# Patient Record
Sex: FEMALE | Race: BLACK OR AFRICAN AMERICAN | Employment: OTHER | URBAN - METROPOLITAN AREA
[De-identification: names, ages, dates, MRNs, and addresses within clinical notes are randomized per-mention and may not be internally consistent; named-entity substitution may affect disease eponyms.]

---

## 2017-01-03 ENCOUNTER — HOSPITAL ENCOUNTER (OUTPATIENT)
Dept: LAB | Age: 77
Discharge: HOME | End: 2017-01-03
Payer: MEDICARE

## 2017-01-03 ENCOUNTER — TELEPHONE (OUTPATIENT)
Dept: FAMILY MEDICINE CLINIC | Age: 77
End: 2017-01-03

## 2017-01-03 ENCOUNTER — OFFICE VISIT (OUTPATIENT)
Dept: GYNECOLOGY | Age: 77
End: 2017-01-03

## 2017-01-03 VITALS
SYSTOLIC BLOOD PRESSURE: 175 MMHG | HEART RATE: 85 BPM | WEIGHT: 293 LBS | BODY MASS INDEX: 47.09 KG/M2 | DIASTOLIC BLOOD PRESSURE: 77 MMHG | HEIGHT: 66 IN

## 2017-01-03 DIAGNOSIS — M79.10 MYALGIA: Primary | ICD-10-CM

## 2017-01-03 DIAGNOSIS — C54.1 ENDOMETRIAL CANCER (HCC): Primary | ICD-10-CM

## 2017-01-03 DIAGNOSIS — E66.01 MORBID OBESITY, UNSPECIFIED OBESITY TYPE (HCC): ICD-10-CM

## 2017-01-03 PROCEDURE — 88175 CYTOPATH C/V AUTO FLUID REDO: CPT | Performed by: OBSTETRICS & GYNECOLOGY

## 2017-01-03 RX ORDER — CYCLOBENZAPRINE HCL 10 MG
10 TABLET ORAL 2 TIMES DAILY
Qty: 60 TAB | Refills: 0 | Status: SHIPPED | OUTPATIENT
Start: 2017-01-03 | End: 2017-01-24

## 2017-01-03 NOTE — PROGRESS NOTES
27 [facility] [address], [address] [facility]  [phone]  MD Scott Yost MD Werner Karvonen, JACQUELINE    Office Visit    Patient ID:  Name: Breanne Jay  MRN: [id number]  : 1940/76 y.o. Visit date: 1/3/2017    Primary Diagnosis:     ICD-10-CM ICD-9-CM    1. Endometrial cancer ([facility]) C54.1 182.0 PAP, IG, RFX HPV ASCUS ([id number])   2.  Morbid obesity, unspecified obesity type ([facility] E66.01 278.01           Problem List:    Patient Active Problem List   Diagnosis Code    DVT (deep venous thrombosis) ([facility] I82.409    Chronic pain disorder G89.4    HTN (hypertension) I10    Fibromyalgia M79.7    Hypercholesterolemia E78.00    Monoclonal gammopathies D47.2    Acute sinusitis J01.90    Rash R21    Pulmonary embolism ([facility]) I26.99    Edema leg R60.0    MCTD (mixed connective tissue disease) ([facility] M35.1    Leg pain, bilateral M79.604, M79.605    Morbid obesity ([facility]) E66.01    Bronchitis J40    Cellulitis of nostril J34.0    Atrial fibrillation ([facility]) I48.91    Otitis externa H60.90    Tinea corporis B35.4    Edema leg R60.0    Ankle pain, right M25.571    Elbow pain, right M25.521    Arm pain, right M79.601    SOB (shortness of breath) R06.02    DM (diabetes mellitus) ([facility]) E11.9    Pulmonary edema J81.1    Elevated blood sugar R73.9    Iritis, chronic H20.10    Heart failure, systolic and diastolic, chronic ([facility]) B86.34    Dental cavities K02.9    Cellulitis and abscess L03.90, L02.91    MRSA (methicillin resistant staph aureus) culture positive Z22.322    Onychomycosis B35.1    Dental abscess K04.7    Cataract H26.9    Knee pain, bilateral M25.561, M25.562    TSH elevation R94.6    MGUS (monoclonal gammopathy of unknown significance) D47.2    Subclinical hypothyroidism E03.9    Vitamin D deficiency E55.9    Left shoulder pain M25.512    Diabetes mellitus, type II (HCC) E11.9    Rash of hands R21    Fall against object [address] XXXA    Absolute anemia D64.9    Preoperative clearance Z01.818    Tinea pedis, recurrent B35.3    Claudication of both lower extremities (HCC) I73.9    Fatty liver K76.0    Acute kidney failure (HCC) N17.9    Cholelithiasis K80.20    Diabetes mellitus type 2, controlled (HCC) E11.9    Screening for glaucoma Z13.5    Screening for colon cancer Z12.11    Lower abdominal pain R10.30    Leiomyoma of body of uterus D25.9    Endometrial cancer (HCC) C54.1    Myalgia M79.1    Pain of left hand M79.642    Left wrist pain M25.532    Pain of left upper extremity M79.602    At risk for side effect of medication Z91.89       INTERVAL HISTORY: Lily Osborn is a morbidly obese  female with multiple medical problems and a history of stage IA, grade 3, uterine papillary serous carcinoma. She underwent a TLH, BSO, PLND in August 2016. She was recommended adjuvant chemotherapy with Taxol/Carboplatin but she declined. She presents today for routine surveillance. She denies any vaginal bleeding or discharge, any pelvic or abdominal pain, or any changes in her bowel or bladder habits. She has a lot of other complaints, primary musculoskeletal issues. ROS:  Negative  and GI review for dysfunction  Negative cardiopulmonary review. No SOB, palpitations, SRIVASTAVA, Angine. No syncope  Negative musculoskeletal review. Negative Psychological/Neurological review.       PMH:  Past Medical History   Diagnosis Date    Acute kidney failure ([address] 11/19/2015    Acute sinusitis 2/11/2011    Adverse effect of anesthesia      colon polyp    Arthritis      RA    At risk for side effect of medication 9/30/2016    Atrial fibrillation ([address] 10/19/2011    Autoimmune disease ([address]      [address]    Cholelithiasis 11/19/2015    Chronic pain     Claudication of both lower extremities ([address] 8/25/2015    Diabetes mellitus type 2, controlled ([address] 1/19/2016    Diabetes mellitus, type II ([facility] 10/10/2014    Fall against object 10/10/2014    Fatty liver 10/20/2015    Hypertension     Ill-defined condition      SPINAL STENOSIS    Left shoulder pain 10/10/2014    Leiomyoma of body of uterus 2016    Morbid obesity ([address]     Myalgia 2016    Pneumonia     Preop examination 6/15/2015    Preoperative clearance 6/15/2015    Rash 2011    Rash of hands 10/10/2014    Screening for colon cancer 2016    Screening for glaucoma 2016    SOB (shortness of breath)      hospitalized 2012. angina, SOB    Thromboembolus ([facility]      LEFT LOWER LEG AND PE    Tinea pedis, recurrent 2015     PSH:  Past Surgical History   Procedure Laterality Date    Pr colsc flx w/rmvl of tumor polyp lesion snare tq  2011          Hx heart catheterization       NEGATIVE PER PATIENT    Hx orthopaedic Right 'S     BUNIONECTOMY    Hx knee arthroscopy Left     Hx other surgical       BIOPSY OF VEIN IN FOREHEAD    Hx cataract removal Bilateral     Hx  section   [facility] Hx gyn  1960     etopic    Hx dilation and curettage  2016    Hx tubal ligation  1963    Hx hysterectomy  2016    Colonoscopy,remv aris,snare  2016          Colonoscopy N/A 2016     COLONOSCOPY performed by Keron Monique MD at [facility] ENDOSCOPY     SOC:  Social History     Social History    Marital status: SINGLE     Spouse name: N/A    Number of children: N/A    Years of education: N/A     Occupational History    Not on file.      Social History Main Topics    Smoking status: Former Smoker     Packs/day: 0.25     Years: 15.00     Quit date: 1996    Smokeless tobacco: Never Used    Alcohol use No    Drug use: No    Sexual activity: No     Other Topics Concern    Not on file     Social History Narrative     Family History  Family History   Problem Relation Age of Onset    Other Mother      ANEURYSM    Obesity Mother     Heart Disease Father     Other Father      VASCULAR DISEASE    Heart Disease Brother     Heart Attack Brother     Kidney Disease Sister     Seizures Brother     Heart Attack Brother     Anesth Problems Neg Hx      Medications: (reviewed)  Current Outpatient Prescriptions on File Prior to Visit   Medication Sig Dispense Refill    erythromycin (ILOTYCIN) ophthalmic ointment Administer 1 g to right eye three (3) times daily for 7 days. 21 Tube 0    morphine CR (MS CONTIN) 60 mg CR tablet Take 1 Tab by mouth every twelve (12) hours. Max Daily Amount: 120 mg. Indications: CHRONIC PAIN, SEVERE PAIN 60 Tab 0    oxyCODONE IR (ROXICODONE) 20 mg immediate release tablet Take 1 Tab by mouth every four (4) hours as needed for Pain. Max Daily Amount: 120 mg. Indications: PAIN 180 Tab 0    [START ON 1/22/2017] oxyCODONE IR (ROXICODONE) 20 mg immediate release tablet Take 1 Tab by mouth every four (4) hours as needed for Pain. Max Daily Amount: 120 mg. Indications: PAIN 180 Tab 0    [START ON 1/22/2017] morphine CR (MS CONTIN) 60 mg CR tablet Take 1 Tab by mouth every twelve (12) hours. Max Daily Amount: 120 mg. Indications: CHRONIC PAIN, SEVERE PAIN 60 Tab 0    metoprolol succinate (TOPROL-XL) 100 mg tablet       furosemide (LASIX) 40 mg tablet Take 1 Tab by mouth daily. 30 Tab 1    potassium chloride (K-DUR, KLOR-CON) 20 mEq tablet Take 1 Tab by mouth two (2) times a day. Indications: HYPOKALEMIA PREVENTION (Patient taking differently: Take 20 mEq by mouth daily. Indications: HYPOKALEMIA PREVENTION) 30 Tab 1    aspirin delayed-release 81 mg tablet Take 2 Tabs by mouth daily (after breakfast). After meal  Indications: PREVENTION OF TRANSIENT ISCHEMIC ATTACKS, THROMBOSIS PREVENTION AFTER PCI 60 Tab 11    ascorbic acid, vitamin C, (VITAMIN C) 500 mg tablet Take 500 mg by mouth daily.  magnesium 250 mg tab Take  by mouth daily.  cholecalciferol, vitamin D3, (VITAMIN D3) 2,000 unit tab Take  by mouth daily.       hydrALAZINE (APRESOLINE) 25 mg tablet Take 1 Tab by mouth daily. 90 Tab 6    metoprolol succinate (TOPROL-XL) 50 mg XL tablet Take 50 mg by mouth daily.  levalbuterol tartrate (XOPENEX HFA) 45 mcg/actuation inhaler Take 2 Puffs by inhalation every six (6) hours as needed for Wheezing or Shortness of Breath. 1 Inhaler 11     No current facility-administered medications on file prior to visit. Allergies: (reviewed)  Allergies   Allergen Reactions    Latex Rash and Itching    Advair Diskus [Fluticasone-Salmeterol] Other (comments)     \"breathing problems\"    Bactrim [Sulfamethoprim] Shortness of Breath, Itching and Swelling     Swelling of tongue and throat    Iodinated Contrast Media - Oral And Iv Dye Hives    Ivp [Fd And C Blue No.1] Hives and Shortness of Breath    Lovenox [Enoxaparin] Shortness of Breath    Nsaids (Non-Steroidal Anti-Inflammatory Drug) Other (comments)     Heartburn    Sulfa (Sulfonamide Antibiotics) Shortness of Breath       OBJECTIVE:    PHYSICAL EXAM  VITAL SIGNS: Vitals:    01/03/17 0929   BP: 175/77   Pulse: 85   Weight: 309 lb (140.2 kg)   Height: 5' 6\" (1.676 m)     Body mass index is 49.87 kg/(m^2). GENERAL VICKI: Conversant, alert, oriented. NAD   HEENT: Normocephalic. Oropharynx clear. Neck: Supple. Trachea midline, no JVD, no thyroid enlargement   RESPIRATORY: Lung fields clear to auscultation and percussion. No rales/rhonchi. Adequate respiratory effort   CARDIOVASC: Rate regular, S1 and S2 confirmed   GASTROINT: soft, non-tender, without masses or organomegaly. No hernia noted   MUSCULOSKEL: FROM. No focal tenderness. EXTREMITIES: extremities normal, atraumatic, no cyanosis or edema. Pulses appreciated.    PELVIC: External genitalia: normal general appearance  Urinary system: urethral meatus normal  Vaginal: normal without tenderness, induration or masses  Cervix: absent  Adnexa: removed surgically  Uterus: absent   RECTAL: Deferred   MEHUL SURVEY: Negative throughout---neck, axillae, inguina. NEURO: Grossly intact, no acute deficit. Oriented. Not depressed. Not agitated. IMPRESSION AND PLAN:  Guera Mckoy has a diagnosis of stage IA, grade 3, uterine papillary serous carcinoma. She declined adjuvant therapy following her hysterectomy. She has no evidence of disease on today's exam.  We will follow up on today's pap smear and will send her for a CA-125. I will see her back in 3 months.       Kristine Crocker MD  1/3/2017/9:26 AM

## 2017-01-03 NOTE — TELEPHONE ENCOUNTER
Pt wants a muscle relaxer for pain in her shoulder area   No fall or accident       Best number to reach her is [phone]

## 2017-01-03 NOTE — TELEPHONE ENCOUNTER
Returned call to pt. C/o muscle spasms to left shoulder,  States oxycodone IR  or morphine CR not relieving pain. Denies injury or fall. Requesting muscle relaxer.   Message sent to Dr Lorenzo Muñoz

## 2017-01-04 LAB — CANCER AG125 SERPL-ACNC: 60.9 U/ML (ref 0–38.1)

## 2017-01-06 NOTE — PROGRESS NOTES
Abnormal cells, suspicious for malignancy. CA-125 also elevated. Patient needs appointment to discuss the results and to schedule imaging.

## 2017-01-09 DIAGNOSIS — R31.9 BLOOD IN URINE: ICD-10-CM

## 2017-01-09 DIAGNOSIS — R10.30 LOWER ABDOMINAL PAIN: ICD-10-CM

## 2017-01-09 DIAGNOSIS — E11.9 DIABETES MELLITUS WITHOUT COMPLICATION (HCC): ICD-10-CM

## 2017-01-09 DIAGNOSIS — G89.4 CHRONIC PAIN DISORDER: ICD-10-CM

## 2017-01-09 DIAGNOSIS — I26.99 OTHER PULMONARY EMBOLISM WITHOUT ACUTE COR PULMONALE, UNSPECIFIED CHRONICITY (HCC): ICD-10-CM

## 2017-01-09 NOTE — PROGRESS NOTES
Patient notified of results, pt verbalized understanding of results and MD's recommendations, pt made an appt for consult visit for 1/10/17

## 2017-01-09 NOTE — TELEPHONE ENCOUNTER
Received call from pt with a call back request.  Pt would like Dr Collins Mendoza to call her in regards to her most recent results from the oncologist.  Informed her that Dr Rosalva Sandoval is out of the office .   Pt stated understanding    Message sent to dr Collins Mendoza

## 2017-01-10 ENCOUNTER — TELEPHONE (OUTPATIENT)
Dept: GYNECOLOGY | Age: 77
End: 2017-01-10

## 2017-01-10 ENCOUNTER — OFFICE VISIT (OUTPATIENT)
Dept: GYNECOLOGY | Age: 77
End: 2017-01-10

## 2017-01-10 DIAGNOSIS — E66.01 MORBID OBESITY, UNSPECIFIED OBESITY TYPE (HCC): ICD-10-CM

## 2017-01-10 DIAGNOSIS — T50.995A ALLERGY TO IVP DYE, INITIAL ENCOUNTER: ICD-10-CM

## 2017-01-10 DIAGNOSIS — I82.499 DEEP VEIN THROMBOSIS (DVT) OF OTHER VEIN OF LOWER EXTREMITY, UNSPECIFIED CHRONICITY, UNSPECIFIED LATERALITY (HCC): ICD-10-CM

## 2017-01-10 DIAGNOSIS — C54.1 ENDOMETRIAL CANCER (HCC): Primary | ICD-10-CM

## 2017-01-10 DIAGNOSIS — I26.09 OTHER PULMONARY EMBOLISM WITH ACUTE COR PULMONALE, UNSPECIFIED CHRONICITY (HCC): ICD-10-CM

## 2017-01-10 DIAGNOSIS — I82.499 DEEP VEIN THROMBOSIS (DVT) OF OTHER VEIN OF LOWER EXTREMITY, UNSPECIFIED CHRONICITY, UNSPECIFIED LATERALITY (HCC): Primary | ICD-10-CM

## 2017-01-10 RX ORDER — PREDNISONE 10 MG/1
TABLET ORAL
Qty: 15 TAB | Refills: 0 | Status: SHIPPED | OUTPATIENT
Start: 2017-01-10 | End: 2017-01-24

## 2017-01-10 RX ORDER — FUROSEMIDE 40 MG/1
40 TABLET ORAL DAILY
Qty: 30 TAB | Refills: 1 | Status: SHIPPED | OUTPATIENT
Start: 2017-01-10 | End: 2017-02-23 | Stop reason: SDUPTHER

## 2017-01-10 NOTE — PROGRESS NOTES
[address], [address], [address]  [phone]  MD Deanna Campbell MD Raelene Conroy, JACQUELINE    Office Visit    Patient ID:  Name: Tila Coles  MRN: [id number]  : 1940/76 y.o. Visit date: 1/10/2017    Primary Diagnosis:     ICD-10-CM ICD-9-CM    1. Endometrial cancer ([facility]) C54.1 182.0    2. Morbid obesity, unspecified obesity type ([facility] E66.01 278.01    3. Deep vein thrombosis (DVT) of other vein of lower extremity, unspecified chronicity, unspecified laterality ([facility]) I82.499 453.40    4.  Other pulmonary embolism with acute cor pulmonale, unspecified chronicity ([facility]) I26.09 415.19      415.0           Problem List:    Patient Active Problem List   Diagnosis Code    DVT (deep venous thrombosis) ([facility]) I82.409    Chronic pain disorder G89.4    HTN (hypertension) I10    Fibromyalgia M79.7    Hypercholesterolemia E78.00    Monoclonal gammopathies D47.2    Acute sinusitis J01.90    Rash R21    Pulmonary embolism ([facility]) I26.99    Edema leg R60.0    MCTD (mixed connective tissue disease) ([facility] M35.1    Leg pain, bilateral M79.604, M79.605    Morbid obesity ([facility]) E66.01    Bronchitis J40    Cellulitis of nostril J34.0    Atrial fibrillation ([facility]) I48.91    Otitis externa H60.90    Tinea corporis B35.4    Edema leg R60.0    Ankle pain, right M25.571    Elbow pain, right M25.521    Arm pain, right M79.601    SOB (shortness of breath) R06.02    DM (diabetes mellitus) ([facility]) E11.9    Pulmonary edema J81.1    Elevated blood sugar R73.9    Iritis, chronic H20.10    Heart failure, systolic and diastolic, chronic ([facility]) N60.59    Dental cavities K02.9    Cellulitis and abscess L03.90, L02.91    MRSA (methicillin resistant staph aureus) culture positive Z22.322    Onychomycosis B35.1    Dental abscess K04.7    Cataract H26.9    Knee pain, bilateral M25.561, M25.562    TSH elevation R94.6    MGUS (monoclonal gammopathy of unknown significance) D47.2    Subclinical hypothyroidism E03.9    Vitamin D deficiency E55.9    Left shoulder pain M25.512    Diabetes mellitus, type II (HCC) E11.9    Rash of hands R21    Fall against object [address]. XXXA    Absolute anemia D64.9    Preoperative clearance Z01.818    Tinea pedis, recurrent B35.3    Claudication of both lower extremities (HCC) I73.9    Fatty liver K76.0    Acute kidney failure (HCC) N17.9    Cholelithiasis K80.20    Diabetes mellitus type 2, controlled (HCC) E11.9    Screening for glaucoma Z13.5    Screening for colon cancer Z12.11    Lower abdominal pain R10.30    Leiomyoma of body of uterus D25.9    Endometrial cancer (HCC) C54.1    Myalgia M79.1    Pain of left hand M79.642    Left wrist pain M25.532    Pain of left upper extremity M79.602    At risk for side effect of medication Z91.89       INTERVAL HISTORY: Lalo Wadsworth is a morbidly obese  female with multiple medical problems and a history of stage IA, grade 3, uterine papillary serous carcinoma. She underwent a TLH, BSO, PLND in August 2016. She was recommended adjuvant chemotherapy with Taxol/Carboplatin but she declined. She presented last week for routine surveillance. She denied any vaginal bleeding or discharge, any pelvic or abdominal pain, or any changes in her bowel or bladder habits. She had a lot of other complaints, primary musculoskeletal issues. There was nothing noted on exam but her pap smear returned as highly suspicious for adenocarcinoma. Her CA-125 was also elevated at 60.9. This was normal prior to her surgery. She presents today to discuss these results. ROS:  Negative  and GI review for dysfunction  Negative cardiopulmonary review. No SOB, palpitations, SRIVASTAVA, Angine. No syncope  Negative musculoskeletal review. Negative Psychological/Neurological review.       PMH:  Past Medical History   Diagnosis Date    Acute kidney failure ([address] 2015    Acute sinusitis 2011    Adverse effect of anesthesia      colon polyp    Arthritis      RA    At risk for side effect of medication 2016    Atrial fibrillation ([address] 10/19/2011    Autoimmune disease ([address]      [address]    Cholelithiasis 2015    Chronic pain     Claudication of both lower extremities ([address] 2015    Diabetes mellitus type 2, controlled ([address] 2016    Diabetes mellitus, type II ([address] 10/10/2014    Fall against object 10/10/2014    Fatty liver 10/20/2015    Hypertension     Ill-defined condition      SPINAL STENOSIS    Left shoulder pain 10/10/2014    Leiomyoma of body of uterus 2016    Morbid obesity ([address]     Myalgia 2016    Pneumonia     Preop examination 6/15/2015    Preoperative clearance 6/15/2015    Rash 2011    Rash of hands 10/10/2014    Screening for colon cancer 2016    Screening for glaucoma 2016    SOB (shortness of breath)      hospitalized 2012. angina, SOB    Thromboembolus ([address]      LEFT LOWER LEG AND PE    Tinea pedis, recurrent 2015     PSH:  Past Surgical History   Procedure Laterality Date    Pr colsc flx w/rmvl of tumor polyp lesion snare tq  2011          Hx heart catheterization       NEGATIVE PER PATIENT    Hx orthopaedic Right 1960'S     BUNIONECTOMY    Hx knee arthroscopy Left     Hx other surgical       BIOPSY OF VEIN IN FOREHEAD    Hx cataract removal Bilateral     Hx  section   [facility] Hx gyn  1960     etopic    Hx dilation and curettage  2016    Hx tubal ligation  1963    Hx hysterectomy  2016    Colonoscopy,remv lesn,snare  2016          Colonoscopy N/A 2016     COLONOSCOPY performed by Velvet Pena MD at [facility] ENDOSCOPY     SOC:  Social History     Social History    Marital status: SINGLE     Spouse name: N/A    Number of children: N/A    Years of education: N/A     Occupational History    Not on file. Social History Main Topics    Smoking status: Former Smoker     Packs/day: 0.25     Years: 15.00     Quit date: 8/23/1996    Smokeless tobacco: Never Used    Alcohol use No    Drug use: No    Sexual activity: No     Other Topics Concern    Not on file     Social History Narrative     Family History  Family History   Problem Relation Age of Onset    Other Mother      ANEURYSM    Obesity Mother     Heart Disease Father     Other Father      VASCULAR DISEASE    Heart Disease Brother     Heart Attack Brother     Kidney Disease Sister     Seizures Brother     Heart Attack Brother     Anesth Problems Neg Hx      Medications: (reviewed)  Current Outpatient Prescriptions on File Prior to Visit   Medication Sig Dispense Refill    cyclobenzaprine (FLEXERIL) 10 mg tablet Take 1 Tab by mouth two (2) times a day. Indications: MUSCLE SPASM 60 Tab 0    morphine CR (MS CONTIN) 60 mg CR tablet Take 1 Tab by mouth every twelve (12) hours. Max Daily Amount: 120 mg. Indications: CHRONIC PAIN, SEVERE PAIN 60 Tab 0    oxyCODONE IR (ROXICODONE) 20 mg immediate release tablet Take 1 Tab by mouth every four (4) hours as needed for Pain. Max Daily Amount: 120 mg. Indications: PAIN 180 Tab 0    [START ON 1/22/2017] oxyCODONE IR (ROXICODONE) 20 mg immediate release tablet Take 1 Tab by mouth every four (4) hours as needed for Pain. Max Daily Amount: 120 mg. Indications: PAIN 180 Tab 0    [START ON 1/22/2017] morphine CR (MS CONTIN) 60 mg CR tablet Take 1 Tab by mouth every twelve (12) hours. Max Daily Amount: 120 mg. Indications: CHRONIC PAIN, SEVERE PAIN 60 Tab 0    metoprolol succinate (TOPROL-XL) 100 mg tablet       furosemide (LASIX) 40 mg tablet Take 1 Tab by mouth daily. 30 Tab 1    potassium chloride (K-DUR, KLOR-CON) 20 mEq tablet Take 1 Tab by mouth two (2) times a day. Indications: HYPOKALEMIA PREVENTION (Patient taking differently: Take 20 mEq by mouth daily.  Indications: HYPOKALEMIA PREVENTION) 30 Tab 1  aspirin delayed-release 81 mg tablet Take 2 Tabs by mouth daily (after breakfast). After meal  Indications: PREVENTION OF TRANSIENT ISCHEMIC ATTACKS, THROMBOSIS PREVENTION AFTER PCI 60 Tab 11    ascorbic acid, vitamin C, (VITAMIN C) 500 mg tablet Take 500 mg by mouth daily.  magnesium 250 mg tab Take  by mouth daily.  cholecalciferol, vitamin D3, (VITAMIN D3) 2,000 unit tab Take  by mouth daily.  hydrALAZINE (APRESOLINE) 25 mg tablet Take 1 Tab by mouth daily. 90 Tab 6    levalbuterol tartrate (XOPENEX HFA) 45 mcg/actuation inhaler Take 2 Puffs by inhalation every six (6) hours as needed for Wheezing or Shortness of Breath. 1 Inhaler 11     No current facility-administered medications on file prior to visit. Allergies: (reviewed)  Allergies   Allergen Reactions    Latex Rash and Itching    Advair Diskus [Fluticasone-Salmeterol] Other (comments)     \"breathing problems\"    Bactrim [Sulfamethoprim] Shortness of Breath, Itching and Swelling     Swelling of tongue and throat    Iodinated Contrast Media - Oral And Iv Dye Hives    Ivp [Fd And C Blue No.1] Hives and Shortness of Breath    Lovenox [Enoxaparin] Shortness of Breath    Nsaids (Non-Steroidal Anti-Inflammatory Drug) Other (comments)     Heartburn    Sulfa (Sulfonamide Antibiotics) Shortness of Breath       OBJECTIVE:    PHYSICAL EXAM  VITAL SIGNS: There were no vitals filed for this visit. There is no height or weight on file to calculate BMI. GENERAL VICKI: Conversant, alert, oriented. NAD   HEENT: Normocephalic. Oropharynx clear. Neck: Supple. Trachea midline, no JVD, no thyroid enlargement   RESPIRATORY: Lung fields clear to auscultation and percussion. No rales/rhonchi. Adequate respiratory effort   CARDIOVASC: Rate regular, S1 and S2 confirmed   GASTROINT: soft, non-tender, without masses or organomegaly. No hernia noted   MUSCULOSKEL: FROM. No focal tenderness.     EXTREMITIES: extremities normal, atraumatic, no cyanosis or edema. Pulses appreciated. PELVIC: Deferred   RECTAL: Deferred   MEHUL SURVEY: Negative throughout---neck, axillae, inguina. NEURO: Grossly intact, no acute deficit. Oriented. Not depressed. Not agitated. IMPRESSION AND PLAN:  Marley Torre has a diagnosis of stage IA, grade 3, uterine papillary serous carcinoma. She declined adjuvant therapy following her hysterectomy. She now has findings concerning for recurrence, specifically an abnormal pap smear and an elevated CA-125. I had a long discussion with the patient, her son, and her daughter about the possibility of recurrent disease. I am going to send her for a CT of the chest, abdomen, and pelvis. If that does not show evidence of disease, I will likely take her to the OR for an EUA with biopsies.         Aravind Kline MD  1/10/2017/9:26 AM

## 2017-01-10 NOTE — MR AVS SNAPSHOT
Visit Information Date & Time Provider Department Dept. Phone Encounter #  
 1/10/2017  2:00 PM Samira Medina MD [facility] Gynecologic Oncology [phone] [id number] Your Appointments 1/17/2017  8:45 AM  
CT SCAN FOLLOW UP with Derian Santiago MD  
[facility]IL[facility] LEIGH Gynecologic Oncology [facility]) Appt Note: ct results [address] [phone]  
[phone]  
  
    
 4/4/2017  9:30 AM  
3 MONTH with Xochilt San MD  
[address] Oncology [facility]) Appt Note: [address] [facility] [address] [phone]  
[phone]  
  
    
 10/31/2017 10:00 AM  
Follow Up with Cammy Reyes  [facility] Oncology at [facility] Appt Note: MGUS, 1 yr f/u, CP 0  
 [address] [phone]  
[phone]  
  
   
 [address] [facility] [address] Upcoming Health Maintenance Date Due  
 MEDICARE YEARLY EXAM 7/20/2016 MICROALBUMIN Q1 2/22/2017 LIPID PANEL Q1 3/17/2017 HEMOGLOBIN A1C Q6M 4/20/2017 EYE EXAM RETINAL OR DILATED Q1 4/22/2017 FOOT EXAM Q1 7/13/2017 GLAUCOMA SCREENING Q2Y 4/22/2018 COLONOSCOPY 12/21/2019 DTaP/Tdap/Td series (3 - Td) 10/20/2026 Allergies as of 1/10/2017  Review Complete On: 1/10/2017 By: Xochilt San MD  
  
 Severity Noted Reaction Type Reactions Latex  08/29/2013    Rash, Itching Advair Diskus [Fluticasone-salmeterol]  05/29/2012    Other (comments) \"breathing problems\" Bactrim [Sulfamethoprim]  08/29/2013   Side Effect Shortness of Breath, Itching, Swelling Swelling of tongue and throat Iodinated Contrast Media - Oral And Iv Dye  10/04/2016    Hives Ivp [Fd And C Blue No.1]  12/21/2010    Hives, Shortness of Breath Lovenox [Enoxaparin]  12/21/2010    Shortness of Breath Nsaids (Non-steroidal Anti-inflammatory Drug)  12/21/2010    Other (comments) Heartburn Sulfa (Sulfonamide Antibiotics)  12/23/2013    Shortness of Breath Current Immunizations  Reviewed on 10/31/2016 Name Date Influenza Vaccine (Quad) PF  Deferred (Medication not available) Pneumococcal Vaccine (Unspecified Type) 1/1/2010 Tdap 5/17/2006 Not reviewed this visit You Were Diagnosed With   
  
 Codes Comments Endometrial cancer ([facility]    -  Primary ICD-10-CM: C54.1 ICD-9-CM: 182.0 Morbid obesity, unspecified obesity type ([facility]     ICD-10-CM: E66.01 
ICD-9-CM: 278.01 Deep vein thrombosis (DVT) of other vein of lower extremity, unspecified chronicity, unspecified laterality (HCC)     ICD-10-CM: K91.781 ICD-9-CM: 453.40 Other pulmonary embolism with acute cor pulmonale, unspecified chronicity (HCC)     ICD-10-CM: I26.09 
ICD-9-CM: 415.19, 415.0 Vitals OB Status Smoking Status Postmenopausal Former Smoker Preferred Pharmacy Pharmacy Name Phone [id number]- [place], [place] - [address] [phone] Your Updated Medication List  
  
   
This list is accurate as of: 1/10/17  3:27 PM.  Always use your most recent med list.  
  
  
  
  
 aspirin delayed-release 81 mg tablet Take 2 Tabs by mouth daily (after breakfast). After meal  Indications: PREVENTION OF TRANSIENT ISCHEMIC ATTACKS, THROMBOSIS PREVENTION AFTER PCI  
  
 cyclobenzaprine 10 mg tablet Commonly known as:  FLEXERIL Take 1 Tab by mouth two (2) times a day. Indications: MUSCLE SPASM  
  
 furosemide 40 mg tablet Commonly known as:  LASIX Take 1 Tab by mouth daily. hydrALAZINE 25 mg tablet Commonly known as:  APRESOLINE Take 1 Tab by mouth daily. levalbuterol tartrate 45 mcg/actuation inhaler Commonly known as:  Keiko Brown  
 Take 2 Puffs by inhalation every six (6) hours as needed for Wheezing or Shortness of Breath.  
  
 magnesium 250 mg Tab Take  by mouth daily. metoprolol succinate 100 mg tablet Commonly known as:  TOPROL-XL  
  
 * morphine CR 60 mg CR tablet Commonly known as:  MS CONTIN Take 1 Tab by mouth every twelve (12) hours. Max Daily Amount: 120 mg. Indications: CHRONIC PAIN, SEVERE PAIN  
  
 * morphine CR 60 mg CR tablet Commonly known as:  MS CONTIN Take 1 Tab by mouth every twelve (12) hours. Max Daily Amount: 120 mg. Indications: CHRONIC PAIN, SEVERE PAIN Start taking on:  1/22/2017 * oxyCODONE IR 20 mg immediate release tablet Commonly known as:  Benjaman Hurl Take 1 Tab by mouth every four (4) hours as needed for Pain. Max Daily Amount: 120 mg. Indications: PAIN  
  
 * oxyCODONE IR 20 mg immediate release tablet Commonly known as:  Benjaman Hurl Take 1 Tab by mouth every four (4) hours as needed for Pain. Max Daily Amount: 120 mg. Indications: PAIN Start taking on:  1/22/2017 potassium chloride 20 mEq tablet Commonly known as:  K-DUR, KLOR-CON Take 1 Tab by mouth two (2) times a day. Indications: HYPOKALEMIA PREVENTION  
  
 VITAMIN C 500 mg tablet Generic drug:  ascorbic acid (vitamin C) Take 500 mg by mouth daily. VITAMIN D3 2,000 unit Tab Generic drug:  cholecalciferol (vitamin D3) Take  by mouth daily. * Notice: This list has 4 medication(s) that are the same as other medications prescribed for you. Read the directions carefully, and ask your doctor or other care provider to review them with you. To-Do List   
 01/10/2017 Imaging:  CT ABD PELV W WO CONT   
  
 01/10/2017 Imaging:  CT CHEST W WO CONT Please provide this summary of care documentation to your next provider. Your primary care clinician is listed as Therese Alert. If you have any questions after today's visit, please call [phone].

## 2017-01-10 NOTE — TELEPHONE ENCOUNTER
Pt  to state she is allergic IV contract/dye She is scheduled for a CT scan on 1/12/17. She is requesting the medication for benadryl and prednisone be sent to the [facility] [address] She was advised to take first dose of prednisone at 11:30 pm on 1/11/17, the second dose at 5:30 am on 1/12/17 and the last dose one hour before her scan at 11:3 am on 1/12/17, pt verbalized understanding.   She was also instructed to take the benadryl one hour prior to scan, pt verbalized understanding, per verbal order ok to send Benadryl tablet and prednisone to pharmacy, allergy warning came up advair and prednisone, per [place] allergy was noted on 5/2012 and she took medication in 8/16 for previous scan without complication, ok to override

## 2017-01-12 ENCOUNTER — HOSPITAL ENCOUNTER (OUTPATIENT)
Dept: CT IMAGING | Age: 77
Discharge: HOME | End: 2017-01-12
Attending: OBSTETRICS & GYNECOLOGY
Payer: MEDICARE

## 2017-01-12 DIAGNOSIS — I26.09 OTHER PULMONARY EMBOLISM WITH ACUTE COR PULMONALE, UNSPECIFIED CHRONICITY (HCC): ICD-10-CM

## 2017-01-12 DIAGNOSIS — I82.499 DEEP VEIN THROMBOSIS (DVT) OF OTHER VEIN OF LOWER EXTREMITY, UNSPECIFIED CHRONICITY, UNSPECIFIED LATERALITY (HCC): ICD-10-CM

## 2017-01-12 DIAGNOSIS — C54.1 ENDOMETRIAL CANCER (HCC): ICD-10-CM

## 2017-01-12 PROCEDURE — 74011250636 HC RX REV CODE- 250/636: Performed by: OBSTETRICS & GYNECOLOGY

## 2017-01-12 PROCEDURE — 74011000255 HC RX REV CODE- 255: Performed by: OBSTETRICS & GYNECOLOGY

## 2017-01-12 PROCEDURE — 74011636320 HC RX REV CODE- 636/320: Performed by: OBSTETRICS & GYNECOLOGY

## 2017-01-12 PROCEDURE — 74177 CT ABD & PELVIS W/CONTRAST: CPT

## 2017-01-12 PROCEDURE — 71260 CT THORAX DX C+: CPT

## 2017-01-12 RX ORDER — SODIUM CHLORIDE 0.9 % (FLUSH) 0.9 %
10 SYRINGE (ML) INJECTION
Status: COMPLETED | OUTPATIENT
Start: 2017-01-12 | End: 2017-01-12

## 2017-01-12 RX ORDER — BARIUM SULFATE 20 MG/ML
900 SUSPENSION ORAL
Status: COMPLETED | OUTPATIENT
Start: 2017-01-12 | End: 2017-01-12

## 2017-01-12 RX ORDER — SODIUM CHLORIDE 9 MG/ML
50 INJECTION, SOLUTION INTRAVENOUS
Status: COMPLETED | OUTPATIENT
Start: 2017-01-12 | End: 2017-01-12

## 2017-01-12 RX ADMIN — BARIUM SULFATE 900 ML: 21 SUSPENSION ORAL at 12:39

## 2017-01-12 RX ADMIN — IOPAMIDOL 100 ML: 755 INJECTION, SOLUTION INTRAVENOUS at 12:39

## 2017-01-12 RX ADMIN — SODIUM CHLORIDE 50 ML/HR: 900 INJECTION, SOLUTION INTRAVENOUS at 12:39

## 2017-01-12 RX ADMIN — Medication 10 ML: at 12:39

## 2017-01-17 ENCOUNTER — OFFICE VISIT (OUTPATIENT)
Dept: GYNECOLOGY | Age: 77
End: 2017-01-17

## 2017-01-17 VITALS
BODY MASS INDEX: 47.09 KG/M2 | WEIGHT: 293 LBS | HEIGHT: 66 IN | DIASTOLIC BLOOD PRESSURE: 92 MMHG | SYSTOLIC BLOOD PRESSURE: 162 MMHG | HEART RATE: 94 BPM

## 2017-01-17 DIAGNOSIS — I26.09 OTHER PULMONARY EMBOLISM WITH ACUTE COR PULMONALE, UNSPECIFIED CHRONICITY (HCC): ICD-10-CM

## 2017-01-17 DIAGNOSIS — E66.01 MORBID OBESITY, UNSPECIFIED OBESITY TYPE (HCC): ICD-10-CM

## 2017-01-17 DIAGNOSIS — I82.499 DEEP VEIN THROMBOSIS (DVT) OF OTHER VEIN OF LOWER EXTREMITY, UNSPECIFIED CHRONICITY, UNSPECIFIED LATERALITY (HCC): ICD-10-CM

## 2017-01-17 DIAGNOSIS — C54.1 ENDOMETRIAL CANCER (HCC): Primary | ICD-10-CM

## 2017-01-17 NOTE — PROGRESS NOTES
[address], [address], [address]  [phone]  MD Scott Headley MD Werner Karvonen, NP    Office Visit    Patient ID:  Name: Breanne Jay  MRN: [id number]  : 1940/76 y.o. Visit date: 2017    Primary Diagnosis:     ICD-10-CM ICD-9-CM    1. Endometrial cancer ([facility]) C54.1 182.0    2. Morbid obesity, unspecified obesity type ([facility] E66.01 278.01    3. Other pulmonary embolism with acute cor pulmonale, unspecified chronicity ([facility]) I26.09 415.19      415.0    4.  Deep vein thrombosis (DVT) of other vein of lower extremity, unspecified chronicity, unspecified laterality ([facility]) I82.499 453.40           Problem List:    Patient Active Problem List   Diagnosis Code    DVT (deep venous thrombosis) ([facility]) I82.409    Chronic pain disorder G89.4    HTN (hypertension) I10    Fibromyalgia M79.7    Hypercholesterolemia E78.00    Monoclonal gammopathies D47.2    Acute sinusitis J01.90    Rash R21    Pulmonary embolism ([facility]) I26.99    Edema leg R60.0    MCTD (mixed connective tissue disease) ([facility]) M35.1    Leg pain, bilateral M79.604, M79.605    Morbid obesity ([facility]) E66.01    Bronchitis J40    Cellulitis of nostril J34.0    Atrial fibrillation ([facility]) I48.91    Otitis externa H60.90    Tinea corporis B35.4    Edema leg R60.0    Ankle pain, right M25.571    Elbow pain, right M25.521    Arm pain, right M79.601    SOB (shortness of breath) R06.02    DM (diabetes mellitus) ([facility]) E11.9    Pulmonary edema J81.1    Elevated blood sugar R73.9    Iritis, chronic H20.10    Heart failure, systolic and diastolic, chronic ([facility]) C72.82    Dental cavities K02.9    Cellulitis and abscess L03.90, L02.91    MRSA (methicillin resistant staph aureus) culture positive Z22.322    Onychomycosis B35.1    Dental abscess K04.7    Cataract H26.9    Knee pain, bilateral M25.561, M25.562    TSH elevation R94.6    MGUS (monoclonal gammopathy of unknown significance) D47.2    Subclinical hypothyroidism E03.9    Vitamin D deficiency E55.9    Left shoulder pain M25.512    Diabetes mellitus, type II (HCC) E11.9    Rash of hands R21    Fall against object W19. XXXA    Absolute anemia D64.9    Preoperative clearance Z01.818    Tinea pedis, recurrent B35.3    Claudication of both lower extremities (HCC) I73.9    Fatty liver K76.0    Acute kidney failure (HCC) N17.9    Cholelithiasis K80.20    Diabetes mellitus type 2, controlled (HCC) E11.9    Screening for glaucoma Z13.5    Screening for colon cancer Z12.11    Lower abdominal pain R10.30    Leiomyoma of body of uterus D25.9    Endometrial cancer (HCC) C54.1    Myalgia M79.1    Pain of left hand M79.642    Left wrist pain M25.532    Pain of left upper extremity M79.602    At risk for side effect of medication Z91.89       INTERVAL HISTORY: Lily Osborn is a morbidly obese  female with multiple medical problems and a history of stage IA, grade 3, uterine papillary serous carcinoma. She underwent a TLH, BSO, PLND in August 2016. She was recommended adjuvant chemotherapy with Taxol/Carboplatin but she declined. She presented last week for routine surveillance. She denied any vaginal bleeding or discharge, any pelvic or abdominal pain, or any changes in her bowel or bladder habits. She had a lot of other complaints, primary musculoskeletal issues. There was nothing noted on exam but her pap smear returned as highly suspicious for adenocarcinoma. Her CA-125 was also elevated at 60.9. This was normal prior to her surgery. I subsequently sent her for a CT of the abdomen/pelvis. She presents today to discuss these results. ROS:  Negative  and GI review for dysfunction  Negative cardiopulmonary review. No SOB, palpitations, SRIVASTAVA, Angine. No syncope  Negative musculoskeletal review. Negative Psychological/Neurological review.       PMH:  Past Medical History   Diagnosis Date    Acute kidney failure ([address] 2015    Acute sinusitis 2011    Adverse effect of anesthesia      colon polyp    Arthritis      RA    At risk for side effect of medication 2016    Atrial fibrillation ([address] 10/19/2011    Autoimmune disease ([address]      [address]    Cholelithiasis 2015    Chronic pain     Claudication of both lower extremities ([address] 2015    Diabetes mellitus type 2, controlled ([address] 2016    Diabetes mellitus, type II ([address] 10/10/2014    Fall against object 10/10/2014    Fatty liver 10/20/2015    Hypertension     Ill-defined condition      SPINAL STENOSIS    Left shoulder pain 10/10/2014    Leiomyoma of body of uterus 2016    Morbid obesity ([address]     Myalgia 2016    Pneumonia     Preop examination 6/15/2015    Preoperative clearance 6/15/2015    Rash 2011    Rash of hands 10/10/2014    Screening for colon cancer 2016    Screening for glaucoma 2016    SOB (shortness of breath)      hospitalized 2012.  angina, SOB    Thromboembolus ([address]      LEFT LOWER LEG AND PE    Tinea pedis, recurrent 2015     PSH:  Past Surgical History   Procedure Laterality Date    Pr colsc flx w/rmvl of tumor polyp lesion snare tq  2011          Hx heart catheterization  2001     NEGATIVE PER PATIENT    Hx orthopaedic Right 'S     BUNIONECTOMY    Hx knee arthroscopy Left     Hx other surgical       BIOPSY OF VEIN IN FOREHEAD    Hx cataract removal Bilateral     Hx  section   [facility] Hx gyn  1960     etopic    Hx dilation and curettage  2016    Hx tubal ligation  1963    Hx hysterectomy  2016    Colonoscopy,remv lesn,snare  2016          Colonoscopy N/A 2016     COLONOSCOPY performed by Gini Paul MD at [facility] ENDOSCOPY     SOC:  Social History     Social History    Marital status: SINGLE     Spouse name: N/A    Number of children: N/A    Years of education: N/A     Occupational History    Not on file. Social History Main Topics    Smoking status: Former Smoker     Packs/day: 0.25     Years: 15.00     Quit date: 8/23/1996    Smokeless tobacco: Never Used    Alcohol use No    Drug use: No    Sexual activity: No     Other Topics Concern    Not on file     Social History Narrative     Family History  Family History   Problem Relation Age of Onset    Other Mother      ANEURYSM    Obesity Mother     Heart Disease Father     Other Father      VASCULAR DISEASE    Heart Disease Brother     Heart Attack Brother     Kidney Disease Sister     Seizures Brother     Heart Attack Brother     Anesth Problems Neg Hx      Medications: (reviewed)  Current Outpatient Prescriptions on File Prior to Visit   Medication Sig Dispense Refill    furosemide (LASIX) 40 mg tablet Take 1 Tab by mouth daily. 30 Tab 1    predniSONE (DELTASONE) 10 mg tablet Take 5 tabs 13 hours, 7 hours and 1 hour prior to your scan 15 Tab 0    cyclobenzaprine (FLEXERIL) 10 mg tablet Take 1 Tab by mouth two (2) times a day. Indications: MUSCLE SPASM 60 Tab 0    morphine CR (MS CONTIN) 60 mg CR tablet Take 1 Tab by mouth every twelve (12) hours. Max Daily Amount: 120 mg. Indications: CHRONIC PAIN, SEVERE PAIN 60 Tab 0    metoprolol succinate (TOPROL-XL) 100 mg tablet       potassium chloride (K-DUR, KLOR-CON) 20 mEq tablet Take 1 Tab by mouth two (2) times a day. Indications: HYPOKALEMIA PREVENTION (Patient taking differently: Take 20 mEq by mouth daily. Indications: HYPOKALEMIA PREVENTION) 30 Tab 1    aspirin delayed-release 81 mg tablet Take 2 Tabs by mouth daily (after breakfast). After meal  Indications: PREVENTION OF TRANSIENT ISCHEMIC ATTACKS, THROMBOSIS PREVENTION AFTER PCI 60 Tab 11    ascorbic acid, vitamin C, (VITAMIN C) 500 mg tablet Take 500 mg by mouth daily.  magnesium 250 mg tab Take  by mouth daily.       cholecalciferol, vitamin D3, (VITAMIN D3) 2,000 unit tab Take  by mouth daily.  hydrALAZINE (APRESOLINE) 25 mg tablet Take 1 Tab by mouth daily. 90 Tab 6    levalbuterol tartrate (XOPENEX HFA) 45 mcg/actuation inhaler Take 2 Puffs by inhalation every six (6) hours as needed for Wheezing or Shortness of Breath. 1 Inhaler 11    oxyCODONE IR (ROXICODONE) 20 mg immediate release tablet Take 1 Tab by mouth every four (4) hours as needed for Pain. Max Daily Amount: 120 mg. Indications: PAIN 180 Tab 0    [START ON 1/22/2017] oxyCODONE IR (ROXICODONE) 20 mg immediate release tablet Take 1 Tab by mouth every four (4) hours as needed for Pain. Max Daily Amount: 120 mg. Indications: PAIN 180 Tab 0    [START ON 1/22/2017] morphine CR (MS CONTIN) 60 mg CR tablet Take 1 Tab by mouth every twelve (12) hours. Max Daily Amount: 120 mg. Indications: CHRONIC PAIN, SEVERE PAIN 60 Tab 0     No current facility-administered medications on file prior to visit. Allergies: (reviewed)  Allergies   Allergen Reactions    Latex Rash and Itching    Advair Diskus [Fluticasone-Salmeterol] Other (comments)     \"breathing problems\"    Bactrim [Sulfamethoprim] Shortness of Breath, Itching and Swelling     Swelling of tongue and throat    Iodinated Contrast Media - Oral And Iv Dye Hives    Ivp [Fd And C Blue No.1] Hives and Shortness of Breath    Lovenox [Enoxaparin] Shortness of Breath    Nsaids (Non-Steroidal Anti-Inflammatory Drug) Other (comments)     Heartburn    Sulfa (Sulfonamide Antibiotics) Shortness of Breath       OBJECTIVE:    PHYSICAL EXAM  VITAL SIGNS: Vitals:    01/17/17 0852   BP: (!) 162/92   Pulse: 94   Weight: 303 lb (137.4 kg)   Height: 5' 5.98\" (1.676 m)     Body mass index is 48.93 kg/(m^2). GENERAL VICKI: Conversant, alert, oriented. NAD   HEENT: Normocephalic. Oropharynx clear. Neck: Supple. Trachea midline, no JVD, no thyroid enlargement   RESPIRATORY: Lung fields clear to auscultation and percussion. No rales/rhonchi.  Adequate respiratory effort CARDIOVASC: Rate regular, S1 and S2 confirmed   GASTROINT: soft, non-tender, without masses or organomegaly. No hernia noted   MUSCULOSKEL: FROM. No focal tenderness. EXTREMITIES: extremities normal, atraumatic, no cyanosis or edema. Pulses appreciated. PELVIC: Deferred   RECTAL: Deferred   MEHUL SURVEY: Negative throughout---neck, axillae, inguina. NEURO: Grossly intact, no acute deficit. Oriented. Not depressed. Not agitated. CT of abdomen/pelvis ( 1/12/17)  THORAX:  2.9 mm nodule is noted in the periphery of the right middle lobe (4-35). 2.2 mm  pleural-based nodule which is triangular is noted adjacent to this nodule. Lungs  are otherwise clear. There is no acute finding. No pleural effusion. The airways  are patent.      The aorta enhances normally with no evidence of aneurysm or dissection.      There is no mediastinal or hilar or axillary adenopathy.      Thyroid gland is homogeneous.     Osseous structures of the thorax, abdomen, and pelvis demonstrate degenerative  changes of the inferior lumbar spine. No lytic or sclerotic lesions are  identified. There are degenerative changes of the sacroiliac joints. There is  possible early AVN of the left femoral head. This has not changed.        ABDOMEN AND PELVIS:  There are no focal abnormalities within the liver, kidneys, spleen, pancreas,  and adrenal glands. .     The aorta tapers without aneurysm. There is no retroperitoneal adenopathy or  mass.     There is no bowel obstruction or inflammatory change. There is diverticulosis of  the distal colon. The appendix is normal. There is a trace amount of ascites in  the right and left upper quadrants as well as in the cul-de-sac. There is slight  stranding in the pericolonic fat along the peritoneum on the left. This is best  visualized on coronal imaging (602, 73). There is no nodularity, however. No  other manifestation of peritoneal disease is identified. .     The study of the pelvis demonstrates moderately full urinary bladder. There is  no pelvic mass or adenopathy.     IMPRESSION:   Thorax:  1. There is a 2.9 mm nodule in the periphery of the right middle lobe. Follow-up  of this may be considered as suggested below. The smaller pleural-based nodule  is consistent with probable benign etiology similar to a perifissural nodule. .  2. No other manifestation of metastatic disease in the thorax.     Abdomen and pelvis:  1. Trace amount of ascites is seen in the abdomen as described above. There is  slight stranding in pericolonic fat along the peritoneum on the left, however  there is no other manifestation of peritoneal disease. 2. There is no adenopathy or abnormal mass noted. IMPRESSION AND PLAN:  Hannah Taveras has a diagnosis of stage IA, grade 3, uterine papillary serous carcinoma. She declined adjuvant therapy following her hysterectomy. She now has findings concerning for recurrence, specifically an abnormal pap smear and an elevated CA-125. I had a long discussion with the patient, her son, and her daughter about the possibility of recurrent disease. Her CT showed no obvious disease, but it did demonstrate some trace ascites in the pelvis. That still doesn't explain the pap smear. I have recommended an exam under anesthesia with vaginal cuff biopsy ASAP. She was counseled on the risks, benefits, indications, and alternatives of the procedure. Her questions were answered and she wishes to proceed.       Corrinne Center., MD  1/17/2017/9:26 AM

## 2017-01-18 ENCOUNTER — TELEPHONE (OUTPATIENT)
Dept: GYNECOLOGY | Age: 77
End: 2017-01-18

## 2017-01-18 NOTE — TELEPHONE ENCOUNTER
Pt  to request a copy of her office visit from 1/17/16, copy of her pap smear, copy of her  results and copy of recent CT scan, she would like them mailed to home address on file which was verified

## 2017-01-24 ENCOUNTER — HOSPITAL ENCOUNTER (OUTPATIENT)
Dept: PREADMISSION TESTING | Age: 77
Discharge: HOME | End: 2017-01-24
Payer: MEDICARE

## 2017-01-24 VITALS
HEIGHT: 66 IN | WEIGHT: 293 LBS | TEMPERATURE: 97.5 F | SYSTOLIC BLOOD PRESSURE: 137 MMHG | BODY MASS INDEX: 47.09 KG/M2 | RESPIRATION RATE: 20 BRPM | DIASTOLIC BLOOD PRESSURE: 75 MMHG | HEART RATE: 69 BPM

## 2017-01-24 LAB
ALBUMIN SERPL BCP-MCNC: 3.2 G/DL (ref 3.5–5)
ALBUMIN/GLOB SERPL: 0.6 {RATIO} (ref 1.1–2.2)
ALP SERPL-CCNC: 75 U/L (ref 45–117)
ALT SERPL-CCNC: 16 U/L (ref 12–78)
ANION GAP BLD CALC-SCNC: 4 MMOL/L (ref 5–15)
AST SERPL W P-5'-P-CCNC: 15 U/L (ref 15–37)
BASOPHILS # BLD AUTO: 0 K/UL (ref 0–0.1)
BASOPHILS # BLD: 0 % (ref 0–1)
BILIRUB SERPL-MCNC: 0.4 MG/DL (ref 0.2–1)
BUN SERPL-MCNC: 25 MG/DL (ref 6–20)
BUN/CREAT SERPL: 28 (ref 12–20)
CALCIUM SERPL-MCNC: 10.2 MG/DL (ref 8.5–10.1)
CHLORIDE SERPL-SCNC: 102 MMOL/L (ref 97–108)
CO2 SERPL-SCNC: 33 MMOL/L (ref 21–32)
CREAT SERPL-MCNC: 0.89 MG/DL (ref 0.55–1.02)
EOSINOPHIL # BLD: 0.1 K/UL (ref 0–0.4)
EOSINOPHIL NFR BLD: 3 % (ref 0–7)
ERYTHROCYTE [DISTWIDTH] IN BLOOD BY AUTOMATED COUNT: 14.9 % (ref 11.5–14.5)
GLOBULIN SER CALC-MCNC: 5.7 G/DL (ref 2–4)
GLUCOSE SERPL-MCNC: 85 MG/DL (ref 65–100)
HCT VFR BLD AUTO: 31 % (ref 35–47)
HGB BLD-MCNC: 9.9 G/DL (ref 11.5–16)
LYMPHOCYTES # BLD AUTO: 24 % (ref 12–49)
LYMPHOCYTES # BLD: 1.1 K/UL (ref 0.8–3.5)
MCH RBC QN AUTO: 29.6 PG (ref 26–34)
MCHC RBC AUTO-ENTMCNC: 31.9 G/DL (ref 30–36.5)
MCV RBC AUTO: 92.5 FL (ref 80–99)
MONOCYTES # BLD: 0.4 K/UL (ref 0–1)
MONOCYTES NFR BLD AUTO: 9 % (ref 5–13)
NEUTS SEG # BLD: 2.9 K/UL (ref 1.8–8)
NEUTS SEG NFR BLD AUTO: 64 % (ref 32–75)
PLATELET # BLD AUTO: 190 K/UL (ref 150–400)
POTASSIUM SERPL-SCNC: 4 MMOL/L (ref 3.5–5.1)
PROT SERPL-MCNC: 8.9 G/DL (ref 6.4–8.2)
RBC # BLD AUTO: 3.35 M/UL (ref 3.8–5.2)
SODIUM SERPL-SCNC: 139 MMOL/L (ref 136–145)
WBC # BLD AUTO: 4.5 K/UL (ref 3.6–11)

## 2017-01-24 PROCEDURE — 36415 COLL VENOUS BLD VENIPUNCTURE: CPT | Performed by: OBSTETRICS & GYNECOLOGY

## 2017-01-24 PROCEDURE — 85025 COMPLETE CBC W/AUTO DIFF WBC: CPT | Performed by: OBSTETRICS & GYNECOLOGY

## 2017-01-24 PROCEDURE — 80053 COMPREHEN METABOLIC PANEL: CPT | Performed by: OBSTETRICS & GYNECOLOGY

## 2017-01-24 NOTE — PERIOP NOTES
Patient given surgical site infection FAQ handout . Preop instructions reviewed and patient verbalizes understanding of instructions. Patient has been given the opportunity to ask additional questions.

## 2017-01-25 ENCOUNTER — TELEPHONE (OUTPATIENT)
Dept: GYNECOLOGY | Age: 77
End: 2017-01-25

## 2017-01-25 RX ORDER — HYDRALAZINE HYDROCHLORIDE 25 MG/1
25 TABLET, FILM COATED ORAL DAILY
Qty: 90 TAB | Refills: 6 | Status: SHIPPED | OUTPATIENT
Start: 2017-01-25 | End: 2018-05-08 | Stop reason: SDUPTHER

## 2017-01-25 NOTE — TELEPHONE ENCOUNTER
Justina Baron called from [address] [place] [address] to report the patients HGB of 9.9 and HCT of 31. She wanted to be sure we have received those results. I advised her that we do have those results for  to review.

## 2017-01-25 NOTE — PERIOP NOTES
FAXED PRE-ADMISSION TESTING REPORTS (AND FAX CONFIRMATION RECEIVED TO 'S OFFICE CALLED ON 1/25/17  AT 1547  AND SPOKE WITH THE NURSE RE: ABNORMAL HGB 9.9(L) , HCT 31.0 (L)

## 2017-02-02 RX ORDER — CLONAZEPAM 1 MG/1
1 TABLET ORAL 2 TIMES DAILY
Qty: 30 TAB | Refills: 0 | Status: SHIPPED | OUTPATIENT
Start: 2017-02-02 | End: 2017-02-02 | Stop reason: SDUPTHER

## 2017-02-02 RX ORDER — SCOLOPAMINE TRANSDERMAL SYSTEM 1 MG/1
1.5 PATCH, EXTENDED RELEASE TRANSDERMAL
Qty: 3 PATCH | Refills: 0 | Status: SHIPPED | OUTPATIENT
Start: 2017-02-02 | End: 2017-02-02 | Stop reason: SDUPTHER

## 2017-02-02 RX ORDER — CLONAZEPAM 1 MG/1
1 TABLET ORAL 2 TIMES DAILY
Qty: 30 TAB | Refills: 0 | Status: SHIPPED | OUTPATIENT
Start: 2017-02-02 | End: 2017-03-14 | Stop reason: SDUPTHER

## 2017-02-02 RX ORDER — SCOLOPAMINE TRANSDERMAL SYSTEM 1 MG/1
1.5 PATCH, EXTENDED RELEASE TRANSDERMAL
Qty: 3 PATCH | Refills: 0 | Status: SHIPPED | OUTPATIENT
Start: 2017-02-02 | End: 2017-09-19 | Stop reason: CLARIF

## 2017-02-23 ENCOUNTER — OFFICE VISIT (OUTPATIENT)
Dept: FAMILY MEDICINE CLINIC | Age: 77
End: 2017-02-23

## 2017-02-23 ENCOUNTER — HOSPITAL ENCOUNTER (OUTPATIENT)
Dept: LAB | Age: 77
Discharge: HOME | End: 2017-02-23
Payer: MEDICARE

## 2017-02-23 VITALS
WEIGHT: 293 LBS | HEIGHT: 66 IN | BODY MASS INDEX: 47.09 KG/M2 | OXYGEN SATURATION: 99 % | RESPIRATION RATE: 14 BRPM | SYSTOLIC BLOOD PRESSURE: 116 MMHG | DIASTOLIC BLOOD PRESSURE: 59 MMHG | HEART RATE: 68 BPM | TEMPERATURE: 97.6 F

## 2017-02-23 DIAGNOSIS — R31.9 BLOOD IN URINE: ICD-10-CM

## 2017-02-23 DIAGNOSIS — G89.4 CHRONIC PAIN DISORDER: ICD-10-CM

## 2017-02-23 DIAGNOSIS — R10.30 LOWER ABDOMINAL PAIN: ICD-10-CM

## 2017-02-23 DIAGNOSIS — E11.9 DIABETES MELLITUS WITHOUT COMPLICATION (HCC): ICD-10-CM

## 2017-02-23 DIAGNOSIS — I26.99 OTHER PULMONARY EMBOLISM WITHOUT ACUTE COR PULMONALE, UNSPECIFIED CHRONICITY (HCC): ICD-10-CM

## 2017-02-23 LAB
BILIRUB UR QL STRIP: NEGATIVE
GLUCOSE UR-MCNC: NEGATIVE MG/DL
KETONES P FAST UR STRIP-MCNC: NEGATIVE MG/DL
PH UR STRIP: 5.5 [PH] (ref 4.6–8)
PROT UR QL STRIP: NORMAL MG/DL
SP GR UR STRIP: 1.02 (ref 1–1.03)
UA UROBILINOGEN AMB POC: NORMAL (ref 0.2–1)
URINALYSIS CLARITY POC: CLEAR
URINALYSIS COLOR POC: YELLOW
URINE BLOOD POC: NEGATIVE
URINE LEUKOCYTES POC: NEGATIVE
URINE NITRITES POC: NEGATIVE

## 2017-02-23 PROCEDURE — 36415 COLL VENOUS BLD VENIPUNCTURE: CPT

## 2017-02-23 PROCEDURE — 82043 UR ALBUMIN QUANTITATIVE: CPT

## 2017-02-23 RX ORDER — OXYCODONE HYDROCHLORIDE 20 MG/1
20 TABLET ORAL
Qty: 180 TAB | Refills: 0 | Status: SHIPPED | OUTPATIENT
Start: 2017-03-23 | End: 2017-04-20 | Stop reason: SDUPTHER

## 2017-02-23 RX ORDER — PREDNISONE 10 MG/1
TABLET ORAL
Refills: 0 | COMMUNITY
Start: 2017-01-10 | End: 2017-09-19 | Stop reason: CLARIF

## 2017-02-23 RX ORDER — POLYETHYLENE GLYCOL-3350 AND ELECTROLYTES WITH FLAVOR PACK 240; 5.84; 2.98; 6.72; 22.72 G/278.26G; G/278.26G; G/278.26G; G/278.26G; G/278.26G
POWDER, FOR SOLUTION ORAL
Refills: 0 | COMMUNITY
Start: 2016-11-30 | End: 2017-09-19 | Stop reason: CLARIF

## 2017-02-23 RX ORDER — FUROSEMIDE 40 MG/1
40 TABLET ORAL DAILY
Qty: 30 TAB | Refills: 1 | Status: SHIPPED | OUTPATIENT
Start: 2017-02-23 | End: 2017-04-20 | Stop reason: SDUPTHER

## 2017-02-23 RX ORDER — MORPHINE SULFATE 60 MG/1
60 TABLET, FILM COATED, EXTENDED RELEASE ORAL EVERY 12 HOURS
Qty: 60 TAB | Refills: 0 | Status: SHIPPED | OUTPATIENT
Start: 2017-03-23 | End: 2017-04-20 | Stop reason: SDUPTHER

## 2017-02-23 RX ORDER — DIPHENHYDRAMINE HCL 50 MG
CAPSULE ORAL
Refills: 0 | COMMUNITY
Start: 2017-01-11 | End: 2017-09-19 | Stop reason: CLARIF

## 2017-02-23 RX ORDER — POTASSIUM CHLORIDE 20 MEQ/1
20 TABLET, EXTENDED RELEASE ORAL 2 TIMES DAILY
Qty: 30 TAB | Refills: 1 | Status: SHIPPED | OUTPATIENT
Start: 2017-02-23 | End: 2017-04-20 | Stop reason: SDUPTHER

## 2017-02-23 RX ORDER — MORPHINE SULFATE 60 MG/1
60 TABLET, FILM COATED, EXTENDED RELEASE ORAL EVERY 12 HOURS
Qty: 60 TAB | Refills: 0 | Status: SHIPPED | OUTPATIENT
Start: 2017-02-23 | End: 2017-04-20 | Stop reason: SDUPTHER

## 2017-02-23 RX ORDER — CYCLOBENZAPRINE HCL 10 MG
TABLET ORAL
Refills: 0 | COMMUNITY
Start: 2017-01-03 | End: 2017-09-19 | Stop reason: CLARIF

## 2017-02-23 RX ORDER — METHYLPREDNISOLONE 4 MG/1
TABLET ORAL
Refills: 0 | COMMUNITY
Start: 2016-11-16 | End: 2017-08-22 | Stop reason: ALTCHOICE

## 2017-02-23 RX ORDER — LORAZEPAM 0.5 MG/1
TABLET ORAL
Refills: 1 | COMMUNITY
Start: 2017-02-02 | End: 2017-03-15 | Stop reason: ALTCHOICE

## 2017-02-23 RX ORDER — OXYCODONE HYDROCHLORIDE 20 MG/1
20 TABLET ORAL
Qty: 180 TAB | Refills: 0 | Status: SHIPPED | OUTPATIENT
Start: 2017-02-23 | End: 2017-04-20 | Stop reason: SDUPTHER

## 2017-02-23 NOTE — PROGRESS NOTES
Milo Selby      Name and  verified      Chief Complaint   Patient presents with   [facility] Welcome To Medicare

## 2017-02-23 NOTE — MR AVS SNAPSHOT
Visit Information Date & Time Provider Department Dept. Phone Encounter #  
 2/23/2017  9:45 AM Claudis Phalen, MD [facility] OFFICE-ANNEX [phone] [id number] Your Appointments 4/4/2017  9:30 AM  
3 MONTH with Benita Sexton MD  
[address] [facility] Gynecologic Oncology [address]) Appt Note: [address] [phone]  
[address] [phone]  
  
    
 10/31/2017 10:00 AM  
Follow Up with Hermelinda Valladares  [facility]) Appt Note: MGUS, 1 yr f/u, CP 0  
 [address] [address] [address]  
[phone]  
  
   
 [address] [address] Upcoming Health Maintenance Date Due  
 MEDICARE YEARLY EXAM 7/20/2016 MICROALBUMIN Q1 2/22/2017 LIPID PANEL Q1 3/17/2017 HEMOGLOBIN A1C Q6M 4/20/2017 EYE EXAM RETINAL OR DILATED Q1 4/22/2017 FOOT EXAM Q1 7/13/2017 GLAUCOMA SCREENING Q2Y 4/22/2018 COLONOSCOPY 12/21/2019 DTaP/Tdap/Td series (3 - Td) 10/20/2026 Allergies as of 2/23/2017  Review Complete On: 2/23/2017 By: Greg Epley, LPN Severity Noted Reaction Type Reactions Latex  08/29/2013    Rash, Itching Advair Diskus [Fluticasone-salmeterol]  05/29/2012    Other (comments) \"breathing problems\" Bactrim [Sulfamethoprim]  08/29/2013   Side Effect Shortness of Breath, Itching, Swelling Swelling of tongue and throat Iodinated Contrast Media - Oral And Iv Dye  10/04/2016    Hives Ivp [Fd And C Blue No.1]  12/21/2010    Hives, Shortness of Breath Lovenox [Enoxaparin]  12/21/2010    Shortness of Breath Nsaids (Non-steroidal Anti-inflammatory Drug)  12/21/2010    Other (comments) Heartburn Sulfa (Sulfonamide Antibiotics)  12/23/2013    Shortness of Breath Current Immunizations  Reviewed on 10/31/2016 Name Date Influenza Vaccine (Quad) PF  Deferred (Medication not available) Pneumococcal Vaccine (Unspecified Type) 1/1/2010 Tdap 5/17/2006 Not reviewed this visit You Were Diagnosed With   
  
 Codes Comments Diabetes mellitus without complication ([facility]     OLW-61-AO: E11.9 ICD-9-CM: 250.00 Blood in urine     ICD-10-CM: R31.9 ICD-9-CM: 599.70 Lower abdominal pain     ICD-10-CM: R10.30 ICD-9-CM: 789.09 Chronic pain disorder     ICD-10-CM: G89.4 ICD-9-CM: 338.4 Other pulmonary embolism without acute cor pulmonale, unspecified chronicity (HCC)     ICD-10-CM: I26.99 
ICD-9-CM: 415.19 Vitals BP  
  
  
  
  
  
 116/59 (BP 1 Location: Left arm, BP Patient Position: At rest) Vitals History BMI and BSA Data Body Mass Index Body Surface Area 49.74 kg/m 2 2.55 m 2 Preferred Pharmacy Pharmacy Name Phone [id number], [place] - [address] [phone] Your Updated Medication List  
  
   
This list is accurate as of: 2/23/17 11:27 AM.  Always use your most recent med list.  
  
  
  
  
 apixaban 5 mg tablet Commonly known as:  Chris Hatfield Take 1 Tab by mouth two (2) times a day. Indications: Cerebral Thromboembolism Prevention, DEEP VEIN THROMBOSIS PREVENTION, DEEP VENOUS THROMBOSIS, Pulmonary Thromboembolism  
  
 aspirin delayed-release 81 mg tablet Take 2 Tabs by mouth daily (after breakfast). After meal  Indications: PREVENTION OF TRANSIENT ISCHEMIC ATTACKS, THROMBOSIS PREVENTION AFTER PCI  
  
 clonazePAM 1 mg tablet Commonly known as:  Wilbur Pen Take 1 Tab by mouth two (2) times a day. Max Daily Amount: 2 mg.  
  
 cyclobenzaprine 10 mg tablet Commonly known as:  FLEXERIL  
TAKE 1 TAB BY MOUTH TWO (2) TIMES A DAY. INDICATIONS: MUSCLE SPASM  
  
 diphenhydrAMINE 50 mg capsule Commonly known as:  BENADRYL  
TAKE ONE CAPSULE BY MOUTH 1 HOUR PRIOR TO SCAN  
  
 furosemide 40 mg tablet Commonly known as:  LASIX Take 1 Tab by mouth daily. GAVILYTE-C 240-22.72-6.72 -5.84 gram solution Generic drug:  PEG 3350-Electrolytes USE AS DIRECTED  
  
 hydrALAZINE 25 mg tablet Commonly known as:  APRESOLINE Take 1 Tab by mouth daily. LORazepam 0.5 mg tablet Commonly known as:  ATIVAN  
TAKE 1 TABLET BY MOUTH 30 MIN PRIOR TO PROCEDURE  
  
 magnesium 250 mg Tab Take  by mouth daily. methylPREDNISolone 4 mg tablet Commonly known as:  MEDROL DOSEPACK  
TAKE 6 TABLETS ON DAY 1 AS DIRECTED ON PACKAGE AND DECREASE BY 1 TAB EACH DAY FOR A TOTAL OF 6 DAYS  
  
 metoprolol succinate 100 mg tablet Commonly known as:  TOPROL-XL  
100 mg daily. * morphine CR 60 mg CR tablet Commonly known as:  MS CONTIN Take 1 Tab by mouth every twelve (12) hours. Max Daily Amount: 120 mg. Indications: Chronic Pain, Severe Pain * morphine CR 60 mg CR tablet Commonly known as:  MS CONTIN Take 1 Tab by mouth every twelve (12) hours. Max Daily Amount: 120 mg. Indications: Chronic Pain, Severe Pain Start taking on:  3/23/2017 OTHER  
MORPHINE CREAM APPLIES TO KNEES 3-4X DAILY  
  
 * oxyCODONE IR 20 mg immediate release tablet Commonly known as:  Mabeline Dickson Take 1 Tab by mouth every four (4) hours as needed for Pain. Max Daily Amount: 120 mg. Indications: Pain * oxyCODONE IR 20 mg immediate release tablet Commonly known as:  Mabeline Dickson Take 1 Tab by mouth every four (4) hours as needed for Pain. Max Daily Amount: 120 mg. Indications: Pain Start taking on:  3/23/2017 pneumococcal 13 aleksandar conj dip 0.5 mL Syrg injection Commonly known as:  PREVNAR-13  
0.5 mL by IntraMUSCular route once for 1 dose. potassium chloride 20 mEq tablet Commonly known as:  K-DUR, KLOR-CON Take 1 Tab by mouth two (2) times a day. Indications: HYPOKALEMIA PREVENTION  
  
 predniSONE 10 mg tablet Commonly known as:  Ambreen Gelineau  
 TAKE 5 TABLETS 13 HOURS, 7 HOURS AND 1 HOUR PRIOR TO YOUR SCAN  
  
 scopolamine 1.5 mg (1 mg over 3 days) Pt3d Commonly known as:  TRANSDERM-SCOP  
1 Patch by TransDERmal route every seventy-two (72) hours. Indications: PREVENTION OF MOTION SICKNESS  
  
 VITAMIN C 500 mg tablet Generic drug:  ascorbic acid (vitamin C) Take 500 mg by mouth daily. VITAMIN D3 2,000 unit Tab Generic drug:  cholecalciferol (vitamin D3) Take  by mouth daily. * Notice: This list has 4 medication(s) that are the same as other medications prescribed for you. Read the directions carefully, and ask your doctor or other care provider to review them with you. Prescriptions Printed Refills  
 pneumococcal 13 aleksandar conj dip (PREVNAR-13) 0.5 mL syrg injection 0 Si.5 mL by IntraMUSCular route once for 1 dose. Class: Print Route: IntraMUSCular  
 oxyCODONE IR (ROXICODONE) 20 mg immediate release tablet 0 Sig: Take 1 Tab by mouth every four (4) hours as needed for Pain. Max Daily Amount: 120 mg. Indications: Pain Class: Print Route: Oral  
 morphine CR (MS CONTIN) 60 mg CR tablet 0 Sig: Take 1 Tab by mouth every twelve (12) hours. Max Daily Amount: 120 mg. Indications: Chronic Pain, Severe Pain Class: Print Route: Oral  
 oxyCODONE IR (ROXICODONE) 20 mg immediate release tablet 0 Starting on: 3/23/2017 Sig: Take 1 Tab by mouth every four (4) hours as needed for Pain. Max Daily Amount: 120 mg. Indications: Pain Class: Print Route: Oral  
 morphine CR (MS CONTIN) 60 mg CR tablet 0 Starting on: 3/23/2017 Sig: Take 1 Tab by mouth every twelve (12) hours. Max Daily Amount: 120 mg. Indications: Chronic Pain, Severe Pain Class: Print Route: Oral  
  
Prescriptions Sent to Pharmacy Refills  
 furosemide (LASIX) 40 mg tablet 1 Sig: Take 1 Tab by mouth daily. Class: Normal  
 Pharmacy[facility] [address] [facility] [address] Ph #: [phone] Route: Oral  
 potassium chloride (K-DUR, KLOR-CON) 20 mEq tablet 1 Sig: Take 1 Tab by mouth two (2) times a day. Indications: HYPOKALEMIA PREVENTION Class: Normal  
 Pharmacy: [facility]pharmacy [address] Ph #: [phone] Route: Oral  
  
We Performed the Following MICROALBUMIN, UR, RAND W/ MICROALBUMIN/CREA RATIO [id number] CPT(R)] Please provide this summary of care documentation to your next provider. Your primary care clinician is listed as Kirsten Godfrey. If you have any questions after today's visit, please call [phone].

## 2017-02-24 LAB
ALBUMIN/CREAT UR: 7 MG/G CREAT (ref 0–30)
CREAT UR-MCNC: 194.2 MG/DL
MICROALBUMIN UR-MCNC: 13.6 UG/ML

## 2017-02-24 NOTE — PROGRESS NOTES
HISTORY OF PRESENT ILLNESS  Tabitha Farrell is a 68 y.o. female. HPI     Chronic low-mid back and knees pain syndrome  The patient's pain has been an ongoing concerning problem, it all Started few yrs ago when the wt started to creep out of control little by little and is aware that the current BMI is a big contributor to the pt current joints pain,     In addition with the current medications dosage, the pt capable of doing things specially the activity of the daily living, and also they are improving the quality of the pt's life which the pt states are very cumbersome. The pt has tried otc pain meds, prescribed pain meds but they did not help and not only that,  they created ++ abdominal upset from NSAIDS, this pt has tried all the other Non- Narcotic meds and none have been able to help ease down the pain, the current opioid based pain meds are the only ones when taken ease the current pain level at this time, this pt offered surgical corrections,was also told they may not get rid of the pain, so that, pt denied correctional surgery for an unknown prognosis. pt states that the pt is not GEOVANY BEBETO IRIZARR[facility] shopping aware random UA drug screen, would be also performed, if foul finding pt would be terminated, for which the pt agreed and finally,     The intensity of the pain is at10/10 w/out med,  persist without medication, a chronic condition, dull radiating to the lower legs      Current Outpatient Prescriptions   Medication Sig Dispense Refill    cyclobenzaprine (FLEXERIL) 10 mg tablet TAKE 1 TAB BY MOUTH TWO (2) TIMES A DAY. INDICATIONS: MUSCLE SPASM  0    diphenhydrAMINE (BENADRYL) 50 mg capsule TAKE ONE CAPSULE BY MOUTH 1 HOUR PRIOR TO SCAN  0    LORazepam (ATIVAN) 0.5 mg tablet TAKE 1 TABLET BY MOUTH 30 MIN PRIOR TO PROCEDURE  1    furosemide (LASIX) 40 mg tablet Take 1 Tab by mouth daily. 30 Tab 1    potassium chloride (K-DUR, KLOR-CON) 20 mEq tablet Take 1 Tab by mouth two (2) times a day.  Indications: HYPOKALEMIA PREVENTION 30 Tab 1    oxyCODONE IR (ROXICODONE) 20 mg immediate release tablet Take 1 Tab by mouth every four (4) hours as needed for Pain. Max Daily Amount: 120 mg. Indications: Pain 180 Tab 0    morphine CR (MS CONTIN) 60 mg CR tablet Take 1 Tab by mouth every twelve (12) hours. Max Daily Amount: 120 mg. Indications: Chronic Pain, Severe Pain 60 Tab 0    [START ON 3/23/2017] oxyCODONE IR (ROXICODONE) 20 mg immediate release tablet Take 1 Tab by mouth every four (4) hours as needed for Pain. Max Daily Amount: 120 mg. Indications: Pain 180 Tab 0    [START ON 3/23/2017] morphine CR (MS CONTIN) 60 mg CR tablet Take 1 Tab by mouth every twelve (12) hours. Max Daily Amount: 120 mg. Indications: Chronic Pain, Severe Pain 60 Tab 0    apixaban (ELIQUIS) 5 mg tablet Take 1 Tab by mouth two (2) times a day. Indications: Cerebral Thromboembolism Prevention, DEEP VEIN THROMBOSIS PREVENTION, DEEP VENOUS THROMBOSIS, Pulmonary Thromboembolism 60 Tab 11    clonazePAM (KLONOPIN) 1 mg tablet Take 1 Tab by mouth two (2) times a day. Max Daily Amount: 2 mg. 30 Tab 0    hydrALAZINE (APRESOLINE) 25 mg tablet Take 1 Tab by mouth daily. 90 Tab 6    OTHER MORPHINE CREAM APPLIES TO KNEES 3-4X DAILY      metoprolol succinate (TOPROL-XL) 100 mg tablet 100 mg daily.  ascorbic acid, vitamin C, (VITAMIN C) 500 mg tablet Take 500 mg by mouth daily.  magnesium 250 mg tab Take  by mouth daily.  cholecalciferol, vitamin D3, (VITAMIN D3) 2,000 unit tab Take  by mouth daily.       methylPREDNISolone (MEDROL DOSEPACK) 4 mg tablet TAKE 6 TABLETS ON DAY 1 AS DIRECTED ON PACKAGE AND DECREASE BY 1 TAB EACH DAY FOR A TOTAL OF 6 DAYS  0    GAVILYTE-C 240-22.72-6.72 -5.84 gram solution USE AS DIRECTED  0    predniSONE (DELTASONE) 10 mg tablet TAKE 5 TABLETS 13 HOURS, 7 HOURS AND 1 HOUR PRIOR TO YOUR SCAN  0    scopolamine (TRANSDERM-SCOP) 1.5 mg (1 mg over 3 days) pt3d 1 Patch by TransDERmal route every seventy-two (72) hours. Indications: PREVENTION OF MOTION SICKNESS 3 Patch 0    aspirin delayed-release 81 mg tablet Take 2 Tabs by mouth daily (after breakfast). After meal  Indications: PREVENTION OF TRANSIENT ISCHEMIC ATTACKS, THROMBOSIS PREVENTION AFTER PCI 60 Tab 11     Allergies   Allergen Reactions    Latex Rash and Itching    Advair Diskus [Fluticasone-Salmeterol] Other (comments)     \"breathing problems\"    Bactrim [Sulfamethoprim] Shortness of Breath, Itching and Swelling     Swelling of tongue and throat    Iodinated Contrast Media - Oral And Iv Dye Hives    Ivp [Fd And C Blue No.1] Hives and Shortness of Breath    Lovenox [Enoxaparin] Shortness of Breath    Nsaids (Non-Steroidal Anti-Inflammatory Drug) Other (comments)     Heartburn    Sulfa (Sulfonamide Antibiotics) Shortness of Breath     Past Medical History:   Diagnosis Date    Acute kidney failure (HCC) 11/19/2015    Acute sinusitis 2/11/2011    Adverse effect of anesthesia     SLOW WAKING PAST ANESTHESIA    Arthritis     RA    At risk for side effect of medication 9/30/2016    Atrial fibrillation ([address] 10/19/2011    Autoimmune disease ([address]     FIBROMYLGIA    Cancer ([address] 2016    ENDOMETRIAL     Cholelithiasis 11/19/2015    Chronic pain     Claudication of both lower extremities ([address] 8/25/2015    Diabetes mellitus type 2, controlled ([address] 1/19/2016    Diabetes mellitus, type II ([address] 10/10/2014    Fall against object 10/10/2014    Fatty liver 10/20/2015    Hypertension     Ill-defined condition     SPINAL STENOSIS    Left shoulder pain 10/10/2014    Leiomyoma of body of uterus 4/7/2016    Morbid obesity ([address]     Myalgia 9/30/2016    Nausea & vomiting     Pneumonia     Preop examination 6/15/2015    Preoperative clearance 6/15/2015    Rash 2/11/2011    Rash of hands 10/10/2014    Screening for colon cancer 2/22/2016    Screening for glaucoma 2/22/2016    Sleep apnea     SOB (shortness of breath)     hospitalized 5/2012. angina, SOB    Thromboembolus ([facility] 2011    LEFT LOWER LEG AND PE    Tinea pedis, recurrent 7/20/2015     Past Surgical History:   Procedure Laterality Date    COLONOSCOPY N/A 12/21/2016    COLONOSCOPY performed by Delores Bacon MD at [address] [facility]  12/21/2016         HX CATARACT REMOVAL Bilateral 2015    [address]    HX DILATION AND CURETTAGE  07/29/2016    HX DILATION AND CURETTAGE  2016    HX GYN  1960    etopic    HX HEART CATHETERIZATION  2001    NEGATIVE PER PATIENT    HX HYSTERECTOMY  2016    HX KNEE ARTHROSCOPY Left 1998    HX ORTHOPAEDIC Right 1960'S    BUNIONECTOMY    HX OTHER SURGICAL      BIOPSY OF VEIN IN FOREHEAD    HX TUBAL LIGATION  1963    [place] COLSC FLX W/RMVL OF TUMOR POLYP LESION SNARE TQ  12/12/2011          Family History   Problem Relation Age of Onset    Other Mother      ANEURYSM    Obesity Mother     Heart Disease Father     Other Father      VASCULAR DISEASE    Hypertension Sister     Heart Disease Brother     Heart Attack Brother     Kidney Disease Sister     Seizures Brother     Heart Attack Brother     Anesth Problems Neg Hx     Alcohol abuse Neg Hx      Social History   Substance Use Topics    Smoking status: Former Smoker     Packs/day: 0.25     Years: 15.00     Quit date: 8/23/1996    Smokeless tobacco: Never Used    Alcohol use No      Lab Results  Component Value Date/Time   Hemoglobin A1c 6.7 08/25/2015 05:01 PM   Hemoglobin A1c 6.7 03/20/2015 12:32 PM   Hemoglobin A1c 6.9 04/17/2014 12:22 PM   Glucose 85 01/24/2017 10:10 AM   Glucose (POC) 113 09/02/2016 12:15 PM   Microalb/Creat ratio (ug/mg creat.) 3.4 04/17/2014 12:30 PM   LDL, calculated 46 03/17/2016 12:19 PM   Creatinine (POC) 0.9 08/16/2016 08:18 AM   Creatinine 0.89 01/24/2017 10:10 AM      Lab Results  Component Value Date/Time   ALT (SGPT) 16 01/24/2017 10:10 AM   AST (SGOT) 15 01/24/2017 10:10 AM   Alk.  phosphatase 75 01/24/2017 10:10 AM Bilirubin, direct 0.14 02/21/2014 12:27 PM   Bilirubin, total 0.4 01/24/2017 10:10 AM          Review of Systems   Constitutional: Negative for chills and fever. HENT: Negative for ear pain and nosebleeds. Eyes: Negative for blurred vision, pain and discharge. Respiratory: Negative for shortness of breath. Cardiovascular: Negative for chest pain and leg swelling. Gastrointestinal: Negative for constipation, diarrhea, nausea and vomiting. Genitourinary: Negative for frequency. Musculoskeletal: Positive for back pain, joint pain, myalgias and neck pain. Skin: Negative for itching and rash. Neurological: Negative for headaches. Psychiatric/Behavioral: Negative for depression. The patient is not nervous/anxious. Physical Exam   Constitutional: She is oriented to person, place, and time. She appears well-developed and well-nourished. HENT:   Head: Normocephalic and atraumatic. Eyes: Conjunctivae and EOM are normal.   Neck: Normal range of motion. Neck supple. Cardiovascular: Normal rate, regular rhythm and normal heart sounds. No murmur heard. Pulmonary/Chest: Effort normal and breath sounds normal.   Abdominal: Soft. Bowel sounds are normal. She exhibits no distension. Musculoskeletal: She exhibits tenderness. She exhibits no edema. Right knee: She exhibits decreased range of motion. Tenderness found. Left knee: She exhibits decreased range of motion. Tenderness found. Lumbar back: She exhibits decreased range of motion, pain and spasm. Lymphadenopathy:     She has no cervical adenopathy. Neurological: She is alert and oriented to person, place, and time. Skin: No erythema. Psychiatric: Her behavior is normal.   Nursing note and vitals reviewed. ASSESSMENT and PLAN  Carla Desouza was seen today for welcome to medicare.     Diagnoses and all orders for this visit:    Diabetes mellitus without complication (HCC)  -     furosemide (LASIX) 40 mg tablet; Take 1 Tab by mouth daily. -     potassium chloride (K-DUR, KLOR-CON) 20 mEq tablet; Take 1 Tab by mouth two (2) times a day. Indications: HYPOKALEMIA PREVENTION  -     MICROALBUMIN, UR, RAND W/ MICROALBUMIN/CREA RATIO  -     AMB POC URINALYSIS DIP STICK AUTO W/O MICRO    Blood in urine  -     furosemide (LASIX) 40 mg tablet; Take 1 Tab by mouth daily. -     potassium chloride (K-DUR, KLOR-CON) 20 mEq tablet; Take 1 Tab by mouth two (2) times a day. Indications: HYPOKALEMIA PREVENTION  -     oxyCODONE IR (ROXICODONE) 20 mg immediate release tablet; Take 1 Tab by mouth every four (4) hours as needed for Pain. Max Daily Amount: 120 mg. Indications: Pain  -     morphine CR (MS CONTIN) 60 mg CR tablet; Take 1 Tab by mouth every twelve (12) hours. Max Daily Amount: 120 mg. Indications: Chronic Pain, Severe Pain  -     oxyCODONE IR (ROXICODONE) 20 mg immediate release tablet; Take 1 Tab by mouth every four (4) hours as needed for Pain. Max Daily Amount: 120 mg. Indications: Pain  -     morphine CR (MS CONTIN) 60 mg CR tablet; Take 1 Tab by mouth every twelve (12) hours. Max Daily Amount: 120 mg. Indications: Chronic Pain, Severe Pain  -     AMB POC URINALYSIS DIP STICK AUTO W/O MICRO    Lower abdominal pain  -     furosemide (LASIX) 40 mg tablet; Take 1 Tab by mouth daily. -     potassium chloride (K-DUR, KLOR-CON) 20 mEq tablet; Take 1 Tab by mouth two (2) times a day. Indications: HYPOKALEMIA PREVENTION  -     oxyCODONE IR (ROXICODONE) 20 mg immediate release tablet; Take 1 Tab by mouth every four (4) hours as needed for Pain. Max Daily Amount: 120 mg. Indications: Pain  -     morphine CR (MS CONTIN) 60 mg CR tablet; Take 1 Tab by mouth every twelve (12) hours. Max Daily Amount: 120 mg. Indications: Chronic Pain, Severe Pain  -     oxyCODONE IR (ROXICODONE) 20 mg immediate release tablet; Take 1 Tab by mouth every four (4) hours as needed for Pain. Max Daily Amount: 120 mg.  Indications: Pain  -     morphine CR (MS CONTIN) 60 mg CR tablet; Take 1 Tab by mouth every twelve (12) hours. Max Daily Amount: 120 mg. Indications: Chronic Pain, Severe Pain    Chronic pain disorder  -     furosemide (LASIX) 40 mg tablet; Take 1 Tab by mouth daily. -     potassium chloride (K-DUR, KLOR-CON) 20 mEq tablet; Take 1 Tab by mouth two (2) times a day. Indications: HYPOKALEMIA PREVENTION  -     oxyCODONE IR (ROXICODONE) 20 mg immediate release tablet; Take 1 Tab by mouth every four (4) hours as needed for Pain. Max Daily Amount: 120 mg. Indications: Pain  -     morphine CR (MS CONTIN) 60 mg CR tablet; Take 1 Tab by mouth every twelve (12) hours. Max Daily Amount: 120 mg. Indications: Chronic Pain, Severe Pain  -     oxyCODONE IR (ROXICODONE) 20 mg immediate release tablet; Take 1 Tab by mouth every four (4) hours as needed for Pain. Max Daily Amount: 120 mg. Indications: Pain  -     morphine CR (MS CONTIN) 60 mg CR tablet; Take 1 Tab by mouth every twelve (12) hours. Max Daily Amount: 120 mg. Indications: Chronic Pain, Severe Pain    Other pulmonary embolism without acute cor pulmonale, unspecified chronicity (HCC)  -     furosemide (LASIX) 40 mg tablet; Take 1 Tab by mouth daily. -     potassium chloride (K-DUR, KLOR-CON) 20 mEq tablet; Take 1 Tab by mouth two (2) times a day. Indications: HYPOKALEMIA PREVENTION    Other orders  -     pneumococcal 13 aleksandar conj dip (PREVNAR-13) 0.5 mL syrg injection; 0.5 mL by IntraMUSCular route once for 1 dose. Patient was provided evidence based informations with the regard of their expected course,  In addition was told to help with weight reduction, spinal manipulations, massage therapy, exercise therapy:  Patient was also told to remain active but to avoid heavy lifting and pushing at this time  Advised for self cared options such as:  NSAID's and Tylenol for pain, take meds w/ food and water, if develop abdominal upsets, such as heart burn and sour stomach.  Please take some OTC antacids or Nexium 30 min before the first meal once daily and watch for discolored stool. Also do the exercise therapy: Ice therapy 2-30min tid daily, daily stretching x2 daily for 5-10 min, rom strengthening with resistance banding 3-4 times per week  Most of the how to do informations printed and handed out to the patient,   and finally the stool softner if opioid base meds given, in addition, pt was told to avoid machinary operation and driving while on any opioid based medications that will cause dizziness, drowsiness, and sleepiness. Dependency and tolerancy were also addressed,  meds side effects and compliancy advised,  Call or rtc if worsens,   Pt agreed with today's recommendations.

## 2017-03-15 ENCOUNTER — TELEPHONE (OUTPATIENT)
Dept: FAMILY MEDICINE CLINIC | Age: 77
End: 2017-03-15

## 2017-03-15 RX ORDER — CLONAZEPAM 1 MG/1
1 TABLET ORAL 2 TIMES DAILY
Qty: 30 TAB | Refills: 2 | Status: SHIPPED | OUTPATIENT
Start: 2017-03-15 | End: 2017-09-19 | Stop reason: CLARIF

## 2017-04-20 ENCOUNTER — OFFICE VISIT (OUTPATIENT)
Dept: FAMILY MEDICINE CLINIC | Age: 77
End: 2017-04-20

## 2017-04-20 ENCOUNTER — HOSPITAL ENCOUNTER (OUTPATIENT)
Dept: LAB | Age: 77
Discharge: HOME | End: 2017-04-20
Payer: MEDICARE

## 2017-04-20 VITALS
HEIGHT: 66 IN | SYSTOLIC BLOOD PRESSURE: 115 MMHG | WEIGHT: 293 LBS | RESPIRATION RATE: 14 BRPM | HEART RATE: 90 BPM | TEMPERATURE: 97.7 F | BODY MASS INDEX: 47.09 KG/M2 | DIASTOLIC BLOOD PRESSURE: 71 MMHG | OXYGEN SATURATION: 94 %

## 2017-04-20 DIAGNOSIS — G89.29 CHRONIC LEFT SHOULDER PAIN: ICD-10-CM

## 2017-04-20 DIAGNOSIS — R31.9 BLOOD IN URINE: ICD-10-CM

## 2017-04-20 DIAGNOSIS — C54.1 ENDOMETRIAL CANCER (HCC): ICD-10-CM

## 2017-04-20 DIAGNOSIS — G89.29 CHRONIC PAIN OF RIGHT ANKLE: ICD-10-CM

## 2017-04-20 DIAGNOSIS — M25.571 CHRONIC PAIN OF RIGHT ANKLE: ICD-10-CM

## 2017-04-20 DIAGNOSIS — M25.512 CHRONIC LEFT SHOULDER PAIN: ICD-10-CM

## 2017-04-20 DIAGNOSIS — D47.2 MONOCLONAL GAMMOPATHIES: ICD-10-CM

## 2017-04-20 DIAGNOSIS — M25.521 ELBOW PAIN, RIGHT: ICD-10-CM

## 2017-04-20 DIAGNOSIS — I73.9 CLAUDICATION OF BOTH LOWER EXTREMITIES (HCC): ICD-10-CM

## 2017-04-20 DIAGNOSIS — E11.9 DIABETES MELLITUS WITHOUT COMPLICATION (HCC): ICD-10-CM

## 2017-04-20 DIAGNOSIS — Z92.3 PERSONAL HISTORY OF RADIATION THERAPY: ICD-10-CM

## 2017-04-20 DIAGNOSIS — M79.10 MYALGIA: ICD-10-CM

## 2017-04-20 DIAGNOSIS — G89.4 CHRONIC PAIN DISORDER: Primary | ICD-10-CM

## 2017-04-20 DIAGNOSIS — R10.30 LOWER ABDOMINAL PAIN: ICD-10-CM

## 2017-04-20 DIAGNOSIS — D47.2 MGUS (MONOCLONAL GAMMOPATHY OF UNKNOWN SIGNIFICANCE): ICD-10-CM

## 2017-04-20 DIAGNOSIS — I26.99 OTHER PULMONARY EMBOLISM WITHOUT ACUTE COR PULMONALE, UNSPECIFIED CHRONICITY (HCC): ICD-10-CM

## 2017-04-20 DIAGNOSIS — M25.561 PAIN IN BOTH KNEES, UNSPECIFIED CHRONICITY: ICD-10-CM

## 2017-04-20 DIAGNOSIS — M35.1 MCTD (MIXED CONNECTIVE TISSUE DISEASE) (HCC): ICD-10-CM

## 2017-04-20 DIAGNOSIS — M79.7 FIBROMYALGIA: ICD-10-CM

## 2017-04-20 DIAGNOSIS — E11.22 CONTROLLED TYPE 2 DIABETES MELLITUS WITH CHRONIC KIDNEY DISEASE, UNSPECIFIED CKD STAGE, UNSPECIFIED LONG TERM INSULIN USE STATUS: ICD-10-CM

## 2017-04-20 DIAGNOSIS — N93.9 VAGINAL BLEEDING, ABNORMAL: ICD-10-CM

## 2017-04-20 DIAGNOSIS — M79.602 PAIN OF LEFT UPPER EXTREMITY: ICD-10-CM

## 2017-04-20 DIAGNOSIS — M25.562 PAIN IN BOTH KNEES, UNSPECIFIED CHRONICITY: ICD-10-CM

## 2017-04-20 DIAGNOSIS — M79.601 ARM PAIN, RIGHT: ICD-10-CM

## 2017-04-20 LAB — HBA1C MFR BLD HPLC: 5.8 %

## 2017-04-20 PROCEDURE — 85027 COMPLETE CBC AUTOMATED: CPT

## 2017-04-20 PROCEDURE — 84443 ASSAY THYROID STIM HORMONE: CPT

## 2017-04-20 PROCEDURE — 80053 COMPREHEN METABOLIC PANEL: CPT

## 2017-04-20 RX ORDER — POTASSIUM CHLORIDE 20 MEQ/1
20 TABLET, EXTENDED RELEASE ORAL 2 TIMES DAILY
Qty: 30 TAB | Refills: 1
Start: 2017-04-20 | End: 2017-07-13 | Stop reason: SDUPTHER

## 2017-04-20 RX ORDER — OXYCODONE HYDROCHLORIDE 20 MG/1
20 TABLET ORAL
Qty: 180 TAB | Refills: 0 | Status: SHIPPED | OUTPATIENT
Start: 2017-04-20 | End: 2017-06-22 | Stop reason: SDUPTHER

## 2017-04-20 RX ORDER — MORPHINE SULFATE 60 MG/1
60 TABLET, FILM COATED, EXTENDED RELEASE ORAL EVERY 12 HOURS
Qty: 60 TAB | Refills: 0 | Status: SHIPPED | OUTPATIENT
Start: 2017-04-20 | End: 2017-06-22 | Stop reason: SDUPTHER

## 2017-04-20 RX ORDER — FUROSEMIDE 40 MG/1
40 TABLET ORAL DAILY
Qty: 30 TAB | Refills: 1
Start: 2017-04-20 | End: 2017-08-22 | Stop reason: ALTCHOICE

## 2017-04-20 RX ORDER — OXYCODONE HYDROCHLORIDE 20 MG/1
20 TABLET ORAL
Qty: 180 TAB | Refills: 0 | Status: SHIPPED | OUTPATIENT
Start: 2017-05-20 | End: 2017-06-22 | Stop reason: SDUPTHER

## 2017-04-20 RX ORDER — MORPHINE SULFATE 60 MG/1
60 TABLET, FILM COATED, EXTENDED RELEASE ORAL EVERY 12 HOURS
Qty: 60 TAB | Refills: 0 | Status: SHIPPED | OUTPATIENT
Start: 2017-05-20 | End: 2017-06-22 | Stop reason: SDUPTHER

## 2017-04-20 NOTE — PROGRESS NOTES
Jesus Cox        Name and  verified        Chief Complaint   Patient presents with    Medication Refill

## 2017-04-20 NOTE — PROGRESS NOTES
HISTORY OF PRESENT ILLNESS  Evette Craft is a 68 y.o. female. HPI     Present with the son, with the help of walker and significant discomfort present for medication refill and her uterine cancer which was removed but unfortunately with metastases which needed for chemotherapy and radiation therapy stating that so far it has been very hard to go through all the radiation so far further or any chemotherapy is questionable and she has been refusing any chemotherapy at this time but up till yesterday she had  >30 episodes of radiation, as per the other oncologist she needed them all but so far stating that she likes to go to one therapy at the time, moslty transvaginal and extraabdominal radiation, was told that she may not chemotx, the area of question were all removed and were resected previously, last visit was yesterday and and her  Would be Q 4 wks f/u , regardless her chronic pain syndrome including her  Chronic low-mid back and knees pain syndrome has been an ongoing concerning problem, unfortunately her current wt , her significant past medical history for autoimmune mixed connective tissue disorder are big contributor to the pt current joints pain,      In addition, patient stating that with the current opioid-based pain medications ,  the pt capable of doing things specially the activity of her daily living, and they are improving the quality of the pt's life which the pt states are very cumbersome at her current weight of 300 pounds stating that sometimes the pain awakens her from sleep so that she will retake 1 of her pain medication and then she is able to go back to sleep for a couple more hours, stating that if it was not because of her current medication she most likely would not be alive.      The pt has tried all other available otc and prescribed pain meds,  but they did not help and not only that, they created ++ abdominal upset from NSAIDS, this pt has tried all the other Non- Narcotic meds and none have been able to help ease down the pain, the current opioid based pain meds are the only ones when taken ease the current pain level at this time, this pt offered surgical corrections,was also told they may not get rid of the pain, so that, pt denied correctional surgery for an unknown prognosis.     pt states that the pt is not [facility] shopping aware random [facility]A drug screen, patient was told if foul finding pt medical care would be terminated, for which the pt agreed and finally,      The intensity of the pain is at10/10 w/out med, persist without medication, a chronic condition, dull radiating to the lower legs    Also with history of steroid induced diabetic state for couple of years secondary to the pain patient was injected with a lot of steroids systemically for which made her develop diabetes since patient has been on opioid-based medication her diabetic state has almost disappeared and currently she is not taking any diabetic medication and today's hemoglobin A1c was at 5.8 percentile which could be secondary to body mass index of almost 48 kg/m²,     Unfortunately her legs are swelled up bilaterally complaining of leg pain both side lower extremity no discoloration denies any numbness no tingling stating that she has not been taking her fluid pill since she has been getting radiation therapy daily      Current Outpatient Prescriptions   Medication Sig Dispense Refill    clonazePAM (KLONOPIN) 1 mg tablet Take 1 Tab by mouth two (2) times a day. Max Daily Amount: 2 mg. 30 Tab 2    cyclobenzaprine (FLEXERIL) 10 mg tablet TAKE 1 TAB BY MOUTH TWO (2) TIMES A DAY. INDICATIONS: MUSCLE SPASM  0    GAVILYTE-C 240-22.72-6.72 -5.84 gram solution USE AS DIRECTED  0    furosemide (LASIX) 40 mg tablet Take 1 Tab by mouth daily. 30 Tab 1    potassium chloride (K-DUR, KLOR-CON) 20 mEq tablet Take 1 Tab by mouth two (2) times a day.  Indications: HYPOKALEMIA PREVENTION 30 Tab 1    oxyCODONE IR (ROXICODONE) 20 mg immediate release tablet Take 1 Tab by mouth every four (4) hours as needed for Pain. Max Daily Amount: 120 mg. Indications: Pain 180 Tab 0    morphine CR (MS CONTIN) 60 mg CR tablet Take 1 Tab by mouth every twelve (12) hours. Max Daily Amount: 120 mg. Indications: Chronic Pain, Severe Pain 60 Tab 0    oxyCODONE IR (ROXICODONE) 20 mg immediate release tablet Take 1 Tab by mouth every four (4) hours as needed for Pain. Max Daily Amount: 120 mg. Indications: Pain 180 Tab 0    morphine CR (MS CONTIN) 60 mg CR tablet Take 1 Tab by mouth every twelve (12) hours. Max Daily Amount: 120 mg. Indications: Chronic Pain, Severe Pain 60 Tab 0    apixaban (ELIQUIS) 5 mg tablet Take 1 Tab by mouth two (2) times a day. Indications: Cerebral Thromboembolism Prevention, DEEP VEIN THROMBOSIS PREVENTION, DEEP VENOUS THROMBOSIS, Pulmonary Thromboembolism 60 Tab 11    hydrALAZINE (APRESOLINE) 25 mg tablet Take 1 Tab by mouth daily. 90 Tab 6    OTHER MORPHINE CREAM APPLIES TO KNEES 3-4X DAILY      metoprolol succinate (TOPROL-XL) 100 mg tablet 100 mg daily.  aspirin delayed-release 81 mg tablet Take 2 Tabs by mouth daily (after breakfast). After meal  Indications: PREVENTION OF TRANSIENT ISCHEMIC ATTACKS, THROMBOSIS PREVENTION AFTER PCI 60 Tab 11    ascorbic acid, vitamin C, (VITAMIN C) 500 mg tablet Take 500 mg by mouth daily.  magnesium 250 mg tab Take  by mouth daily.  cholecalciferol, vitamin D3, (VITAMIN D3) 2,000 unit tab Take  by mouth daily.       diphenhydrAMINE (BENADRYL) 50 mg capsule TAKE ONE CAPSULE BY MOUTH 1 HOUR PRIOR TO SCAN  0    methylPREDNISolone (MEDROL DOSEPACK) 4 mg tablet TAKE 6 TABLETS ON DAY 1 AS DIRECTED ON PACKAGE AND DECREASE BY 1 TAB EACH DAY FOR A TOTAL OF 6 DAYS  0    predniSONE (DELTASONE) 10 mg tablet TAKE 5 TABLETS 13 HOURS, 7 HOURS AND 1 HOUR PRIOR TO YOUR SCAN  0    scopolamine (TRANSDERM-SCOP) 1.5 mg (1 mg over 3 days) pt3d 1 Patch by TransDERmal route every seventy-two (72) hours. Indications: PREVENTION OF MOTION SICKNESS 3 Patch 0     Allergies   Allergen Reactions    Latex Rash and Itching    Advair Diskus [Fluticasone-Salmeterol] Other (comments)     \"breathing problems\"    Bactrim [Sulfamethoprim] Shortness of Breath, Itching and Swelling     Swelling of tongue and throat    Iodinated Contrast Media - Oral And Iv Dye Hives    Ivp [Fd And C Blue No.1] Hives and Shortness of Breath    Lovenox [Enoxaparin] Shortness of Breath    Nsaids (Non-Steroidal Anti-Inflammatory Drug) Other (comments)     Heartburn    Sulfa (Sulfonamide Antibiotics) Shortness of Breath     Past Medical History:   Diagnosis Date    Acute kidney failure (HCC) 11/19/2015    Acute sinusitis 2/11/2011    Adverse effect of anesthesia     SLOW WAKING PAST ANESTHESIA    Arthritis     RA    At risk for side effect of medication 9/30/2016    Atrial fibrillation ([address] 10/19/2011    Autoimmune disease ([address]     FIBROMYLGIA    Cancer ([address] 2016    ENDOMETRIAL     Cholelithiasis 11/19/2015    Chronic pain     Claudication of both lower extremities ([address] 8/25/2015    Diabetes mellitus type 2, controlled ([address] 1/19/2016    Diabetes mellitus, type II ([address] 10/10/2014    Fall against object 10/10/2014    Fatty liver 10/20/2015    Hypertension     Ill-defined condition     SPINAL STENOSIS    Left shoulder pain 10/10/2014    Leiomyoma of body of uterus 4/7/2016    Morbid obesity ([address]     Myalgia 9/30/2016    Nausea & vomiting     Pneumonia     Preop examination 6/15/2015    Preoperative clearance 6/15/2015    Rash 2/11/2011    Rash of hands 10/10/2014    Screening for colon cancer 2/22/2016    Screening for glaucoma 2/22/2016    Sleep apnea     SOB (shortness of breath)     hospitalized 5/2012.  angina, SOB    Thromboembolus ([address] 2011    LEFT LOWER LEG AND PE    Tinea pedis, recurrent 7/20/2015    Vaginal bleeding, abnormal 4/20/2017     Past Surgical History:   Procedure Laterality Date  COLONOSCOPY N/A 12/21/2016    COLONOSCOPY performed by Christofer Burgos MD at [facility]  12/21/2016         HX CATARACT REMOVAL Bilateral 2015    [address]    HX DILATION AND CURETTAGE  07/29/2016    HX DILATION AND CURETTAGE  2016    HX GYN  1960    etopic    HX HEART CATHETERIZATION  2001    NEGATIVE PER PATIENT    HX HYSTERECTOMY  2016    HX KNEE ARTHROSCOPY Left 1998    HX ORTHOPAEDIC Right 1960'S    BUNIONECTOMY    HX OTHER SURGICAL      BIOPSY OF VEIN IN FOREHEAD    HX TUBAL LIGATION  1963    TX COLSC FLX W/RMVL OF TUMOR POLYP LESION SNARE TQ  12/12/2011          Family History   Problem Relation Age of Onset    Other Mother      ANEURYSM    Obesity Mother     Heart Disease Father     Other Father      VASCULAR DISEASE    Hypertension Sister     Heart Disease Brother     Heart Attack Brother     Kidney Disease Sister     Seizures Brother     Heart Attack Brother     Anesth Problems Neg Hx     Alcohol abuse Neg Hx      Social History   Substance Use Topics    Smoking status: Former Smoker     Packs/day: 0.25     Years: 15.00     Quit date: 8/23/1996    Smokeless tobacco: Never Used    Alcohol use No      Lab Results  Component Value Date/Time   WBC 4.5 01/24/2017 10:10 AM   WBC 12.2 05/02/2012 11:45 AM   HGB 9.9 01/24/2017 10:10 AM   HCT 31.0 01/24/2017 10:10 AM   PLATELET 838 31/69/9581 10:10 AM   MCV 92.5 01/24/2017 10:10 AM       Lab Results  Component Value Date/Time   Hemoglobin A1c 6.7 08/25/2015 05:01 PM   Hemoglobin A1c 6.7 03/20/2015 12:32 PM   Hemoglobin A1c 6.9 04/17/2014 12:22 PM   Glucose 85 01/24/2017 10:10 AM   Glucose (POC) 113 09/02/2016 12:15 PM   Microalb/Creat ratio (ug/mg creat.) 7.0 02/23/2017 11:44 AM   LDL, calculated 46 03/17/2016 12:19 PM   Creatinine (POC) 0.9 08/16/2016 08:18 AM   Creatinine 0.89 01/24/2017 10:10 AM      Lab Results  Component Value Date/Time   TSH 4.170 03/20/2015 12:32 PM Review of Systems   Constitutional: Positive for malaise/fatigue. Negative for chills and fever. HENT: Negative for ear pain and nosebleeds. Eyes: Negative for blurred vision, pain and discharge. Respiratory: Negative for shortness of breath. Cardiovascular: Negative for chest pain and leg swelling. Gastrointestinal: Negative for constipation, diarrhea, nausea and vomiting. Genitourinary: Negative for frequency. Musculoskeletal: Positive for back pain, joint pain, myalgias and neck pain. Skin: Negative for itching and rash. Neurological: Positive for weakness. Negative for headaches. Psychiatric/Behavioral: Negative for depression. The patient is nervous/anxious and has insomnia. Physical Exam   Constitutional: She is oriented to person, place, and time. She appears well-developed and well-nourished. HENT:   Head: Normocephalic and atraumatic. Eyes: Conjunctivae and EOM are normal.   Neck: Normal range of motion. Neck supple. Cardiovascular: Normal rate, regular rhythm and normal heart sounds. No murmur heard. Pulmonary/Chest: Effort normal and breath sounds normal.   Abdominal: Soft. Bowel sounds are normal. She exhibits no distension. Musculoskeletal: She exhibits tenderness. She exhibits no edema. Right knee: She exhibits decreased range of motion. Tenderness found. Left knee: She exhibits decreased range of motion. Tenderness found. Right ankle: She exhibits decreased range of motion and swelling. Left ankle: She exhibits decreased range of motion and swelling. Thoracic back: She exhibits decreased range of motion, tenderness, pain and spasm. Lumbar back: She exhibits decreased range of motion, tenderness, bony tenderness, pain and spasm. Lymphadenopathy:     She has no cervical adenopathy. Neurological: She is alert and oriented to person, place, and time. Skin: No erythema.    Psychiatric: Her behavior is normal. Nursing note and vitals reviewed. ASSESSMENT and PLAN  Shanika Casas was seen today for medication refill. Diagnoses and all orders for this visit:    Chronic pain disorder  -     AMB POC HEMOGLOBIN A1C  -     morphine CR (MS CONTIN) 60 mg CR tablet; Take 1 Tab by mouth every twelve (12) hours. Max Daily Amount: 120 mg. Indications: Chronic Pain, Severe Pain  -     oxyCODONE IR (ROXICODONE) 20 mg immediate release tablet; Take 1 Tab by mouth every four (4) hours as needed for Pain. Max Daily Amount: 120 mg. Indications: Pain  -     morphine CR (MS CONTIN) 60 mg CR tablet; Take 1 Tab by mouth every twelve (12) hours. Max Daily Amount: 120 mg. Indications: Chronic Pain, Severe Pain  -     oxyCODONE IR (ROXICODONE) 20 mg immediate release tablet; Take 1 Tab by mouth every four (4) hours as needed for Pain. Max Daily Amount: 120 mg. Indications: Pain  -     CBC W/O DIFF  -     METABOLIC PANEL, COMPREHENSIVE  -     TSH 3RD GENERATION  -     furosemide (LASIX) 40 mg tablet; Take 1 Tab by mouth daily. -     potassium chloride (K-DUR, KLOR-CON) 20 mEq tablet; Take 1 Tab by mouth two (2) times a day. Indications: HYPOKALEMIA PREVENTION  -     [place] COLLECTION VENOUS BLOOD,VENIPUNCTURE    Blood in urine  -     AMB POC HEMOGLOBIN A1C  -     morphine CR (MS CONTIN) 60 mg CR tablet; Take 1 Tab by mouth every twelve (12) hours. Max Daily Amount: 120 mg. Indications: Chronic Pain, Severe Pain  -     oxyCODONE IR (ROXICODONE) 20 mg immediate release tablet; Take 1 Tab by mouth every four (4) hours as needed for Pain. Max Daily Amount: 120 mg. Indications: Pain  -     morphine CR (MS CONTIN) 60 mg CR tablet; Take 1 Tab by mouth every twelve (12) hours. Max Daily Amount: 120 mg. Indications: Chronic Pain, Severe Pain  -     oxyCODONE IR (ROXICODONE) 20 mg immediate release tablet; Take 1 Tab by mouth every four (4) hours as needed for Pain. Max Daily Amount: 120 mg.  Indications: Pain  -     CBC W/O DIFF  -     METABOLIC PANEL, COMPREHENSIVE  -     TSH 3RD GENERATION  -     furosemide (LASIX) 40 mg tablet; Take 1 Tab by mouth daily. -     potassium chloride (K-DUR, KLOR-CON) 20 mEq tablet; Take 1 Tab by mouth two (2) times a day. Indications: HYPOKALEMIA PREVENTION  -     [place] COLLECTION VENOUS BLOOD,VENIPUNCTURE    Lower abdominal pain  -     AMB POC HEMOGLOBIN A1C  -     morphine CR (MS CONTIN) 60 mg CR tablet; Take 1 Tab by mouth every twelve (12) hours. Max Daily Amount: 120 mg. Indications: Chronic Pain, Severe Pain  -     oxyCODONE IR (ROXICODONE) 20 mg immediate release tablet; Take 1 Tab by mouth every four (4) hours as needed for Pain. Max Daily Amount: 120 mg. Indications: Pain  -     morphine CR (MS CONTIN) 60 mg CR tablet; Take 1 Tab by mouth every twelve (12) hours. Max Daily Amount: 120 mg. Indications: Chronic Pain, Severe Pain  -     oxyCODONE IR (ROXICODONE) 20 mg immediate release tablet; Take 1 Tab by mouth every four (4) hours as needed for Pain. Max Daily Amount: 120 mg. Indications: Pain  -     CBC W/O DIFF  -     METABOLIC PANEL, COMPREHENSIVE  -     TSH 3RD GENERATION  -     furosemide (LASIX) 40 mg tablet; Take 1 Tab by mouth daily. -     potassium chloride (K-DUR, KLOR-CON) 20 mEq tablet; Take 1 Tab by mouth two (2) times a day. Indications: HYPOKALEMIA PREVENTION  -     [place] COLLECTION VENOUS BLOOD,VENIPUNCTURE    MCTD (mixed connective tissue disease) (HCC)  -     AMB POC HEMOGLOBIN A1C  -     morphine CR (MS CONTIN) 60 mg CR tablet; Take 1 Tab by mouth every twelve (12) hours. Max Daily Amount: 120 mg. Indications: Chronic Pain, Severe Pain  -     oxyCODONE IR (ROXICODONE) 20 mg immediate release tablet; Take 1 Tab by mouth every four (4) hours as needed for Pain. Max Daily Amount: 120 mg. Indications: Pain  -     morphine CR (MS CONTIN) 60 mg CR tablet; Take 1 Tab by mouth every twelve (12) hours. Max Daily Amount: 120 mg.  Indications: Chronic Pain, Severe Pain  -     oxyCODONE IR (ROXICODONE) 20 mg immediate release tablet; Take 1 Tab by mouth every four (4) hours as needed for Pain. Max Daily Amount: 120 mg. Indications: Pain  -     CBC W/O DIFF  -     METABOLIC PANEL, COMPREHENSIVE  -     TSH 3RD GENERATION  -     furosemide (LASIX) 40 mg tablet; Take 1 Tab by mouth daily. -     potassium chloride (K-DUR, KLOR-CON) 20 mEq tablet; Take 1 Tab by mouth two (2) times a day. Indications: HYPOKALEMIA PREVENTION  -     [place] COLLECTION VENOUS BLOOD,VENIPUNCTURE    Claudication of both lower extremities (HCC)  -     AMB POC HEMOGLOBIN A1C  -     morphine CR (MS CONTIN) 60 mg CR tablet; Take 1 Tab by mouth every twelve (12) hours. Max Daily Amount: 120 mg. Indications: Chronic Pain, Severe Pain  -     oxyCODONE IR (ROXICODONE) 20 mg immediate release tablet; Take 1 Tab by mouth every four (4) hours as needed for Pain. Max Daily Amount: 120 mg. Indications: Pain  -     morphine CR (MS CONTIN) 60 mg CR tablet; Take 1 Tab by mouth every twelve (12) hours. Max Daily Amount: 120 mg. Indications: Chronic Pain, Severe Pain  -     oxyCODONE IR (ROXICODONE) 20 mg immediate release tablet; Take 1 Tab by mouth every four (4) hours as needed for Pain. Max Daily Amount: 120 mg. Indications: Pain  -     CBC W/O DIFF  -     METABOLIC PANEL, COMPREHENSIVE  -     TSH 3RD GENERATION  -     furosemide (LASIX) 40 mg tablet; Take 1 Tab by mouth daily. -     potassium chloride (K-DUR, KLOR-CON) 20 mEq tablet; Take 1 Tab by mouth two (2) times a day. Indications: HYPOKALEMIA PREVENTION  -     [place] COLLECTION VENOUS BLOOD,VENIPUNCTURE    Controlled type 2 diabetes mellitus with chronic kidney disease, unspecified CKD stage, unspecified long term insulin use status  -     REFERRAL TO PODIATRY  -     REFERRAL TO OPTOMETRY  -     AMB POC HEMOGLOBIN A1C  -     morphine CR (MS CONTIN) 60 mg CR tablet; Take 1 Tab by mouth every twelve (12) hours. Max Daily Amount: 120 mg.  Indications: Chronic Pain, Severe Pain  -     oxyCODONE IR (ROXICODONE) 20 mg immediate release tablet; Take 1 Tab by mouth every four (4) hours as needed for Pain. Max Daily Amount: 120 mg. Indications: Pain  -     morphine CR (MS CONTIN) 60 mg CR tablet; Take 1 Tab by mouth every twelve (12) hours. Max Daily Amount: 120 mg. Indications: Chronic Pain, Severe Pain  -     oxyCODONE IR (ROXICODONE) 20 mg immediate release tablet; Take 1 Tab by mouth every four (4) hours as needed for Pain. Max Daily Amount: 120 mg. Indications: Pain  -     CBC W/O DIFF  -     METABOLIC PANEL, COMPREHENSIVE  -     TSH 3RD GENERATION  -     furosemide (LASIX) 40 mg tablet; Take 1 Tab by mouth daily. -     potassium chloride (K-DUR, KLOR-CON) 20 mEq tablet; Take 1 Tab by mouth two (2) times a day. Indications: HYPOKALEMIA PREVENTION  -     [place] COLLECTION VENOUS BLOOD,VENIPUNCTURE    Endometrial cancer (HCC)  -     AMB POC HEMOGLOBIN A1C  -     morphine CR (MS CONTIN) 60 mg CR tablet; Take 1 Tab by mouth every twelve (12) hours. Max Daily Amount: 120 mg. Indications: Chronic Pain, Severe Pain  -     oxyCODONE IR (ROXICODONE) 20 mg immediate release tablet; Take 1 Tab by mouth every four (4) hours as needed for Pain. Max Daily Amount: 120 mg. Indications: Pain  -     morphine CR (MS CONTIN) 60 mg CR tablet; Take 1 Tab by mouth every twelve (12) hours. Max Daily Amount: 120 mg. Indications: Chronic Pain, Severe Pain  -     oxyCODONE IR (ROXICODONE) 20 mg immediate release tablet; Take 1 Tab by mouth every four (4) hours as needed for Pain. Max Daily Amount: 120 mg. Indications: Pain  -     CBC W/O DIFF  -     METABOLIC PANEL, COMPREHENSIVE  -     TSH 3RD GENERATION  -     furosemide (LASIX) 40 mg tablet; Take 1 Tab by mouth daily. -     potassium chloride (K-DUR, KLOR-CON) 20 mEq tablet; Take 1 Tab by mouth two (2) times a day. Indications: HYPOKALEMIA PREVENTION  -     [place] COLLECTION VENOUS BLOOD,VENIPUNCTURE    Fibromyalgia  -     AMB POC HEMOGLOBIN A1C  -     morphine CR (MS CONTIN) 60 mg CR tablet;  Take 1 Tab by mouth every twelve (12) hours. Max Daily Amount: 120 mg. Indications: Chronic Pain, Severe Pain  -     oxyCODONE IR (ROXICODONE) 20 mg immediate release tablet; Take 1 Tab by mouth every four (4) hours as needed for Pain. Max Daily Amount: 120 mg. Indications: Pain  -     morphine CR (MS CONTIN) 60 mg CR tablet; Take 1 Tab by mouth every twelve (12) hours. Max Daily Amount: 120 mg. Indications: Chronic Pain, Severe Pain  -     oxyCODONE IR (ROXICODONE) 20 mg immediate release tablet; Take 1 Tab by mouth every four (4) hours as needed for Pain. Max Daily Amount: 120 mg. Indications: Pain  -     CBC W/O DIFF  -     METABOLIC PANEL, COMPREHENSIVE  -     TSH 3RD GENERATION  -     furosemide (LASIX) 40 mg tablet; Take 1 Tab by mouth daily. -     potassium chloride (K-DUR, KLOR-CON) 20 mEq tablet; Take 1 Tab by mouth two (2) times a day. Indications: HYPOKALEMIA PREVENTION  -     [place] COLLECTION VENOUS BLOOD,VENIPUNCTURE    Monoclonal gammopathies  -     AMB POC HEMOGLOBIN A1C  -     morphine CR (MS CONTIN) 60 mg CR tablet; Take 1 Tab by mouth every twelve (12) hours. Max Daily Amount: 120 mg. Indications: Chronic Pain, Severe Pain  -     oxyCODONE IR (ROXICODONE) 20 mg immediate release tablet; Take 1 Tab by mouth every four (4) hours as needed for Pain. Max Daily Amount: 120 mg. Indications: Pain  -     morphine CR (MS CONTIN) 60 mg CR tablet; Take 1 Tab by mouth every twelve (12) hours. Max Daily Amount: 120 mg. Indications: Chronic Pain, Severe Pain  -     oxyCODONE IR (ROXICODONE) 20 mg immediate release tablet; Take 1 Tab by mouth every four (4) hours as needed for Pain. Max Daily Amount: 120 mg. Indications: Pain  -     CBC W/O DIFF  -     METABOLIC PANEL, COMPREHENSIVE  -     TSH 3RD GENERATION  -     furosemide (LASIX) 40 mg tablet; Take 1 Tab by mouth daily. -     potassium chloride (K-DUR, KLOR-CON) 20 mEq tablet; Take 1 Tab by mouth two (2) times a day.  Indications: HYPOKALEMIA PREVENTION  -     [place] COLLECTION VENOUS BLOOD,VENIPUNCTURE    Chronic pain of right ankle  -     AMB POC HEMOGLOBIN A1C  -     morphine CR (MS CONTIN) 60 mg CR tablet; Take 1 Tab by mouth every twelve (12) hours. Max Daily Amount: 120 mg. Indications: Chronic Pain, Severe Pain  -     oxyCODONE IR (ROXICODONE) 20 mg immediate release tablet; Take 1 Tab by mouth every four (4) hours as needed for Pain. Max Daily Amount: 120 mg. Indications: Pain  -     morphine CR (MS CONTIN) 60 mg CR tablet; Take 1 Tab by mouth every twelve (12) hours. Max Daily Amount: 120 mg. Indications: Chronic Pain, Severe Pain  -     oxyCODONE IR (ROXICODONE) 20 mg immediate release tablet; Take 1 Tab by mouth every four (4) hours as needed for Pain. Max Daily Amount: 120 mg. Indications: Pain  -     CBC W/O DIFF  -     METABOLIC PANEL, COMPREHENSIVE  -     TSH 3RD GENERATION  -     furosemide (LASIX) 40 mg tablet; Take 1 Tab by mouth daily. -     potassium chloride (K-DUR, KLOR-CON) 20 mEq tablet; Take 1 Tab by mouth two (2) times a day. Indications: HYPOKALEMIA PREVENTION  -     [place] COLLECTION VENOUS BLOOD,VENIPUNCTURE    Arm pain, right  -     AMB POC HEMOGLOBIN A1C  -     morphine CR (MS CONTIN) 60 mg CR tablet; Take 1 Tab by mouth every twelve (12) hours. Max Daily Amount: 120 mg. Indications: Chronic Pain, Severe Pain  -     oxyCODONE IR (ROXICODONE) 20 mg immediate release tablet; Take 1 Tab by mouth every four (4) hours as needed for Pain. Max Daily Amount: 120 mg. Indications: Pain  -     morphine CR (MS CONTIN) 60 mg CR tablet; Take 1 Tab by mouth every twelve (12) hours. Max Daily Amount: 120 mg. Indications: Chronic Pain, Severe Pain  -     oxyCODONE IR (ROXICODONE) 20 mg immediate release tablet; Take 1 Tab by mouth every four (4) hours as needed for Pain. Max Daily Amount: 120 mg. Indications: Pain  -     CBC W/O DIFF  -     METABOLIC PANEL, COMPREHENSIVE  -     TSH 3RD GENERATION  -     furosemide (LASIX) 40 mg tablet; Take 1 Tab by mouth daily.   -     potassium chloride (K-DUR, KLOR-CON) 20 mEq tablet; Take 1 Tab by mouth two (2) times a day. Indications: HYPOKALEMIA PREVENTION  -     [place] COLLECTION VENOUS BLOOD,VENIPUNCTURE    Elbow pain, right  -     AMB POC HEMOGLOBIN A1C  -     morphine CR (MS CONTIN) 60 mg CR tablet; Take 1 Tab by mouth every twelve (12) hours. Max Daily Amount: 120 mg. Indications: Chronic Pain, Severe Pain  -     oxyCODONE IR (ROXICODONE) 20 mg immediate release tablet; Take 1 Tab by mouth every four (4) hours as needed for Pain. Max Daily Amount: 120 mg. Indications: Pain  -     morphine CR (MS CONTIN) 60 mg CR tablet; Take 1 Tab by mouth every twelve (12) hours. Max Daily Amount: 120 mg. Indications: Chronic Pain, Severe Pain  -     oxyCODONE IR (ROXICODONE) 20 mg immediate release tablet; Take 1 Tab by mouth every four (4) hours as needed for Pain. Max Daily Amount: 120 mg. Indications: Pain  -     CBC W/O DIFF  -     METABOLIC PANEL, COMPREHENSIVE  -     TSH 3RD GENERATION  -     furosemide (LASIX) 40 mg tablet; Take 1 Tab by mouth daily. -     potassium chloride (K-DUR, KLOR-CON) 20 mEq tablet; Take 1 Tab by mouth two (2) times a day. Indications: HYPOKALEMIA PREVENTION  -     CA COLLECTION VENOUS BLOOD,VENIPUNCTURE    Pain in both knees, unspecified chronicity  -     AMB POC HEMOGLOBIN A1C  -     morphine CR (MS CONTIN) 60 mg CR tablet; Take 1 Tab by mouth every twelve (12) hours. Max Daily Amount: 120 mg. Indications: Chronic Pain, Severe Pain  -     oxyCODONE IR (ROXICODONE) 20 mg immediate release tablet; Take 1 Tab by mouth every four (4) hours as needed for Pain. Max Daily Amount: 120 mg. Indications: Pain  -     morphine CR (MS CONTIN) 60 mg CR tablet; Take 1 Tab by mouth every twelve (12) hours. Max Daily Amount: 120 mg. Indications: Chronic Pain, Severe Pain  -     oxyCODONE IR (ROXICODONE) 20 mg immediate release tablet; Take 1 Tab by mouth every four (4) hours as needed for Pain. Max Daily Amount: 120 mg.  Indications: Pain  -     CBC W/O DIFF  -     METABOLIC PANEL, COMPREHENSIVE  -     TSH 3RD GENERATION  -     furosemide (LASIX) 40 mg tablet; Take 1 Tab by mouth daily. -     potassium chloride (K-DUR, KLOR-CON) 20 mEq tablet; Take 1 Tab by mouth two (2) times a day. Indications: HYPOKALEMIA PREVENTION  -     HI COLLECTION VENOUS BLOOD,VENIPUNCTURE    MGUS (monoclonal gammopathy of unknown significance)  -     AMB POC HEMOGLOBIN A1C  -     morphine CR (MS CONTIN) 60 mg CR tablet; Take 1 Tab by mouth every twelve (12) hours. Max Daily Amount: 120 mg. Indications: Chronic Pain, Severe Pain  -     oxyCODONE IR (ROXICODONE) 20 mg immediate release tablet; Take 1 Tab by mouth every four (4) hours as needed for Pain. Max Daily Amount: 120 mg. Indications: Pain  -     morphine CR (MS CONTIN) 60 mg CR tablet; Take 1 Tab by mouth every twelve (12) hours. Max Daily Amount: 120 mg. Indications: Chronic Pain, Severe Pain  -     oxyCODONE IR (ROXICODONE) 20 mg immediate release tablet; Take 1 Tab by mouth every four (4) hours as needed for Pain. Max Daily Amount: 120 mg. Indications: Pain  -     CBC W/O DIFF  -     METABOLIC PANEL, COMPREHENSIVE  -     TSH 3RD GENERATION  -     furosemide (LASIX) 40 mg tablet; Take 1 Tab by mouth daily. -     potassium chloride (K-DUR, KLOR-CON) 20 mEq tablet; Take 1 Tab by mouth two (2) times a day. Indications: HYPOKALEMIA PREVENTION  -     HI COLLECTION VENOUS BLOOD,VENIPUNCTURE    Chronic left shoulder pain  -     AMB POC HEMOGLOBIN A1C  -     morphine CR (MS CONTIN) 60 mg CR tablet; Take 1 Tab by mouth every twelve (12) hours. Max Daily Amount: 120 mg. Indications: Chronic Pain, Severe Pain  -     oxyCODONE IR (ROXICODONE) 20 mg immediate release tablet; Take 1 Tab by mouth every four (4) hours as needed for Pain. Max Daily Amount: 120 mg. Indications: Pain  -     morphine CR (MS CONTIN) 60 mg CR tablet; Take 1 Tab by mouth every twelve (12) hours. Max Daily Amount: 120 mg.  Indications: Chronic Pain, Severe Pain  - oxyCODONE IR (ROXICODONE) 20 mg immediate release tablet; Take 1 Tab by mouth every four (4) hours as needed for Pain. Max Daily Amount: 120 mg. Indications: Pain  -     CBC W/O DIFF  -     METABOLIC PANEL, COMPREHENSIVE  -     TSH 3RD GENERATION  -     furosemide (LASIX) 40 mg tablet; Take 1 Tab by mouth daily. -     potassium chloride (K-DUR, KLOR-CON) 20 mEq tablet; Take 1 Tab by mouth two (2) times a day. Indications: HYPOKALEMIA PREVENTION  -     NC COLLECTION VENOUS BLOOD,VENIPUNCTURE    Myalgia  -     AMB POC HEMOGLOBIN A1C  -     morphine CR (MS CONTIN) 60 mg CR tablet; Take 1 Tab by mouth every twelve (12) hours. Max Daily Amount: 120 mg. Indications: Chronic Pain, Severe Pain  -     oxyCODONE IR (ROXICODONE) 20 mg immediate release tablet; Take 1 Tab by mouth every four (4) hours as needed for Pain. Max Daily Amount: 120 mg. Indications: Pain  -     morphine CR (MS CONTIN) 60 mg CR tablet; Take 1 Tab by mouth every twelve (12) hours. Max Daily Amount: 120 mg. Indications: Chronic Pain, Severe Pain  -     oxyCODONE IR (ROXICODONE) 20 mg immediate release tablet; Take 1 Tab by mouth every four (4) hours as needed for Pain. Max Daily Amount: 120 mg. Indications: Pain  -     CBC W/O DIFF  -     METABOLIC PANEL, COMPREHENSIVE  -     TSH 3RD GENERATION  -     furosemide (LASIX) 40 mg tablet; Take 1 Tab by mouth daily. -     potassium chloride (K-DUR, KLOR-CON) 20 mEq tablet; Take 1 Tab by mouth two (2) times a day. Indications: HYPOKALEMIA PREVENTION  -     NC COLLECTION VENOUS BLOOD,VENIPUNCTURE    Pain of left upper extremity  -     AMB POC HEMOGLOBIN A1C  -     morphine CR (MS CONTIN) 60 mg CR tablet; Take 1 Tab by mouth every twelve (12) hours. Max Daily Amount: 120 mg. Indications: Chronic Pain, Severe Pain  -     oxyCODONE IR (ROXICODONE) 20 mg immediate release tablet; Take 1 Tab by mouth every four (4) hours as needed for Pain. Max Daily Amount: 120 mg.  Indications: Pain  -     morphine CR (MS CONTIN) 60 mg CR tablet; Take 1 Tab by mouth every twelve (12) hours. Max Daily Amount: 120 mg. Indications: Chronic Pain, Severe Pain  -     oxyCODONE IR (ROXICODONE) 20 mg immediate release tablet; Take 1 Tab by mouth every four (4) hours as needed for Pain. Max Daily Amount: 120 mg. Indications: Pain  -     CBC W/O DIFF  -     METABOLIC PANEL, COMPREHENSIVE  -     TSH 3RD GENERATION  -     furosemide (LASIX) 40 mg tablet; Take 1 Tab by mouth daily. -     potassium chloride (K-DUR, KLOR-CON) 20 mEq tablet; Take 1 Tab by mouth two (2) times a day. Indications: HYPOKALEMIA PREVENTION  -     LA COLLECTION VENOUS BLOOD,VENIPUNCTURE    Vaginal bleeding, abnormal  -     AMB POC HEMOGLOBIN A1C  -     morphine CR (MS CONTIN) 60 mg CR tablet; Take 1 Tab by mouth every twelve (12) hours. Max Daily Amount: 120 mg. Indications: Chronic Pain, Severe Pain  -     oxyCODONE IR (ROXICODONE) 20 mg immediate release tablet; Take 1 Tab by mouth every four (4) hours as needed for Pain. Max Daily Amount: 120 mg. Indications: Pain  -     morphine CR (MS CONTIN) 60 mg CR tablet; Take 1 Tab by mouth every twelve (12) hours. Max Daily Amount: 120 mg. Indications: Chronic Pain, Severe Pain  -     oxyCODONE IR (ROXICODONE) 20 mg immediate release tablet; Take 1 Tab by mouth every four (4) hours as needed for Pain. Max Daily Amount: 120 mg. Indications: Pain  -     CBC W/O DIFF  -     METABOLIC PANEL, COMPREHENSIVE  -     TSH 3RD GENERATION  -     furosemide (LASIX) 40 mg tablet; Take 1 Tab by mouth daily. -     potassium chloride (K-DUR, KLOR-CON) 20 mEq tablet; Take 1 Tab by mouth two (2) times a day. Indications: HYPOKALEMIA PREVENTION  -     LA COLLECTION VENOUS BLOOD,VENIPUNCTURE    Diabetes mellitus without complication (HCC)  -     REFERRAL TO PODIATRY  -     REFERRAL TO OPTOMETRY  -     AMB POC HEMOGLOBIN A1C  -     morphine CR (MS CONTIN) 60 mg CR tablet; Take 1 Tab by mouth every twelve (12) hours. Max Daily Amount: 120 mg. Indications: Chronic Pain, Severe Pain  -     oxyCODONE IR (ROXICODONE) 20 mg immediate release tablet; Take 1 Tab by mouth every four (4) hours as needed for Pain. Max Daily Amount: 120 mg. Indications: Pain  -     morphine CR (MS CONTIN) 60 mg CR tablet; Take 1 Tab by mouth every twelve (12) hours. Max Daily Amount: 120 mg. Indications: Chronic Pain, Severe Pain  -     oxyCODONE IR (ROXICODONE) 20 mg immediate release tablet; Take 1 Tab by mouth every four (4) hours as needed for Pain. Max Daily Amount: 120 mg. Indications: Pain  -     CBC W/O DIFF  -     METABOLIC PANEL, COMPREHENSIVE  -     TSH 3RD GENERATION  -     furosemide (LASIX) 40 mg tablet; Take 1 Tab by mouth daily. -     potassium chloride (K-DUR, KLOR-CON) 20 mEq tablet; Take 1 Tab by mouth two (2) times a day. Indications: HYPOKALEMIA PREVENTION  -     CT COLLECTION VENOUS BLOOD,VENIPUNCTURE    Other pulmonary embolism without acute cor pulmonale, unspecified chronicity (HCC)  -     AMB POC HEMOGLOBIN A1C  -     morphine CR (MS CONTIN) 60 mg CR tablet; Take 1 Tab by mouth every twelve (12) hours. Max Daily Amount: 120 mg. Indications: Chronic Pain, Severe Pain  -     oxyCODONE IR (ROXICODONE) 20 mg immediate release tablet; Take 1 Tab by mouth every four (4) hours as needed for Pain. Max Daily Amount: 120 mg. Indications: Pain  -     morphine CR (MS CONTIN) 60 mg CR tablet; Take 1 Tab by mouth every twelve (12) hours. Max Daily Amount: 120 mg. Indications: Chronic Pain, Severe Pain  -     oxyCODONE IR (ROXICODONE) 20 mg immediate release tablet; Take 1 Tab by mouth every four (4) hours as needed for Pain. Max Daily Amount: 120 mg. Indications: Pain  -     CBC W/O DIFF  -     METABOLIC PANEL, COMPREHENSIVE  -     TSH 3RD GENERATION  -     furosemide (LASIX) 40 mg tablet; Take 1 Tab by mouth daily. -     potassium chloride (K-DUR, KLOR-CON) 20 mEq tablet; Take 1 Tab by mouth two (2) times a day.  Indications: HYPOKALEMIA PREVENTION  -     CT COLLECTION VENOUS BLOOD,VENIPUNCTURE    Personal history of radiation therapy  -     AMB POC HEMOGLOBIN A1C  -     morphine CR (MS CONTIN) 60 mg CR tablet; Take 1 Tab by mouth every twelve (12) hours. Max Daily Amount: 120 mg. Indications: Chronic Pain, Severe Pain  -     oxyCODONE IR (ROXICODONE) 20 mg immediate release tablet; Take 1 Tab by mouth every four (4) hours as needed for Pain. Max Daily Amount: 120 mg. Indications: Pain  -     morphine CR (MS CONTIN) 60 mg CR tablet; Take 1 Tab by mouth every twelve (12) hours. Max Daily Amount: 120 mg. Indications: Chronic Pain, Severe Pain  -     oxyCODONE IR (ROXICODONE) 20 mg immediate release tablet; Take 1 Tab by mouth every four (4) hours as needed for Pain. Max Daily Amount: 120 mg. Indications: Pain  -     CBC W/O DIFF  -     METABOLIC PANEL, COMPREHENSIVE  -     TSH 3RD GENERATION  -     furosemide (LASIX) 40 mg tablet; Take 1 Tab by mouth daily. -     potassium chloride (K-DUR, KLOR-CON) 20 mEq tablet; Take 1 Tab by mouth two (2) times a day. Indications: HYPOKALEMIA PREVENTION  -     CT COLLECTION VENOUS BLOOD,VENIPUNCTURE      Patient medications were refilled after signing the contract consent and no harm documentations and again was told to lessen the amount of opioid-based medication continue with weight reduction do some massage therapy chiropractor exercise therapy such as resistance banding avoid heavy lifting heavy pushing at this time include ibuprofen and Tylenol over-the-counter topical cream for  day views of concerns patient was told to take some Tums or over-the-counter PPI if abdominal upset ice therapy he therapy side effect of current opioid-based medication significantly explained in detail patient acknowledged understanding and agreed with recommendation  in addition, pt was told to avoid machinary operation and driving while on any opioid based medications that will cause dizziness, drowsiness, and sleepiness.   Dependency and tolerancy were also addressed,  meds side effects and compliancy advised,  Call or rtc if worsens, radiology results and schedule of future radiology studies reviewed with patient. Pt agreed with today's recommendations.

## 2017-04-20 NOTE — MR AVS SNAPSHOT
Visit Information Date & Time Provider Department Dept. Phone Encounter #  
 4/20/2017 11:30 AM Magy Abraham MD [address] OFFICE-[facility] [phone] [id number] Your Appointments 10/31/2017 10:00 AM  
Follow Up with Corrie Jorge  [facility] Appt Note: MGUS, 1 yr f/u, CP 0  
 [address] [place] [address] [address]  
[phone]  
  
   
 [address] [address] Upcoming Health Maintenance Date Due  
 MEDICARE YEARLY EXAM 7/20/2016 LIPID PANEL Q1 3/17/2017 HEMOGLOBIN A1C Q6M 4/20/2017 EYE EXAM RETINAL OR DILATED Q1 4/22/2017 FOOT EXAM Q1 7/13/2017 MICROALBUMIN Q1 2/23/2018 GLAUCOMA SCREENING Q2Y 4/22/2018 COLONOSCOPY 12/21/2019 DTaP/Tdap/Td series (3 - Td) 10/20/2026 Allergies as of 4/20/2017  Review Complete On: 4/20/2017 By: Phong Mccord LPN Severity Noted Reaction Type Reactions Latex  08/29/2013    Rash, Itching Advair Diskus [Fluticasone-salmeterol]  05/29/2012    Other (comments) \"breathing problems\" Bactrim [Sulfamethoprim]  08/29/2013   Side Effect Shortness of Breath, Itching, Swelling Swelling of tongue and throat Iodinated Contrast Media - Oral And Iv Dye  10/04/2016    Hives Ivp [Fd And C Blue No.1]  12/21/2010    Hives, Shortness of Breath Lovenox [Enoxaparin]  12/21/2010    Shortness of Breath Nsaids (Non-steroidal Anti-inflammatory Drug)  12/21/2010    Other (comments) Heartburn Sulfa (Sulfonamide Antibiotics)  12/23/2013    Shortness of Breath Current Immunizations  Reviewed on 10/31/2016 Name Date Influenza Vaccine (Quad) PF  Deferred (Medication not available) Pneumococcal Vaccine (Unspecified Type) 1/1/2010 Tdap 5/17/2006 Not reviewed this visit You Were Diagnosed With   
  
 Codes Comments MCTD (mixed connective tissue disease) ([place] [address].)    -  Primary ICD-10-CM: M35.1 ICD-9-CM: 710.8 Blood in urine     ICD-10-CM: R31.9 ICD-9-CM: 599.70 Lower abdominal pain     ICD-10-CM: R10.30 ICD-9-CM: 789.09 Chronic pain disorder     ICD-10-CM: G89.4 ICD-9-CM: 338.4 Claudication of both lower extremities ([place] [address].)     ICD-10-CM: I73.9 ICD-9-CM: 443.9 Controlled type 2 diabetes mellitus with chronic kidney disease, unspecified CKD stage, unspecified long term insulin use status     ICD-10-CM: E11.22 
ICD-9-CM: 250.40, 585.9 Endometrial cancer ([facility] [facility].)     ICD-10-CM: C54.1 ICD-9-CM: 182.0 Fibromyalgia     ICD-10-CM: M79.7 ICD-9-CM: 729.1 Monoclonal gammopathies     ICD-10-CM: D47.2 ICD-9-CM: 273.1 Chronic pain of right ankle     ICD-10-CM: M25.571, G89.29 ICD-9-CM: 719.47, 338.29 Arm pain, right     ICD-10-CM: M79.601 ICD-9-CM: 729.5 Elbow pain, right     ICD-10-CM: M25.521 ICD-9-CM: 719.42 Pain in both knees, unspecified chronicity     ICD-10-CM: M25.561, M25.562 ICD-9-CM: 719.46   
 MGUS (monoclonal gammopathy of unknown significance)     ICD-10-CM: F62.0 ICD-9-CM: 273.1 Chronic left shoulder pain     ICD-10-CM: M25.512, G89.29 ICD-9-CM: 719.41, 338.29 Myalgia     ICD-10-CM: M79.1 ICD-9-CM: 729.1 Pain of left upper extremity     ICD-10-CM: D66.537 ICD-9-CM: 729.5 Vaginal bleeding, abnormal     ICD-10-CM: N93.9 ICD-9-CM: 623.8 Diabetes mellitus without complication ([facility]     BVN-25-PL: E11.9 ICD-9-CM: 250.00 Other pulmonary embolism without acute cor pulmonale, unspecified chronicity (HCC)     ICD-10-CM: I26.99 
ICD-9-CM: 415.19 Vitals BP Pulse Temp Resp Height(growth percentile) Weight(growth percentile) 115/71 (BP 1 Location: Left arm, BP Patient Position: At rest) 90 97.7 °F (36.5 °C) (Oral) 14 5' 6\" (1.676 m) 301 lb 12.8 oz (136.9 kg) SpO2 BMI OB Status Smoking Status 94% 48.71 kg/m2 Hysterectomy Former Smoker Vitals History BMI and BSA Data Body Mass Index Body Surface Area 48.71 kg/m 2 2.52 m 2 Preferred Pharmacy Pharmacy Name Phone [facility]Y #[id number]- [facility] [address] [phone] Your Updated Medication List  
  
   
This list is accurate as of: 4/20/17 12:39 PM.  Always use your most recent med list.  
  
  
  
  
 apixaban 5 mg tablet Commonly known as:  Chelsea Verena Take 1 Tab by mouth two (2) times a day. Indications: Cerebral Thromboembolism Prevention, DEEP VEIN THROMBOSIS PREVENTION, DEEP VENOUS THROMBOSIS, Pulmonary Thromboembolism  
  
 aspirin delayed-release 81 mg tablet Take 2 Tabs by mouth daily (after breakfast). After meal  Indications: PREVENTION OF TRANSIENT ISCHEMIC ATTACKS, THROMBOSIS PREVENTION AFTER PCI  
  
 clonazePAM 1 mg tablet Commonly known as:  Jodeen Levels Take 1 Tab by mouth two (2) times a day. Max Daily Amount: 2 mg.  
  
 cyclobenzaprine 10 mg tablet Commonly known as:  FLEXERIL  
TAKE 1 TAB BY MOUTH TWO (2) TIMES A DAY. INDICATIONS: MUSCLE SPASM  
  
 diphenhydrAMINE 50 mg capsule Commonly known as:  BENADRYL  
TAKE ONE CAPSULE BY MOUTH 1 HOUR PRIOR TO SCAN  
  
 furosemide 40 mg tablet Commonly known as:  LASIX Take 1 Tab by mouth daily. GAVILYTE-C 240-22.72-6.72 -5.84 gram solution Generic drug:  PEG 3350-Electrolytes USE AS DIRECTED  
  
 hydrALAZINE 25 mg tablet Commonly known as:  APRESOLINE Take 1 Tab by mouth daily. magnesium 250 mg Tab Take  by mouth daily. methylPREDNISolone 4 mg tablet Commonly known as:  MEDROL DOSEPACK  
TAKE 6 TABLETS ON DAY 1 AS DIRECTED ON PACKAGE AND DECREASE BY 1 TAB EACH DAY FOR A TOTAL OF 6 DAYS  
  
 metoprolol succinate 100 mg tablet Commonly known as:  TOPROL-XL  
100 mg daily. * morphine CR 60 mg CR tablet Commonly known as:  MS CONTIN  
 Take 1 Tab by mouth every twelve (12) hours. Max Daily Amount: 120 mg. Indications: Chronic Pain, Severe Pain * morphine CR 60 mg CR tablet Commonly known as:  MS CONTIN Take 1 Tab by mouth every twelve (12) hours. Max Daily Amount: 120 mg. Indications: Chronic Pain, Severe Pain Start taking on:  5/20/2017 OTHER  
MORPHINE CREAM APPLIES TO KNEES 3-4X DAILY  
  
 * oxyCODONE IR 20 mg immediate release tablet Commonly known as:  Daren Pronto Take 1 Tab by mouth every four (4) hours as needed for Pain. Max Daily Amount: 120 mg. Indications: Pain * oxyCODONE IR 20 mg immediate release tablet Commonly known as:  Daren Pronto Take 1 Tab by mouth every four (4) hours as needed for Pain. Max Daily Amount: 120 mg. Indications: Pain Start taking on:  5/20/2017 potassium chloride 20 mEq tablet Commonly known as:  K-DUR, KLOR-CON Take 1 Tab by mouth two (2) times a day. Indications: HYPOKALEMIA PREVENTION  
  
 predniSONE 10 mg tablet Commonly known as:  DELTASONE  
TAKE 5 TABLETS 13 HOURS, 7 HOURS AND 1 HOUR PRIOR TO YOUR SCAN  
  
 scopolamine 1.5 mg (1 mg over 3 days) Pt3d Commonly known as:  TRANSDERM-SCOP  
1 Patch by TransDERmal route every seventy-two (72) hours. Indications: PREVENTION OF MOTION SICKNESS  
  
 VITAMIN C 500 mg tablet Generic drug:  ascorbic acid (vitamin C) Take 500 mg by mouth daily. VITAMIN D3 2,000 unit Tab Generic drug:  cholecalciferol (vitamin D3) Take  by mouth daily. * Notice: This list has 4 medication(s) that are the same as other medications prescribed for you. Read the directions carefully, and ask your doctor or other care provider to review them with you. Prescriptions Printed Refills  
 morphine CR (MS CONTIN) 60 mg CR tablet 0 Sig: Take 1 Tab by mouth every twelve (12) hours. Max Daily Amount: 120 mg. Indications: Chronic Pain, Severe Pain Class: Print  Route: Oral  
 oxyCODONE IR (ROXICODONE) 20 mg immediate release tablet 0 Sig: Take 1 Tab by mouth every four (4) hours as needed for Pain. Max Daily Amount: 120 mg. Indications: Pain Class: Print Route: Oral  
 morphine CR (MS CONTIN) 60 mg CR tablet 0 Starting on: 5/20/2017 Sig: Take 1 Tab by mouth every twelve (12) hours. Max Daily Amount: 120 mg. Indications: Chronic Pain, Severe Pain Class: Print Route: Oral  
 oxyCODONE IR (ROXICODONE) 20 mg immediate release tablet 0 Starting on: 5/20/2017 Sig: Take 1 Tab by mouth every four (4) hours as needed for Pain. Max Daily Amount: 120 mg. Indications: Pain Class: Print Route: Oral  
  
We Performed the Following AMB POC HEMOGLOBIN A1C [49489 CPT(R)] CBC W/O DIFF [16544 CPT(R)] METABOLIC PANEL, COMPREHENSIVE [30058 CPT(R)] REFERRAL TO OPTOMETRY [id number] Custom] Comments:  
 Please evaluate patient for DM. REFERRAL TO PODIATRY [REF90 Custom] Comments:  
 Please evaluate patient for *DM. TSH 3RD GENERATION [93137 CPT(R)] Referral Information Referral ID Referred By Referred To  
  
 [id number] Dee RUFF [facility], [address] [place] [address] Phone: [phone] Visits Status Start Date End Date 1 New Request 4/20/17 4/20/18 If your referral has a status of pending review or denied, additional information will be sent to support the outcome of this decision. Referral ID Referred By Referred To  
 [id number] JENNY RUFF MD  
   [facility], [facility] [address] [place] Phone: [phone] Fax: [phone] Visits Status Start Date End Date 1 New Request 4/20/17 4/20/18 If your referral has a status of pending review or denied, additional information will be sent to support the outcome of this decision. Please provide this summary of care documentation to your next provider. Your primary care clinician is listed as Marielos Bangura. If you have any questions after today's visit, please call [phone].

## 2017-04-21 LAB
ALBUMIN SERPL-MCNC: 3.7 G/DL (ref 3.5–4.8)
ALBUMIN/GLOB SERPL: 0.7 {RATIO} (ref 1.2–2.2)
ALP SERPL-CCNC: 83 IU/L (ref 39–117)
ALT SERPL-CCNC: 15 IU/L (ref 0–32)
AST SERPL-CCNC: 20 IU/L (ref 0–40)
BILIRUB SERPL-MCNC: 0.5 MG/DL (ref 0–1.2)
BUN SERPL-MCNC: 22 MG/DL (ref 8–27)
BUN/CREAT SERPL: 22 (ref 12–28)
CALCIUM SERPL-MCNC: 10.8 MG/DL (ref 8.7–10.3)
CHLORIDE SERPL-SCNC: 97 MMOL/L (ref 96–106)
CO2 SERPL-SCNC: 25 MMOL/L (ref 18–29)
CREAT SERPL-MCNC: 1.01 MG/DL (ref 0.57–1)
ERYTHROCYTE [DISTWIDTH] IN BLOOD BY AUTOMATED COUNT: 15.6 % (ref 12.3–15.4)
GLOBULIN SER CALC-MCNC: 5.4 G/DL (ref 1.5–4.5)
GLUCOSE SERPL-MCNC: 100 MG/DL (ref 65–99)
HCT VFR BLD AUTO: 28.9 % (ref 34–46.6)
HGB BLD-MCNC: 9.5 G/DL (ref 11.1–15.9)
INTERPRETATION: NORMAL
Lab: NORMAL
MCH RBC QN AUTO: 30.4 PG (ref 26.6–33)
MCHC RBC AUTO-ENTMCNC: 32.9 G/DL (ref 31.5–35.7)
MCV RBC AUTO: 93 FL (ref 79–97)
NRBC BLD AUTO-RTO: 0 %
PLATELET # BLD AUTO: 193 X10E3/UL (ref 150–379)
POTASSIUM SERPL-SCNC: 4.7 MMOL/L (ref 3.5–5.2)
PROT SERPL-MCNC: 9.1 G/DL (ref 6–8.5)
RBC # BLD AUTO: 3.12 X10E6/UL (ref 3.77–5.28)
SODIUM SERPL-SCNC: 139 MMOL/L (ref 134–144)
TSH SERPL DL<=0.005 MIU/L-ACNC: 4.32 UIU/ML (ref 0.45–4.5)
WBC # BLD AUTO: 2.5 X10E3/UL (ref 3.4–10.8)

## 2017-05-03 ENCOUNTER — TELEPHONE (OUTPATIENT)
Dept: FAMILY MEDICINE CLINIC | Age: 77
End: 2017-05-03

## 2017-05-03 NOTE — TELEPHONE ENCOUNTER
Patient phoned complaining of swelling in bilateral foot/ankle/legs and not voiding as much as normal.  Informed Dr. Yolande Meadows he stated to inform patient to increase fluid intake to 8 glasses of water per day  and sport drink patient informed she acknowledged understanding.

## 2017-05-30 ENCOUNTER — APPOINTMENT (OUTPATIENT)
Dept: GENERAL RADIOLOGY | Age: 77
End: 2017-05-30
Attending: EMERGENCY MEDICINE
Payer: MEDICARE

## 2017-05-30 ENCOUNTER — HOSPITAL ENCOUNTER (EMERGENCY)
Age: 77
Discharge: HOME | End: 2017-05-30
Attending: EMERGENCY MEDICINE
Payer: MEDICARE

## 2017-05-30 VITALS
HEART RATE: 79 BPM | HEIGHT: 66 IN | RESPIRATION RATE: 22 BRPM | DIASTOLIC BLOOD PRESSURE: 70 MMHG | SYSTOLIC BLOOD PRESSURE: 121 MMHG | TEMPERATURE: 98.3 F | BODY MASS INDEX: 47.09 KG/M2 | OXYGEN SATURATION: 96 % | WEIGHT: 293 LBS

## 2017-05-30 DIAGNOSIS — R00.0 TACHYCARDIA: ICD-10-CM

## 2017-05-30 DIAGNOSIS — T50.905A MEDICATION SIDE EFFECT, INITIAL ENCOUNTER: Primary | ICD-10-CM

## 2017-05-30 LAB
ALBUMIN SERPL BCP-MCNC: 3.1 G/DL (ref 3.5–5)
ALBUMIN/GLOB SERPL: 0.5 {RATIO} (ref 1.1–2.2)
ALP SERPL-CCNC: 75 U/L (ref 45–117)
ALT SERPL-CCNC: 15 U/L (ref 12–78)
ANION GAP BLD CALC-SCNC: 7 MMOL/L (ref 5–15)
AST SERPL W P-5'-P-CCNC: 13 U/L (ref 15–37)
BASOPHILS # BLD AUTO: 0 K/UL (ref 0–0.1)
BASOPHILS # BLD: 0 % (ref 0–1)
BILIRUB SERPL-MCNC: 1.3 MG/DL (ref 0.2–1)
BUN SERPL-MCNC: 18 MG/DL (ref 6–20)
BUN/CREAT SERPL: 15 (ref 12–20)
CALCIUM SERPL-MCNC: 10.3 MG/DL (ref 8.5–10.1)
CHLORIDE SERPL-SCNC: 104 MMOL/L (ref 97–108)
CK SERPL-CCNC: 68 U/L (ref 26–192)
CO2 SERPL-SCNC: 31 MMOL/L (ref 21–32)
CREAT SERPL-MCNC: 1.19 MG/DL (ref 0.55–1.02)
EOSINOPHIL # BLD: 0.1 K/UL (ref 0–0.4)
EOSINOPHIL NFR BLD: 2 % (ref 0–7)
ERYTHROCYTE [DISTWIDTH] IN BLOOD BY AUTOMATED COUNT: 15.6 % (ref 11.5–14.5)
GLOBULIN SER CALC-MCNC: 6.4 G/DL (ref 2–4)
GLUCOSE SERPL-MCNC: 89 MG/DL (ref 65–100)
HCT VFR BLD AUTO: 29.1 % (ref 35–47)
HGB BLD-MCNC: 9.3 G/DL (ref 11.5–16)
LYMPHOCYTES # BLD AUTO: 11 % (ref 12–49)
LYMPHOCYTES # BLD: 0.5 K/UL (ref 0.8–3.5)
MCH RBC QN AUTO: 29.7 PG (ref 26–34)
MCHC RBC AUTO-ENTMCNC: 32 G/DL (ref 30–36.5)
MCV RBC AUTO: 93 FL (ref 80–99)
MONOCYTES # BLD: 0.5 K/UL (ref 0–1)
MONOCYTES NFR BLD AUTO: 12 % (ref 5–13)
NEUTS SEG # BLD: 3.3 K/UL (ref 1.8–8)
NEUTS SEG NFR BLD AUTO: 75 % (ref 32–75)
PLATELET # BLD AUTO: 182 K/UL (ref 150–400)
POTASSIUM SERPL-SCNC: 4 MMOL/L (ref 3.5–5.1)
PROT SERPL-MCNC: 9.5 G/DL (ref 6.4–8.2)
RBC # BLD AUTO: 3.13 M/UL (ref 3.8–5.2)
RBC MORPH BLD: ABNORMAL
SODIUM SERPL-SCNC: 142 MMOL/L (ref 136–145)
TROPONIN I SERPL-MCNC: <0.04 NG/ML
WBC # BLD AUTO: 4.4 K/UL (ref 3.6–11)

## 2017-05-30 PROCEDURE — 71020 XR CHEST PA LAT: CPT

## 2017-05-30 PROCEDURE — 82550 ASSAY OF CK (CPK): CPT | Performed by: EMERGENCY MEDICINE

## 2017-05-30 PROCEDURE — 80053 COMPREHEN METABOLIC PANEL: CPT | Performed by: EMERGENCY MEDICINE

## 2017-05-30 PROCEDURE — 93005 ELECTROCARDIOGRAM TRACING: CPT

## 2017-05-30 PROCEDURE — 99284 EMERGENCY DEPT VISIT MOD MDM: CPT

## 2017-05-30 PROCEDURE — 85025 COMPLETE CBC W/AUTO DIFF WBC: CPT | Performed by: EMERGENCY MEDICINE

## 2017-05-30 PROCEDURE — 36415 COLL VENOUS BLD VENIPUNCTURE: CPT | Performed by: EMERGENCY MEDICINE

## 2017-05-30 PROCEDURE — 84484 ASSAY OF TROPONIN QUANT: CPT | Performed by: EMERGENCY MEDICINE

## 2017-05-30 NOTE — DISCHARGE INSTRUCTIONS
Side Effects of Medicine: Care Instructions  Your Care Instructions  Medicines are a big part of treatment for many health problems. But all medicines have side effects. Often these are mild problems. They might include a dry mouth or upset stomach. But sometimes medicines can cause dangerous side effects. One example is a bad allergic reaction. The best treatment will depend on what side effects you have. If you have a serious side effect, you may need to stop taking the medicine. You may also need to take another medicine to treat the side effect. If you have a mild side effect, it may go away after you take the medicine for a while. The doctor has checked you carefully, but problems can develop later. If you notice any problems or new symptoms, get medical treatment right away. Follow-up care is a key part of your treatment and safety. Be sure to make and go to all appointments, and call your doctor if you are having problems. It's also a good idea to know your test results and keep a list of the medicines you take. How can you care for yourself at home? · Be safe with medicines. Take your medicines exactly as prescribed. Call your doctor if you think you are having a problem with your medicine. · Call your doctor if side effects bother you and you wonder if you should keep taking a medicine. Your doctor may be able to lower your dose or change your medicine. Do not suddenly quit taking your medicine unless a doctor tells you to. · Make sure your doctor has a list of all the medicines, vitamins, supplements, and herbal remedies you take. Ask about side effects. When should you call for help? Call 911 anytime you think you may need emergency care. For example, call if:  · You have symptoms of a severe allergic reaction. These may include:  ¨ Sudden raised, red areas (hives) all over your body. ¨ Swelling of the throat, mouth, lips, or tongue. ¨ Trouble breathing. ¨ Passing out (losing consciousness). Or you may feel very lightheaded or suddenly feel weak, confused, or restless. Call your doctor now or seek immediate medical care if:  · You have symptoms of an allergic reaction, such as:  ¨ A rash or hives (raised, red areas on the skin). ¨ Itching. ¨ Swelling. ¨ Belly pain, nausea, or vomiting. Watch closely for changes in your health, and be sure to contact your doctor if:  · You think you are having a new problem with your medicine. · You do not get better as expected. Where can you learn more? Go to [employer]  Enter D152 in the search box to learn more about \"Side Effects of Medicine: Care Instructions. \"   © 4246-7215 [employer]. Care instructions adapted under license by [employer] (which disclaims liability or warranty for this information). This care instruction is for use with your licensed healthcare professional. If you have questions about a medical condition or this instruction, always ask your healthcare professional. [place] [address] any warranty or liability for your use of this information.   Content Version: 56.1.640307; Current as of: November 20, 2015

## 2017-05-30 NOTE — ED PROVIDER NOTES
HPI Comments:   Brice Brown is a 68 y.o. female with a hx of HTN, DM, ARF, A-fib, RA, and fibromyalgia presenting to the ED C/O intermittent palpitations which started after taking her Xopenex inhaler 3 days ago. Pt states she began wheezing this weekend, took the inhaler, and the palpitations developed soon afterwards. She called her PCP regarding the symptoms and was told to come to the ED for further evaluation. Pt is also c/o wound expressing green discharge to the left medial ankle which began draining a few days ago. She changes the dressing to the wound 3x per day but denies any redness or pain to the surrounding area. Pt denies hx of similar symptoms. Patient denies fever or any other symptoms or complaints. She specifically denies any fevers, chills, nausea, vomiting, chest pain, shortness of breath, headache, rash, diarrhea, sweating or weight loss. PCP: Trell Santos MD    There are no other complaints, changes or physical findings at this time. Written by Merlin Eaves, ED [employer], as dictated by [employer] Mark Felipe MD      The history is provided by the patient. No  was used.         Past Medical History:   Diagnosis Date    Acute kidney failure ([address] 11/19/2015    Acute sinusitis 2/11/2011    Adverse effect of anesthesia     SLOW WAKING PAST ANESTHESIA    Arthritis     RA    At risk for side effect of medication 9/30/2016    Atrial fibrillation ([address] 10/19/2011    Autoimmune disease ([address]     FIBROMYLGIA    Cancer ([address] 2016    ENDOMETRIAL     Cholelithiasis 11/19/2015    Chronic pain     Claudication of both lower extremities ([address] 8/25/2015    Diabetes mellitus type 2, controlled ([address] 1/19/2016    Diabetes mellitus, type II ([address] 10/10/2014    Fall against object 10/10/2014    Fatty liver 10/20/2015    Hypertension     Ill-defined condition     SPINAL STENOSIS    Left shoulder pain 10/10/2014    Leiomyoma of body of uterus 4/7/2016    Morbid [facility]     Myalgia 9/30/2016    Nausea & vomiting     Personal history of radiation therapy 4/20/2017    Pneumonia     Preop examination 6/15/2015    Preoperative clearance 6/15/2015    Rash 2/11/2011    Rash of hands 10/10/2014    Screening for colon cancer 2/22/2016    Screening for glaucoma 2/22/2016    Sleep apnea     SOB (shortness of breath)     hospitalized 5/2012. angina, SOB    Thromboembolus ([facility] 2011    LEFT LOWER LEG AND PE    Tinea pedis, recurrent 7/20/2015    Vaginal bleeding, abnormal 4/20/2017       Past Surgical History:   Procedure Laterality Date    COLONOSCOPY N/A 12/21/2016    COLONOSCOPY performed [facility]varez MD at [facility]  12/21/2016         HX CATARACT REMOVAL Bilateral 2015    [address]    HX DILATION AND CURETTAGE  07/29/2016    HX DILATION AND CURETTAGE  2016    HX GYN  1960    etopic    HX HEART CATHETERIZATION  2001    NEGATIVE PER PATIENT    HX HYSTERECTOMY  2016    HX KNEE ARTHROSCOPY Left 1998    HX ORTHOPAEDIC Right 1960'S    BUNIONECTOMY    HX OTHER SURGICAL      BIOPSY OF VEIN IN FOREHEAD    HX TUBAL LIGATION  1963    VT COLSC FLX W/RMVL OF TUMOR POLYP LESION SNARE TQ  12/12/2011              Family History:   Problem Relation Age of Onset    Other Mother      ANEURYSM    Obesity Mother     Heart Disease Father     Other Father      VASCULAR DISEASE    Hypertension Sister     Heart Disease Brother     Heart Attack Brother     Kidney Disease Sister     Seizures Brother     Heart Attack Brother     Anesth Problems Neg Hx     Alcohol abuse Neg Hx        Social History     Social History    Marital status: SINGLE     Spouse name: N/A    Number of children: N/A    Years of education: N/A     Occupational History    Not on file.      Social History Main Topics    Smoking status: Former Smoker     Packs/day: 0.25     Years: 15.00     Quit date: 8/23/1996    Smokeless tobacco: Never Used    Alcohol use No    Drug use: No    Sexual activity: No     Other Topics Concern    Not on file     Social History Narrative         ALLERGIES: Latex; Advair diskus [fluticasone-salmeterol]; Bactrim [sulfamethoprim]; Iodinated contrast media - oral and iv dye; Ivp [fd and c blue no.1]; Lovenox [enoxaparin]; Nsaids (non-steroidal anti-inflammatory drug); and Sulfa (sulfonamide antibiotics)    Review of Systems   Constitutional: Negative. Negative for activity change, appetite change, chills, fatigue, fever and unexpected weight change. HENT: Negative. Negative for congestion, hearing loss, rhinorrhea, sneezing and voice change. Eyes: Negative. Negative for pain and visual disturbance. Respiratory: Negative. Negative for apnea, cough, choking, chest tightness and shortness of breath. Cardiovascular: Positive for palpitations. Negative for chest pain. Gastrointestinal: Negative. Negative for abdominal distention, abdominal pain, blood in stool, diarrhea, nausea and vomiting. Genitourinary: Negative. Negative for difficulty urinating, flank pain, frequency and urgency. No discharge   Musculoskeletal: Negative. Negative for arthralgias, back pain, myalgias and neck stiffness. Skin: Positive for wound (to left medial ankle). Negative for color change and rash. Neurological: Negative. Negative for dizziness, seizures, syncope, speech difficulty, weakness, numbness and headaches. Hematological: Negative for adenopathy. Psychiatric/Behavioral: Negative. Negative for agitation, behavioral problems, dysphoric mood and suicidal ideas. The patient is not nervous/anxious. Vitals:    05/30/17 1147 05/30/17 1300 05/30/17 1315 05/30/17 1330   BP: 149/82 133/56 102/67 121/70   Pulse:       Resp:       Temp:       SpO2:  98% 97% 96%   Weight:       Height:                Physical Exam   Constitutional: She is oriented to person, place, and time.  She appears well-developed and well-nourished. No distress. HENT:   Head: Normocephalic and atraumatic. Mouth/Throat: Oropharynx is clear and moist. No oropharyngeal exudate. Eyes: Conjunctivae and EOM are normal. Pupils are equal, round, and reactive to light. Right eye exhibits no discharge. Left eye exhibits no discharge. Neck: Normal range of motion. Neck supple. Cardiovascular: Normal rate, regular rhythm and intact distal pulses. Exam reveals no gallop and no friction rub. No murmur heard. Pulmonary/Chest: Effort normal and breath sounds normal. No respiratory distress. She has no wheezes. She has no rales. She exhibits no tenderness. Abdominal: Soft. Bowel sounds are normal. She exhibits no distension and no mass. There is no tenderness. There is no rebound and no guarding. Musculoskeletal: Normal range of motion. She exhibits no edema. Lymphadenopathy:     She has no cervical adenopathy. Neurological: She is alert and oriented to person, place, and time. No cranial nerve deficit. Coordination normal.   Skin: Skin is warm and dry. No rash noted. No erythema. Psychiatric: She has a normal mood and affect. Nursing note and vitals reviewed. MDM  Number of Diagnoses or Management Options  Diagnosis management comments: DDx: ACS, arrhythmia, medication side effect       Amount and/or Complexity of Data Reviewed  Clinical lab tests: ordered and reviewed  Tests in the radiology section of CPT®: ordered and reviewed  Tests in the medicine section of CPT®: ordered and reviewed  Independent visualization of images, tracings, or specimens: yes    Patient Progress  Patient progress: stable    Procedures    Chief Complaint   Patient presents with    Rapid Heart Rate    Wound Check       12:06 PM  The patients presenting problems have been discussed, and they are in agreement with the care plan formulated and outlined with them. I have encouraged them to ask questions as they arise throughout their visit.     LABS REVIEWED:  Recent Results (from the past 24 hour(s))   EKG, 12 LEAD, INITIAL    Collection Time: 05/30/17 11:47 AM   Result Value Ref Range    Ventricular Rate 75 BPM    Atrial Rate 75 BPM    P-R Interval 174 ms    QRS Duration 86 ms    Q-T Interval 392 ms    QTC Calculation (Bezet) 437 ms    Calculated P Axis 39 degrees    Calculated R Axis 10 degrees    Calculated T Axis 26 degrees    Diagnosis       Sinus rhythm with premature atrial complexes  When compared with ECG of 29-SEP-2016 09:07,  premature atrial complexes are now present  VA interval has decreased     CBC WITH AUTOMATED DIFF    Collection Time: 05/30/17 12:35 PM   Result Value Ref Range    WBC 4.4 3.6 - 11.0 K/uL    RBC 3.13 (L) 3.80 - 5.20 M/uL    HGB 9.3 (L) 11.5 - 16.0 g/dL    HCT 29.1 (L) 35.0 - 47.0 %    MCV 93.0 80.0 - 99.0 FL    MCH 29.7 26.0 - 34.0 PG    MCHC 32.0 30.0 - 36.5 g/dL    RDW 15.6 (H) 11.5 - 14.5 %    PLATELET 425 527 - 562 K/uL    NEUTROPHILS 75 32 - 75 %    LYMPHOCYTES 11 (L) 12 - 49 %    MONOCYTES 12 5 - 13 %    EOSINOPHILS 2 0 - 7 %    BASOPHILS 0 0 - 1 %    ABS. NEUTROPHILS 3.3 1.8 - 8.0 K/UL    ABS. LYMPHOCYTES 0.5 (L) 0.8 - 3.5 K/UL    ABS. MONOCYTES 0.5 0.0 - 1.0 K/UL    ABS. EOSINOPHILS 0.1 0.0 - 0.4 K/UL    ABS. BASOPHILS 0.0 0.0 - 0.1 K/UL    RBC COMMENTS NORMOCYTIC, NORMOCHROMIC     METABOLIC PANEL, COMPREHENSIVE    Collection Time: 05/30/17 12:35 PM   Result Value Ref Range    Sodium 142 136 - 145 mmol/L    Potassium 4.0 3.5 - 5.1 mmol/L    Chloride 104 97 - 108 mmol/L    CO2 31 21 - 32 mmol/L    Anion gap 7 5 - 15 mmol/L    Glucose 89 65 - 100 mg/dL    BUN 18 6 - 20 MG/DL    Creatinine 1.19 (H) 0.55 - 1.02 MG/DL    BUN/Creatinine ratio 15 12 - 20      GFR est AA 53 (L) >60 ml/min/1.73m2    GFR est non-AA 44 (L) >60 ml/min/1.73m2    Calcium 10.3 (H) 8.5 - 10.1 MG/DL    Bilirubin, total 1.3 (H) 0.2 - 1.0 MG/DL    ALT (SGPT) 15 12 - 78 U/L    AST (SGOT) 13 (L) 15 - 37 U/L    Alk.  phosphatase 75 45 - 117 U/L    Protein, total 9.5 (H) 6.4 - 8.2 g/dL    Albumin 3.1 (L) 3.5 - 5.0 g/dL    Globulin 6.4 (H) 2.0 - 4.0 g/dL    A-G Ratio 0.5 (L) 1.1 - 2.2     CK W/ REFLX CKMB    Collection Time: 05/30/17 12:35 PM   Result Value Ref Range    CK 68 26 - 192 U/L   TROPONIN I    Collection Time: 05/30/17 12:35 PM   Result Value Ref Range    Troponin-I, Qt. <0.04 <0.05 ng/mL       VITAL SIGNS:  Patient Vitals for the past 12 hrs:   Temp Pulse Resp BP SpO2   05/30/17 1330 - - - 121/70 96 %   05/30/17 1315 - - - 102/67 97 %   05/30/17 1300 - - - 133/56 98 %   05/30/17 1147 - - - 149/82 -   05/30/17 1133 98.3 °F (36.8 °C) 79 22 149/71 98 %       RADIOLOGY RESULTS:  The following have been ordered and reviewed:  CXR Results  (Last 48 hours)               05/30/17 1256  XR CHEST PA LAT Final result    Impression:  IMPRESSION: No acute cardiopulmonary disease. Narrative: Indication:  rapid heart rate, intermittent. Wound check. Exam: AP upright and lateral views of the chest.       Direct comparison is made to prior CXR dated September 2016. Findings: Cardiomediastinal silhouette is stable. Lungs are clear bilaterally. Pleural spaces are normal. Osseous structures are intact. EKG interpretation: (Preliminary)  11:47 AM  Rhythm: sinus rhythm with PAC's; and regular . Rate (approx.): 75 bpm; Axis: normal; P wave: normal; QRS interval: normal ; ST/T wave: normal;     PROGRESS NOTES:    1:16 PM  I have just reevaluated the patient. I have reviewed Her vital signs and determined there is currently no worsening in their condition or physical exam.  Results have been reviewed with them and their questions have been answered. We will continue to review further results as they come available. 1:32 PM  The patient states that their symptoms have resolved and they feel much better. There are no other new complaints at this time. Her questions have been answered.   We are awaiting final results and those will be reviewed with them when they become available. Pt states she has had no more episodes of rapid heart rate in the ED.    1:39 PM  Meghan Tipton's  results have been reviewed with her. She has been counseled regarding her diagnosis. She verbally conveys understanding and agreement of the signs, symptoms, diagnosis, treatment and prognosis and additionally agrees to follow up as recommended with Dr. Liu Wagner MD in 24 - 48 hours. She also agrees with the care-plan and conveys that all of her questions have been answered. I have also put together some discharge instructions for her that include: 1) educational information regarding their diagnosis, 2) how to care for their diagnosis at home, as well a 3) list of reasons why they would want to return to the ED prior to their follow-up appointment, should their condition change. DIAGNOSIS:    1. Medication side effect, initial encounter    2. Tachycardia        PLAN:  Follow-up Information     Follow up With Details Comments Contact Info    Liu Wagner MD Call in 2 days As needed, If symptoms worsen [address] [place] [address]  [phone]          Current Discharge Medication List            ED COURSE: The patients hospital course has been uncomplicated. Attestation: This note is prepared by Humberto Fields, acting as Scribe for Shania Arambula. Tiana Riggs, [facility] Tiana Riggs MD: The scribe's documentation has been prepared under my direction and personally reviewed by me in its entirety. I confirm that the note above accurately reflects all work, treatment, procedures, and medical decision making performed by me.

## 2017-05-31 ENCOUNTER — PATIENT OUTREACH (OUTPATIENT)
Dept: FAMILY MEDICINE CLINIC | Age: 77
End: 2017-05-31

## 2017-05-31 LAB
ATRIAL RATE: 75 BPM
CALCULATED P AXIS, ECG09: 39 DEGREES
CALCULATED R AXIS, ECG10: 10 DEGREES
CALCULATED T AXIS, ECG11: 26 DEGREES
DIAGNOSIS, 93000: NORMAL
P-R INTERVAL, ECG05: 174 MS
Q-T INTERVAL, ECG07: 392 MS
QRS DURATION, ECG06: 86 MS
QTC CALCULATION (BEZET), ECG08: 437 MS
VENTRICULAR RATE, ECG03: 75 BPM

## 2017-05-31 NOTE — PROGRESS NOTES
Patient listed on discharge [facility]) report on 17. Patient discharged from ED [facility] ED for tachycardia after inhaler use. Contacted patient to perform post ED discharge assessment. Verified  and name with patient as identifiers. Provided introduction to self, and explanation of the NN role. Performed medication reconciliation with patient, and patient verbalizes understanding of administration of home medications. Reviewed discharge instructions with patient. Patient verbalizes understand of discharge instructions and follow-up care. Patient reports not taking Xopenex anymore, and it is not listed on her med list anymore. She reports \"no more pain than usual\" and no difficulties with intake or output. She would still like to keep her appointment with PCP on  as previously scheduled. She was given my name and number to call in case of any issues or concerns.

## 2017-06-22 ENCOUNTER — OFFICE VISIT (OUTPATIENT)
Dept: FAMILY MEDICINE CLINIC | Age: 77
End: 2017-06-22

## 2017-06-22 ENCOUNTER — HOSPITAL ENCOUNTER (OUTPATIENT)
Dept: LAB | Age: 77
Discharge: HOME | End: 2017-06-22
Payer: MEDICARE

## 2017-06-22 VITALS
HEIGHT: 66 IN | BODY MASS INDEX: 47.09 KG/M2 | WEIGHT: 293 LBS | DIASTOLIC BLOOD PRESSURE: 48 MMHG | RESPIRATION RATE: 14 BRPM | SYSTOLIC BLOOD PRESSURE: 116 MMHG | HEART RATE: 66 BPM | OXYGEN SATURATION: 94 % | TEMPERATURE: 97.2 F

## 2017-06-22 DIAGNOSIS — D47.2 MGUS (MONOCLONAL GAMMOPATHY OF UNKNOWN SIGNIFICANCE): ICD-10-CM

## 2017-06-22 DIAGNOSIS — M25.521 ELBOW PAIN, RIGHT: ICD-10-CM

## 2017-06-22 DIAGNOSIS — Z92.3 PERSONAL HISTORY OF RADIATION THERAPY: ICD-10-CM

## 2017-06-22 DIAGNOSIS — M25.571 CHRONIC PAIN OF RIGHT ANKLE: ICD-10-CM

## 2017-06-22 DIAGNOSIS — I26.99 OTHER PULMONARY EMBOLISM WITHOUT ACUTE COR PULMONALE, UNSPECIFIED CHRONICITY (HCC): ICD-10-CM

## 2017-06-22 DIAGNOSIS — E66.01 MORBID OBESITY, UNSPECIFIED OBESITY TYPE (HCC): Primary | ICD-10-CM

## 2017-06-22 DIAGNOSIS — G89.29 CHRONIC LEFT SHOULDER PAIN: ICD-10-CM

## 2017-06-22 DIAGNOSIS — I73.9 CLAUDICATION OF BOTH LOWER EXTREMITIES (HCC): ICD-10-CM

## 2017-06-22 DIAGNOSIS — M79.601 ARM PAIN, RIGHT: ICD-10-CM

## 2017-06-22 DIAGNOSIS — M25.561 PAIN IN BOTH KNEES, UNSPECIFIED CHRONICITY: ICD-10-CM

## 2017-06-22 DIAGNOSIS — M25.512 CHRONIC LEFT SHOULDER PAIN: ICD-10-CM

## 2017-06-22 DIAGNOSIS — C54.1 ENDOMETRIAL CANCER (HCC): ICD-10-CM

## 2017-06-22 DIAGNOSIS — M79.602 PAIN OF LEFT UPPER EXTREMITY: ICD-10-CM

## 2017-06-22 DIAGNOSIS — G89.29 CHRONIC PAIN OF RIGHT ANKLE: ICD-10-CM

## 2017-06-22 DIAGNOSIS — N93.9 VAGINAL BLEEDING, ABNORMAL: ICD-10-CM

## 2017-06-22 DIAGNOSIS — D47.2 MONOCLONAL GAMMOPATHIES: ICD-10-CM

## 2017-06-22 DIAGNOSIS — E11.9 DIABETES MELLITUS WITHOUT COMPLICATION (HCC): ICD-10-CM

## 2017-06-22 DIAGNOSIS — R10.30 LOWER ABDOMINAL PAIN: ICD-10-CM

## 2017-06-22 DIAGNOSIS — M25.532 LEFT WRIST PAIN: ICD-10-CM

## 2017-06-22 DIAGNOSIS — R31.9 BLOOD IN URINE: ICD-10-CM

## 2017-06-22 DIAGNOSIS — M79.7 FIBROMYALGIA: ICD-10-CM

## 2017-06-22 DIAGNOSIS — M79.642 PAIN OF LEFT HAND: ICD-10-CM

## 2017-06-22 DIAGNOSIS — E11.22 CONTROLLED TYPE 2 DIABETES MELLITUS WITH CHRONIC KIDNEY DISEASE, UNSPECIFIED CKD STAGE, UNSPECIFIED LONG TERM INSULIN USE STATUS: ICD-10-CM

## 2017-06-22 DIAGNOSIS — G89.4 CHRONIC PAIN DISORDER: ICD-10-CM

## 2017-06-22 DIAGNOSIS — M35.1 MCTD (MIXED CONNECTIVE TISSUE DISEASE) (HCC): ICD-10-CM

## 2017-06-22 DIAGNOSIS — M25.562 PAIN IN BOTH KNEES, UNSPECIFIED CHRONICITY: ICD-10-CM

## 2017-06-22 DIAGNOSIS — M79.10 MYALGIA: ICD-10-CM

## 2017-06-22 DIAGNOSIS — D25.9 UTERINE LEIOMYOMA, UNSPECIFIED LOCATION: ICD-10-CM

## 2017-06-22 PROCEDURE — 84443 ASSAY THYROID STIM HORMONE: CPT

## 2017-06-22 PROCEDURE — 85027 COMPLETE CBC AUTOMATED: CPT

## 2017-06-22 PROCEDURE — 83036 HEMOGLOBIN GLYCOSYLATED A1C: CPT

## 2017-06-22 PROCEDURE — 36415 COLL VENOUS BLD VENIPUNCTURE: CPT

## 2017-06-22 PROCEDURE — 80053 COMPREHEN METABOLIC PANEL: CPT

## 2017-06-22 RX ORDER — SAME BUTANEDISULFONATE/BETAINE 400-600 MG
250 POWDER IN PACKET (EA) ORAL 2 TIMES DAILY
Qty: 20 CAP | Refills: 0 | Status: SHIPPED | OUTPATIENT
Start: 2017-06-22 | End: 2017-07-02

## 2017-06-22 RX ORDER — MORPHINE SULFATE 60 MG/1
60 TABLET, FILM COATED, EXTENDED RELEASE ORAL EVERY 12 HOURS
Qty: 60 TAB | Refills: 0 | Status: SHIPPED | OUTPATIENT
Start: 2017-06-22 | End: 2017-08-22 | Stop reason: SDUPTHER

## 2017-06-22 RX ORDER — OXYCODONE HYDROCHLORIDE 20 MG/1
20 TABLET ORAL
Qty: 180 TAB | Refills: 0 | Status: SHIPPED | OUTPATIENT
Start: 2017-07-22 | End: 2017-08-22 | Stop reason: SDUPTHER

## 2017-06-22 RX ORDER — MORPHINE SULFATE 60 MG/1
60 TABLET, FILM COATED, EXTENDED RELEASE ORAL EVERY 12 HOURS
Qty: 60 TAB | Refills: 0 | Status: SHIPPED | OUTPATIENT
Start: 2017-07-22 | End: 2017-08-22 | Stop reason: SDUPTHER

## 2017-06-22 RX ORDER — FUROSEMIDE 80 MG/1
TABLET ORAL
Refills: 11 | COMMUNITY
Start: 2017-05-22 | End: 2018-01-09

## 2017-06-22 RX ORDER — DOXYCYCLINE 100 MG/1
100 CAPSULE ORAL 2 TIMES DAILY
Qty: 20 CAP | Refills: 0 | Status: SHIPPED | OUTPATIENT
Start: 2017-06-22 | End: 2017-07-02

## 2017-06-22 RX ORDER — OXYCODONE HYDROCHLORIDE 20 MG/1
20 TABLET ORAL
Qty: 180 TAB | Refills: 0 | Status: SHIPPED | OUTPATIENT
Start: 2017-06-22 | End: 2017-08-22 | Stop reason: SDUPTHER

## 2017-06-22 NOTE — PROGRESS NOTES
Vincent York        Name and  verified        Chief Complaint   Patient presents with    Pain (Chronic)         Health Maintenance reviewed-discussed with patient.

## 2017-06-22 NOTE — MR AVS SNAPSHOT
Visit Information Date & Time Provider Department Dept. Phone Encounter #  
 6/22/2017 11:45 AM Albina Peabody, MD [address] OFF[facility] [phone] [id number] Your Appointments 10/31/2017 10:00 AM  
Follow Up with Lanre Whitmore DO [facility] at [facility]) Appt Note: MGUS, 1 yr f/u, CP 0  
 [address] [place] [address] [id number]  
[phone]  
  
   
 [address] [address] Upcoming Health Maintenance Date Due  
 MEDICARE YEARLY EXAM 7/20/2016 EYE EXAM RETINAL OR DILATED Q1 4/22/2017 FOOT EXAM Q1 7/13/2017 INFLUENZA AGE 9 TO ADULT 8/1/2017 HEMOGLOBIN A1C Q6M 10/20/2017 MICROALBUMIN Q1 2/23/2018 LIPID PANEL Q1 4/20/2018 GLAUCOMA SCREENING Q2Y 4/22/2018 COLONOSCOPY 12/21/2019 DTaP/Tdap/Td series (3 - Td) 10/20/2026 Allergies as of 6/22/2017  Review Complete On: 5/30/2017 By: Shara Galarza RN Severity Noted Reaction Type Reactions Latex  08/29/2013    Rash, Itching Advair Diskus [Fluticasone-salmeterol]  05/29/2012    Other (comments) \"breathing problems\" Bactrim [Sulfamethoprim]  08/29/2013   Side Effect Shortness of Breath, Itching, Swelling Swelling of tongue and throat Iodinated Contrast- Oral And Iv Dye  10/04/2016    Hives Ivp [Fd And C Blue No.1]  12/21/2010    Hives, Shortness of Breath Lovenox [Enoxaparin]  12/21/2010    Shortness of Breath Nsaids (Non-steroidal Anti-inflammatory Drug)  12/21/2010    Other (comments) Heartburn Sulfa (Sulfonamide Antibiotics)  12/23/2013    Shortness of Breath Current Immunizations  Reviewed on 10/31/2016 Name Date Influenza Vaccine (Quad) PF  Deferred (Medication not available) Pneumococcal Vaccine (Unspecified Type) 1/1/2010 Tdap 5/17/2006 Not reviewed this visit You Were Diagnosed With   
  
 Codes Comments Morbid obesity, unspecified obesity type    -  Primary ICD-10-CM: E66.01 
ICD-9-CM: 278.01 Myalgia     ICD-10-CM: M79.1 ICD-9-CM: 729.1 Left wrist pain     ICD-10-CM: M25.532 ICD-9-CM: 719.43 Pain of left hand     ICD-10-CM: N79.966 ICD-9-CM: 729.5 Pain of left upper extremity     ICD-10-CM: B03.749 ICD-9-CM: 729.5 Uterine leiomyoma, unspecified location     ICD-10-CM: D25.9 ICD-9-CM: 218.9 Chronic left shoulder pain     ICD-10-CM: M25.512, G89.29 ICD-9-CM: 719.41, 338.29 Blood in urine     ICD-10-CM: R31.9 ICD-9-CM: 599.70 Lower abdominal pain     ICD-10-CM: R10.30 ICD-9-CM: 789.09 Chronic pain disorder     ICD-10-CM: G89.4 ICD-9-CM: 338.4 MCTD (mixed connective tissue disease) (RUST 75.)     ICD-10-CM: M35.1 ICD-9-CM: 710.8 Claudication of both lower extremities ([facility] 75.)     ICD-10-CM: I73.9 ICD-9-CM: 443.9 Controlled type 2 diabetes mellitus with chronic kidney disease, unspecified CKD stage, unspecified long term insulin use status     ICD-10-CM: E11.22 
ICD-9-CM: 250.40, 585.9 Endometrial cancer ([facility] 75.)     ICD-10-CM: C54.1 ICD-9-CM: 182.0 Fibromyalgia     ICD-10-CM: M79.7 ICD-9-CM: 729.1 Monoclonal gammopathies     ICD-10-CM: D47.2 ICD-9-CM: 273.1 Chronic pain of right ankle     ICD-10-CM: M25.571, G89.29 ICD-9-CM: 719.47, 338.29 Arm pain, right     ICD-10-CM: M79.601 ICD-9-CM: 729.5 Elbow pain, right     ICD-10-CM: M25.521 ICD-9-CM: 719.42 Pain in both knees, unspecified chronicity     ICD-10-CM: M25.561, M25.562 ICD-9-CM: 719.46   
 MGUS (monoclonal gammopathy of unknown significance)     ICD-10-CM: K49.3 ICD-9-CM: 273.1 Vaginal bleeding, abnormal     ICD-10-CM: N93.9 ICD-9-CM: 623.8 Diabetes mellitus without complication ([facility]     PJU-61-GY: E11.9 ICD-9-CM: 250.00 Other pulmonary embolism without acute cor pulmonale, unspecified chronicity (HCC)     ICD-10-CM: I26.99 
ICD-9-CM: 415.19 Personal history of radiation therapy     ICD-10-CM: Z92.3 ICD-9-CM: V15.3 Vitals BP Pulse Temp Resp Height(growth percentile) Weight(growth percentile) 116/48 (BP 1 Location: Left arm, BP Patient Position: At rest) 66 97.2 °F (36.2 °C) (Oral) 14 5' 6\" (1.676 m) 297 lb (134.7 kg) SpO2 BMI OB Status Smoking Status 94% 47.94 kg/m2 Hysterectomy Former Smoker BMI and BSA Data Body Mass Index Body Surface Area  
 47.94 kg/m 2 2.5 m 2 Preferred Pharmacy Pharmacy Name Phone [id number]- [place], [place] - [address] [phone] Your Updated Medication List  
  
   
This list is accurate as of: 6/22/17 12:05 PM.  Always use your most recent med list.  
  
  
  
  
 apixaban 5 mg tablet Commonly known as:  Pasty Ramal Take 1 Tab by mouth two (2) times a day. Indications: Cerebral Thromboembolism Prevention, DEEP VEIN THROMBOSIS PREVENTION, DEEP VENOUS THROMBOSIS, Pulmonary Thromboembolism  
  
 aspirin delayed-release 81 mg tablet Take 2 Tabs by mouth daily (after breakfast). After meal  Indications: PREVENTION OF TRANSIENT ISCHEMIC ATTACKS, THROMBOSIS PREVENTION AFTER PCI  
  
 clonazePAM 1 mg tablet Commonly known as:  Joaquina Maurice Take 1 Tab by mouth two (2) times a day. Max Daily Amount: 2 mg.  
  
 cyclobenzaprine 10 mg tablet Commonly known as:  FLEXERIL  
TAKE 1 TAB BY MOUTH TWO (2) TIMES A DAY. INDICATIONS: MUSCLE SPASM  
  
 diphenhydrAMINE 50 mg capsule Commonly known as:  BENADRYL  
TAKE ONE CAPSULE BY MOUTH 1 HOUR PRIOR TO SCAN  
  
 doxycycline 100 mg capsule Commonly known as:  VIBRAMYCIN Take 1 Cap by mouth two (2) times a day for 10 days. * furosemide 40 mg tablet Commonly known as:  LASIX Take 1 Tab by mouth daily. * furosemide 80 mg tablet Commonly known as:  LASIX TAKE 1 TABLET BY MOUTH EVERY DAY  
  
 GAVILYTE-C 240-22.72-6.72 -5.84 gram solution Generic drug:  PEG 3350-Electrolytes USE AS DIRECTED  
  
 hydrALAZINE 25 mg tablet Commonly known as:  APRESOLINE Take 1 Tab by mouth daily. magnesium 250 mg Tab Take  by mouth daily. methylPREDNISolone 4 mg tablet Commonly known as:  MEDROL DOSEPACK  
TAKE 6 TABLETS ON DAY 1 AS DIRECTED ON PACKAGE AND DECREASE BY 1 TAB EACH DAY FOR A TOTAL OF 6 DAYS  
  
 metoprolol succinate 100 mg tablet Commonly known as:  TOPROL-XL  
100 mg daily. * morphine CR 60 mg CR tablet Commonly known as:  MS CONTIN Take 1 Tab by mouth every twelve (12) hours. Max Daily Amount: 120 mg. Indications: Chronic Pain, Severe Pain * morphine CR 60 mg CR tablet Commonly known as:  MS CONTIN Take 1 Tab by mouth every twelve (12) hours. Max Daily Amount: 120 mg. Indications: Chronic Pain, Severe Pain Start taking on:  7/22/2017 OTHER  
MORPHINE CREAM APPLIES TO KNEES 3-4X DAILY  
  
 * oxyCODONE IR 20 mg immediate release tablet Commonly known as:  Verlena [place] Take 1 Tab by mouth every four (4) hours as needed for Pain. Max Daily Amount: 120 mg. Indications: Pain * oxyCODONE IR 20 mg immediate release tablet Commonly known as:  Verlena [place] Take 1 Tab by mouth every four (4) hours as needed for Pain. Max Daily Amount: 120 mg. Indications: Pain Start taking on:  7/22/2017 potassium chloride 20 mEq tablet Commonly known as:  K-DUR, KLOR-CON Take 1 Tab by mouth two (2) times a day. Indications: HYPOKALEMIA PREVENTION  
  
 predniSONE 10 mg tablet Commonly known as:  DELTASONE  
TAKE 5 TABLETS 13 HOURS, 7 HOURS AND 1 HOUR PRIOR TO YOUR SCAN Saccharomyces boulardii 250 mg capsule Commonly known as:  PROBIOTIC (S.BOULARDII) Take 1 Cap by mouth two (2) times a day for 10 days. scopolamine 1.5 mg (1 mg over 3 days) Pt3d Commonly known as:  TRANSDERM-SCOP  
1 Patch by TransDERmal route every seventy-two (72) hours. Indications: PREVENTION OF MOTION SICKNESS VITAMIN C 500 mg tablet Generic drug:  ascorbic acid (vitamin C) Take 500 mg by mouth daily. VITAMIN D3 2,000 unit Tab Generic drug:  cholecalciferol (vitamin D3) Take  by mouth daily. * Notice: This list has 6 medication(s) that are the same as other medications prescribed for you. Read the directions carefully, and ask your doctor or other care provider to review them with you. Prescriptions Printed Refills  
 morphine CR (MS CONTIN) 60 mg CR tablet 0 Sig: Take 1 Tab by mouth every twelve (12) hours. Max Daily Amount: 120 mg. Indications: Chronic Pain, Severe Pain Class: Print Route: Oral  
 oxyCODONE IR (ROXICODONE) 20 mg immediate release tablet 0 Sig: Take 1 Tab by mouth every four (4) hours as needed for Pain. Max Daily Amount: 120 mg. Indications: Pain Class: Print Route: Oral  
 morphine CR (MS CONTIN) 60 mg CR tablet 0 Starting on: 7/22/2017 Sig: Take 1 Tab by mouth every twelve (12) hours. Max Daily Amount: 120 mg. Indications: Chronic Pain, Severe Pain Class: Print Route: Oral  
 oxyCODONE IR (ROXICODONE) 20 mg immediate release tablet 0 Starting on: 7/22/2017 Sig: Take 1 Tab by mouth every four (4) hours as needed for Pain. Max Daily Amount: 120 mg. Indications: Pain Class: Print Route: Oral  
  
Prescriptions Sent to Pharmacy Refills  
 doxycycline (VIBRAMYCIN) 100 mg capsule 0 Sig: Take 1 Cap by mouth two (2) times a day for 10 days. Class: Normal  
 Pharmacy: [facility]/pharmacy [address] [place] [address] [address] Ph #: [phone] Route: Oral  
 Saccharomyces boulardii (PROBIOTIC, S.BOULARDII,) 250 mg capsule 0 Sig: Take 1 Cap by mouth two (2) times a day for 10 days. Class: Normal  
 Pharmacy: [facility]/pharmacy [address] [place] [address] [address] Ph #: [phone] Route: Oral  
  
We Performed the Following CBC W/O DIFF [38100 CPT(R)] HEMOGLOBIN A1C WITH EAG [83371 CPT(R)] METABOLIC PANEL, COMPREHENSIVE [29062 CPT(R)] TSH 3RD GENERATION [62035 CPT(R)] To-Do List   
 06/22/2017 Imaging:  [facility] Please provide this summary of care documentation to your next provider. Your primary care clinician is listed as Felicitas Gasca. If you have any questions after today's visit, please call [phone].

## 2017-06-22 NOTE — PROGRESS NOTES
HISTORY OF PRESENT ILLNESS  Ethan Neal is a 68 y.o. female. HPI     Chronic low-mid back and knees pain syndrome  The patient's pain has been an ongoing concerning problem, it all Started few yrs ago when the wt started to creep out of control little by little and is aware that the current BMI of 48 which is a big contributor to the pt current joints pain, in addition patient not able to do much of activity uses a walker for movement having his son and son-in-law and care provider at the house unable to do much of activity secondary to, extreme joint pain that she is in at this time patient was told that she is not a good candidate for surgical correction, but was told that her joints or bone to bone in addition with history of mixed connective tissue disorder so far happy with current progress  Patient states that the current dosage of the meds given, not strong enough, but it is helping, regardless of all the concerns of the opioid based medications side effects,    In addition with the current medications dosage, the pt capable of doing things specially the activity of the daily living, and also they are improving the quality of the pt's life which the pt states are very cumbersome. The pt has tried otc pain meds, prescribed pain meds but they did not help and not only that,  they created ++ abdominal upset from NSAIDS, this pt has tried all the other Non- Narcotic meds and none have been able to help ease down the pain, the current opioid based pain meds are the only ones when taken ease the current pain level at this time, this pt offered surgical corrections,was also told they may not get rid of the pain, so that, pt denied correctional surgery for an unknown prognosis.     pt states that the pt is not [facility] shopping aware that is indicated in the pt contract that a record is obtained from other MD's writing opioid based med for such pt and if so, would be against the contract, it would terminate the care for the opioid based med refill, UA drug screen, would be also performed, if foul finding pt would be terminated, for which the pt agreed and finally,     The intensity of the pain is at10/10 w/out med,  persist without medication, a chronic condition,  compliant with medication takes it as prescribed, keeps meds at a safe place, on stool softener, and laxatives,  not operating  machinery while on this med. In addition she has Cellulitis of the left lower foot. Stating that few days ago pus was coming out of it but now stopped painful current pain medication not working having left ankle pain with history of skin infection and now with bone pain patient currently is on no antibiotic and is very concerned about her left ankle          Current Outpatient Prescriptions   Medication Sig Dispense Refill    morphine CR (MS CONTIN) 60 mg CR tablet Take 1 Tab by mouth every twelve (12) hours. Max Daily Amount: 120 mg. Indications: Chronic Pain, Severe Pain 60 Tab 0    oxyCODONE IR (ROXICODONE) 20 mg immediate release tablet Take 1 Tab by mouth every four (4) hours as needed for Pain. Max Daily Amount: 120 mg. Indications: Pain 180 Tab 0    morphine CR (MS CONTIN) 60 mg CR tablet Take 1 Tab by mouth every twelve (12) hours. Max Daily Amount: 120 mg. Indications: Chronic Pain, Severe Pain 60 Tab 0    oxyCODONE IR (ROXICODONE) 20 mg immediate release tablet Take 1 Tab by mouth every four (4) hours as needed for Pain. Max Daily Amount: 120 mg. Indications: Pain 180 Tab 0    furosemide (LASIX) 40 mg tablet Take 1 Tab by mouth daily. (Patient taking differently: Take 80 mg by mouth daily.) 30 Tab 1    potassium chloride (K-DUR, KLOR-CON) 20 mEq tablet Take 1 Tab by mouth two (2) times a day. Indications: HYPOKALEMIA PREVENTION 30 Tab 1    clonazePAM (KLONOPIN) 1 mg tablet Take 1 Tab by mouth two (2) times a day.  Max Daily Amount: 2 mg. 30 Tab 2    cyclobenzaprine (FLEXERIL) 10 mg tablet TAKE 1 TAB BY MOUTH TWO (2) TIMES A DAY. INDICATIONS: MUSCLE SPASM  0    diphenhydrAMINE (BENADRYL) 50 mg capsule TAKE ONE CAPSULE BY MOUTH 1 HOUR PRIOR TO SCAN  0    methylPREDNISolone (MEDROL DOSEPACK) 4 mg tablet TAKE 6 TABLETS ON DAY 1 AS DIRECTED ON PACKAGE AND DECREASE BY 1 TAB EACH DAY FOR A TOTAL OF 6 DAYS  0    GAVILYTE-C 240-22.72-6.72 -5.84 gram solution USE AS DIRECTED  0    predniSONE (DELTASONE) 10 mg tablet TAKE 5 TABLETS 13 HOURS, 7 HOURS AND 1 HOUR PRIOR TO YOUR SCAN  0    apixaban (ELIQUIS) 5 mg tablet Take 1 Tab by mouth two (2) times a day. Indications: Cerebral Thromboembolism Prevention, DEEP VEIN THROMBOSIS PREVENTION, DEEP VENOUS THROMBOSIS, Pulmonary Thromboembolism 60 Tab 11    scopolamine (TRANSDERM-SCOP) 1.5 mg (1 mg over 3 days) pt3d 1 Patch by TransDERmal route every seventy-two (72) hours. Indications: PREVENTION OF MOTION SICKNESS 3 Patch 0    hydrALAZINE (APRESOLINE) 25 mg tablet Take 1 Tab by mouth daily. 90 Tab 6    OTHER MORPHINE CREAM APPLIES TO KNEES 3-4X DAILY      metoprolol succinate (TOPROL-XL) 100 mg tablet 100 mg daily.  aspirin delayed-release 81 mg tablet Take 2 Tabs by mouth daily (after breakfast). After meal  Indications: PREVENTION OF TRANSIENT ISCHEMIC ATTACKS, THROMBOSIS PREVENTION AFTER PCI 60 Tab 11    ascorbic acid, vitamin C, (VITAMIN C) 500 mg tablet Take 500 mg by mouth daily.  magnesium 250 mg tab Take  by mouth daily.  cholecalciferol, vitamin D3, (VITAMIN D3) 2,000 unit tab Take  by mouth daily.        Allergies   Allergen Reactions    Latex Rash and Itching    Advair Diskus [Fluticasone-Salmeterol] Other (comments)     \"breathing problems\"    Bactrim [Sulfamethoprim] Shortness of Breath, Itching and Swelling     Swelling of tongue and throat    Iodinated Contrast- Oral And Iv Dye Hives    Ivp [Fd And C Blue No.1] Hives and Shortness of Breath    Lovenox [Enoxaparin] Shortness of Breath    Nsaids (Non-Steroidal Anti-Inflammatory Drug) Other (comments) Heartburn    Sulfa (Sulfonamide Antibiotics) Shortness of Breath     Past Medical History:   Diagnosis Date    Acute kidney failure ([address] 2015    Acute sinusitis 2011    Adverse effect of anesthesia     SLOW WAKING PAST ANESTHESIA    Arthritis     RA    At risk for side effect of medication 2016    Atrial fibrillation ([address] 10/19/2011    Autoimmune disease ([address]     FIBROMYLGIA    Cancer ([address] 2016    ENDOMETRIAL     Cholelithiasis 2015    Chronic pain     Claudication of both lower extremities ([address] 2015    Diabetes mellitus type 2, controlled ([address] 2016    Diabetes mellitus, type II ([address] 10/10/2014    Fall against object 10/10/2014    Fatty liver 10/20/2015    Hypertension     Ill-defined condition     SPINAL STENOSIS    Left shoulder pain 10/10/2014    Leiomyoma of body of uterus 2016    Morbid obesity ([address]     Myalgia 2016    Nausea & vomiting     Personal history of radiation therapy 2017    Pneumonia     Preop examination 6/15/2015    Preoperative clearance 6/15/2015    Rash 2011    Rash of hands 10/10/2014    Screening for colon cancer 2016    Screening for glaucoma 2016    Sleep apnea     SOB (shortness of breath)     hospitalized 2012.  angina, SOB    Thromboembolus ([address]     LEFT LOWER LEG AND PE    Tinea pedis, recurrent 2015    Vaginal bleeding, abnormal 2017     Past Surgical History:   Procedure Laterality Date    COLONOSCOPY N/A 2016    COLONOSCOPY performed by Roger Granda MD at [address] [place] [address]  2016         HX CATARACT REMOVAL Bilateral 2015    HX  SECTION   1963    HX DILATION AND CURETTAGE  2016    HX DILATION AND CURETTAGE  2016    HX GYN  1960    etopic    HX HEART CATHETERIZATION  2001    NEGATIVE PER PATIENT    HX HYSTERECTOMY  2016    HX KNEE ARTHROSCOPY Left     HX ORTHOPAEDIC Right  BUNIONECTOMY    HX OTHER SURGICAL      BIOPSY OF VEIN IN FOREHEAD    HX TUBAL LIGATION  1963    FL COLSC FLX W/RMVL OF TUMOR POLYP LESION SNARE TQ  12/12/2011          Family History   Problem Relation Age of Onset    Other Mother      ANEURYSM    Obesity Mother     Heart Disease Father     Other Father      VASCULAR DISEASE    Hypertension Sister     Heart Disease Brother     Heart Attack Brother     Kidney Disease Sister     Seizures Brother     Heart Attack Brother     Anesth Problems Neg Hx     Alcohol abuse Neg Hx      Social History   Substance Use Topics    Smoking status: Former Smoker     Packs/day: 0.25     Years: 15.00     Quit date: 8/23/1996    Smokeless tobacco: Never Used    Alcohol use No      Lab Results  Component Value Date/Time   WBC 4.4 05/30/2017 12:35 PM   HGB 9.3 05/30/2017 12:35 PM   HCT 29.1 05/30/2017 12:35 PM   PLATELET 778 19/62/8291 12:35 PM   MCV 93.0 05/30/2017 12:35 PM     Lab Results  Component Value Date/Time   Hemoglobin A1c 6.7 08/25/2015 05:01 PM   Hemoglobin A1c 6.7 03/20/2015 12:32 PM   Hemoglobin A1c 6.9 04/17/2014 12:22 PM   Glucose 89 05/30/2017 12:35 PM   Glucose (POC) 113 09/02/2016 12:15 PM   Microalb/Creat ratio (ug/mg creat.) 7.0 02/23/2017 11:44 AM   LDL, calculated 46 03/17/2016 12:19 PM   Creatinine (POC) 0.9 08/16/2016 08:18 AM   Creatinine 1.19 05/30/2017 12:35 PM      Lab Results  Component Value Date/Time   Cholesterol, total 104 03/17/2016 12:19 PM   HDL Cholesterol 39 03/17/2016 12:19 PM   LDL, calculated 46 03/17/2016 12:19 PM   Triglyceride 94 03/17/2016 12:19 PM   Lab Results  Component Value Date/Time   ALT (SGPT) 15 05/30/2017 12:35 PM   AST (SGOT) 13 05/30/2017 12:35 PM   Alk.  phosphatase 75 05/30/2017 12:35 PM   Bilirubin, direct 0.14 02/21/2014 12:27 PM   Bilirubin, total 1.3 05/30/2017 12:35 PM   Albumin 3.1 05/30/2017 12:35 PM   Protein, total 9.5 05/30/2017 12:35 PM   INR 1.3 12/22/2016 11:40 AM   INR 1.5 09/29/2016 11:21 AM Prothrombin time 15.7 09/29/2016 11:21 AM   PLATELET 350 44/69/5248 12:35 PM   AFP, Serum, Tumor Marker 1.6 02/21/2014 12:27 PM       Lab Results  Component Value Date/Time   GFR est non-AA 44 05/30/2017 12:35 PM   GFR, non-AA (POC) >60 08/16/2016 08:18 AM   GFR est AA 53 05/30/2017 12:35 PM   GFR-AA (POC) >60 08/16/2016 08:18 AM   Creatinine 1.19 05/30/2017 12:35 PM   Creatinine (POC) 0.9 08/16/2016 08:18 AM   BUN 18 05/30/2017 12:35 PM   Sodium 142 05/30/2017 12:35 PM   Potassium 4.0 05/30/2017 12:35 PM   Chloride 104 05/30/2017 12:35 PM   CO2 31 05/30/2017 12:35 PM   Magnesium 2.1 09/01/2016 05:04 PM   Lab Results  Component Value Date/Time   TSH 4.320 04/20/2017 12:41 PM                     Review of Systems   Constitutional: Negative for chills and fever. HENT: Negative for ear pain and nosebleeds. Eyes: Negative for blurred vision, pain and discharge. Respiratory: Negative for shortness of breath. Cardiovascular: Positive for leg swelling. Negative for chest pain. Gastrointestinal: Negative for constipation, diarrhea, nausea and vomiting. Genitourinary: Negative for frequency. Musculoskeletal: Positive for joint pain and myalgias. Skin: Positive for rash. Negative for itching. Neurological: Negative for headaches. Psychiatric/Behavioral: Negative for depression. The patient is not nervous/anxious. Physical Exam   Constitutional: She is oriented to person, place, and time. She appears well-developed and well-nourished. HENT:   Head: Normocephalic and atraumatic. Eyes: Conjunctivae and EOM are normal.   Neck: Normal range of motion. Neck supple. Cardiovascular: Normal rate, regular rhythm and normal heart sounds. No murmur heard. Pulmonary/Chest: Effort normal and breath sounds normal.   Abdominal: Soft. Bowel sounds are normal. She exhibits no distension. Musculoskeletal: She exhibits tenderness and deformity. She exhibits no edema.         Right knee: She exhibits decreased range of motion and swelling. Tenderness found. Left knee: She exhibits decreased range of motion and swelling. Tenderness found. Left ankle: She exhibits decreased range of motion, swelling, ecchymosis and deformity. Tenderness. Medial malleolus tenderness found. Thoracic back: She exhibits decreased range of motion, tenderness, pain and spasm. Lumbar back: She exhibits decreased range of motion, tenderness, bony tenderness, pain and spasm. Neurological: She is alert and oriented to person, place, and time. Skin: No erythema. Psychiatric: Her behavior is normal.   Nursing note and vitals reviewed. ASSESSMENT and PLAN  Poly Miller was seen today for pain (chronic). Diagnoses and all orders for this visit:    Morbid obesity, unspecified obesity type  -     CBC W/O DIFF  -     METABOLIC PANEL, COMPREHENSIVE  -     TSH 3RD GENERATION  -     HEMOGLOBIN A1C WITH EAG  -     NM BONE SCAN LTD; Future    Myalgia  -     morphine CR (MS CONTIN) 60 mg CR tablet; Take 1 Tab by mouth every twelve (12) hours. Max Daily Amount: 120 mg. Indications: Chronic Pain, Severe Pain  -     oxyCODONE IR (ROXICODONE) 20 mg immediate release tablet; Take 1 Tab by mouth every four (4) hours as needed for Pain. Max Daily Amount: 120 mg. Indications: Pain  -     morphine CR (MS CONTIN) 60 mg CR tablet; Take 1 Tab by mouth every twelve (12) hours. Max Daily Amount: 120 mg. Indications: Chronic Pain, Severe Pain  -     oxyCODONE IR (ROXICODONE) 20 mg immediate release tablet; Take 1 Tab by mouth every four (4) hours as needed for Pain. Max Daily Amount: 120 mg.  Indications: Pain  -     CBC W/O DIFF  -     METABOLIC PANEL, COMPREHENSIVE  -     TSH 3RD GENERATION  -     HEMOGLOBIN A1C WITH EAG  -     NM BONE SCAN LTD; Future    Left wrist pain  -     CBC W/O DIFF  -     METABOLIC PANEL, COMPREHENSIVE  -     TSH 3RD GENERATION  -     HEMOGLOBIN A1C WITH EAG  -     NM BONE SCAN LTD; Future    Pain of left hand  -     CBC W/O DIFF  -     METABOLIC PANEL, COMPREHENSIVE  -     TSH 3RD GENERATION  -     HEMOGLOBIN A1C WITH EAG  -     NM BONE SCAN LTD; Future    Pain of left upper extremity  -     morphine CR (MS CONTIN) 60 mg CR tablet; Take 1 Tab by mouth every twelve (12) hours. Max Daily Amount: 120 mg. Indications: Chronic Pain, Severe Pain  -     oxyCODONE IR (ROXICODONE) 20 mg immediate release tablet; Take 1 Tab by mouth every four (4) hours as needed for Pain. Max Daily Amount: 120 mg. Indications: Pain  -     morphine CR (MS CONTIN) 60 mg CR tablet; Take 1 Tab by mouth every twelve (12) hours. Max Daily Amount: 120 mg. Indications: Chronic Pain, Severe Pain  -     oxyCODONE IR (ROXICODONE) 20 mg immediate release tablet; Take 1 Tab by mouth every four (4) hours as needed for Pain. Max Daily Amount: 120 mg. Indications: Pain  -     CBC W/O DIFF  -     METABOLIC PANEL, COMPREHENSIVE  -     TSH 3RD GENERATION  -     HEMOGLOBIN A1C WITH EAG  -     NM BONE SCAN LTD; Future    Uterine leiomyoma, unspecified location  -     CBC W/O DIFF  -     METABOLIC PANEL, COMPREHENSIVE  -     TSH 3RD GENERATION  -     HEMOGLOBIN A1C WITH EAG  -     NM BONE SCAN LTD; Future    Chronic left shoulder pain  -     morphine CR (MS CONTIN) 60 mg CR tablet; Take 1 Tab by mouth every twelve (12) hours. Max Daily Amount: 120 mg. Indications: Chronic Pain, Severe Pain  -     oxyCODONE IR (ROXICODONE) 20 mg immediate release tablet; Take 1 Tab by mouth every four (4) hours as needed for Pain. Max Daily Amount: 120 mg. Indications: Pain  -     morphine CR (MS CONTIN) 60 mg CR tablet; Take 1 Tab by mouth every twelve (12) hours. Max Daily Amount: 120 mg. Indications: Chronic Pain, Severe Pain  -     oxyCODONE IR (ROXICODONE) 20 mg immediate release tablet; Take 1 Tab by mouth every four (4) hours as needed for Pain. Max Daily Amount: 120 mg.  Indications: Pain  -     CBC W/O DIFF  -     METABOLIC PANEL, COMPREHENSIVE  -     TSH 3RD GENERATION  - HEMOGLOBIN A1C WITH EAG  -     NM BONE SCAN LTD; Future    Blood in urine  -     morphine CR (MS CONTIN) 60 mg CR tablet; Take 1 Tab by mouth every twelve (12) hours. Max Daily Amount: 120 mg. Indications: Chronic Pain, Severe Pain  -     oxyCODONE IR (ROXICODONE) 20 mg immediate release tablet; Take 1 Tab by mouth every four (4) hours as needed for Pain. Max Daily Amount: 120 mg. Indications: Pain  -     morphine CR (MS CONTIN) 60 mg CR tablet; Take 1 Tab by mouth every twelve (12) hours. Max Daily Amount: 120 mg. Indications: Chronic Pain, Severe Pain  -     oxyCODONE IR (ROXICODONE) 20 mg immediate release tablet; Take 1 Tab by mouth every four (4) hours as needed for Pain. Max Daily Amount: 120 mg. Indications: Pain  -     CBC W/O DIFF  -     METABOLIC PANEL, COMPREHENSIVE  -     TSH 3RD GENERATION  -     HEMOGLOBIN A1C WITH EAG  -     NM BONE SCAN LTD; Future    Lower abdominal pain  -     morphine CR (MS CONTIN) 60 mg CR tablet; Take 1 Tab by mouth every twelve (12) hours. Max Daily Amount: 120 mg. Indications: Chronic Pain, Severe Pain  -     oxyCODONE IR (ROXICODONE) 20 mg immediate release tablet; Take 1 Tab by mouth every four (4) hours as needed for Pain. Max Daily Amount: 120 mg. Indications: Pain  -     morphine CR (MS CONTIN) 60 mg CR tablet; Take 1 Tab by mouth every twelve (12) hours. Max Daily Amount: 120 mg. Indications: Chronic Pain, Severe Pain  -     oxyCODONE IR (ROXICODONE) 20 mg immediate release tablet; Take 1 Tab by mouth every four (4) hours as needed for Pain. Max Daily Amount: 120 mg. Indications: Pain  -     CBC W/O DIFF  -     METABOLIC PANEL, COMPREHENSIVE  -     TSH 3RD GENERATION  -     HEMOGLOBIN A1C WITH EAG  -     NM BONE SCAN LTD; Future    Chronic pain disorder  -     morphine CR (MS CONTIN) 60 mg CR tablet; Take 1 Tab by mouth every twelve (12) hours. Max Daily Amount: 120 mg.  Indications: Chronic Pain, Severe Pain  -     oxyCODONE IR (ROXICODONE) 20 mg immediate release tablet; Take 1 Tab by mouth every four (4) hours as needed for Pain. Max Daily Amount: 120 mg. Indications: Pain  -     morphine CR (MS CONTIN) 60 mg CR tablet; Take 1 Tab by mouth every twelve (12) hours. Max Daily Amount: 120 mg. Indications: Chronic Pain, Severe Pain  -     oxyCODONE IR (ROXICODONE) 20 mg immediate release tablet; Take 1 Tab by mouth every four (4) hours as needed for Pain. Max Daily Amount: 120 mg. Indications: Pain  -     CBC W/O DIFF  -     METABOLIC PANEL, COMPREHENSIVE  -     TSH 3RD GENERATION  -     HEMOGLOBIN A1C WITH EAG  -     NM BONE SCAN LTD; Future    MCTD (mixed connective tissue disease) ([facility])  -     morphine CR (MS CONTIN) 60 mg CR tablet; Take 1 Tab by mouth every twelve (12) hours. Max Daily Amount: 120 mg. Indications: Chronic Pain, Severe Pain  -     oxyCODONE IR (ROXICODONE) 20 mg immediate release tablet; Take 1 Tab by mouth every four (4) hours as needed for Pain. Max Daily Amount: 120 mg. Indications: Pain  -     morphine CR (MS CONTIN) 60 mg CR tablet; Take 1 Tab by mouth every twelve (12) hours. Max Daily Amount: 120 mg. Indications: Chronic Pain, Severe Pain  -     oxyCODONE IR (ROXICODONE) 20 mg immediate release tablet; Take 1 Tab by mouth every four (4) hours as needed for Pain. Max Daily Amount: 120 mg. Indications: Pain  -     CBC W/O DIFF  -     METABOLIC PANEL, COMPREHENSIVE  -     TSH 3RD GENERATION  -     HEMOGLOBIN A1C WITH EAG  -     NM BONE SCAN LTD; Future    Claudication of both lower extremities (HCC)  -     morphine CR (MS CONTIN) 60 mg CR tablet; Take 1 Tab by mouth every twelve (12) hours. Max Daily Amount: 120 mg. Indications: Chronic Pain, Severe Pain  -     oxyCODONE IR (ROXICODONE) 20 mg immediate release tablet; Take 1 Tab by mouth every four (4) hours as needed for Pain. Max Daily Amount: 120 mg. Indications: Pain  -     morphine CR (MS CONTIN) 60 mg CR tablet; Take 1 Tab by mouth every twelve (12) hours. Max Daily Amount: 120 mg.  Indications: Chronic Pain, Severe Pain  -     oxyCODONE IR (ROXICODONE) 20 mg immediate release tablet; Take 1 Tab by mouth every four (4) hours as needed for Pain. Max Daily Amount: 120 mg. Indications: Pain  -     CBC W/O DIFF  -     METABOLIC PANEL, COMPREHENSIVE  -     TSH 3RD GENERATION  -     HEMOGLOBIN A1C WITH EAG  -     NM BONE SCAN LTD; Future    Controlled type 2 diabetes mellitus with chronic kidney disease, unspecified CKD stage, unspecified long term insulin use status  -     morphine CR (MS CONTIN) 60 mg CR tablet; Take 1 Tab by mouth every twelve (12) hours. Max Daily Amount: 120 mg. Indications: Chronic Pain, Severe Pain  -     oxyCODONE IR (ROXICODONE) 20 mg immediate release tablet; Take 1 Tab by mouth every four (4) hours as needed for Pain. Max Daily Amount: 120 mg. Indications: Pain  -     morphine CR (MS CONTIN) 60 mg CR tablet; Take 1 Tab by mouth every twelve (12) hours. Max Daily Amount: 120 mg. Indications: Chronic Pain, Severe Pain  -     oxyCODONE IR (ROXICODONE) 20 mg immediate release tablet; Take 1 Tab by mouth every four (4) hours as needed for Pain. Max Daily Amount: 120 mg. Indications: Pain  -     CBC W/O DIFF  -     METABOLIC PANEL, COMPREHENSIVE  -     TSH 3RD GENERATION  -     HEMOGLOBIN A1C WITH EAG  -     NM BONE SCAN LTD; Future    Endometrial cancer (HCC)  -     morphine CR (MS CONTIN) 60 mg CR tablet; Take 1 Tab by mouth every twelve (12) hours. Max Daily Amount: 120 mg. Indications: Chronic Pain, Severe Pain  -     oxyCODONE IR (ROXICODONE) 20 mg immediate release tablet; Take 1 Tab by mouth every four (4) hours as needed for Pain. Max Daily Amount: 120 mg. Indications: Pain  -     morphine CR (MS CONTIN) 60 mg CR tablet; Take 1 Tab by mouth every twelve (12) hours. Max Daily Amount: 120 mg. Indications: Chronic Pain, Severe Pain  -     oxyCODONE IR (ROXICODONE) 20 mg immediate release tablet; Take 1 Tab by mouth every four (4) hours as needed for Pain. Max Daily Amount: 120 mg. Indications: Pain  -     CBC W/O DIFF  -     METABOLIC PANEL, COMPREHENSIVE  -     TSH 3RD GENERATION  -     HEMOGLOBIN A1C WITH EAG  -     NM BONE SCAN LTD; Future    Fibromyalgia  -     morphine CR (MS CONTIN) 60 mg CR tablet; Take 1 Tab by mouth every twelve (12) hours. Max Daily Amount: 120 mg. Indications: Chronic Pain, Severe Pain  -     oxyCODONE IR (ROXICODONE) 20 mg immediate release tablet; Take 1 Tab by mouth every four (4) hours as needed for Pain. Max Daily Amount: 120 mg. Indications: Pain  -     morphine CR (MS CONTIN) 60 mg CR tablet; Take 1 Tab by mouth every twelve (12) hours. Max Daily Amount: 120 mg. Indications: Chronic Pain, Severe Pain  -     oxyCODONE IR (ROXICODONE) 20 mg immediate release tablet; Take 1 Tab by mouth every four (4) hours as needed for Pain. Max Daily Amount: 120 mg. Indications: Pain  -     CBC W/O DIFF  -     METABOLIC PANEL, COMPREHENSIVE  -     TSH 3RD GENERATION  -     HEMOGLOBIN A1C WITH EAG  -     NM BONE SCAN LTD; Future    Monoclonal gammopathies  -     morphine CR (MS CONTIN) 60 mg CR tablet; Take 1 Tab by mouth every twelve (12) hours. Max Daily Amount: 120 mg. Indications: Chronic Pain, Severe Pain  -     oxyCODONE IR (ROXICODONE) 20 mg immediate release tablet; Take 1 Tab by mouth every four (4) hours as needed for Pain. Max Daily Amount: 120 mg. Indications: Pain  -     morphine CR (MS CONTIN) 60 mg CR tablet; Take 1 Tab by mouth every twelve (12) hours. Max Daily Amount: 120 mg. Indications: Chronic Pain, Severe Pain  -     oxyCODONE IR (ROXICODONE) 20 mg immediate release tablet; Take 1 Tab by mouth every four (4) hours as needed for Pain. Max Daily Amount: 120 mg. Indications: Pain  -     CBC W/O DIFF  -     METABOLIC PANEL, COMPREHENSIVE  -     TSH 3RD GENERATION  -     HEMOGLOBIN A1C WITH EAG  -     NM BONE SCAN LTD; Future    Chronic pain of right ankle  -     morphine CR (MS CONTIN) 60 mg CR tablet; Take 1 Tab by mouth every twelve (12) hours.  Max Daily Amount: 120 mg. Indications: Chronic Pain, Severe Pain  -     oxyCODONE IR (ROXICODONE) 20 mg immediate release tablet; Take 1 Tab by mouth every four (4) hours as needed for Pain. Max Daily Amount: 120 mg. Indications: Pain  -     morphine CR (MS CONTIN) 60 mg CR tablet; Take 1 Tab by mouth every twelve (12) hours. Max Daily Amount: 120 mg. Indications: Chronic Pain, Severe Pain  -     oxyCODONE IR (ROXICODONE) 20 mg immediate release tablet; Take 1 Tab by mouth every four (4) hours as needed for Pain. Max Daily Amount: 120 mg. Indications: Pain  -     CBC W/O DIFF  -     METABOLIC PANEL, COMPREHENSIVE  -     TSH 3RD GENERATION  -     HEMOGLOBIN A1C WITH EAG  -     NM BONE SCAN LTD; Future    Arm pain, right  -     morphine CR (MS CONTIN) 60 mg CR tablet; Take 1 Tab by mouth every twelve (12) hours. Max Daily Amount: 120 mg. Indications: Chronic Pain, Severe Pain  -     oxyCODONE IR (ROXICODONE) 20 mg immediate release tablet; Take 1 Tab by mouth every four (4) hours as needed for Pain. Max Daily Amount: 120 mg. Indications: Pain  -     morphine CR (MS CONTIN) 60 mg CR tablet; Take 1 Tab by mouth every twelve (12) hours. Max Daily Amount: 120 mg. Indications: Chronic Pain, Severe Pain  -     oxyCODONE IR (ROXICODONE) 20 mg immediate release tablet; Take 1 Tab by mouth every four (4) hours as needed for Pain. Max Daily Amount: 120 mg. Indications: Pain  -     CBC W/O DIFF  -     METABOLIC PANEL, COMPREHENSIVE  -     TSH 3RD GENERATION  -     HEMOGLOBIN A1C WITH EAG  -     NM BONE SCAN LTD; Future    Elbow pain, right  -     morphine CR (MS CONTIN) 60 mg CR tablet; Take 1 Tab by mouth every twelve (12) hours. Max Daily Amount: 120 mg. Indications: Chronic Pain, Severe Pain  -     oxyCODONE IR (ROXICODONE) 20 mg immediate release tablet; Take 1 Tab by mouth every four (4) hours as needed for Pain. Max Daily Amount: 120 mg. Indications: Pain  -     morphine CR (MS CONTIN) 60 mg CR tablet;  Take 1 Tab by mouth every twelve (12) hours. Max Daily Amount: 120 mg. Indications: Chronic Pain, Severe Pain  -     oxyCODONE IR (ROXICODONE) 20 mg immediate release tablet; Take 1 Tab by mouth every four (4) hours as needed for Pain. Max Daily Amount: 120 mg. Indications: Pain  -     CBC W/O DIFF  -     METABOLIC PANEL, COMPREHENSIVE  -     TSH 3RD GENERATION  -     HEMOGLOBIN A1C WITH EAG  -     NM BONE SCAN LTD; Future    Pain in both knees, unspecified chronicity  -     morphine CR (MS CONTIN) 60 mg CR tablet; Take 1 Tab by mouth every twelve (12) hours. Max Daily Amount: 120 mg. Indications: Chronic Pain, Severe Pain  -     oxyCODONE IR (ROXICODONE) 20 mg immediate release tablet; Take 1 Tab by mouth every four (4) hours as needed for Pain. Max Daily Amount: 120 mg. Indications: Pain  -     morphine CR (MS CONTIN) 60 mg CR tablet; Take 1 Tab by mouth every twelve (12) hours. Max Daily Amount: 120 mg. Indications: Chronic Pain, Severe Pain  -     oxyCODONE IR (ROXICODONE) 20 mg immediate release tablet; Take 1 Tab by mouth every four (4) hours as needed for Pain. Max Daily Amount: 120 mg. Indications: Pain  -     CBC W/O DIFF  -     METABOLIC PANEL, COMPREHENSIVE  -     TSH 3RD GENERATION  -     HEMOGLOBIN A1C WITH EAG  -     NM BONE SCAN LTD; Future    MGUS (monoclonal gammopathy of unknown significance)  -     morphine CR (MS CONTIN) 60 mg CR tablet; Take 1 Tab by mouth every twelve (12) hours. Max Daily Amount: 120 mg. Indications: Chronic Pain, Severe Pain  -     oxyCODONE IR (ROXICODONE) 20 mg immediate release tablet; Take 1 Tab by mouth every four (4) hours as needed for Pain. Max Daily Amount: 120 mg. Indications: Pain  -     morphine CR (MS CONTIN) 60 mg CR tablet; Take 1 Tab by mouth every twelve (12) hours. Max Daily Amount: 120 mg. Indications: Chronic Pain, Severe Pain  -     oxyCODONE IR (ROXICODONE) 20 mg immediate release tablet; Take 1 Tab by mouth every four (4) hours as needed for Pain. Max Daily Amount: 120 mg. Indications: Pain  -     CBC W/O DIFF  -     METABOLIC PANEL, COMPREHENSIVE  -     TSH 3RD GENERATION  -     HEMOGLOBIN A1C WITH EAG  -     NM BONE SCAN LTD; Future    Vaginal bleeding, abnormal  -     morphine CR (MS CONTIN) 60 mg CR tablet; Take 1 Tab by mouth every twelve (12) hours. Max Daily Amount: 120 mg. Indications: Chronic Pain, Severe Pain  -     oxyCODONE IR (ROXICODONE) 20 mg immediate release tablet; Take 1 Tab by mouth every four (4) hours as needed for Pain. Max Daily Amount: 120 mg. Indications: Pain  -     morphine CR (MS CONTIN) 60 mg CR tablet; Take 1 Tab by mouth every twelve (12) hours. Max Daily Amount: 120 mg. Indications: Chronic Pain, Severe Pain  -     oxyCODONE IR (ROXICODONE) 20 mg immediate release tablet; Take 1 Tab by mouth every four (4) hours as needed for Pain. Max Daily Amount: 120 mg. Indications: Pain  -     CBC W/O DIFF  -     METABOLIC PANEL, COMPREHENSIVE  -     TSH 3RD GENERATION  -     HEMOGLOBIN A1C WITH EAG  -     NM BONE SCAN LTD; Future    Diabetes mellitus without complication (HCC)  -     morphine CR (MS CONTIN) 60 mg CR tablet; Take 1 Tab by mouth every twelve (12) hours. Max Daily Amount: 120 mg. Indications: Chronic Pain, Severe Pain  -     oxyCODONE IR (ROXICODONE) 20 mg immediate release tablet; Take 1 Tab by mouth every four (4) hours as needed for Pain. Max Daily Amount: 120 mg. Indications: Pain  -     morphine CR (MS CONTIN) 60 mg CR tablet; Take 1 Tab by mouth every twelve (12) hours. Max Daily Amount: 120 mg. Indications: Chronic Pain, Severe Pain  -     oxyCODONE IR (ROXICODONE) 20 mg immediate release tablet; Take 1 Tab by mouth every four (4) hours as needed for Pain. Max Daily Amount: 120 mg.  Indications: Pain  -     CBC W/O DIFF  -     METABOLIC PANEL, COMPREHENSIVE  -     TSH 3RD GENERATION  -     HEMOGLOBIN A1C WITH EAG  -     NM BONE SCAN LTD; Future    Other pulmonary embolism without acute cor pulmonale, unspecified chronicity (HCC)  - morphine CR (MS CONTIN) 60 mg CR tablet; Take 1 Tab by mouth every twelve (12) hours. Max Daily Amount: 120 mg. Indications: Chronic Pain, Severe Pain  -     oxyCODONE IR (ROXICODONE) 20 mg immediate release tablet; Take 1 Tab by mouth every four (4) hours as needed for Pain. Max Daily Amount: 120 mg. Indications: Pain  -     morphine CR (MS CONTIN) 60 mg CR tablet; Take 1 Tab by mouth every twelve (12) hours. Max Daily Amount: 120 mg. Indications: Chronic Pain, Severe Pain  -     oxyCODONE IR (ROXICODONE) 20 mg immediate release tablet; Take 1 Tab by mouth every four (4) hours as needed for Pain. Max Daily Amount: 120 mg. Indications: Pain  -     CBC W/O DIFF  -     METABOLIC PANEL, COMPREHENSIVE  -     TSH 3RD GENERATION  -     HEMOGLOBIN A1C WITH EAG  -     NM BONE SCAN LTD; Future    Personal history of radiation therapy  -     morphine CR (MS CONTIN) 60 mg CR tablet; Take 1 Tab by mouth every twelve (12) hours. Max Daily Amount: 120 mg. Indications: Chronic Pain, Severe Pain  -     oxyCODONE IR (ROXICODONE) 20 mg immediate release tablet; Take 1 Tab by mouth every four (4) hours as needed for Pain. Max Daily Amount: 120 mg. Indications: Pain  -     morphine CR (MS CONTIN) 60 mg CR tablet; Take 1 Tab by mouth every twelve (12) hours. Max Daily Amount: 120 mg. Indications: Chronic Pain, Severe Pain  -     oxyCODONE IR (ROXICODONE) 20 mg immediate release tablet; Take 1 Tab by mouth every four (4) hours as needed for Pain. Max Daily Amount: 120 mg. Indications: Pain  -     CBC W/O DIFF  -     METABOLIC PANEL, COMPREHENSIVE  -     TSH 3RD GENERATION  -     HEMOGLOBIN A1C WITH EAG  -     NM BONE SCAN LTD; Future    Other orders  -     doxycycline (VIBRAMYCIN) 100 mg capsule; Take 1 Cap by mouth two (2) times a day for 10 days.  -     Saccharomyces boulardii (PROBIOTIC, S.BOULARDII,) 250 mg capsule; Take 1 Cap by mouth two (2) times a day for 10 days.     Patient medications were refilled after signing the contract consent and no harm documentations and again was told to lessen the amount of opioid-based medication continue with weight reduction do some massage therapy chiropractor exercise therapy such as resistance banding avoid heavy lifting heavy pushing at this time include ibuprofen and Tylenol over-the-counter topical cream for  day views of concerns patient was told to take some Tums or over-the-counter PPI if abdominal upset ice therapy he therapy side effect of current opioid-based medication significantly explained in detail patient acknowledged understanding and agreed with recommendation  in addition, pt was told to avoid machinary operation and driving while on any opioid based medications that will cause dizziness, drowsiness, and sleepiness. Dependency and tolerancy were also addressed,  meds side effects and compliancy advised,  Call or rtc if worsens, radiology results and schedule of future radiology studies reviewed with patient. Pt agreed with today's recommendations.

## 2017-06-23 LAB
ALBUMIN SERPL-MCNC: 3.8 G/DL (ref 3.5–4.8)
ALBUMIN/GLOB SERPL: 0.7 {RATIO} (ref 1.2–2.2)
ALP SERPL-CCNC: 71 IU/L (ref 39–117)
ALT SERPL-CCNC: 11 IU/L (ref 0–32)
AST SERPL-CCNC: 14 IU/L (ref 0–40)
BILIRUB SERPL-MCNC: 0.6 MG/DL (ref 0–1.2)
BUN SERPL-MCNC: 26 MG/DL (ref 8–27)
BUN/CREAT SERPL: 24 (ref 12–28)
CALCIUM SERPL-MCNC: 10.8 MG/DL (ref 8.7–10.3)
CHLORIDE SERPL-SCNC: 98 MMOL/L (ref 96–106)
CO2 SERPL-SCNC: 27 MMOL/L (ref 18–29)
CREAT SERPL-MCNC: 1.1 MG/DL (ref 0.57–1)
ERYTHROCYTE [DISTWIDTH] IN BLOOD BY AUTOMATED COUNT: 15.6 % (ref 12.3–15.4)
EST. AVERAGE GLUCOSE BLD GHB EST-MCNC: 128 MG/DL
GLOBULIN SER CALC-MCNC: 5.1 G/DL (ref 1.5–4.5)
GLUCOSE SERPL-MCNC: 86 MG/DL (ref 65–99)
HBA1C MFR BLD: 6.1 % (ref 4.8–5.6)
HCT VFR BLD AUTO: 28.5 % (ref 34–46.6)
HGB BLD-MCNC: 9 G/DL (ref 11.1–15.9)
INTERPRETATION: NORMAL
Lab: NORMAL
MCH RBC QN AUTO: 29.6 PG (ref 26.6–33)
MCHC RBC AUTO-ENTMCNC: 31.6 G/DL (ref 31.5–35.7)
MCV RBC AUTO: 94 FL (ref 79–97)
PLATELET # BLD AUTO: 225 X10E3/UL (ref 150–379)
POTASSIUM SERPL-SCNC: 4.6 MMOL/L (ref 3.5–5.2)
PROT SERPL-MCNC: 8.9 G/DL (ref 6–8.5)
RBC # BLD AUTO: 3.04 X10E6/UL (ref 3.77–5.28)
SODIUM SERPL-SCNC: 139 MMOL/L (ref 134–144)
TSH SERPL DL<=0.005 MIU/L-ACNC: 4.43 UIU/ML (ref 0.45–4.5)
WBC # BLD AUTO: 2.9 X10E3/UL (ref 3.4–10.8)

## 2017-06-27 ENCOUNTER — HOSPITAL ENCOUNTER (OUTPATIENT)
Dept: NUCLEAR MEDICINE | Age: 77
Discharge: HOME | End: 2017-06-27
Attending: FAMILY MEDICINE
Payer: MEDICARE

## 2017-06-27 DIAGNOSIS — M25.561 PAIN IN BOTH KNEES, UNSPECIFIED CHRONICITY: ICD-10-CM

## 2017-06-27 DIAGNOSIS — M79.7 FIBROMYALGIA: ICD-10-CM

## 2017-06-27 DIAGNOSIS — E66.01 MORBID OBESITY, UNSPECIFIED OBESITY TYPE (HCC): ICD-10-CM

## 2017-06-27 DIAGNOSIS — M25.562 PAIN IN BOTH KNEES, UNSPECIFIED CHRONICITY: ICD-10-CM

## 2017-06-27 DIAGNOSIS — D25.9 UTERINE LEIOMYOMA, UNSPECIFIED LOCATION: ICD-10-CM

## 2017-06-27 DIAGNOSIS — D47.2 MONOCLONAL GAMMOPATHIES: ICD-10-CM

## 2017-06-27 DIAGNOSIS — M25.532 LEFT WRIST PAIN: ICD-10-CM

## 2017-06-27 DIAGNOSIS — D47.2 MGUS (MONOCLONAL GAMMOPATHY OF UNKNOWN SIGNIFICANCE): ICD-10-CM

## 2017-06-27 DIAGNOSIS — M79.642 PAIN OF LEFT HAND: ICD-10-CM

## 2017-06-27 DIAGNOSIS — I26.99 OTHER PULMONARY EMBOLISM WITHOUT ACUTE COR PULMONALE, UNSPECIFIED CHRONICITY (HCC): ICD-10-CM

## 2017-06-27 DIAGNOSIS — I73.9 CLAUDICATION OF BOTH LOWER EXTREMITIES (HCC): ICD-10-CM

## 2017-06-27 DIAGNOSIS — R10.30 LOWER ABDOMINAL PAIN: ICD-10-CM

## 2017-06-27 DIAGNOSIS — M35.1 MCTD (MIXED CONNECTIVE TISSUE DISEASE) (HCC): ICD-10-CM

## 2017-06-27 DIAGNOSIS — M79.601 ARM PAIN, RIGHT: ICD-10-CM

## 2017-06-27 DIAGNOSIS — Z92.3 PERSONAL HISTORY OF RADIATION THERAPY: ICD-10-CM

## 2017-06-27 DIAGNOSIS — N93.9 VAGINAL BLEEDING, ABNORMAL: ICD-10-CM

## 2017-06-27 DIAGNOSIS — E11.22 CONTROLLED TYPE 2 DIABETES MELLITUS WITH CHRONIC KIDNEY DISEASE, UNSPECIFIED CKD STAGE, UNSPECIFIED LONG TERM INSULIN USE STATUS: ICD-10-CM

## 2017-06-27 DIAGNOSIS — C54.1 ENDOMETRIAL CANCER (HCC): ICD-10-CM

## 2017-06-27 DIAGNOSIS — G89.29 CHRONIC LEFT SHOULDER PAIN: ICD-10-CM

## 2017-06-27 DIAGNOSIS — G89.4 CHRONIC PAIN DISORDER: ICD-10-CM

## 2017-06-27 DIAGNOSIS — R31.9 BLOOD IN URINE: ICD-10-CM

## 2017-06-27 DIAGNOSIS — E11.9 DIABETES MELLITUS WITHOUT COMPLICATION (HCC): ICD-10-CM

## 2017-06-27 DIAGNOSIS — M25.521 ELBOW PAIN, RIGHT: ICD-10-CM

## 2017-06-27 DIAGNOSIS — M79.10 MYALGIA: ICD-10-CM

## 2017-06-27 DIAGNOSIS — M79.602 PAIN OF LEFT UPPER EXTREMITY: ICD-10-CM

## 2017-06-27 DIAGNOSIS — M25.571 CHRONIC PAIN OF RIGHT ANKLE: ICD-10-CM

## 2017-06-27 DIAGNOSIS — G89.29 CHRONIC PAIN OF RIGHT ANKLE: ICD-10-CM

## 2017-06-27 DIAGNOSIS — M25.512 CHRONIC LEFT SHOULDER PAIN: ICD-10-CM

## 2017-06-27 PROCEDURE — 78315 BONE IMAGING 3 PHASE: CPT

## 2017-07-10 ENCOUNTER — TELEPHONE (OUTPATIENT)
Dept: FAMILY MEDICINE CLINIC | Age: 77
End: 2017-07-10

## 2017-07-10 RX ORDER — FERROUS SULFATE 325(65) MG
325 TABLET, DELAYED RELEASE (ENTERIC COATED) ORAL
Qty: 90 TAB | Refills: 3 | Status: SHIPPED | OUTPATIENT
Start: 2017-07-10 | End: 2017-09-19 | Stop reason: CLARIF

## 2017-07-10 NOTE — TELEPHONE ENCOUNTER
Returned call to Stan Kumar ,  Spoke to [employer],  Stated she needs a new prescription and application for the pt. Time to renew her application. Stated she will fax over application.

## 2017-07-10 NOTE — TELEPHONE ENCOUNTER
----- Message from Cari Romo sent at 7/10/2017  8:57 AM EDT -----  Regarding: Dr. Potter Posen at [address] Patient [facility] states he needs the doctor to fill out his portion of the application and fax back with Rx for Eliquis to fax [phone] his number is [phone]. Faxed on 7/5 and will refax now.

## 2017-07-13 DIAGNOSIS — M25.521 ELBOW PAIN, RIGHT: ICD-10-CM

## 2017-07-13 DIAGNOSIS — G89.4 CHRONIC PAIN DISORDER: ICD-10-CM

## 2017-07-13 DIAGNOSIS — M25.512 CHRONIC LEFT SHOULDER PAIN: ICD-10-CM

## 2017-07-13 DIAGNOSIS — M35.1 MCTD (MIXED CONNECTIVE TISSUE DISEASE) (HCC): ICD-10-CM

## 2017-07-13 DIAGNOSIS — E11.22 CONTROLLED TYPE 2 DIABETES MELLITUS WITH CHRONIC KIDNEY DISEASE, UNSPECIFIED CKD STAGE, UNSPECIFIED LONG TERM INSULIN USE STATUS: ICD-10-CM

## 2017-07-13 DIAGNOSIS — M79.602 PAIN OF LEFT UPPER EXTREMITY: ICD-10-CM

## 2017-07-13 DIAGNOSIS — M25.571 CHRONIC PAIN OF RIGHT ANKLE: ICD-10-CM

## 2017-07-13 DIAGNOSIS — I73.9 CLAUDICATION OF BOTH LOWER EXTREMITIES (HCC): ICD-10-CM

## 2017-07-13 DIAGNOSIS — M25.561 PAIN IN BOTH KNEES, UNSPECIFIED CHRONICITY: ICD-10-CM

## 2017-07-13 DIAGNOSIS — C54.1 ENDOMETRIAL CANCER (HCC): ICD-10-CM

## 2017-07-13 DIAGNOSIS — G89.29 CHRONIC LEFT SHOULDER PAIN: ICD-10-CM

## 2017-07-13 DIAGNOSIS — M79.7 FIBROMYALGIA: ICD-10-CM

## 2017-07-13 DIAGNOSIS — M79.601 ARM PAIN, RIGHT: ICD-10-CM

## 2017-07-13 DIAGNOSIS — D47.2 MGUS (MONOCLONAL GAMMOPATHY OF UNKNOWN SIGNIFICANCE): ICD-10-CM

## 2017-07-13 DIAGNOSIS — E11.9 DIABETES MELLITUS WITHOUT COMPLICATION (HCC): ICD-10-CM

## 2017-07-13 DIAGNOSIS — Z92.3 PERSONAL HISTORY OF RADIATION THERAPY: ICD-10-CM

## 2017-07-13 DIAGNOSIS — M25.562 PAIN IN BOTH KNEES, UNSPECIFIED CHRONICITY: ICD-10-CM

## 2017-07-13 DIAGNOSIS — R31.9 BLOOD IN URINE: ICD-10-CM

## 2017-07-13 DIAGNOSIS — D47.2 MONOCLONAL GAMMOPATHIES: ICD-10-CM

## 2017-07-13 DIAGNOSIS — G89.29 CHRONIC PAIN OF RIGHT ANKLE: ICD-10-CM

## 2017-07-13 DIAGNOSIS — I26.99 OTHER PULMONARY EMBOLISM WITHOUT ACUTE COR PULMONALE, UNSPECIFIED CHRONICITY (HCC): ICD-10-CM

## 2017-07-13 DIAGNOSIS — N93.9 VAGINAL BLEEDING, ABNORMAL: ICD-10-CM

## 2017-07-13 DIAGNOSIS — M79.10 MYALGIA: ICD-10-CM

## 2017-07-13 DIAGNOSIS — R10.30 LOWER ABDOMINAL PAIN: ICD-10-CM

## 2017-07-13 RX ORDER — POTASSIUM CHLORIDE 20 MEQ/1
20 TABLET, EXTENDED RELEASE ORAL 2 TIMES DAILY
Qty: 30 TAB | Refills: 1 | Status: SHIPPED | OUTPATIENT
Start: 2017-07-13 | End: 2017-11-17 | Stop reason: SDUPTHER

## 2017-07-31 ENCOUNTER — PATIENT OUTREACH (OUTPATIENT)
Dept: FAMILY MEDICINE CLINIC | Age: 77
End: 2017-07-31

## 2017-07-31 NOTE — Clinical Note
Please contact this patient regarding Eliquis samples, says she is in the donut hole and has been turned down by the foundation.   Thanks

## 2017-07-31 NOTE — PROGRESS NOTES
Patient called to ask for Eliquis samples because she is in \"the donut hole\" for this medication - routed to pharmacy, PCP staff aware, patient to be called back.

## 2017-08-04 ENCOUNTER — DOCUMENTATION ONLY (OUTPATIENT)
Dept: FAMILY MEDICINE CLINIC | Age: 77
End: 2017-08-04

## 2017-08-04 NOTE — PROGRESS NOTES
Spoke to patient about her Eliquis situation. She was able to get samples from her cardiologist office. Her copay for the Eliquis is >$100 and she cannot afford this. Prior to this month it has only cost $40. She was rejected by the foundation for patient assistance because she has not spent enough money out of pocket this year. She is not interested in any other anticoagulant. She stated that Dr. Steve Badillo checked her lab work and said she wasn't high risk for clotting so she doesn't understand why she can't just stop the medication. Educated her that despite the lab work she is still at an increased risk of stroke due to her other medical conditions. The only option left would be for Dr. Steve Badillo to write a letter to [employer] and see if they would be willing to give her free medication despite the out of pocket expenditure due to her situation. I will inform ulysses of the update on this for when she returns next week. Advised patient to continue getting samples from cardiologist if able.     Laron Cason, ConchisD, BCPS, CDE

## 2017-08-22 ENCOUNTER — OFFICE VISIT (OUTPATIENT)
Dept: FAMILY MEDICINE CLINIC | Age: 77
End: 2017-08-22

## 2017-08-22 ENCOUNTER — HOSPITAL ENCOUNTER (OUTPATIENT)
Dept: LAB | Age: 77
Discharge: HOME | End: 2017-08-22
Payer: MEDICARE

## 2017-08-22 VITALS
HEIGHT: 66 IN | SYSTOLIC BLOOD PRESSURE: 130 MMHG | DIASTOLIC BLOOD PRESSURE: 65 MMHG | RESPIRATION RATE: 14 BRPM | OXYGEN SATURATION: 100 % | BODY MASS INDEX: 47.09 KG/M2 | WEIGHT: 293 LBS | TEMPERATURE: 97.8 F | HEART RATE: 63 BPM

## 2017-08-22 DIAGNOSIS — M79.601 ARM PAIN, RIGHT: ICD-10-CM

## 2017-08-22 DIAGNOSIS — M79.602 PAIN OF LEFT UPPER EXTREMITY: ICD-10-CM

## 2017-08-22 DIAGNOSIS — Z92.3 PERSONAL HISTORY OF RADIATION THERAPY: ICD-10-CM

## 2017-08-22 DIAGNOSIS — I73.9 CLAUDICATION OF BOTH LOWER EXTREMITIES (HCC): ICD-10-CM

## 2017-08-22 DIAGNOSIS — M25.562 PAIN IN BOTH KNEES, UNSPECIFIED CHRONICITY: ICD-10-CM

## 2017-08-22 DIAGNOSIS — G89.4 CHRONIC PAIN DISORDER: ICD-10-CM

## 2017-08-22 DIAGNOSIS — R31.9 BLOOD IN URINE: ICD-10-CM

## 2017-08-22 DIAGNOSIS — G89.29 CHRONIC PAIN OF RIGHT ANKLE: ICD-10-CM

## 2017-08-22 DIAGNOSIS — I26.99 OTHER PULMONARY EMBOLISM WITHOUT ACUTE COR PULMONALE, UNSPECIFIED CHRONICITY (HCC): ICD-10-CM

## 2017-08-22 DIAGNOSIS — M25.532 LEFT WRIST PAIN: ICD-10-CM

## 2017-08-22 DIAGNOSIS — M79.7 FIBROMYALGIA: ICD-10-CM

## 2017-08-22 DIAGNOSIS — M25.512 CHRONIC LEFT SHOULDER PAIN: ICD-10-CM

## 2017-08-22 DIAGNOSIS — M25.521 ELBOW PAIN, RIGHT: ICD-10-CM

## 2017-08-22 DIAGNOSIS — G89.29 CHRONIC LEFT SHOULDER PAIN: ICD-10-CM

## 2017-08-22 DIAGNOSIS — N93.9 VAGINAL BLEEDING, ABNORMAL: ICD-10-CM

## 2017-08-22 DIAGNOSIS — R10.30 LOWER ABDOMINAL PAIN: ICD-10-CM

## 2017-08-22 DIAGNOSIS — M25.571 CHRONIC PAIN OF RIGHT ANKLE: ICD-10-CM

## 2017-08-22 DIAGNOSIS — M79.604 LEG PAIN, BILATERAL: ICD-10-CM

## 2017-08-22 DIAGNOSIS — M25.561 PAIN IN BOTH KNEES, UNSPECIFIED CHRONICITY: ICD-10-CM

## 2017-08-22 DIAGNOSIS — D47.2 MONOCLONAL GAMMOPATHIES: ICD-10-CM

## 2017-08-22 DIAGNOSIS — M35.1 MCTD (MIXED CONNECTIVE TISSUE DISEASE) (HCC): Primary | ICD-10-CM

## 2017-08-22 DIAGNOSIS — D25.9 UTERINE LEIOMYOMA, UNSPECIFIED LOCATION: ICD-10-CM

## 2017-08-22 DIAGNOSIS — E11.22 CONTROLLED TYPE 2 DIABETES MELLITUS WITH CHRONIC KIDNEY DISEASE, UNSPECIFIED CKD STAGE, UNSPECIFIED LONG TERM INSULIN USE STATUS: ICD-10-CM

## 2017-08-22 DIAGNOSIS — C54.1 ENDOMETRIAL CANCER (HCC): ICD-10-CM

## 2017-08-22 DIAGNOSIS — E11.9 DIABETES MELLITUS WITHOUT COMPLICATION (HCC): ICD-10-CM

## 2017-08-22 DIAGNOSIS — M79.642 PAIN OF LEFT HAND: ICD-10-CM

## 2017-08-22 DIAGNOSIS — M79.10 MYALGIA: ICD-10-CM

## 2017-08-22 DIAGNOSIS — D47.2 MGUS (MONOCLONAL GAMMOPATHY OF UNKNOWN SIGNIFICANCE): ICD-10-CM

## 2017-08-22 DIAGNOSIS — M79.605 LEG PAIN, BILATERAL: ICD-10-CM

## 2017-08-22 LAB — HBA1C MFR BLD HPLC: 5.7 %

## 2017-08-22 PROCEDURE — 80053 COMPREHEN METABOLIC PANEL: CPT

## 2017-08-22 PROCEDURE — 82607 VITAMIN B-12: CPT

## 2017-08-22 PROCEDURE — 82728 ASSAY OF FERRITIN: CPT

## 2017-08-22 PROCEDURE — 85027 COMPLETE CBC AUTOMATED: CPT

## 2017-08-22 RX ORDER — OXYCODONE HYDROCHLORIDE 20 MG/1
20 TABLET ORAL
Qty: 180 TAB | Refills: 0 | Status: SHIPPED | OUTPATIENT
Start: 2017-09-22 | End: 2017-10-24 | Stop reason: SDUPTHER

## 2017-08-22 RX ORDER — NYSTATIN 100000 U/G
CREAM TOPICAL 2 TIMES DAILY
Qty: 30 G | Refills: 5 | Status: SHIPPED | OUTPATIENT
Start: 2017-08-22 | End: 2017-09-19 | Stop reason: CLARIF

## 2017-08-22 RX ORDER — METOLAZONE 5 MG/1
5 TABLET ORAL DAILY
Qty: 30 TAB | Refills: 1 | Status: SHIPPED | OUTPATIENT
Start: 2017-08-22 | End: 2017-09-19 | Stop reason: CLARIF

## 2017-08-22 RX ORDER — MORPHINE SULFATE 60 MG/1
60 TABLET, FILM COATED, EXTENDED RELEASE ORAL EVERY 12 HOURS
Qty: 60 TAB | Refills: 0 | Status: SHIPPED | OUTPATIENT
Start: 2017-08-22 | End: 2017-10-24 | Stop reason: SDUPTHER

## 2017-08-22 RX ORDER — OXYCODONE HYDROCHLORIDE 20 MG/1
20 TABLET ORAL
Qty: 180 TAB | Refills: 0 | Status: SHIPPED | OUTPATIENT
Start: 2017-08-22 | End: 2017-10-24 | Stop reason: SDUPTHER

## 2017-08-22 RX ORDER — MORPHINE SULFATE 60 MG/1
60 TABLET, FILM COATED, EXTENDED RELEASE ORAL EVERY 12 HOURS
Qty: 60 TAB | Refills: 0 | Status: SHIPPED | OUTPATIENT
Start: 2017-09-22 | End: 2017-10-24 | Stop reason: SDUPTHER

## 2017-08-22 NOTE — LETTER
NOTIFICATION  
 
 
 
8/22/2017 12:09 PM 
 
Ms. Sri Pedroza [phone] To Whom It May Concern: Sri Nascimento is currently under the care of [address]. She will need Eliquis as her only anticoagulative medication, unfortunately, she was given and treated  
 
with other anticoagulative medications for which she developed adverse reactions to other medications  
 
as prescribed to her in the past. 
 
 
Ms. Stiven Palmer is unable to effort and therefore, My patient is unable to pay for her Eliquis secondary to  
 
the lack of income and Financial limitation. If there are questions or concerns please have the patient contact our office. Sincerely, Sally Tian MD

## 2017-08-22 NOTE — MR AVS SNAPSHOT
Visit Information Date & Time Provider Department Dept. Phone Encounter #  
 8/22/2017 11:30 AM Iron Ramírez MD [address] [facility] [phone] [id number] Your Appointments 10/31/2017 10:00 AM  
Follow Up with Cole Rodriguez  [facility]) Appt Note: MGUS, 1 yr f/u, CP 0  
 [address] [place] [address] [id number]  
[phone]  
  
   
 [address] [address] [facility] [address] Upcoming Health Maintenance Date Due  
 MEDICARE YEARLY EXAM 7/20/2016 EYE EXAM RETINAL OR DILATED Q1 4/22/2017 INFLUENZA AGE 9 TO ADULT 8/1/2017 HEMOGLOBIN A1C Q6M 12/22/2017 MICROALBUMIN Q1 2/23/2018 LIPID PANEL Q1 4/20/2018 GLAUCOMA SCREENING Q2Y 4/22/2018 FOOT EXAM Q1 6/22/2018 COLONOSCOPY 12/21/2019 DTaP/Tdap/Td series (3 - Td) 10/20/2026 Allergies as of 8/22/2017  Review Complete On: 8/22/2017 By: Robbi Davis LPN Severity Noted Reaction Type Reactions Latex  08/29/2013    Rash, Itching Advair Diskus [Fluticasone-salmeterol]  05/29/2012    Other (comments) \"breathing problems\" Bactrim [Sulfamethoprim]  08/29/2013   Side Effect Shortness of Breath, Itching, Swelling Swelling of tongue and throat Iodinated Contrast- Oral And Iv Dye  10/04/2016    Hives Ivp [Fd And C Blue No.1]  12/21/2010    Hives, Shortness of Breath Lovenox [Enoxaparin]  12/21/2010    Shortness of Breath Nsaids (Non-steroidal Anti-inflammatory Drug)  12/21/2010    Other (comments) Heartburn Sulfa (Sulfonamide Antibiotics)  12/23/2013    Shortness of Breath Current Immunizations  Reviewed on 10/31/2016 Name Date Influenza Vaccine (Quad) PF  Deferred (Medication not available) Tdap 5/17/2006 ZZZ-RETIRED (DO NOT USE) Pneumococcal Vaccine (Unspecified Type) 1/1/2010 Not reviewed this visit You Were Diagnosed With   
  
 Codes Comments MCTD (mixed connective tissue disease) ([place] [address].)    -  Primary ICD-10-CM: M35.1 ICD-9-CM: 710.8 Pain of left hand     ICD-10-CM: H04.170 ICD-9-CM: 729.5 Myalgia     ICD-10-CM: M79.1 ICD-9-CM: 729.1 Left wrist pain     ICD-10-CM: M25.532 ICD-9-CM: 719.43 Pain of left upper extremity     ICD-10-CM: L03.008 ICD-9-CM: 729.5 Uterine leiomyoma, unspecified location     ICD-10-CM: D25.9 ICD-9-CM: 218.9 Chronic left shoulder pain     ICD-10-CM: M25.512, G89.29 ICD-9-CM: 719.41, 338.29   
 MGUS (monoclonal gammopathy of unknown significance)     ICD-10-CM: M07.7 ICD-9-CM: 273.1 Arm pain, right     ICD-10-CM: M79.601 ICD-9-CM: 729.5 Chronic pain of right ankle     ICD-10-CM: M25.571, G89.29 ICD-9-CM: 719.47, 338.29 Leg pain, bilateral     ICD-10-CM: M79.604, M79.605 ICD-9-CM: 729.5 Monoclonal gammopathies     ICD-10-CM: D47.2 ICD-9-CM: 273.1 Fibromyalgia     ICD-10-CM: M79.7 ICD-9-CM: 729.1 Chronic pain disorder     ICD-10-CM: G89.4 ICD-9-CM: 338.4 Blood in urine     ICD-10-CM: R31.9 ICD-9-CM: 599.70 Lower abdominal pain     ICD-10-CM: R10.30 ICD-9-CM: 789.09 Claudication of both lower extremities ([place] [address].)     ICD-10-CM: I73.9 ICD-9-CM: 443.9 Controlled type 2 diabetes mellitus with chronic kidney disease, unspecified CKD stage, unspecified long term insulin use status ([place] [address].)     ICD-10-CM: E11.22 
ICD-9-CM: 250.40, 585.9 Endometrial cancer ([facility].)     ICD-10-CM: C54.1 ICD-9-CM: 182.0 Elbow pain, right     ICD-10-CM: M25.521 ICD-9-CM: 719.42 Pain in both knees, unspecified chronicity     ICD-10-CM: M25.561, M25.562 ICD-9-CM: 719.46 Vaginal bleeding, abnormal     ICD-10-CM: N93.9 ICD-9-CM: 623.8 Diabetes mellitus without complication ([facility] 75.)     [facility]-LO: E11.9 ICD-9-CM: 250.00 Other pulmonary embolism without acute cor pulmonale, unspecified chronicity (HCC)     ICD-10-CM: I26.99 
ICD-9-CM: 415.19 Personal history of radiation therapy     ICD-10-CM: Z92.3 ICD-9-CM: V15.3 Vitals BP Pulse Temp Resp Height(growth percentile) Weight(growth percentile) 130/65 (BP 1 Location: Left arm, BP Patient Position: At rest) 63 97.8 °F (36.6 °C) (Oral) 14 5' 6\" (1.676 m) 300 lb (136.1 kg) SpO2 BMI OB Status Smoking Status 100% 48.42 kg/m2 Hysterectomy Former Smoker BMI and BSA Data Body Mass Index Body Surface Area  
 48.42 kg/m 2 2.52 m 2 Preferred Pharmacy Pharmacy Name Phone [id number] [place], [place] - [address] [phone] Your Updated Medication List  
  
   
This list is accurate as of: 8/22/17 12:25 PM.  Always use your most recent med list.  
  
  
  
  
 apixaban 5 mg tablet Commonly known as:  Marly Franc Take 1 Tab by mouth two (2) times a day. Indications: Cerebral Thromboembolism Prevention, DEEP VEIN THROMBOSIS PREVENTION, deep venous thrombosis, pulmonary thromboembolism  
  
 aspirin delayed-release 81 mg tablet Take 2 Tabs by mouth daily (after breakfast). After meal  Indications: PREVENTION OF TRANSIENT ISCHEMIC ATTACKS, THROMBOSIS PREVENTION AFTER PCI  
  
 clonazePAM 1 mg tablet Commonly known as:  Heath Grave Take 1 Tab by mouth two (2) times a day. Max Daily Amount: 2 mg.  
  
 cyclobenzaprine 10 mg tablet Commonly known as:  FLEXERIL  
TAKE 1 TAB BY MOUTH TWO (2) TIMES A DAY. INDICATIONS: MUSCLE SPASM  
  
 diphenhydrAMINE 50 mg capsule Commonly known as:  BENADRYL  
TAKE ONE CAPSULE BY MOUTH 1 HOUR PRIOR TO SCAN  
  
 ferrous sulfate 325 mg (65 mg iron) EC tablet Commonly known as:  IRON Take 1 Tab by mouth three (3) times daily (with meals). furosemide 80 mg tablet Commonly known as:  LASIX TAKE 1 TABLET BY MOUTH EVERY DAY  
  
 GAVILYTE-C 240-22.72-6.72 -5.84 gram solution Generic drug:  PEG 3350-Electrolytes USE AS DIRECTED  
  
 hydrALAZINE 25 mg tablet Commonly known as:  APRESOLINE Take 1 Tab by mouth daily. magnesium 250 mg Tab Take  by mouth daily. metOLazone 5 mg tablet Commonly known as:  Mateo Galley Take 1 Tab by mouth daily. metoprolol succinate 100 mg tablet Commonly known as:  TOPROL-XL  
100 mg daily. * morphine CR 60 mg CR tablet Commonly known as:  MS CONTIN Take 1 Tab by mouth every twelve (12) hours. Max Daily Amount: 120 mg. Indications: Chronic Pain, Severe Pain * morphine CR 60 mg CR tablet Commonly known as:  MS CONTIN Take 1 Tab by mouth every twelve (12) hours. Max Daily Amount: 120 mg. Indications: Chronic Pain, Severe Pain Start taking on:  9/22/2017 OTHER  
MORPHINE CREAM APPLIES TO KNEES 3-4X DAILY  
  
 * oxyCODONE IR 20 mg immediate release tablet Commonly known as:  Joellyn Adas Take 1 Tab by mouth every four (4) hours as needed for Pain. Max Daily Amount: 120 mg. Indications: Pain * oxyCODONE IR 20 mg immediate release tablet Commonly known as:  Joellyn Adas Take 1 Tab by mouth every four (4) hours as needed for Pain. Max Daily Amount: 120 mg. Indications: Pain Start taking on:  9/22/2017 potassium chloride 20 mEq tablet Commonly known as:  K-DUR, KLOR-CON Take 1 Tab by mouth two (2) times a day. Indications: hypokalemia prevention  
  
 predniSONE 10 mg tablet Commonly known as:  DELTASONE  
TAKE 5 TABLETS 13 HOURS, 7 HOURS AND 1 HOUR PRIOR TO YOUR SCAN  
  
 scopolamine 1.5 mg (1 mg over 3 days) Pt3d Commonly known as:  TRANSDERM-SCOP  
1 Patch by TransDERmal route every seventy-two (72) hours. Indications: PREVENTION OF MOTION SICKNESS  
  
 VITAMIN C 500 mg tablet Generic drug:  ascorbic acid (vitamin C) Take 500 mg by mouth daily. VITAMIN D3 2,000 unit Tab Generic drug:  cholecalciferol (vitamin D3) Take  by mouth daily. * Notice:   This list has 4 medication(s) that are the same as other medications prescribed for you. Read the directions carefully, and ask your doctor or other care provider to review them with you. Prescriptions Printed Refills  
 morphine CR (MS CONTIN) 60 mg CR tablet 0 Starting on: 9/22/2017 Sig: Take 1 Tab by mouth every twelve (12) hours. Max Daily Amount: 120 mg. Indications: Chronic Pain, Severe Pain Class: Print Route: Oral  
 oxyCODONE IR (ROXICODONE) 20 mg immediate release tablet 0 Starting on: 9/22/2017 Sig: Take 1 Tab by mouth every four (4) hours as needed for Pain. Max Daily Amount: 120 mg. Indications: Pain Class: Print Route: Oral  
 morphine CR (MS CONTIN) 60 mg CR tablet 0 Sig: Take 1 Tab by mouth every twelve (12) hours. Max Daily Amount: 120 mg. Indications: Chronic Pain, Severe Pain Class: Print Route: Oral  
 oxyCODONE IR (ROXICODONE) 20 mg immediate release tablet 0 Sig: Take 1 Tab by mouth every four (4) hours as needed for Pain. Max Daily Amount: 120 mg. Indications: Pain Class: Print Route: Oral  
  
Prescriptions Sent to Pharmacy Refills  
 metOLazone (ZAROXOLYN) 5 mg tablet 1 Sig: Take 1 Tab by mouth daily. Class: Normal  
 Pharmacy: [facility] [address]  #: [phone] Route: Oral  
  
We Performed the Following AMB POC HEMOGLOBIN A1C [95847 CPT(R)] CBC W/O DIFF [40986 CPT(R)] FERRITIN [57316 CPT(R)] METABOLIC PANEL, COMPREHENSIVE [06126 CPT(R)] VITAMIN B12 & FOLATE [88178 CPT(R)] Introducing [facility]! Dear Yvonne Encarnacion: Thank you for requesting a [employer] account. Our records indicate that you have previously registered for a [employer] account but its currently inactive. Please call our [employer] support line at [phone]. Additional Information If you have questions, please visit the Frequently Asked Questions section of the [employer] website at [email or web address] Remember, [employer] is NOT to be used for urgent needs. For medical emergencies, dial 911. Now available from your iPhone and Android! Please provide this summary of care documentation to your next provider. Your primary care clinician is listed as Shayla Bagley. If you have any questions after today's visit, please call [phone].

## 2017-08-22 NOTE — PROGRESS NOTES
HISTORY OF PRESENT ILLNESS  Dayna Burks is a 68 y.o. female.   HPI   68year old -American female with significant comorbid past medical history brought in by her son for her chronic pain medication refill unfortunately she has to use the walker she is able to walk with the walker 5-7 feet and then she has to rest for the significant amount of bilateral knee pain and low back pain unfortunately she has been diagnosed with mixed connective tissue disorder fibromyalgia severe arthritis of multiple joint 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 myeloma significant history of chronic DVT and PE currently on anticoagulative medication also with history of uterine cancer for which it was recently removed with elevated  for which she had a PET scan done few days ago awaiting the results as per oncology's for further care  She is taking her pain medication as directed she is not doctor shopping she has been given prescription for pain medication by other doctor and today she stating that she never got them refilled she is not abusing any of her medication she does take them as prescribed keeping them in a safe place stating that she lives by herself and she has family member to provide care for her but her medication is locked in a safe box she is the only person who has the key for it she does get constipated but she takes laxative, she stating that without current pain medication she would not have any happiness and she is not able to do any of her activity of daily living the pain is severe 10 out of 10 without pain medication she has dull aching radiating to the lower extremity sometimes she gets these sharp attacks that even the current pain medication not providing to help she does other supportive care such as heat ice and massage therapy at the house she is aware of her contract and no harm consent she already has no sign and she is aware that the urine would be checked randomly in any foul pounding she would be discontinue from her pain medication regardless she is presenting for her pain medication refill she is getting her pain medication 2 months at the time  Other problems that she has is that she is not able to afford her medication she stating that she is in the donut hole and many of her medication are extremely expensive and she is not able to afford them unfortunately she needs them otherwise she would be bedridden and she will be able to do any of the activity of her daily living  Lasix was changed from 40 to 80mg daily, no is causing ringing sensation, in addition to hydralazine  Vitals 6/22/2017 5/30/2017 5/30/2017 5/30/2017 5/30/2017 5/30/2017   Weight 297 lb          Vitals 5/30/2017   Weight 301 lb           Current Outpatient Prescriptions   Medication Sig Dispense Refill    [START ON 9/22/2017] morphine CR (MS CONTIN) 60 mg CR tablet Take 1 Tab by mouth every twelve (12) hours. Max Daily Amount: 120 mg. Indications: Chronic Pain, Severe Pain 60 Tab 0    [START ON 9/22/2017] oxyCODONE IR (ROXICODONE) 20 mg immediate release tablet Take 1 Tab by mouth every four (4) hours as needed for Pain. Max Daily Amount: 120 mg. Indications: Pain 180 Tab 0    morphine CR (MS CONTIN) 60 mg CR tablet Take 1 Tab by mouth every twelve (12) hours. Max Daily Amount: 120 mg. Indications: Chronic Pain, Severe Pain 60 Tab 0    oxyCODONE IR (ROXICODONE) 20 mg immediate release tablet Take 1 Tab by mouth every four (4) hours as needed for Pain. Max Daily Amount: 120 mg. Indications: Pain 180 Tab 0    nystatin (MYCOSTATIN) topical cream Apply  to affected area two (2) times a day. 30 g 5    potassium chloride (K-DUR, KLOR-CON) 20 mEq tablet Take 1 Tab by mouth two (2) times a day. Indications: hypokalemia prevention 30 Tab 1    apixaban (ELIQUIS) 5 mg tablet Take 1 Tab by mouth two (2) times a day.  Indications: Cerebral Thromboembolism Prevention, DEEP VEIN THROMBOSIS PREVENTION, deep venous thrombosis, pulmonary thromboembolism 60 Tab 11    furosemide (LASIX) 80 mg tablet TAKE 1 TABLET BY MOUTH EVERY DAY  11    GAVILYTE-C 240-22.72-6.72 -5.84 gram solution USE AS DIRECTED  0    hydrALAZINE (APRESOLINE) 25 mg tablet Take 1 Tab by mouth daily. 90 Tab 6    OTHER MORPHINE CREAM APPLIES TO KNEES 3-4X DAILY      metoprolol succinate (TOPROL-XL) 100 mg tablet 100 mg daily.  ascorbic acid, vitamin C, (VITAMIN C) 500 mg tablet Take 500 mg by mouth daily.  magnesium 250 mg tab Take  by mouth daily.  cholecalciferol, vitamin D3, (VITAMIN D3) 2,000 unit tab Take  by mouth daily.  metOLazone (ZAROXOLYN) 5 mg tablet Take 1 Tab by mouth daily. 30 Tab 1    ferrous sulfate (IRON) 325 mg (65 mg iron) EC tablet Take 1 Tab by mouth three (3) times daily (with meals). 90 Tab 3    clonazePAM (KLONOPIN) 1 mg tablet Take 1 Tab by mouth two (2) times a day. Max Daily Amount: 2 mg. 30 Tab 2    cyclobenzaprine (FLEXERIL) 10 mg tablet TAKE 1 TAB BY MOUTH TWO (2) TIMES A DAY. INDICATIONS: MUSCLE SPASM  0    diphenhydrAMINE (BENADRYL) 50 mg capsule TAKE ONE CAPSULE BY MOUTH 1 HOUR PRIOR TO SCAN  0    predniSONE (DELTASONE) 10 mg tablet TAKE 5 TABLETS 13 HOURS, 7 HOURS AND 1 HOUR PRIOR TO YOUR SCAN  0    scopolamine (TRANSDERM-SCOP) 1.5 mg (1 mg over 3 days) pt3d 1 Patch by TransDERmal route every seventy-two (72) hours. Indications: PREVENTION OF MOTION SICKNESS 3 Patch 0    aspirin delayed-release 81 mg tablet Take 2 Tabs by mouth daily (after breakfast).  After meal  Indications: PREVENTION OF TRANSIENT ISCHEMIC ATTACKS, THROMBOSIS PREVENTION AFTER PCI 60 Tab 11     Allergies   Allergen Reactions    Latex Rash and Itching    Advair Diskus [Fluticasone-Salmeterol] Other (comments)     \"breathing problems\"    Bactrim [Sulfamethoprim] Shortness of Breath, Itching and Swelling     Swelling of tongue and throat    Iodinated Contrast- Oral And Iv Dye Hives    Ivp [Fd And C Blue No.1] Hives and Shortness of Breath    Lovenox [Enoxaparin] Shortness of Breath    Nsaids (Non-Steroidal Anti-Inflammatory Drug) Other (comments)     Heartburn    Sulfa (Sulfonamide Antibiotics) Shortness of Breath     Past Medical History:   Diagnosis Date    Acute kidney failure ([address] 2015    Acute sinusitis 2011    Adverse effect of anesthesia     SLOW WAKING PAST ANESTHESIA    Arthritis     RA    At risk for side effect of medication 2016    Atrial fibrillation ([address] 10/19/2011    Autoimmune disease ([address]     FIBROMYLGIA    Cancer ([address]     ENDOMETRIAL     Cholelithiasis 2015    Chronic pain     Claudication of both lower extremities ([address] 2015    Diabetes mellitus type 2, controlled ([address] 2016    Diabetes mellitus, type II ([address] 10/10/2014    Fall against object 10/10/2014    Fatty liver 10/20/2015    Hypertension     Ill-defined condition     SPINAL STENOSIS    Left shoulder pain 10/10/2014    Leiomyoma of body of uterus 2016    Morbid obesity ([address]     Myalgia 2016    Nausea & vomiting     Personal history of radiation therapy 2017    Pneumonia     Preop examination 6/15/2015    Preoperative clearance 6/15/2015    Rash 2011    Rash of hands 10/10/2014    Screening for colon cancer 2016    Screening for glaucoma 2016    Sleep apnea     SOB (shortness of breath)     hospitalized 2012.  angina, SOB    Thromboembolus ([address]     LEFT LOWER LEG AND PE    Tinea pedis, recurrent 2015    Vaginal bleeding, abnormal 2017     Past Surgical History:   Procedure Laterality Date    COLONOSCOPY N/A 2016    COLONOSCOPY performed by Maria Esther Chang MD at [address] [place] [address]  2016         HX CATARACT REMOVAL Bilateral 2015    HX  SECTION   1963    HX DILATION AND CURETTAGE  2016    HX DILATION AND CURETTAGE  2016    HX GYN  1960    etopic    HX HEART CATHETERIZATION      NEGATIVE PER PATIENT    HX HYSTERECTOMY  2016    HX KNEE ARTHROSCOPY Left 1998    HX ORTHOPAEDIC Right 1960'S    BUNIONECTOMY    HX OTHER SURGICAL      BIOPSY OF VEIN IN FOREHEAD    HX TUBAL LIGATION  1963    IN COLSC FLX W/RMVL OF TUMOR POLYP LESION SNARE TQ  12/12/2011          Family History   Problem Relation Age of Onset    Other Mother      ANEURYSM    Obesity Mother     Heart Disease Father     Other Father      VASCULAR DISEASE    Hypertension Sister     Heart Disease Brother     Heart Attack Brother     Kidney Disease Sister     Seizures Brother     Heart Attack Brother     Anesth Problems Neg Hx     Alcohol abuse Neg Hx      Social History   Substance Use Topics    Smoking status: Former Smoker     Packs/day: 0.25     Years: 15.00     Quit date: 8/23/1996    Smokeless tobacco: Never Used    Alcohol use No      Lab Results  Component Value Date/Time   WBC 2.9 08/22/2017 12:25 PM   HGB 9.0 08/22/2017 12:25 PM   HCT 27.5 08/22/2017 12:25 PM   PLATELET 353 65/77/2718 12:25 PM   MCV 92 08/22/2017 12:25 PM   Lab Results  Component Value Date/Time   GFR est non-AA 58 08/22/2017 12:25 PM   GFRNA, POC >60 08/16/2016 08:18 AM   GFR est AA 66 08/22/2017 12:25 PM   GFRAA, POC >60 08/16/2016 08:18 AM   Creatinine 0.96 08/22/2017 12:25 PM   Creatinine (POC) 0.9 08/16/2016 08:18 AM   BUN 31 08/22/2017 12:25 PM   Sodium 143 08/22/2017 12:25 PM   Potassium 4.5 08/22/2017 12:25 PM   Chloride 103 08/22/2017 12:25 PM   CO2 27 08/22/2017 12:25 PM   Magnesium 2.1 09/01/2016 05:04 PM              Review of Systems   Constitutional: Negative for chills and fever. HENT: Negative for ear pain and nosebleeds. Eyes: Negative for blurred vision, pain and discharge. Respiratory: Negative for shortness of breath. Cardiovascular: Negative for chest pain and leg swelling. Gastrointestinal: Negative for constipation, diarrhea, nausea and vomiting. Genitourinary: Negative for frequency.    Musculoskeletal: Positive for back pain, joint pain, myalgias and neck pain. Skin: Negative for itching and rash. Neurological: Negative for headaches. Psychiatric/Behavioral: Negative for depression. The patient has insomnia. The patient is not nervous/anxious. Physical Exam   Constitutional: She is oriented to person, place, and time. She appears well-developed and well-nourished. HENT:   Head: Normocephalic and atraumatic. Eyes: Conjunctivae and EOM are normal.   Neck: Normal range of motion. Neck supple. Cardiovascular: Normal rate, regular rhythm and normal heart sounds. No murmur heard. Pulmonary/Chest: Effort normal and breath sounds normal.   Abdominal: Soft. Bowel sounds are normal. She exhibits no distension. Musculoskeletal: She exhibits tenderness and deformity. She exhibits no edema. Right knee: She exhibits decreased range of motion and swelling. Tenderness found. Left knee: She exhibits decreased range of motion. Left ankle: She exhibits decreased range of motion. Tenderness. Cervical back: She exhibits decreased range of motion, tenderness, pain and spasm. Lumbar back: She exhibits decreased range of motion, tenderness, bony tenderness, deformity and laceration. Neurological: She is alert and oriented to person, place, and time. Skin: No erythema. Psychiatric: Her behavior is normal.   Nursing note and vitals reviewed. ASSESSMENT and PLAN  Diagnoses and all orders for this visit:    1. MCTD (mixed connective tissue disease) ([facility])  -     morphine CR (MS CONTIN) 60 mg CR tablet; Take 1 Tab by mouth every twelve (12) hours. Max Daily Amount: 120 mg. Indications: Chronic Pain, Severe Pain  -     oxyCODONE IR (ROXICODONE) 20 mg immediate release tablet; Take 1 Tab by mouth every four (4) hours as needed for Pain. Max Daily Amount: 120 mg. Indications: Pain  -     morphine CR (MS CONTIN) 60 mg CR tablet; Take 1 Tab by mouth every twelve (12) hours.  Max Daily Amount: 120 mg. Indications: Chronic Pain, Severe Pain  -     oxyCODONE IR (ROXICODONE) 20 mg immediate release tablet; Take 1 Tab by mouth every four (4) hours as needed for Pain. Max Daily Amount: 120 mg. Indications: Pain  -     CBC W/O DIFF  -     FERRITIN  -     VITAMIN B12 & FOLATE  -     METABOLIC PANEL, COMPREHENSIVE  -     AMB POC HEMOGLOBIN A1C    2. Pain of left hand  -     morphine CR (MS CONTIN) 60 mg CR tablet; Take 1 Tab by mouth every twelve (12) hours. Max Daily Amount: 120 mg. Indications: Chronic Pain, Severe Pain  -     oxyCODONE IR (ROXICODONE) 20 mg immediate release tablet; Take 1 Tab by mouth every four (4) hours as needed for Pain. Max Daily Amount: 120 mg. Indications: Pain  -     morphine CR (MS CONTIN) 60 mg CR tablet; Take 1 Tab by mouth every twelve (12) hours. Max Daily Amount: 120 mg. Indications: Chronic Pain, Severe Pain  -     oxyCODONE IR (ROXICODONE) 20 mg immediate release tablet; Take 1 Tab by mouth every four (4) hours as needed for Pain. Max Daily Amount: 120 mg. Indications: Pain  -     CBC W/O DIFF  -     FERRITIN  -     VITAMIN B12 & FOLATE  -     METABOLIC PANEL, COMPREHENSIVE  -     AMB POC HEMOGLOBIN A1C    3. Myalgia  -     morphine CR (MS CONTIN) 60 mg CR tablet; Take 1 Tab by mouth every twelve (12) hours. Max Daily Amount: 120 mg. Indications: Chronic Pain, Severe Pain  -     oxyCODONE IR (ROXICODONE) 20 mg immediate release tablet; Take 1 Tab by mouth every four (4) hours as needed for Pain. Max Daily Amount: 120 mg. Indications: Pain  -     morphine CR (MS CONTIN) 60 mg CR tablet; Take 1 Tab by mouth every twelve (12) hours. Max Daily Amount: 120 mg. Indications: Chronic Pain, Severe Pain  -     oxyCODONE IR (ROXICODONE) 20 mg immediate release tablet; Take 1 Tab by mouth every four (4) hours as needed for Pain. Max Daily Amount: 120 mg.  Indications: Pain  -     CBC W/O DIFF  -     FERRITIN  -     VITAMIN B12 & FOLATE  -     METABOLIC PANEL, COMPREHENSIVE  -     AMB POC HEMOGLOBIN A1C    4. Left wrist pain  -     morphine CR (MS CONTIN) 60 mg CR tablet; Take 1 Tab by mouth every twelve (12) hours. Max Daily Amount: 120 mg. Indications: Chronic Pain, Severe Pain  -     oxyCODONE IR (ROXICODONE) 20 mg immediate release tablet; Take 1 Tab by mouth every four (4) hours as needed for Pain. Max Daily Amount: 120 mg. Indications: Pain  -     morphine CR (MS CONTIN) 60 mg CR tablet; Take 1 Tab by mouth every twelve (12) hours. Max Daily Amount: 120 mg. Indications: Chronic Pain, Severe Pain  -     oxyCODONE IR (ROXICODONE) 20 mg immediate release tablet; Take 1 Tab by mouth every four (4) hours as needed for Pain. Max Daily Amount: 120 mg. Indications: Pain  -     CBC W/O DIFF  -     FERRITIN  -     VITAMIN B12 & FOLATE  -     METABOLIC PANEL, COMPREHENSIVE  -     AMB POC HEMOGLOBIN A1C    5. Pain of left upper extremity  -     morphine CR (MS CONTIN) 60 mg CR tablet; Take 1 Tab by mouth every twelve (12) hours. Max Daily Amount: 120 mg. Indications: Chronic Pain, Severe Pain  -     oxyCODONE IR (ROXICODONE) 20 mg immediate release tablet; Take 1 Tab by mouth every four (4) hours as needed for Pain. Max Daily Amount: 120 mg. Indications: Pain  -     morphine CR (MS CONTIN) 60 mg CR tablet; Take 1 Tab by mouth every twelve (12) hours. Max Daily Amount: 120 mg. Indications: Chronic Pain, Severe Pain  -     oxyCODONE IR (ROXICODONE) 20 mg immediate release tablet; Take 1 Tab by mouth every four (4) hours as needed for Pain. Max Daily Amount: 120 mg. Indications: Pain  -     CBC W/O DIFF  -     FERRITIN  -     VITAMIN B12 & FOLATE  -     METABOLIC PANEL, COMPREHENSIVE  -     AMB POC HEMOGLOBIN A1C    6. Uterine leiomyoma, unspecified location  -     morphine CR (MS CONTIN) 60 mg CR tablet; Take 1 Tab by mouth every twelve (12) hours. Max Daily Amount: 120 mg. Indications: Chronic Pain, Severe Pain  -     oxyCODONE IR (ROXICODONE) 20 mg immediate release tablet;  Take 1 Tab by mouth every four (4) hours as needed for Pain. Max Daily Amount: 120 mg. Indications: Pain  -     morphine CR (MS CONTIN) 60 mg CR tablet; Take 1 Tab by mouth every twelve (12) hours. Max Daily Amount: 120 mg. Indications: Chronic Pain, Severe Pain  -     oxyCODONE IR (ROXICODONE) 20 mg immediate release tablet; Take 1 Tab by mouth every four (4) hours as needed for Pain. Max Daily Amount: 120 mg. Indications: Pain  -     CBC W/O DIFF  -     FERRITIN  -     VITAMIN B12 & FOLATE  -     METABOLIC PANEL, COMPREHENSIVE  -     AMB POC HEMOGLOBIN A1C    7. Chronic left shoulder pain  -     morphine CR (MS CONTIN) 60 mg CR tablet; Take 1 Tab by mouth every twelve (12) hours. Max Daily Amount: 120 mg. Indications: Chronic Pain, Severe Pain  -     oxyCODONE IR (ROXICODONE) 20 mg immediate release tablet; Take 1 Tab by mouth every four (4) hours as needed for Pain. Max Daily Amount: 120 mg. Indications: Pain  -     morphine CR (MS CONTIN) 60 mg CR tablet; Take 1 Tab by mouth every twelve (12) hours. Max Daily Amount: 120 mg. Indications: Chronic Pain, Severe Pain  -     oxyCODONE IR (ROXICODONE) 20 mg immediate release tablet; Take 1 Tab by mouth every four (4) hours as needed for Pain. Max Daily Amount: 120 mg. Indications: Pain  -     CBC W/O DIFF  -     FERRITIN  -     VITAMIN B12 & FOLATE  -     METABOLIC PANEL, COMPREHENSIVE  -     AMB POC HEMOGLOBIN A1C    8. MGUS (monoclonal gammopathy of unknown significance)  -     morphine CR (MS CONTIN) 60 mg CR tablet; Take 1 Tab by mouth every twelve (12) hours. Max Daily Amount: 120 mg. Indications: Chronic Pain, Severe Pain  -     oxyCODONE IR (ROXICODONE) 20 mg immediate release tablet; Take 1 Tab by mouth every four (4) hours as needed for Pain. Max Daily Amount: 120 mg. Indications: Pain  -     morphine CR (MS CONTIN) 60 mg CR tablet; Take 1 Tab by mouth every twelve (12) hours. Max Daily Amount: 120 mg.  Indications: Chronic Pain, Severe Pain  -     oxyCODONE IR (ROXICODONE) 20 mg immediate release tablet; Take 1 Tab by mouth every four (4) hours as needed for Pain. Max Daily Amount: 120 mg. Indications: Pain  -     CBC W/O DIFF  -     FERRITIN  -     VITAMIN B12 & FOLATE  -     METABOLIC PANEL, COMPREHENSIVE  -     AMB POC HEMOGLOBIN A1C    9. Arm pain, right  -     morphine CR (MS CONTIN) 60 mg CR tablet; Take 1 Tab by mouth every twelve (12) hours. Max Daily Amount: 120 mg. Indications: Chronic Pain, Severe Pain  -     oxyCODONE IR (ROXICODONE) 20 mg immediate release tablet; Take 1 Tab by mouth every four (4) hours as needed for Pain. Max Daily Amount: 120 mg. Indications: Pain  -     morphine CR (MS CONTIN) 60 mg CR tablet; Take 1 Tab by mouth every twelve (12) hours. Max Daily Amount: 120 mg. Indications: Chronic Pain, Severe Pain  -     oxyCODONE IR (ROXICODONE) 20 mg immediate release tablet; Take 1 Tab by mouth every four (4) hours as needed for Pain. Max Daily Amount: 120 mg. Indications: Pain  -     CBC W/O DIFF  -     FERRITIN  -     VITAMIN B12 & FOLATE  -     METABOLIC PANEL, COMPREHENSIVE  -     AMB POC HEMOGLOBIN A1C    10. Chronic pain of right ankle  -     morphine CR (MS CONTIN) 60 mg CR tablet; Take 1 Tab by mouth every twelve (12) hours. Max Daily Amount: 120 mg. Indications: Chronic Pain, Severe Pain  -     oxyCODONE IR (ROXICODONE) 20 mg immediate release tablet; Take 1 Tab by mouth every four (4) hours as needed for Pain. Max Daily Amount: 120 mg. Indications: Pain  -     morphine CR (MS CONTIN) 60 mg CR tablet; Take 1 Tab by mouth every twelve (12) hours. Max Daily Amount: 120 mg. Indications: Chronic Pain, Severe Pain  -     oxyCODONE IR (ROXICODONE) 20 mg immediate release tablet; Take 1 Tab by mouth every four (4) hours as needed for Pain. Max Daily Amount: 120 mg. Indications: Pain  -     CBC W/O DIFF  -     FERRITIN  -     VITAMIN B12 & FOLATE  -     METABOLIC PANEL, COMPREHENSIVE  -     AMB POC HEMOGLOBIN A1C    11.  Leg pain, bilateral  -     morphine CR (MS CONTIN) 60 mg CR tablet; Take 1 Tab by mouth every twelve (12) hours. Max Daily Amount: 120 mg. Indications: Chronic Pain, Severe Pain  -     oxyCODONE IR (ROXICODONE) 20 mg immediate release tablet; Take 1 Tab by mouth every four (4) hours as needed for Pain. Max Daily Amount: 120 mg. Indications: Pain  -     morphine CR (MS CONTIN) 60 mg CR tablet; Take 1 Tab by mouth every twelve (12) hours. Max Daily Amount: 120 mg. Indications: Chronic Pain, Severe Pain  -     oxyCODONE IR (ROXICODONE) 20 mg immediate release tablet; Take 1 Tab by mouth every four (4) hours as needed for Pain. Max Daily Amount: 120 mg. Indications: Pain  -     CBC W/O DIFF  -     FERRITIN  -     VITAMIN B12 & FOLATE  -     METABOLIC PANEL, COMPREHENSIVE  -     AMB POC HEMOGLOBIN A1C    12. Monoclonal gammopathies  -     morphine CR (MS CONTIN) 60 mg CR tablet; Take 1 Tab by mouth every twelve (12) hours. Max Daily Amount: 120 mg. Indications: Chronic Pain, Severe Pain  -     oxyCODONE IR (ROXICODONE) 20 mg immediate release tablet; Take 1 Tab by mouth every four (4) hours as needed for Pain. Max Daily Amount: 120 mg. Indications: Pain  -     morphine CR (MS CONTIN) 60 mg CR tablet; Take 1 Tab by mouth every twelve (12) hours. Max Daily Amount: 120 mg. Indications: Chronic Pain, Severe Pain  -     oxyCODONE IR (ROXICODONE) 20 mg immediate release tablet; Take 1 Tab by mouth every four (4) hours as needed for Pain. Max Daily Amount: 120 mg. Indications: Pain  -     CBC W/O DIFF  -     FERRITIN  -     VITAMIN B12 & FOLATE  -     METABOLIC PANEL, COMPREHENSIVE  -     AMB POC HEMOGLOBIN A1C    13. Fibromyalgia  -     morphine CR (MS CONTIN) 60 mg CR tablet; Take 1 Tab by mouth every twelve (12) hours. Max Daily Amount: 120 mg. Indications: Chronic Pain, Severe Pain  -     oxyCODONE IR (ROXICODONE) 20 mg immediate release tablet; Take 1 Tab by mouth every four (4) hours as needed for Pain. Max Daily Amount: 120 mg.  Indications: Pain  -     morphine CR (MS CONTIN) 60 mg CR tablet; Take 1 Tab by mouth every twelve (12) hours. Max Daily Amount: 120 mg. Indications: Chronic Pain, Severe Pain  -     oxyCODONE IR (ROXICODONE) 20 mg immediate release tablet; Take 1 Tab by mouth every four (4) hours as needed for Pain. Max Daily Amount: 120 mg. Indications: Pain  -     CBC W/O DIFF  -     FERRITIN  -     VITAMIN B12 & FOLATE  -     METABOLIC PANEL, COMPREHENSIVE  -     AMB POC HEMOGLOBIN A1C    14. Chronic pain disorder  -     morphine CR (MS CONTIN) 60 mg CR tablet; Take 1 Tab by mouth every twelve (12) hours. Max Daily Amount: 120 mg. Indications: Chronic Pain, Severe Pain  -     oxyCODONE IR (ROXICODONE) 20 mg immediate release tablet; Take 1 Tab by mouth every four (4) hours as needed for Pain. Max Daily Amount: 120 mg. Indications: Pain  -     morphine CR (MS CONTIN) 60 mg CR tablet; Take 1 Tab by mouth every twelve (12) hours. Max Daily Amount: 120 mg. Indications: Chronic Pain, Severe Pain  -     oxyCODONE IR (ROXICODONE) 20 mg immediate release tablet; Take 1 Tab by mouth every four (4) hours as needed for Pain. Max Daily Amount: 120 mg. Indications: Pain  -     CBC W/O DIFF  -     FERRITIN  -     VITAMIN B12 & FOLATE  -     METABOLIC PANEL, COMPREHENSIVE  -     AMB POC HEMOGLOBIN A1C    15. Blood in urine  -     morphine CR (MS CONTIN) 60 mg CR tablet; Take 1 Tab by mouth every twelve (12) hours. Max Daily Amount: 120 mg. Indications: Chronic Pain, Severe Pain  -     oxyCODONE IR (ROXICODONE) 20 mg immediate release tablet; Take 1 Tab by mouth every four (4) hours as needed for Pain. Max Daily Amount: 120 mg. Indications: Pain  -     morphine CR (MS CONTIN) 60 mg CR tablet; Take 1 Tab by mouth every twelve (12) hours. Max Daily Amount: 120 mg. Indications: Chronic Pain, Severe Pain  -     oxyCODONE IR (ROXICODONE) 20 mg immediate release tablet; Take 1 Tab by mouth every four (4) hours as needed for Pain. Max Daily Amount: 120 mg.  Indications: Pain  -     CBC W/O DIFF  - FERRITIN  -     VITAMIN B12 & FOLATE  -     METABOLIC PANEL, COMPREHENSIVE  -     AMB POC HEMOGLOBIN A1C    16. Lower abdominal pain  -     morphine CR (MS CONTIN) 60 mg CR tablet; Take 1 Tab by mouth every twelve (12) hours. Max Daily Amount: 120 mg. Indications: Chronic Pain, Severe Pain  -     oxyCODONE IR (ROXICODONE) 20 mg immediate release tablet; Take 1 Tab by mouth every four (4) hours as needed for Pain. Max Daily Amount: 120 mg. Indications: Pain  -     morphine CR (MS CONTIN) 60 mg CR tablet; Take 1 Tab by mouth every twelve (12) hours. Max Daily Amount: 120 mg. Indications: Chronic Pain, Severe Pain  -     oxyCODONE IR (ROXICODONE) 20 mg immediate release tablet; Take 1 Tab by mouth every four (4) hours as needed for Pain. Max Daily Amount: 120 mg. Indications: Pain  -     CBC W/O DIFF  -     FERRITIN  -     VITAMIN B12 & FOLATE  -     METABOLIC PANEL, COMPREHENSIVE  -     AMB POC HEMOGLOBIN A1C    17. Claudication of both lower extremities (HCC)  -     morphine CR (MS CONTIN) 60 mg CR tablet; Take 1 Tab by mouth every twelve (12) hours. Max Daily Amount: 120 mg. Indications: Chronic Pain, Severe Pain  -     oxyCODONE IR (ROXICODONE) 20 mg immediate release tablet; Take 1 Tab by mouth every four (4) hours as needed for Pain. Max Daily Amount: 120 mg. Indications: Pain  -     morphine CR (MS CONTIN) 60 mg CR tablet; Take 1 Tab by mouth every twelve (12) hours. Max Daily Amount: 120 mg. Indications: Chronic Pain, Severe Pain  -     oxyCODONE IR (ROXICODONE) 20 mg immediate release tablet; Take 1 Tab by mouth every four (4) hours as needed for Pain. Max Daily Amount: 120 mg. Indications: Pain  -     CBC W/O DIFF  -     FERRITIN  -     VITAMIN B12 & FOLATE  -     METABOLIC PANEL, COMPREHENSIVE  -     AMB POC HEMOGLOBIN A1C    18.  Controlled type 2 diabetes mellitus with chronic kidney disease, unspecified CKD stage, unspecified long term insulin use status (HCC)  -     morphine CR (MS CONTIN) 60 mg CR tablet; Take 1 Tab by mouth every twelve (12) hours. Max Daily Amount: 120 mg. Indications: Chronic Pain, Severe Pain  -     oxyCODONE IR (ROXICODONE) 20 mg immediate release tablet; Take 1 Tab by mouth every four (4) hours as needed for Pain. Max Daily Amount: 120 mg. Indications: Pain  -     morphine CR (MS CONTIN) 60 mg CR tablet; Take 1 Tab by mouth every twelve (12) hours. Max Daily Amount: 120 mg. Indications: Chronic Pain, Severe Pain  -     oxyCODONE IR (ROXICODONE) 20 mg immediate release tablet; Take 1 Tab by mouth every four (4) hours as needed for Pain. Max Daily Amount: 120 mg. Indications: Pain  -     CBC W/O DIFF  -     FERRITIN  -     VITAMIN B12 & FOLATE  -     METABOLIC PANEL, COMPREHENSIVE  -     AMB POC HEMOGLOBIN A1C    19. Endometrial cancer (HCC)  -     morphine CR (MS CONTIN) 60 mg CR tablet; Take 1 Tab by mouth every twelve (12) hours. Max Daily Amount: 120 mg. Indications: Chronic Pain, Severe Pain  -     oxyCODONE IR (ROXICODONE) 20 mg immediate release tablet; Take 1 Tab by mouth every four (4) hours as needed for Pain. Max Daily Amount: 120 mg. Indications: Pain  -     morphine CR (MS CONTIN) 60 mg CR tablet; Take 1 Tab by mouth every twelve (12) hours. Max Daily Amount: 120 mg. Indications: Chronic Pain, Severe Pain  -     oxyCODONE IR (ROXICODONE) 20 mg immediate release tablet; Take 1 Tab by mouth every four (4) hours as needed for Pain. Max Daily Amount: 120 mg. Indications: Pain  -     CBC W/O DIFF  -     FERRITIN  -     VITAMIN B12 & FOLATE  -     METABOLIC PANEL, COMPREHENSIVE  -     AMB POC HEMOGLOBIN A1C    20. Elbow pain, right  -     morphine CR (MS CONTIN) 60 mg CR tablet; Take 1 Tab by mouth every twelve (12) hours. Max Daily Amount: 120 mg. Indications: Chronic Pain, Severe Pain  -     oxyCODONE IR (ROXICODONE) 20 mg immediate release tablet; Take 1 Tab by mouth every four (4) hours as needed for Pain. Max Daily Amount: 120 mg.  Indications: Pain  -     morphine CR (MS CONTIN) 60 mg CR tablet; Take 1 Tab by mouth every twelve (12) hours. Max Daily Amount: 120 mg. Indications: Chronic Pain, Severe Pain  -     oxyCODONE IR (ROXICODONE) 20 mg immediate release tablet; Take 1 Tab by mouth every four (4) hours as needed for Pain. Max Daily Amount: 120 mg. Indications: Pain  -     CBC W/O DIFF  -     FERRITIN  -     VITAMIN B12 & FOLATE  -     METABOLIC PANEL, COMPREHENSIVE  -     AMB POC HEMOGLOBIN A1C    21. Pain in both knees, unspecified chronicity  -     morphine CR (MS CONTIN) 60 mg CR tablet; Take 1 Tab by mouth every twelve (12) hours. Max Daily Amount: 120 mg. Indications: Chronic Pain, Severe Pain  -     oxyCODONE IR (ROXICODONE) 20 mg immediate release tablet; Take 1 Tab by mouth every four (4) hours as needed for Pain. Max Daily Amount: 120 mg. Indications: Pain  -     morphine CR (MS CONTIN) 60 mg CR tablet; Take 1 Tab by mouth every twelve (12) hours. Max Daily Amount: 120 mg. Indications: Chronic Pain, Severe Pain  -     oxyCODONE IR (ROXICODONE) 20 mg immediate release tablet; Take 1 Tab by mouth every four (4) hours as needed for Pain. Max Daily Amount: 120 mg. Indications: Pain  -     CBC W/O DIFF  -     FERRITIN  -     VITAMIN B12 & FOLATE  -     METABOLIC PANEL, COMPREHENSIVE  -     AMB POC HEMOGLOBIN A1C    22. Vaginal bleeding, abnormal  -     morphine CR (MS CONTIN) 60 mg CR tablet; Take 1 Tab by mouth every twelve (12) hours. Max Daily Amount: 120 mg. Indications: Chronic Pain, Severe Pain  -     oxyCODONE IR (ROXICODONE) 20 mg immediate release tablet; Take 1 Tab by mouth every four (4) hours as needed for Pain. Max Daily Amount: 120 mg. Indications: Pain  -     morphine CR (MS CONTIN) 60 mg CR tablet; Take 1 Tab by mouth every twelve (12) hours. Max Daily Amount: 120 mg. Indications: Chronic Pain, Severe Pain  -     oxyCODONE IR (ROXICODONE) 20 mg immediate release tablet; Take 1 Tab by mouth every four (4) hours as needed for Pain. Max Daily Amount: 120 mg. Indications: Pain  -     CBC W/O DIFF  -     FERRITIN  -     VITAMIN B12 & FOLATE  -     METABOLIC PANEL, COMPREHENSIVE  -     AMB POC HEMOGLOBIN A1C    23. Diabetes mellitus without complication (HCC)  -     morphine CR (MS CONTIN) 60 mg CR tablet; Take 1 Tab by mouth every twelve (12) hours. Max Daily Amount: 120 mg. Indications: Chronic Pain, Severe Pain  -     oxyCODONE IR (ROXICODONE) 20 mg immediate release tablet; Take 1 Tab by mouth every four (4) hours as needed for Pain. Max Daily Amount: 120 mg. Indications: Pain  -     morphine CR (MS CONTIN) 60 mg CR tablet; Take 1 Tab by mouth every twelve (12) hours. Max Daily Amount: 120 mg. Indications: Chronic Pain, Severe Pain  -     oxyCODONE IR (ROXICODONE) 20 mg immediate release tablet; Take 1 Tab by mouth every four (4) hours as needed for Pain. Max Daily Amount: 120 mg. Indications: Pain  -     CBC W/O DIFF  -     FERRITIN  -     VITAMIN B12 & FOLATE  -     METABOLIC PANEL, COMPREHENSIVE  -     AMB POC HEMOGLOBIN A1C    24. Other pulmonary embolism without acute cor pulmonale, unspecified chronicity (HCC)  -     morphine CR (MS CONTIN) 60 mg CR tablet; Take 1 Tab by mouth every twelve (12) hours. Max Daily Amount: 120 mg. Indications: Chronic Pain, Severe Pain  -     oxyCODONE IR (ROXICODONE) 20 mg immediate release tablet; Take 1 Tab by mouth every four (4) hours as needed for Pain. Max Daily Amount: 120 mg. Indications: Pain  -     morphine CR (MS CONTIN) 60 mg CR tablet; Take 1 Tab by mouth every twelve (12) hours. Max Daily Amount: 120 mg. Indications: Chronic Pain, Severe Pain  -     oxyCODONE IR (ROXICODONE) 20 mg immediate release tablet; Take 1 Tab by mouth every four (4) hours as needed for Pain. Max Daily Amount: 120 mg. Indications: Pain  -     CBC W/O DIFF  -     FERRITIN  -     VITAMIN B12 & FOLATE  -     METABOLIC PANEL, COMPREHENSIVE  -     AMB POC HEMOGLOBIN A1C    25.  Personal history of radiation therapy  -     morphine CR (MS CONTIN) 60 mg CR tablet; Take 1 Tab by mouth every twelve (12) hours. Max Daily Amount: 120 mg. Indications: Chronic Pain, Severe Pain  -     oxyCODONE IR (ROXICODONE) 20 mg immediate release tablet; Take 1 Tab by mouth every four (4) hours as needed for Pain. Max Daily Amount: 120 mg. Indications: Pain  -     morphine CR (MS CONTIN) 60 mg CR tablet; Take 1 Tab by mouth every twelve (12) hours. Max Daily Amount: 120 mg. Indications: Chronic Pain, Severe Pain  -     oxyCODONE IR (ROXICODONE) 20 mg immediate release tablet; Take 1 Tab by mouth every four (4) hours as needed for Pain. Max Daily Amount: 120 mg. Indications: Pain  -     CBC W/O DIFF  -     FERRITIN  -     VITAMIN B12 & FOLATE  -     METABOLIC PANEL, COMPREHENSIVE  -     AMB POC HEMOGLOBIN A1C    Other orders  -     metOLazone (ZAROXOLYN) 5 mg tablet; Take 1 Tab by mouth daily. -     nystatin (MYCOSTATIN) topical cream; Apply  to affected area two (2) times a day. -     CKD REPORT  -     DIABETES PATIENT EDUCATION      Patient medications were refilled after signing the contract consent and no harm documentations and again was told to lessen the amount of opioid-based medication continue with weight reduction do some massage therapy chiropractor exercise therapy such as resistance banding avoid heavy lifting heavy pushing at this time include ibuprofen and Tylenol over-the-counter topical cream for  day views of concerns patient was told to take some Tums or over-the-counter PPI if abdominal upset ice therapy he therapy side effect of current opioid-based medication significantly explained in detail patient acknowledged understanding and agreed with recommendation  in addition, pt was told to avoid machinary operation and driving while on any opioid based medications that will cause dizziness, drowsiness, and sleepiness.   Dependency and tolerancy were also addressed,  meds side effects and compliancy advised,  Call or rtc if worsens, radiology results and schedule of future radiology studies reviewed with patient. Pt agreed with today's recommendations.

## 2017-08-22 NOTE — PROGRESS NOTES
Richi Perez          Name and  verified        Chief Complaint   Patient presents with   [facility] Follow Up     Mixed Connective tissue Disease         Health Maintenance reviewed-discussed with patient.

## 2017-08-22 NOTE — LETTER
NOTIFICATION  
 
8/22/2017 11:49 AM 
 
Ms. Philip Pedroza [id number] To Whom It May Concern: Philip Ayers is currently under the care of [address] [facility]. She will need Eliquis as her only anticoagulative medication, unfortunately, she was given and treated  
 
with other anticoagulative medications for which she developed adverse reactions to other medications  
 
as prescribed to her in the past. 
 
If there are questions or concerns please have the patient contact our office. Sincerely, Mk Aponte MD

## 2017-08-23 LAB
ALBUMIN SERPL-MCNC: 3.7 G/DL (ref 3.5–4.8)
ALBUMIN/GLOB SERPL: 0.7 {RATIO} (ref 1.2–2.2)
ALP SERPL-CCNC: 65 IU/L (ref 39–117)
ALT SERPL-CCNC: 8 IU/L (ref 0–32)
AST SERPL-CCNC: 13 IU/L (ref 0–40)
BILIRUB SERPL-MCNC: 0.6 MG/DL (ref 0–1.2)
BUN SERPL-MCNC: 31 MG/DL (ref 8–27)
BUN/CREAT SERPL: 32 (ref 12–28)
CALCIUM SERPL-MCNC: 10.7 MG/DL (ref 8.7–10.3)
CHLORIDE SERPL-SCNC: 103 MMOL/L (ref 96–106)
CO2 SERPL-SCNC: 27 MMOL/L (ref 18–29)
CREAT SERPL-MCNC: 0.96 MG/DL (ref 0.57–1)
ERYTHROCYTE [DISTWIDTH] IN BLOOD BY AUTOMATED COUNT: 15.4 % (ref 12.3–15.4)
FERRITIN SERPL-MCNC: 210 NG/ML (ref 15–150)
FOLATE SERPL-MCNC: 11.8 NG/ML
GLOBULIN SER CALC-MCNC: 5.1 G/DL (ref 1.5–4.5)
GLUCOSE SERPL-MCNC: 82 MG/DL (ref 65–99)
HCT VFR BLD AUTO: 27.5 % (ref 34–46.6)
HGB BLD-MCNC: 9 G/DL (ref 11.1–15.9)
INTERPRETATION: NORMAL
Lab: NORMAL
MCH RBC QN AUTO: 30.1 PG (ref 26.6–33)
MCHC RBC AUTO-ENTMCNC: 32.7 G/DL (ref 31.5–35.7)
MCV RBC AUTO: 92 FL (ref 79–97)
PLATELET # BLD AUTO: 225 X10E3/UL (ref 150–379)
POTASSIUM SERPL-SCNC: 4.5 MMOL/L (ref 3.5–5.2)
PROT SERPL-MCNC: 8.8 G/DL (ref 6–8.5)
RBC # BLD AUTO: 2.99 X10E6/UL (ref 3.77–5.28)
SODIUM SERPL-SCNC: 143 MMOL/L (ref 134–144)
VIT B12 SERPL-MCNC: 283 PG/ML (ref 211–946)
WBC # BLD AUTO: 2.9 X10E3/UL (ref 3.4–10.8)

## 2017-09-19 ENCOUNTER — APPOINTMENT (OUTPATIENT)
Dept: CT IMAGING | Age: 77
End: 2017-09-19
Attending: EMERGENCY MEDICINE
Payer: MEDICARE

## 2017-09-19 ENCOUNTER — HOSPITAL ENCOUNTER (EMERGENCY)
Age: 77
Discharge: HOME | End: 2017-09-19
Attending: EMERGENCY MEDICINE
Payer: MEDICARE

## 2017-09-19 VITALS
DIASTOLIC BLOOD PRESSURE: 68 MMHG | OXYGEN SATURATION: 99 % | WEIGHT: 293 LBS | BODY MASS INDEX: 47.09 KG/M2 | HEIGHT: 66 IN | RESPIRATION RATE: 16 BRPM | SYSTOLIC BLOOD PRESSURE: 143 MMHG | TEMPERATURE: 98.2 F | HEART RATE: 76 BPM

## 2017-09-19 DIAGNOSIS — K62.5 RECTAL BLEEDING: ICD-10-CM

## 2017-09-19 DIAGNOSIS — R10.84 ABDOMINAL PAIN, GENERALIZED: ICD-10-CM

## 2017-09-19 DIAGNOSIS — K57.32 DIVERTICULITIS, COLON: Primary | ICD-10-CM

## 2017-09-19 LAB
ABO + RH BLD: NORMAL
ALBUMIN SERPL-MCNC: 3 G/DL (ref 3.5–5)
ALBUMIN/GLOB SERPL: 0.5 {RATIO} (ref 1.1–2.2)
ALP SERPL-CCNC: 71 U/L (ref 45–117)
ALT SERPL-CCNC: 15 U/L (ref 12–78)
ANION GAP SERPL CALC-SCNC: 7 MMOL/L (ref 5–15)
APPEARANCE UR: CLEAR
AST SERPL-CCNC: 16 U/L (ref 15–37)
ATRIAL RATE: 64 BPM
BACTERIA URNS QL MICRO: NEGATIVE /HPF
BASOPHILS # BLD: 0 K/UL (ref 0–0.1)
BASOPHILS NFR BLD: 0 % (ref 0–1)
BILIRUB SERPL-MCNC: 0.8 MG/DL (ref 0.2–1)
BILIRUB UR QL: NEGATIVE
BLOOD GROUP ANTIBODIES SERPL: NORMAL
BUN SERPL-MCNC: 19 MG/DL (ref 6–20)
BUN/CREAT SERPL: 21 (ref 12–20)
CALCIUM SERPL-MCNC: 10.2 MG/DL (ref 8.5–10.1)
CALCULATED P AXIS, ECG09: 61 DEGREES
CALCULATED R AXIS, ECG10: 18 DEGREES
CALCULATED T AXIS, ECG11: 34 DEGREES
CHLORIDE SERPL-SCNC: 107 MMOL/L (ref 97–108)
CO2 SERPL-SCNC: 27 MMOL/L (ref 21–32)
COLOR UR: ABNORMAL
CREAT SERPL-MCNC: 0.92 MG/DL (ref 0.55–1.02)
DIAGNOSIS, 93000: NORMAL
EOSINOPHIL # BLD: 0 K/UL (ref 0–0.4)
EOSINOPHIL NFR BLD: 0 % (ref 0–7)
EPITH CASTS URNS QL MICRO: ABNORMAL /LPF
ERYTHROCYTE [DISTWIDTH] IN BLOOD BY AUTOMATED COUNT: 15 % (ref 11.5–14.5)
GLOBULIN SER CALC-MCNC: 6.4 G/DL (ref 2–4)
GLUCOSE SERPL-MCNC: 111 MG/DL (ref 65–100)
GLUCOSE UR STRIP.AUTO-MCNC: NEGATIVE MG/DL
HCT VFR BLD AUTO: 29 % (ref 35–47)
HEMOCCULT STL QL: NEGATIVE
HGB BLD-MCNC: 9.6 G/DL (ref 11.5–16)
HGB UR QL STRIP: NEGATIVE
KETONES UR QL STRIP.AUTO: NEGATIVE MG/DL
LEUKOCYTE ESTERASE UR QL STRIP.AUTO: NEGATIVE
LIPASE SERPL-CCNC: 59 U/L (ref 73–393)
LYMPHOCYTES # BLD: 0.5 K/UL (ref 0.8–3.5)
LYMPHOCYTES NFR BLD: 12 % (ref 12–49)
MCH RBC QN AUTO: 29.7 PG (ref 26–34)
MCHC RBC AUTO-ENTMCNC: 33.1 G/DL (ref 30–36.5)
MCV RBC AUTO: 89.8 FL (ref 80–99)
MONOCYTES # BLD: 0.3 K/UL (ref 0–1)
MONOCYTES NFR BLD: 8 % (ref 5–13)
NEUTS SEG # BLD: 3 K/UL (ref 1.8–8)
NEUTS SEG NFR BLD: 80 % (ref 32–75)
NITRITE UR QL STRIP.AUTO: NEGATIVE
P-R INTERVAL, ECG05: 190 MS
PH UR STRIP: 7.5 [PH] (ref 5–8)
PLATELET # BLD AUTO: 199 K/UL (ref 150–400)
POTASSIUM SERPL-SCNC: 3.4 MMOL/L (ref 3.5–5.1)
PROT SERPL-MCNC: 9.4 G/DL (ref 6.4–8.2)
PROT UR STRIP-MCNC: ABNORMAL MG/DL
Q-T INTERVAL, ECG07: 402 MS
QRS DURATION, ECG06: 86 MS
QTC CALCULATION (BEZET), ECG08: 414 MS
RBC # BLD AUTO: 3.23 M/UL (ref 3.8–5.2)
RBC #/AREA URNS HPF: ABNORMAL /HPF (ref 0–5)
RBC MORPH BLD: ABNORMAL
SODIUM SERPL-SCNC: 141 MMOL/L (ref 136–145)
SP GR UR REFRACTOMETRY: 1.02 (ref 1–1.03)
SPECIMEN EXP DATE BLD: NORMAL
UA: UC IF INDICATED,UAUC: ABNORMAL
UROBILINOGEN UR QL STRIP.AUTO: 0.2 EU/DL (ref 0.2–1)
VENTRICULAR RATE, ECG03: 64 BPM
WBC # BLD AUTO: 3.8 K/UL (ref 3.6–11)
WBC URNS QL MICRO: ABNORMAL /HPF (ref 0–4)

## 2017-09-19 PROCEDURE — 74011250636 HC RX REV CODE- 250/636: Performed by: EMERGENCY MEDICINE

## 2017-09-19 PROCEDURE — 83690 ASSAY OF LIPASE: CPT | Performed by: EMERGENCY MEDICINE

## 2017-09-19 PROCEDURE — 96375 TX/PRO/DX INJ NEW DRUG ADDON: CPT

## 2017-09-19 PROCEDURE — 85025 COMPLETE CBC W/AUTO DIFF WBC: CPT | Performed by: EMERGENCY MEDICINE

## 2017-09-19 PROCEDURE — 74011250637 HC RX REV CODE- 250/637: Performed by: EMERGENCY MEDICINE

## 2017-09-19 PROCEDURE — 74011250636 HC RX REV CODE- 250/636

## 2017-09-19 PROCEDURE — 36415 COLL VENOUS BLD VENIPUNCTURE: CPT | Performed by: EMERGENCY MEDICINE

## 2017-09-19 PROCEDURE — 81001 URINALYSIS AUTO W/SCOPE: CPT | Performed by: EMERGENCY MEDICINE

## 2017-09-19 PROCEDURE — 93005 ELECTROCARDIOGRAM TRACING: CPT

## 2017-09-19 PROCEDURE — 80053 COMPREHEN METABOLIC PANEL: CPT | Performed by: EMERGENCY MEDICINE

## 2017-09-19 PROCEDURE — 74176 CT ABD & PELVIS W/O CONTRAST: CPT

## 2017-09-19 PROCEDURE — 51701 INSERT BLADDER CATHETER: CPT

## 2017-09-19 PROCEDURE — 96374 THER/PROPH/DIAG INJ IV PUSH: CPT

## 2017-09-19 PROCEDURE — 74011000250 HC RX REV CODE- 250: Performed by: EMERGENCY MEDICINE

## 2017-09-19 PROCEDURE — 86900 BLOOD TYPING SEROLOGIC ABO: CPT | Performed by: EMERGENCY MEDICINE

## 2017-09-19 PROCEDURE — 77030005563 HC CATH URETH INT MMGH -A

## 2017-09-19 PROCEDURE — 99285 EMERGENCY DEPT VISIT HI MDM: CPT

## 2017-09-19 PROCEDURE — 82272 OCCULT BLD FECES 1-3 TESTS: CPT | Performed by: EMERGENCY MEDICINE

## 2017-09-19 RX ORDER — HYDROMORPHONE HYDROCHLORIDE 2 MG/ML
1 INJECTION, SOLUTION INTRAMUSCULAR; INTRAVENOUS; SUBCUTANEOUS
Status: COMPLETED | OUTPATIENT
Start: 2017-09-19 | End: 2017-09-19

## 2017-09-19 RX ORDER — ONDANSETRON 4 MG/1
4 TABLET, ORALLY DISINTEGRATING ORAL
Qty: 16 TAB | Refills: 0 | Status: SHIPPED | OUTPATIENT
Start: 2017-09-19 | End: 2018-10-23 | Stop reason: ALTCHOICE

## 2017-09-19 RX ORDER — METRONIDAZOLE 500 MG/1
500 TABLET ORAL 3 TIMES DAILY
Qty: 21 TAB | Refills: 0 | Status: SHIPPED | OUTPATIENT
Start: 2017-09-19 | End: 2017-09-26

## 2017-09-19 RX ORDER — MORPHINE SULFATE 2 MG/ML
4 INJECTION, SOLUTION INTRAMUSCULAR; INTRAVENOUS
Status: COMPLETED | OUTPATIENT
Start: 2017-09-19 | End: 2017-09-19

## 2017-09-19 RX ORDER — ANASTROZOLE 1 MG/1
1 TABLET ORAL DAILY
Status: ON HOLD | COMMUNITY
End: 2017-12-16

## 2017-09-19 RX ORDER — ONDANSETRON 2 MG/ML
INJECTION INTRAMUSCULAR; INTRAVENOUS
Status: COMPLETED
Start: 2017-09-19 | End: 2017-09-19

## 2017-09-19 RX ORDER — CIPROFLOXACIN 500 MG/1
500 TABLET ORAL 2 TIMES DAILY
Qty: 14 TAB | Refills: 0 | Status: SHIPPED | OUTPATIENT
Start: 2017-09-19 | End: 2017-09-26

## 2017-09-19 RX ADMIN — ONDANSETRON 4 MG: 2 INJECTION INTRAMUSCULAR; INTRAVENOUS at 13:07

## 2017-09-19 RX ADMIN — HYDROMORPHONE HYDROCHLORIDE 1 MG: 2 INJECTION, SOLUTION INTRAMUSCULAR; INTRAVENOUS; SUBCUTANEOUS at 14:11

## 2017-09-19 RX ADMIN — LIDOCAINE HYDROCHLORIDE 40 ML: 20 SOLUTION ORAL; TOPICAL at 16:15

## 2017-09-19 RX ADMIN — MORPHINE SULFATE 4 MG: 2 INJECTION, SOLUTION INTRAMUSCULAR; INTRAVENOUS at 13:01

## 2017-09-19 NOTE — ED PROVIDER NOTES
HPI Comments: Mari Walsh is a 68 y.o. Female with hx of HTN, DM, DVT, PE, fibromyalgia, and endometrial cancer who presents ambulatory to ED [facility] ED with CC of rectal bleeding since (17), becoming worse x ~2 days. Pt also reports nausea and diarrhea, and states she has been passing bright red blood and blood clots since onset. She c/o burning epigastric pain today, as well as mild numbness in her B/L fingers. Per son, pt has also had generalized weakness and SOB with exertion since onset of her symptoms. Pt states she is currently taking Eliquis. Pt reports last colonoscopy (2016), states she has follow up appointment with her GI specialist today, and was referred to ED after calling them PTA. She reports hx of , as well as hx of hysterectomy. Per son, pt finished radiation treatment for hx of endometrial cancer (2017). Pt specifically denies constipation, vomiting, CP, and melena. PCP: Gricelda Ceballos MD  GI: Masha Worley MD    There are no other complaints, changes, or physical findings at this time. The history is provided by the patient.         Past Medical History:   Diagnosis Date    Acute kidney failure ([address] 2015    Acute sinusitis 2011    Adverse effect of anesthesia     SLOW WAKING PAST ANESTHESIA    Arthritis     RA    At risk for side effect of medication 2016    Atrial fibrillation ([address] 10/19/2011    Autoimmune disease ([address]     FIBROMYLGIA    Cancer ([address] 2016    ENDOMETRIAL     Cholelithiasis 2015    Chronic pain     Claudication of both lower extremities ([address] 2015    Diabetes mellitus type 2, controlled ([address] 2016    Diabetes mellitus, type II ([address] 10/10/2014    Fall against object 10/10/2014    Fatty liver 10/20/2015    Hypertension     Ill-defined condition     SPINAL STENOSIS    Left shoulder pain 10/10/2014    Leiomyoma of body of uterus 2016    Morbid obesity (HCC)     Myalgia 2016    Nausea & vomiting     Personal history of radiation therapy 4/20/2017    Pneumonia     Preop examination 6/15/2015    Preoperative clearance 6/15/2015    Rash 2/11/2011    Rash of hands 10/10/2014    Screening for colon cancer 2/22/2016    Screening for glaucoma 2/22/2016    Sleep apnea     SOB (shortness of breath)     hospitalized 5/2012. angina, SOB    Thromboembolus ([address] 2011    LEFT LOWER LEG AND PE    Tinea pedis, recurrent 7/20/2015    Vaginal bleeding, abnormal 4/20/2017       Past Surgical History:   Procedure Laterality Date    COLONOSCOPY N/A 12/21/2016    COLONOSCOPY performed by Zander Lara MD at [address] [place] [address]  12/21/2016         HX CATARACT REMOVAL Bilateral 2015    [address]    HX DILATION AND CURETTAGE  07/29/2016    HX DILATION AND CURETTAGE  2016    HX GYN  1960    etopic    HX HEART CATHETERIZATION  2001    NEGATIVE PER PATIENT    HX HYSTERECTOMY  2016    HX KNEE ARTHROSCOPY Left 1998    HX ORTHOPAEDIC Right 1960'S    BUNIONECTOMY    HX OTHER SURGICAL      BIOPSY OF VEIN IN FOREHEAD    HX TUBAL LIGATION  1963    [place] COLSC FLX W/RMVL OF TUMOR POLYP LESION SNARE TQ  12/12/2011              Family History:   Problem Relation Age of Onset    Other Mother      ANEURYSM    Obesity Mother     Heart Disease Father     Other Father      VASCULAR DISEASE    Hypertension Sister     Heart Disease Brother     Heart Attack Brother     Kidney Disease Sister     Seizures Brother     Heart Attack Brother     Anesth Problems Neg Hx     Alcohol abuse Neg Hx        Social History     Social History    Marital status: SINGLE     Spouse name: N/A    Number of children: N/A    Years of education: N/A     Occupational History    Not on file.      Social History Main Topics    Smoking status: Former Smoker     Packs/day: 0.25     Years: 15.00     Quit date: 8/23/1996    Smokeless tobacco: Never Used    Alcohol use No    Drug use: No    Sexual activity: No     Other Topics Concern    Not on file     Social History Narrative         ALLERGIES: Latex; Advair diskus [fluticasone-salmeterol]; Bactrim [sulfamethoprim]; Iodinated contrast- oral and iv dye; Ivp [fd and c blue no.1]; Lovenox [enoxaparin]; Nsaids (non-steroidal anti-inflammatory drug); and Sulfa (sulfonamide antibiotics)    Review of Systems   Constitutional: Negative for fatigue and fever. HENT: Negative. Eyes: Negative. Respiratory: Positive for shortness of breath (with exertion). Negative for wheezing. Cardiovascular: Negative for chest pain and leg swelling. Gastrointestinal: Positive for abdominal pain (epigastric), anal bleeding, diarrhea and nausea. Negative for blood in stool, constipation and vomiting.        -melena   Endocrine: Negative. Genitourinary: Negative for difficulty urinating and dysuria. Musculoskeletal: Negative. Skin: Negative for rash. Allergic/Immunologic: Negative. Neurological: Positive for weakness (generalized). Negative for numbness. Hematological: Negative. Psychiatric/Behavioral: Negative. Patient Vitals for the past 12 hrs:   Temp Pulse Resp BP SpO2   09/19/17 1130 98.2 °F (36.8 °C) 69 18 189/83 97 %            Physical Exam   Constitutional: She is oriented to person, place, and time. She appears well-developed and well-nourished. No distress. Elevated BMI   HENT:   Head: Normocephalic and atraumatic. Mouth/Throat: Oropharynx is clear and moist.   Eyes: Conjunctivae and EOM are normal.   Neck: Neck supple. No JVD present. No tracheal deviation present. Cardiovascular: Normal rate, regular rhythm and intact distal pulses. Exam reveals no gallop and no friction rub. No murmur heard. Pulmonary/Chest: Effort normal and breath sounds normal. No stridor. No respiratory distress. She has no wheezes. Abdominal: Soft. Bowel sounds are normal. She exhibits no distension and no mass. There is no tenderness.  There is no guarding. No lesions   Genitourinary: Rectum normal. Rectal exam shows no external hemorrhoid, no internal hemorrhoid, no mass and no tenderness. Genitourinary Comments: +brown stool   Musculoskeletal: Normal range of motion. She exhibits no edema or tenderness. No deformity   Neurological: She is alert and oriented to person, place, and time. She has normal strength. No focal deficits   Skin: Skin is warm, dry and intact. No rash noted. Psychiatric: She has a normal mood and affect. Her behavior is normal. Judgment and thought content normal.   Nursing note and vitals reviewed. MDM  Number of Diagnoses or Management Options  Abdominal pain, generalized:   Diverticulitis, colon:   Rectal bleeding:   Diagnosis management comments: Pt presenting with intermittent rectal bleeding since the beginning of the month, with worsening rectal bleeding that began 2 days ago. Pt is on Eliquis. Pt is also complaining of burning abdominal pain. Abdominal exam is benign. Will check labs, rectal exam, CT scan to eval for acute pathology. DDx includes diverticulitis, avm, hemorrhoids, diverticuolosis, anemia. Pt states her last colonoscopy in 2016 was normal.        Amount and/or Complexity of Data Reviewed  Clinical lab tests: ordered and reviewed  Tests in the radiology section of CPT®: ordered and reviewed  Tests in the medicine section of CPT®: ordered and reviewed  Obtain history from someone other than the patient: yes (Son)  Review and summarize past medical records: yes  Discuss the patient with other providers: yes (GI)  Independent visualization of images, tracings, or specimens: yes      ED Course       Procedures    EKG interpretation: 11:40  Rhythm: normal sinus rhythm; and regular . Rate (approx.): 64; Axis: normal; VA interval: normal; QRS interval: normal ; ST/T wave: normal.    3:28 PM  Updated pt on results and plan of care, awaiting return call from GI.  Rectal exam performed, chaperoned by Chris Lucio Aruna Plaza RN. Consult Note:  3:32 PM  José Lopes DO spoke with Dr. Jaswinder Segura  Specialty: GI  Discussed pts hx, disposition, and available diagnostic and imaging results. Reviewed care plans. Consultant agrees with plans as outlined. He feels pt can be discharged if CT is normal, will try to speak with Dr. Edison Bustillos and have him call back if he is able to reach him. Consult Note:  3:40 PM  José Lopes DO spoke with David Ramsay MD  Specialty: GI  Discussed pts hx, disposition, and available diagnostic and imaging results. Reviewed care plans. Consultant agrees with plans as outlined. He agrees with plan of care, pt can call to rescheduled follow up if CT is normal.    Progress Note:  4:06 PM  Updated patient on lab and imaging results. Pt is still complaining of \"burning\" in her abdomen despite IV morphine and dilaudid. Pt is on a significant amount of pain medication at home. Repeat abdominal exam remains benign--no masses palpated, no lesions, swelling or erythema of abdominal wall. Will treat for diverticulitis and have patient follow up with GI. Recommended that patient get a repeat CT scan when she can be pretreated for dye allergy.      LABORATORY TESTS:  Recent Results (from the past 12 hour(s))   EKG, 12 LEAD, INITIAL    Collection Time: 09/19/17 11:40 AM   Result Value Ref Range    Ventricular Rate 64 BPM    Atrial Rate 64 BPM    P-R Interval 190 ms    QRS Duration 86 ms    Q-T Interval 402 ms    QTC Calculation (Bezet) 414 ms    Calculated P Axis 61 degrees    Calculated R Axis 18 degrees    Calculated T Axis 34 degrees    Diagnosis       Normal sinus rhythm  Normal ECG  When compared with ECG of 30-MAY-2017 11:47,  premature atrial complexes are no longer present     CBC WITH AUTOMATED DIFF    Collection Time: 09/19/17 12:20 PM   Result Value Ref Range    WBC 3.8 3.6 - 11.0 K/uL    RBC 3.23 (L) 3.80 - 5.20 M/uL    HGB 9.6 (L) 11.5 - 16.0 g/dL    HCT 29.0 (L) 35.0 - 47.0 %    MCV 89.8 80.0 - 99.0 FL    MCH 29.7 26.0 - 34.0 PG    MCHC 33.1 30.0 - 36.5 g/dL    RDW 15.0 (H) 11.5 - 14.5 %    PLATELET 041 624 - 548 K/uL    NEUTROPHILS 80 (H) 32 - 75 %    LYMPHOCYTES 12 12 - 49 %    MONOCYTES 8 5 - 13 %    EOSINOPHILS 0 0 - 7 %    BASOPHILS 0 0 - 1 %    ABS. NEUTROPHILS 3.0 1.8 - 8.0 K/UL    ABS. LYMPHOCYTES 0.5 (L) 0.8 - 3.5 K/UL    ABS. MONOCYTES 0.3 0.0 - 1.0 K/UL    ABS. EOSINOPHILS 0.0 0.0 - 0.4 K/UL    ABS. BASOPHILS 0.0 0.0 - 0.1 K/UL    RBC COMMENTS NORMOCYTIC, NORMOCHROMIC     METABOLIC PANEL, COMPREHENSIVE    Collection Time: 09/19/17 12:20 PM   Result Value Ref Range    Sodium 141 136 - 145 mmol/L    Potassium 3.4 (L) 3.5 - 5.1 mmol/L    Chloride 107 97 - 108 mmol/L    CO2 27 21 - 32 mmol/L    Anion gap 7 5 - 15 mmol/L    Glucose 111 (H) 65 - 100 mg/dL    BUN 19 6 - 20 MG/DL    Creatinine 0.92 0.55 - 1.02 MG/DL    BUN/Creatinine ratio 21 (H) 12 - 20      GFR est AA >60 >60 ml/min/1.73m2    GFR est non-AA 59 (L) >60 ml/min/1.73m2    Calcium 10.2 (H) 8.5 - 10.1 MG/DL    Bilirubin, total 0.8 0.2 - 1.0 MG/DL    ALT (SGPT) 15 12 - 78 U/L    AST (SGOT) 16 15 - 37 U/L    Alk.  phosphatase 71 45 - 117 U/L    Protein, total 9.4 (H) 6.4 - 8.2 g/dL    Albumin 3.0 (L) 3.5 - 5.0 g/dL    Globulin 6.4 (H) 2.0 - 4.0 g/dL    A-G Ratio 0.5 (L) 1.1 - 2.2     TYPE & SCREEN    Collection Time: 09/19/17 12:20 PM   Result Value Ref Range    Crossmatch Expiration 09/22/2017     ABO/Rh(D) B POSITIVE     Antibody screen NEG    LIPASE    Collection Time: 09/19/17 12:20 PM   Result Value Ref Range    Lipase 59 (L) 73 - 393 U/L   OCCULT BLOOD, STOOL    Collection Time: 09/19/17  2:13 PM   Result Value Ref Range    Occult blood, stool NEGATIVE  NEG     URINALYSIS W/ REFLEX CULTURE    Collection Time: 09/19/17  2:13 PM   Result Value Ref Range    Color YELLOW/STRAW      Appearance CLEAR CLEAR      Specific gravity 1.019 1.003 - 1.030      pH (UA) 7.5 5.0 - 8.0      Protein TRACE (A) NEG mg/dL    Glucose NEGATIVE  NEG mg/dL Ketone NEGATIVE  NEG mg/dL    Bilirubin NEGATIVE  NEG      Blood NEGATIVE  NEG      Urobilinogen 0.2 0.2 - 1.0 EU/dL    Nitrites NEGATIVE  NEG      Leukocyte Esterase NEGATIVE  NEG      WBC 0-4 0 - 4 /hpf    RBC 0-5 0 - 5 /hpf    Epithelial cells FEW FEW /lpf    Bacteria NEGATIVE  NEG /hpf    UA:UC IF INDICATED CULTURE NOT INDICATED BY UA RESULT CNI         IMAGING RESULTS:  CT ABD PELV WO CONT    (Results Pending)     CT Results  (Last 48 hours)    None        MEDICATIONS GIVEN:  Medications   morphine injection 4 mg (4 mg IntraVENous Given 9/19/17 1301)   ondansetron (ZOFRAN) 4 mg/2 mL injection (4 mg  Given 9/19/17 1307)   HYDROmorphone (PF) (DILAUDID) injection 1 mg (1 mg IntraVENous Given 9/19/17 1411)       IMPRESSION:  1. Diverticulitis, colon    2. Abdominal pain, generalized    3. Rectal bleeding        PLAN:  1. Current Discharge Medication List        2. Follow-up Information     None        Return to ED if worse     DISCHARGE NOTE:    The patient is ready for discharge. The patients signs, symptoms, diagnosis, and instructions for discharge have been discussed and the pt has conveyed their understanding. The patient is to follow up as recommended or return to the ER should their symptoms worsen. Plan has been discussed and patient has conveyed their agreement. This note is prepared by Alejandro Mckinney, acting as Scribe for Sheri Stafford DO. Sheri Stafford DO: The scribe's documentation has been prepared under my direction and personally reviewed by me in its entirety. I confirm that the note above accurately reflects all work, treatment, procedures, and medical decision making performed by me.

## 2017-09-19 NOTE — ED NOTES
MD reviewed discharge instructions with the patient. The patient verbalized understanding. All questions and concerns were addressed. The patient is discharged via wheelchair in the care of family members with instructions and prescriptions in hand. Pt is alert and oriented x 4. Respirations are clear and unlabored.

## 2017-09-19 NOTE — ED NOTES
Bedside and Verbal shift change report given to Lizet RN (oncoming nurse) by Israel Cordova (offgoing nurse). Report included the following information SBAR, Kardex, ED Summary, Recent Results and Med Rec Status.

## 2017-09-19 NOTE — DISCHARGE INSTRUCTIONS
Diverticulitis: Care Instructions  Your Care Instructions    Diverticulitis occurs when pouches form in the wall of the colon and become inflamed or infected. It can be very painful. Doctors aren't sure what causes diverticulitis. There is no proof that foods such as nuts, seeds, or berries cause it or make it worse. A low-fiber diet may cause the colon to work harder to push stool forward. Pouches may form because of this extra work. It may be hard to think about healthy eating while you're in pain. But as you recover, you might think about how you can use healthy eating for overall better health. Healthy eating may help you avoid future attacks. Follow-up care is a key part of your treatment and safety. Be sure to make and go to all appointments, and call your doctor if you are having problems. It's also a good idea to know your test results and keep a list of the medicines you take. How can you care for yourself at home? · Drink plenty of fluids, enough so that your urine is light yellow or clear like water. If you have kidney, heart, or liver disease and have to limit fluids, talk with your doctor before you increase the amount of fluids you drink. · Stick to liquids or a bland diet (plain rice, bananas, dry toast or crackers, applesauce) until you are feeling better. Then you can return to regular foods and gradually increase the amount of fiber in your diet. · Use a heating pad set on low on your belly to relieve mild cramps and pain. · Get extra rest until you are feeling better. · Be safe with medicines. Read and follow all instructions on the label. ¨ If the doctor gave you a prescription medicine for pain, take it as prescribed. ¨ If you are not taking a prescription pain medicine, ask your doctor if you can take an over-the-counter medicine. · If your doctor prescribed antibiotics, take them as directed. Do not stop taking them just because you feel better.  You need to take the full course of antibiotics. To prevent future attacks of diverticulitis  · Avoid constipation:  ¨ Include fruits, vegetables, beans, and whole grains in your diet each day. These foods are high in fiber. ¨ Drink plenty of fluids, enough so that your urine is light yellow or clear like water. If you have kidney, heart, or liver disease and have to limit fluids, talk with your doctor before you increase the amount of fluids you drink. ¨ Get some exercise every day. Build up slowly to 30 to 60 minutes a day on 5 or more days of the week. ¨ Take a fiber supplement, such as Citrucel or Metamucil, every day if needed. Read and follow all instructions on the label. ¨ Schedule time each day for a bowel movement. Having a daily routine may help. Take your time and do not strain when having a bowel movement. When should you call for help? Call 911 anytime you think you may need emergency care. For example, call if:  · You passed out (lost consciousness). · You vomit blood or what looks like coffee grounds. · You pass maroon or very bloody stools. Call your doctor now or seek immediate medical care if:  · You have severe pain or swelling in your belly. · You have a new or higher fever. · You cannot keep down fluids or medicines. · You have new pain that gets worse when you move or cough. Watch closely for changes in your health, and be sure to contact your doctor if:  · The symptoms you had when you first started feeling sick come back. · You do not get better as expected. Where can you learn more? Go to http://kiara-olesya.info/. Enter H901 in the search box to learn more about \"Diverticulitis: Care Instructions. \"  Current as of: August 9, 2016  Content Version: 11.3  © 5738-8574 [employer]. Care instructions adapted under license by [employer] (which disclaims liability or warranty for this information).  If you have questions about a medical condition or this instruction, always ask your healthcare professional. [place] [address] any warranty or liability for your use of this information.

## 2017-10-05 ENCOUNTER — TELEPHONE (OUTPATIENT)
Dept: FAMILY MEDICINE CLINIC | Age: 77
End: 2017-10-05

## 2017-10-05 NOTE — TELEPHONE ENCOUNTER
Patient phoned she stated tingling in arms/right hand and toes are better. Informed Dr. Moo Le is attempting to contact Oncologist has been unsuccessful will continue she acknowledged understanding.

## 2017-10-24 ENCOUNTER — OFFICE VISIT (OUTPATIENT)
Dept: FAMILY MEDICINE CLINIC | Age: 77
End: 2017-10-24

## 2017-10-24 VITALS
TEMPERATURE: 97.4 F | RESPIRATION RATE: 16 BRPM | BODY MASS INDEX: 47.09 KG/M2 | HEIGHT: 66 IN | WEIGHT: 293 LBS | DIASTOLIC BLOOD PRESSURE: 63 MMHG | SYSTOLIC BLOOD PRESSURE: 138 MMHG | OXYGEN SATURATION: 96 % | HEART RATE: 80 BPM

## 2017-10-24 DIAGNOSIS — M35.1 MCTD (MIXED CONNECTIVE TISSUE DISEASE) (HCC): ICD-10-CM

## 2017-10-24 DIAGNOSIS — N93.9 VAGINAL BLEEDING, ABNORMAL: ICD-10-CM

## 2017-10-24 DIAGNOSIS — E11.22 CONTROLLED TYPE 2 DIABETES MELLITUS WITH CHRONIC KIDNEY DISEASE, UNSPECIFIED CKD STAGE, UNSPECIFIED LONG TERM INSULIN USE STATUS: ICD-10-CM

## 2017-10-24 DIAGNOSIS — I48.0 PAROXYSMAL ATRIAL FIBRILLATION (HCC): ICD-10-CM

## 2017-10-24 DIAGNOSIS — G89.4 CHRONIC PAIN DISORDER: ICD-10-CM

## 2017-10-24 DIAGNOSIS — M79.642 PAIN OF LEFT HAND: ICD-10-CM

## 2017-10-24 DIAGNOSIS — C54.1 ENDOMETRIAL CANCER (HCC): ICD-10-CM

## 2017-10-24 DIAGNOSIS — R10.30 LOWER ABDOMINAL PAIN: ICD-10-CM

## 2017-10-24 DIAGNOSIS — M79.7 FIBROMYALGIA: ICD-10-CM

## 2017-10-24 DIAGNOSIS — D47.2 MGUS (MONOCLONAL GAMMOPATHY OF UNKNOWN SIGNIFICANCE): ICD-10-CM

## 2017-10-24 DIAGNOSIS — D25.9 UTERINE LEIOMYOMA, UNSPECIFIED LOCATION: ICD-10-CM

## 2017-10-24 DIAGNOSIS — I73.9 CLAUDICATION OF BOTH LOWER EXTREMITIES (HCC): ICD-10-CM

## 2017-10-24 DIAGNOSIS — M79.601 ARM PAIN, RIGHT: ICD-10-CM

## 2017-10-24 DIAGNOSIS — M25.512 CHRONIC LEFT SHOULDER PAIN: ICD-10-CM

## 2017-10-24 DIAGNOSIS — M25.532 LEFT WRIST PAIN: ICD-10-CM

## 2017-10-24 DIAGNOSIS — M25.561 PAIN IN BOTH KNEES, UNSPECIFIED CHRONICITY: ICD-10-CM

## 2017-10-24 DIAGNOSIS — G89.29 CHRONIC LEFT SHOULDER PAIN: ICD-10-CM

## 2017-10-24 DIAGNOSIS — M25.571 CHRONIC PAIN OF RIGHT ANKLE: ICD-10-CM

## 2017-10-24 DIAGNOSIS — I82.409 DEEP VEIN THROMBOSIS (DVT) OF LOWER EXTREMITY, UNSPECIFIED CHRONICITY, UNSPECIFIED LATERALITY, UNSPECIFIED VEIN (HCC): Primary | ICD-10-CM

## 2017-10-24 DIAGNOSIS — E11.9 DIABETES MELLITUS WITHOUT COMPLICATION (HCC): ICD-10-CM

## 2017-10-24 DIAGNOSIS — Z92.3 PERSONAL HISTORY OF RADIATION THERAPY: ICD-10-CM

## 2017-10-24 DIAGNOSIS — I26.99 OTHER PULMONARY EMBOLISM WITHOUT ACUTE COR PULMONALE, UNSPECIFIED CHRONICITY (HCC): ICD-10-CM

## 2017-10-24 DIAGNOSIS — M25.521 ELBOW PAIN, RIGHT: ICD-10-CM

## 2017-10-24 DIAGNOSIS — D47.2 MONOCLONAL GAMMOPATHIES: ICD-10-CM

## 2017-10-24 DIAGNOSIS — M25.562 PAIN IN BOTH KNEES, UNSPECIFIED CHRONICITY: ICD-10-CM

## 2017-10-24 DIAGNOSIS — M79.605 LEG PAIN, BILATERAL: ICD-10-CM

## 2017-10-24 DIAGNOSIS — M79.10 MYALGIA: ICD-10-CM

## 2017-10-24 DIAGNOSIS — M79.602 PAIN OF LEFT UPPER EXTREMITY: ICD-10-CM

## 2017-10-24 DIAGNOSIS — G89.29 CHRONIC PAIN OF RIGHT ANKLE: ICD-10-CM

## 2017-10-24 DIAGNOSIS — M79.604 LEG PAIN, BILATERAL: ICD-10-CM

## 2017-10-24 LAB — HBA1C MFR BLD HPLC: 6.1 %

## 2017-10-24 RX ORDER — LANOLIN ALCOHOL/MO/W.PET/CERES
1000 CREAM (GRAM) TOPICAL DAILY
Qty: 30 TAB | Refills: 6 | Status: SHIPPED | OUTPATIENT
Start: 2017-10-24 | End: 2017-11-10 | Stop reason: SDUPTHER

## 2017-10-24 RX ORDER — MORPHINE SULFATE 60 MG/1
60 TABLET, FILM COATED, EXTENDED RELEASE ORAL EVERY 12 HOURS
Qty: 60 TAB | Refills: 0 | Status: SHIPPED | OUTPATIENT
Start: 2017-11-24 | End: 2017-10-31 | Stop reason: SDUPTHER

## 2017-10-24 RX ORDER — OXYCODONE HYDROCHLORIDE 20 MG/1
20 TABLET ORAL
Qty: 180 TAB | Refills: 0 | Status: SHIPPED | OUTPATIENT
Start: 2017-10-24 | End: 2018-01-04 | Stop reason: SDUPTHER

## 2017-10-24 RX ORDER — MORPHINE SULFATE 60 MG/1
60 TABLET, FILM COATED, EXTENDED RELEASE ORAL EVERY 12 HOURS
Qty: 60 TAB | Refills: 0 | Status: SHIPPED | OUTPATIENT
Start: 2017-10-24 | End: 2018-01-04 | Stop reason: SDUPTHER

## 2017-10-24 RX ORDER — OXYCODONE HYDROCHLORIDE 20 MG/1
20 TABLET ORAL
Qty: 180 TAB | Refills: 0 | Status: SHIPPED | OUTPATIENT
Start: 2017-11-24 | End: 2017-10-31 | Stop reason: SDUPTHER

## 2017-10-24 RX ORDER — PANTOPRAZOLE SODIUM 40 MG/1
40 TABLET, DELAYED RELEASE ORAL 2 TIMES DAILY
Qty: 60 TAB | Refills: 2 | Status: SHIPPED | OUTPATIENT
Start: 2017-10-24

## 2017-10-24 NOTE — PROGRESS NOTES
HISTORY OF PRESENT ILLNESS  Sulaiman Love is a 68 y.o. female. HPI   Pt with significant comorbid medical hx, uses the walker to get around with ht help of her pain meds, recnelty erna through so much and today c/o rectal bleeding on Wliquis 5mg bid, pt very risky to come off her mds, but not seen the GI since the bleeding started, in addition c/o   Constipation  Hard stool, no fhx of colon cancer, no tarry stool, no abdominal pain, eats varieties of foods, no hc of UC, nor of Crohn's Dz, compliant with stool softner  IMPRESSION of the most recent CT:  1. Colonic diverticulosis. Possible mild acute diverticulitis of the descending  colon. 2. Small amount of ascites. 3. Small fluid containing left periumbilical abdominal wall abnormality,  question of small fluid containing hernia or other fluid containing lesion. 4. Small fat-containing inguinal hernias. 5. Small amount of ascites. Chronic low-mid back and knees pain now with uterine cancer and other questionable concern about her MGUS syndrome  The patient's pain has been an ongoing concerning problem, it all Started few yrs ago when the wt started to creep out of control little by little and is aware that the current BMI is a big contributor to the pt current joints pain,   Patient states that the current dosage of the meds given, not strong enough, but it is helping, regardless of all the concerns of the opioid based medications side effects,    In addition with the current medications dosage, the pt capable of doing things specially the activity of the daily living, and also they are improving the quality of the pt's life which the pt states are very cumbersome.    The pt has tried otc pain meds, prescribed pain meds but they did not help and not only that,  they created ++ abdominal upset from NSAIDS, this pt has tried all the other Non- Narcotic meds and none have been able to help ease down the pain, the current opioid based pain meds are the only ones when taken ease the current pain level at this time, this pt offered surgical corrections,was also told they may not get rid of the pain, so that, pt denied correctional surgery for an unknown prognosis. pt states that the pt is not [facility] shopping aware that is indicated in the pt contract that a record is obtained from other MD's writing opioid based med for such pt and if so, would be against the contract, it would terminate the care for the opioid based med refill, UA drug screen, would be also performed, if foul finding pt would be terminated, for which the pt agreed and finally,     The intensity of the pain is at10/10 w/out med,  persist without medication, a chronic condition, pt is compliant with medication takes it as prescribed, keeps meds at a safe place, on stool softener, not operating  machinery while on this med. Current Outpatient Prescriptions   Medication Sig Dispense Refill    morphine CR (MS CONTIN) 60 mg CR tablet Take 1 Tab by mouth every twelve (12) hours. Max Daily Amount: 120 mg. Indications: Chronic Pain, Severe Pain 60 Tab 0    oxyCODONE IR (ROXICODONE) 20 mg immediate release tablet Take 1 Tab by mouth every four (4) hours as needed for Pain. Max Daily Amount: 120 mg. Indications: Pain 180 Tab 0    naloxegol (MOVANTIK) 25 mg tab tablet Take 1 Tab by mouth Daily (before breakfast). 30 Tab 6    pantoprazole (PROTONIX) 40 mg tablet Take 1 Tab by mouth two (2) times a day. 60 Tab 2    cyanocobalamin (VITAMIN B-12) 1,000 mcg tablet Take 1 Tab by mouth daily. 30 Tab 6    anastrozole (ARIMIDEX) 1 mg tablet Take 1 mg by mouth daily.  ondansetron (ZOFRAN ODT) 4 mg disintegrating tablet Take 1 Tab by mouth every eight (8) hours as needed for Nausea. 16 Tab 0    potassium chloride (K-DUR, KLOR-CON) 20 mEq tablet Take 1 Tab by mouth two (2) times a day. Indications: hypokalemia prevention 30 Tab 1    apixaban (ELIQUIS) 5 mg tablet Take 1 Tab by mouth two (2) times a day. Indications: Cerebral Thromboembolism Prevention, DEEP VEIN THROMBOSIS PREVENTION, deep venous thrombosis, pulmonary thromboembolism 60 Tab 11    furosemide (LASIX) 80 mg tablet TAKE 1 TABLET BY MOUTH EVERY DAY  11    hydrALAZINE (APRESOLINE) 25 mg tablet Take 1 Tab by mouth daily. 90 Tab 6    metoprolol succinate (TOPROL-XL) 100 mg tablet 100 mg daily.  magnesium 250 mg tab Take  by mouth daily.  cholecalciferol, vitamin D3, (VITAMIN D3) 2,000 unit tab Take  by mouth daily.  ascorbate calcium 500 mg tab Take 500 mg by mouth.  clonazePAM (KLONOPIN) 1 mg tablet Take 1 mg by mouth.  metOLazone (ZAROXOLYN) 5 mg tablet Take 5 mg by mouth.  apixaban (ELIQUIS) 5 mg tablet Take 5 mg by mouth.  cholecalciferol (VITAMIN D3) 1,000 unit cap Take  by mouth.  hydrALAZINE (APRESOLINE) 25 mg tablet Take 25 mg by mouth.       OTHER MORPHINE CREAM APPLIES TO KNEES 3-4X DAILY       Allergies   Allergen Reactions    Latex Rash and Itching    Advair Diskus [Fluticasone-Salmeterol] Other (comments)     \"breathing problems\"    Bactrim [Sulfamethoprim] Shortness of Breath, Itching and Swelling     Swelling of tongue and throat    Iodinated Contrast- Oral And Iv Dye Hives    Ivp [Fd And C Blue No.1] Hives and Shortness of Breath    Lovenox [Enoxaparin] Shortness of Breath    Nsaids (Non-Steroidal Anti-Inflammatory Drug) Other (comments)     Heartburn    Sulfa (Sulfonamide Antibiotics) Shortness of Breath     Past Medical History:   Diagnosis Date    Acute kidney failure (HCC) 11/19/2015    Acute sinusitis 2/11/2011    Adverse effect of anesthesia     SLOW WAKING PAST ANESTHESIA    Arthritis     RA    At risk for side effect of medication 9/30/2016    Atrial fibrillation ([address] 10/19/2011    Autoimmune disease ([address]     FIBROMYLGIA    Cancer ([address] 2016    ENDOMETRIAL     Cholelithiasis 11/19/2015    Chronic pain     Claudication of both lower extremities ([address] 8/25/2015    Diabetes mellitus type 2, controlled ([address] 1/19/2016    Diabetes mellitus, type II ([address] 10/10/2014    Fall against object 10/10/2014    Fatty liver 10/20/2015    Hypertension     Ill-defined condition     SPINAL STENOSIS    Left shoulder pain 10/10/2014    Leiomyoma of body of uterus 4/7/2016    Morbid obesity ([address]     Myalgia 9/30/2016    Nausea & vomiting     Personal history of radiation therapy 4/20/2017    Pneumonia     Preop examination 6/15/2015    Preoperative clearance 6/15/2015    Rash 2/11/2011    Rash of hands 10/10/2014    Screening for colon cancer 2/22/2016    Screening for glaucoma 2/22/2016    Sleep apnea     SOB (shortness of breath)     hospitalized 5/2012.  angina, SOB    Throm[facility]embolus [facility] 2011    LEFT LOWER LEG AND PE    Tinea pedis, recurrent 7/20/2015    Vaginal bleeding, abnormal 4/20/2017     Past Surgical History:   Procedure Laterality Date    COLONOSCOPY N/A 12/21/2016    COLONOSCOPY performed by Fernanda Blakely MD at [address] [place] [address]  12/21/2016         HX CATARACT REMOVAL Bilateral 2015    [address]    HX DILATION AND CURETTAGE  07/29/2016    HX DILATION AND CURETTAGE  2016    HX GYN  1960    etopic    HX HEART CATHETERIZATION  2001    NEGATIVE PER PATIENT    HX HYSTERECTOMY  2016    HX KNEE ARTHROSCOPY Left 1998    HX ORTHOPAEDIC Right 1960'S    BUNIONECTOMY    HX OTHER SURGICAL      BIOPSY OF VEIN IN FOREHEAD    HX TUBAL LIGATION  1963    SC COLSC FLX W/RMVL OF TUMOR POLYP LESION SNARE TQ  12/12/2011          Family History   Problem Relation Age of Onset    Other Mother      ANEURYSM    Obesity Mother     Heart Disease Father     Other Father      VASCULAR DISEASE    Hypertension Sister     Heart Disease Brother     Heart Attack Brother     Kidney Disease Sister     Seizures Brother     Heart Attack Brother     Anesth Problems Neg Hx     Alcohol abuse Neg Hx      Social History Substance Use Topics    Smoking status: Former Smoker     Packs/day: 0.25     Years: 15.00     Quit date: 8/23/1996    Smokeless tobacco: Never Used    Alcohol use No      Lab Results  Component Value Date/Time   Hemoglobin A1c 6.1 06/22/2017 12:20 PM   Hemoglobin A1c 6.7 08/25/2015 05:01 PM   Hemoglobin A1c 6.7 03/20/2015 12:32 PM   Glucose 123 10/24/2017 09:56 AM   Glucose (POC) 113 09/02/2016 12:15 PM   Microalb/Creat ratio (ug/mg creat.) 7.0 02/23/2017 11:44 AM   LDL, calculated 46 03/17/2016 12:19 PM   Creatinine (POC) 0.9 08/16/2016 08:18 AM   Creatinine 1.00 10/24/2017 09:56 AM      Lab Results  Component Value Date/Time   Cholesterol, total 104 03/17/2016 12:19 PM   HDL Cholesterol 39 03/17/2016 12:19 PM   LDL, calculated 46 03/17/2016 12:19 PM   Triglyceride 94 03/17/2016 12:19 PM   Lab Results  Component Value Date/Time   ALT (SGPT) 12 10/24/2017 09:56 AM   AST (SGOT) 19 10/24/2017 09:56 AM   Alk. phosphatase 71 10/24/2017 09:56 AM   Bilirubin, direct 0.14 02/21/2014 12:27 PM   Bilirubin, total 0.4 10/24/2017 09:56 AM   Albumin 3.8 10/24/2017 09:56 AM   Protein, total 9.0 10/24/2017 09:56 AM   INR 1.3 12/22/2016 11:40 AM   INR 1.5 09/29/2016 11:21 AM   Prothrombin time 15.7 09/29/2016 11:21 AM   PLATELET 663 50/40/7002 09:56 AM   AFP, Serum, Tumor Marker 1.6 02/21/2014 12:27 PM                  Review of Systems   Constitutional: Negative for chills and fever. HENT: Negative for ear pain and nosebleeds. Eyes: Negative for blurred vision, pain and discharge. Respiratory: Negative for shortness of breath. Cardiovascular: Negative for chest pain and leg swelling. Gastrointestinal: Positive for blood in stool, constipation and melena. Negative for diarrhea, nausea and vomiting. Genitourinary: Negative for frequency. Musculoskeletal: Positive for back pain, joint pain, myalgias and neck pain. Skin: Negative for itching and rash. Neurological: Negative for headaches. Psychiatric/Behavioral: Negative for depression. The patient is not nervous/anxious. Physical Exam   Constitutional: She is oriented to person, place, and time. She appears well-developed and well-nourished. HENT:   Head: Normocephalic and atraumatic. Eyes: Conjunctivae and EOM are normal.   Neck: Normal range of motion. Neck supple. Cardiovascular: Normal rate, regular rhythm and normal heart sounds. No murmur heard. Pulmonary/Chest: Effort normal and breath sounds normal.   Abdominal: Soft. Bowel sounds are normal. She exhibits no distension. Musculoskeletal: She exhibits edema. Right knee: She exhibits decreased range of motion and swelling. Tenderness found. Left knee: She exhibits decreased range of motion and swelling. Tenderness found. Thoracic back: She exhibits decreased range of motion, tenderness and pain. Lumbar back: She exhibits decreased range of motion, tenderness, pain and spasm. Right foot: There is decreased range of motion and tenderness. Left foot: There is decreased range of motion and tenderness. Neurological: She is alert and oriented to person, place, and time. Skin: No erythema. Psychiatric: Her behavior is normal.   Nursing note and vitals reviewed. ASSESSMENT and PLAN  Diagnoses and all orders for this visit:    1. Deep vein thrombosis (DVT) of lower extremity, unspecified chronicity, unspecified laterality, unspecified vein (HCC)  -     CBC W/O DIFF  -     METABOLIC PANEL, COMPREHENSIVE  -     TSH 3RD GENERATION  -     AMB POC HEMOGLOBIN A1C  -     REFERRAL TO GASTROENTEROLOGY  -     REFERRAL TO ONCOLOGY    2. Paroxysmal atrial fibrillation (HCC)  -     CBC W/O DIFF  -     METABOLIC PANEL, COMPREHENSIVE  -     TSH 3RD GENERATION  -     AMB POC HEMOGLOBIN A1C  -     REFERRAL TO GASTROENTEROLOGY  -     REFERRAL TO ONCOLOGY    3. Lower abdominal pain  -     morphine CR (MS CONTIN) 60 mg CR tablet;  Take 1 Tab by mouth every twelve (12) hours. Max Daily Amount: 120 mg. Indications: Chronic Pain, Severe Pain  -     oxyCODONE IR (ROXICODONE) 20 mg immediate release tablet; Take 1 Tab by mouth every four (4) hours as needed for Pain. Max Daily Amount: 120 mg. Indications: Pain  -     CBC W/O DIFF  -     METABOLIC PANEL, COMPREHENSIVE  -     TSH 3RD GENERATION  -     AMB POC HEMOGLOBIN A1C  -     REFERRAL TO GASTROENTEROLOGY  -     REFERRAL TO ONCOLOGY    4. Chronic pain disorder  -     morphine CR (MS CONTIN) 60 mg CR tablet; Take 1 Tab by mouth every twelve (12) hours. Max Daily Amount: 120 mg. Indications: Chronic Pain, Severe Pain  -     oxyCODONE IR (ROXICODONE) 20 mg immediate release tablet; Take 1 Tab by mouth every four (4) hours as needed for Pain. Max Daily Amount: 120 mg. Indications: Pain  -     CBC W/O DIFF  -     METABOLIC PANEL, COMPREHENSIVE  -     TSH 3RD GENERATION  -     AMB POC HEMOGLOBIN A1C  -     REFERRAL TO GASTROENTEROLOGY  -     REFERRAL TO ONCOLOGY    5. MCTD (mixed connective tissue disease) (HCC)  -     morphine CR (MS CONTIN) 60 mg CR tablet; Take 1 Tab by mouth every twelve (12) hours. Max Daily Amount: 120 mg. Indications: Chronic Pain, Severe Pain  -     oxyCODONE IR (ROXICODONE) 20 mg immediate release tablet; Take 1 Tab by mouth every four (4) hours as needed for Pain. Max Daily Amount: 120 mg. Indications: Pain  -     CBC W/O DIFF  -     METABOLIC PANEL, COMPREHENSIVE  -     TSH 3RD GENERATION  -     AMB POC HEMOGLOBIN A1C  -     REFERRAL TO GASTROENTEROLOGY  -     REFERRAL TO ONCOLOGY    6. Claudication of both lower extremities (HCC)  -     morphine CR (MS CONTIN) 60 mg CR tablet; Take 1 Tab by mouth every twelve (12) hours. Max Daily Amount: 120 mg. Indications: Chronic Pain, Severe Pain  -     oxyCODONE IR (ROXICODONE) 20 mg immediate release tablet; Take 1 Tab by mouth every four (4) hours as needed for Pain. Max Daily Amount: 120 mg.  Indications: Pain  -     CBC W/O DIFF  -     METABOLIC PANEL, COMPREHENSIVE  -     TSH 3RD GENERATION  -     AMB POC HEMOGLOBIN A1C  -     REFERRAL TO GASTROENTEROLOGY  -     REFERRAL TO ONCOLOGY    7. Controlled type 2 diabetes mellitus with chronic kidney disease, unspecified CKD stage, unspecified long term insulin use status (HCC)  -     morphine CR (MS CONTIN) 60 mg CR tablet; Take 1 Tab by mouth every twelve (12) hours. Max Daily Amount: 120 mg. Indications: Chronic Pain, Severe Pain  -     oxyCODONE IR (ROXICODONE) 20 mg immediate release tablet; Take 1 Tab by mouth every four (4) hours as needed for Pain. Max Daily Amount: 120 mg. Indications: Pain  -     CBC W/O DIFF  -     METABOLIC PANEL, COMPREHENSIVE  -     TSH 3RD GENERATION  -     AMB POC HEMOGLOBIN A1C  -     REFERRAL TO GASTROENTEROLOGY  -     REFERRAL TO ONCOLOGY    8. Endometrial cancer (HCC)  -     morphine CR (MS CONTIN) 60 mg CR tablet; Take 1 Tab by mouth every twelve (12) hours. Max Daily Amount: 120 mg. Indications: Chronic Pain, Severe Pain  -     oxyCODONE IR (ROXICODONE) 20 mg immediate release tablet; Take 1 Tab by mouth every four (4) hours as needed for Pain. Max Daily Amount: 120 mg. Indications: Pain  -     CBC W/O DIFF  -     METABOLIC PANEL, COMPREHENSIVE  -     TSH 3RD GENERATION  -     AMB POC HEMOGLOBIN A1C  -     REFERRAL TO GASTROENTEROLOGY  -     REFERRAL TO ONCOLOGY    9. Fibromyalgia  -     morphine CR (MS CONTIN) 60 mg CR tablet; Take 1 Tab by mouth every twelve (12) hours. Max Daily Amount: 120 mg. Indications: Chronic Pain, Severe Pain  -     oxyCODONE IR (ROXICODONE) 20 mg immediate release tablet; Take 1 Tab by mouth every four (4) hours as needed for Pain. Max Daily Amount: 120 mg. Indications: Pain  -     CBC W/O DIFF  -     METABOLIC PANEL, COMPREHENSIVE  -     TSH 3RD GENERATION  -     AMB POC HEMOGLOBIN A1C  -     REFERRAL TO GASTROENTEROLOGY  -     REFERRAL TO ONCOLOGY    10. Monoclonal gammopathies  -     morphine CR (MS CONTIN) 60 mg CR tablet;  Take 1 Tab by mouth every twelve (12) hours. Max Daily Amount: 120 mg. Indications: Chronic Pain, Severe Pain  -     oxyCODONE IR (ROXICODONE) 20 mg immediate release tablet; Take 1 Tab by mouth every four (4) hours as needed for Pain. Max Daily Amount: 120 mg. Indications: Pain  -     CBC W/O DIFF  -     METABOLIC PANEL, COMPREHENSIVE  -     TSH 3RD GENERATION  -     AMB POC HEMOGLOBIN A1C  -     REFERRAL TO GASTROENTEROLOGY  -     REFERRAL TO ONCOLOGY    11. Chronic pain of right ankle  -     morphine CR (MS CONTIN) 60 mg CR tablet; Take 1 Tab by mouth every twelve (12) hours. Max Daily Amount: 120 mg. Indications: Chronic Pain, Severe Pain  -     oxyCODONE IR (ROXICODONE) 20 mg immediate release tablet; Take 1 Tab by mouth every four (4) hours as needed for Pain. Max Daily Amount: 120 mg. Indications: Pain  -     CBC W/O DIFF  -     METABOLIC PANEL, COMPREHENSIVE  -     TSH 3RD GENERATION  -     AMB POC HEMOGLOBIN A1C  -     REFERRAL TO GASTROENTEROLOGY  -     REFERRAL TO ONCOLOGY    12. Arm pain, right  -     morphine CR (MS CONTIN) 60 mg CR tablet; Take 1 Tab by mouth every twelve (12) hours. Max Daily Amount: 120 mg. Indications: Chronic Pain, Severe Pain  -     oxyCODONE IR (ROXICODONE) 20 mg immediate release tablet; Take 1 Tab by mouth every four (4) hours as needed for Pain. Max Daily Amount: 120 mg. Indications: Pain  -     CBC W/O DIFF  -     METABOLIC PANEL, COMPREHENSIVE  -     TSH 3RD GENERATION  -     AMB POC HEMOGLOBIN A1C  -     REFERRAL TO GASTROENTEROLOGY  -     REFERRAL TO ONCOLOGY    13. Elbow pain, right  -     morphine CR (MS CONTIN) 60 mg CR tablet; Take 1 Tab by mouth every twelve (12) hours. Max Daily Amount: 120 mg. Indications: Chronic Pain, Severe Pain  -     oxyCODONE IR (ROXICODONE) 20 mg immediate release tablet; Take 1 Tab by mouth every four (4) hours as needed for Pain. Max Daily Amount: 120 mg.  Indications: Pain  -     CBC W/O DIFF  -     METABOLIC PANEL, COMPREHENSIVE  -     TSH 3RD GENERATION  - AMB POC HEMOGLOBIN A1C  -     REFERRAL TO GASTROENTEROLOGY  -     REFERRAL TO ONCOLOGY    14. Pain in both knees, unspecified chronicity  -     morphine CR (MS CONTIN) 60 mg CR tablet; Take 1 Tab by mouth every twelve (12) hours. Max Daily Amount: 120 mg. Indications: Chronic Pain, Severe Pain  -     oxyCODONE IR (ROXICODONE) 20 mg immediate release tablet; Take 1 Tab by mouth every four (4) hours as needed for Pain. Max Daily Amount: 120 mg. Indications: Pain  -     CBC W/O DIFF  -     METABOLIC PANEL, COMPREHENSIVE  -     TSH 3RD GENERATION  -     AMB POC HEMOGLOBIN A1C  -     REFERRAL TO GASTROENTEROLOGY  -     REFERRAL TO ONCOLOGY    15. MGUS (monoclonal gammopathy of unknown significance)  -     morphine CR (MS CONTIN) 60 mg CR tablet; Take 1 Tab by mouth every twelve (12) hours. Max Daily Amount: 120 mg. Indications: Chronic Pain, Severe Pain  -     oxyCODONE IR (ROXICODONE) 20 mg immediate release tablet; Take 1 Tab by mouth every four (4) hours as needed for Pain. Max Daily Amount: 120 mg. Indications: Pain  -     CBC W/O DIFF  -     METABOLIC PANEL, COMPREHENSIVE  -     TSH 3RD GENERATION  -     AMB POC HEMOGLOBIN A1C  -     REFERRAL TO GASTROENTEROLOGY  -     REFERRAL TO ONCOLOGY    16. Chronic left shoulder pain  -     morphine CR (MS CONTIN) 60 mg CR tablet; Take 1 Tab by mouth every twelve (12) hours. Max Daily Amount: 120 mg. Indications: Chronic Pain, Severe Pain  -     oxyCODONE IR (ROXICODONE) 20 mg immediate release tablet; Take 1 Tab by mouth every four (4) hours as needed for Pain. Max Daily Amount: 120 mg. Indications: Pain  -     CBC W/O DIFF  -     METABOLIC PANEL, COMPREHENSIVE  -     TSH 3RD GENERATION  -     AMB POC HEMOGLOBIN A1C  -     REFERRAL TO GASTROENTEROLOGY  -     REFERRAL TO ONCOLOGY    17. Myalgia  -     morphine CR (MS CONTIN) 60 mg CR tablet; Take 1 Tab by mouth every twelve (12) hours. Max Daily Amount: 120 mg.  Indications: Chronic Pain, Severe Pain  -     oxyCODONE IR (ROXICODONE) 20 mg immediate release tablet; Take 1 Tab by mouth every four (4) hours as needed for Pain. Max Daily Amount: 120 mg. Indications: Pain  -     CBC W/O DIFF  -     METABOLIC PANEL, COMPREHENSIVE  -     TSH 3RD GENERATION  -     AMB POC HEMOGLOBIN A1C  -     REFERRAL TO GASTROENTEROLOGY  -     REFERRAL TO ONCOLOGY    18. Pain of left upper extremity  -     morphine CR (MS CONTIN) 60 mg CR tablet; Take 1 Tab by mouth every twelve (12) hours. Max Daily Amount: 120 mg. Indications: Chronic Pain, Severe Pain  -     oxyCODONE IR (ROXICODONE) 20 mg immediate release tablet; Take 1 Tab by mouth every four (4) hours as needed for Pain. Max Daily Amount: 120 mg. Indications: Pain  -     CBC W/O DIFF  -     METABOLIC PANEL, COMPREHENSIVE  -     TSH 3RD GENERATION  -     AMB POC HEMOGLOBIN A1C  -     REFERRAL TO GASTROENTEROLOGY  -     REFERRAL TO ONCOLOGY    19. Vaginal bleeding, abnormal  -     morphine CR (MS CONTIN) 60 mg CR tablet; Take 1 Tab by mouth every twelve (12) hours. Max Daily Amount: 120 mg. Indications: Chronic Pain, Severe Pain  -     oxyCODONE IR (ROXICODONE) 20 mg immediate release tablet; Take 1 Tab by mouth every four (4) hours as needed for Pain. Max Daily Amount: 120 mg. Indications: Pain  -     CBC W/O DIFF  -     METABOLIC PANEL, COMPREHENSIVE  -     TSH 3RD GENERATION  -     AMB POC HEMOGLOBIN A1C  -     REFERRAL TO GASTROENTEROLOGY  -     REFERRAL TO ONCOLOGY    20. Diabetes mellitus without complication (HCC)  -     morphine CR (MS CONTIN) 60 mg CR tablet; Take 1 Tab by mouth every twelve (12) hours. Max Daily Amount: 120 mg. Indications: Chronic Pain, Severe Pain  -     oxyCODONE IR (ROXICODONE) 20 mg immediate release tablet; Take 1 Tab by mouth every four (4) hours as needed for Pain. Max Daily Amount: 120 mg.  Indications: Pain  -     CBC W/O DIFF  -     METABOLIC PANEL, COMPREHENSIVE  -     TSH 3RD GENERATION  -     AMB POC HEMOGLOBIN A1C  -     REFERRAL TO GASTROENTEROLOGY  - REFERRAL TO ONCOLOGY    21. Other pulmonary embolism without acute cor pulmonale, unspecified chronicity (HCC)  -     morphine CR (MS CONTIN) 60 mg CR tablet; Take 1 Tab by mouth every twelve (12) hours. Max Daily Amount: 120 mg. Indications: Chronic Pain, Severe Pain  -     oxyCODONE IR (ROXICODONE) 20 mg immediate release tablet; Take 1 Tab by mouth every four (4) hours as needed for Pain. Max Daily Amount: 120 mg. Indications: Pain  -     CBC W/O DIFF  -     METABOLIC PANEL, COMPREHENSIVE  -     TSH 3RD GENERATION  -     AMB POC HEMOGLOBIN A1C  -     REFERRAL TO GASTROENTEROLOGY  -     REFERRAL TO ONCOLOGY    22. Personal history of radiation therapy  -     morphine CR (MS CONTIN) 60 mg CR tablet; Take 1 Tab by mouth every twelve (12) hours. Max Daily Amount: 120 mg. Indications: Chronic Pain, Severe Pain  -     oxyCODONE IR (ROXICODONE) 20 mg immediate release tablet; Take 1 Tab by mouth every four (4) hours as needed for Pain. Max Daily Amount: 120 mg. Indications: Pain  -     CBC W/O DIFF  -     METABOLIC PANEL, COMPREHENSIVE  -     TSH 3RD GENERATION  -     AMB POC HEMOGLOBIN A1C  -     REFERRAL TO GASTROENTEROLOGY  -     REFERRAL TO ONCOLOGY    23. Pain of left hand  -     morphine CR (MS CONTIN) 60 mg CR tablet; Take 1 Tab by mouth every twelve (12) hours. Max Daily Amount: 120 mg. Indications: Chronic Pain, Severe Pain  -     oxyCODONE IR (ROXICODONE) 20 mg immediate release tablet; Take 1 Tab by mouth every four (4) hours as needed for Pain. Max Daily Amount: 120 mg. Indications: Pain  -     CBC W/O DIFF  -     METABOLIC PANEL, COMPREHENSIVE  -     TSH 3RD GENERATION  -     AMB POC HEMOGLOBIN A1C  -     REFERRAL TO GASTROENTEROLOGY  -     REFERRAL TO ONCOLOGY    24. Left wrist pain  -     morphine CR (MS CONTIN) 60 mg CR tablet; Take 1 Tab by mouth every twelve (12) hours. Max Daily Amount: 120 mg. Indications: Chronic Pain, Severe Pain  -     oxyCODONE IR (ROXICODONE) 20 mg immediate release tablet;  Take 1 Tab by mouth every four (4) hours as needed for Pain. Max Daily Amount: 120 mg. Indications: Pain  -     CBC W/O DIFF  -     METABOLIC PANEL, COMPREHENSIVE  -     TSH 3RD GENERATION  -     AMB POC HEMOGLOBIN A1C  -     REFERRAL TO GASTROENTEROLOGY  -     REFERRAL TO ONCOLOGY    25. Uterine leiomyoma, unspecified location  -     morphine CR (MS CONTIN) 60 mg CR tablet; Take 1 Tab by mouth every twelve (12) hours. Max Daily Amount: 120 mg. Indications: Chronic Pain, Severe Pain  -     oxyCODONE IR (ROXICODONE) 20 mg immediate release tablet; Take 1 Tab by mouth every four (4) hours as needed for Pain. Max Daily Amount: 120 mg. Indications: Pain  -     CBC W/O DIFF  -     METABOLIC PANEL, COMPREHENSIVE  -     TSH 3RD GENERATION  -     REFERRAL TO GASTROENTEROLOGY  -     REFERRAL TO ONCOLOGY    26. Leg pain, bilateral  -     morphine CR (MS CONTIN) 60 mg CR tablet; Take 1 Tab by mouth every twelve (12) hours. Max Daily Amount: 120 mg. Indications: Chronic Pain, Severe Pain  -     oxyCODONE IR (ROXICODONE) 20 mg immediate release tablet; Take 1 Tab by mouth every four (4) hours as needed for Pain. Max Daily Amount: 120 mg. Indications: Pain  -     CBC W/O DIFF  -     METABOLIC PANEL, COMPREHENSIVE  -     TSH 3RD GENERATION  -     AMB POC HEMOGLOBIN A1C  -     REFERRAL TO GASTROENTEROLOGY  -     REFERRAL TO ONCOLOGY    Other orders  -     naloxegol (MOVANTIK) 25 mg tab tablet; Take 1 Tab by mouth Daily (before breakfast). -     pantoprazole (PROTONIX) 40 mg tablet; Take 1 Tab by mouth two (2) times a day. -     cyanocobalamin (VITAMIN B-12) 1,000 mcg tablet; Take 1 Tab by mouth daily.   -     CKD REPORT  -     DIABETES PATIENT EDUCATION      At this time will discon eliquis or decrease to half a tablet bid, this was a shared decision making, will do GI referral to furer investigate te source of her bleeding, also need to f/u with the oncologist to investigate the state of her MGUS, hope that is not into MM yet, was told to call for any concern pt agrred refills was also given, was told if bleeding doesnot stop need to go to ER pt agreed

## 2017-10-24 NOTE — MR AVS SNAPSHOT
Visit Information Date & Time Provider Department Dept. Phone Encounter #  
 10/24/2017  8:30 AM Harini Swan MD [address] OFFICE-[facility] [phone] [id number] Your Appointments 10/31/2017 10:00 AM  
Follow Up with Starla Joyner  [facility]) Appt Note: MGUS, 1 yr f/u, CP 0  
 [address] [place] [address] [address]  
[phone]  
  
   
 [address] [address] Upcoming Health Maintenance Date Due  
 MEDICARE YEARLY EXAM 7/20/2016 EYE EXAM RETINAL OR DILATED Q1 4/22/2017 INFLUENZA AGE 9 TO ADULT 8/1/2017 HEMOGLOBIN A1C Q6M 2/22/2018 MICROALBUMIN Q1 2/23/2018 LIPID PANEL Q1 4/20/2018 GLAUCOMA SCREENING Q2Y 4/22/2018 FOOT EXAM Q1 6/22/2018 COLONOSCOPY 12/21/2019 DTaP/Tdap/Td series (3 - Td) 10/20/2026 Allergies as of 10/24/2017  Review Complete On: 10/24/2017 By: Bia Cardoso LPN Severity Noted Reaction Type Reactions Latex  08/29/2013    Rash, Itching Advair Diskus [Fluticasone-salmeterol]  05/29/2012    Other (comments) \"breathing problems\" Bactrim [Sulfamethoprim]  08/29/2013   Side Effect Shortness of Breath, Itching, Swelling Swelling of tongue and throat Iodinated Contrast- Oral And Iv Dye  10/04/2016    Hives Ivp [Fd And C Blue No.1]  12/21/2010    Hives, Shortness of Breath Lovenox [Enoxaparin]  12/21/2010    Shortness of Breath Nsaids (Non-steroidal Anti-inflammatory Drug)  12/21/2010    Other (comments) Heartburn Sulfa (Sulfonamide Antibiotics)  12/23/2013    Shortness of Breath Current Immunizations  Reviewed on 10/31/2016 Name Date Influenza Vaccine (Quad) PF  Deferred (Medication not available) Tdap 5/17/2006 ZZZ-RETIRED (DO NOT USE) Pneumococcal Vaccine (Unspecified Type) 1/1/2010 Not reviewed this visit You Were Diagnosed With   
  
 Codes Comments Deep vein thrombosis (DVT) of lower extremity, unspecified chronicity, unspecified laterality, unspecified vein (HCC)    -  Primary ICD-10-CM: I82.409 ICD-9-CM: 453.40 Paroxysmal atrial fibrillation (HCC)     ICD-10-CM: I48.0 ICD-9-CM: 427.31 Lower abdominal pain     ICD-10-CM: R10.30 ICD-9-CM: 789.09 Chronic pain disorder     ICD-10-CM: G89.4 ICD-9-CM: 338.4 MCTD (mixed connective tissue disease) ([facility]     ICD-10-CM: M35.1 ICD-9-CM: 710.8 Claudication of both lower extremities ([facility]     ICD-10-CM: I73.9 ICD-9-CM: 443.9 Controlled type 2 diabetes mellitus with chronic kidney disease, unspecified CKD stage, unspecified long term insulin use status ([place] [address].)     ICD-10-CM: E11.22 
ICD-9-CM: 250.40, 585.9 Endometrial cancer ([facility]     ICD-10-CM: C54.1 ICD-9-CM: 182.0 Fibromyalgia     ICD-10-CM: M79.7 ICD-9-CM: 729.1 Monoclonal gammopathies     ICD-10-CM: D47.2 ICD-9-CM: 273.1 Chronic pain of right ankle     ICD-10-CM: M25.571, G89.29 ICD-9-CM: 719.47, 338.29 Arm pain, right     ICD-10-CM: M79.601 ICD-9-CM: 729.5 Elbow pain, right     ICD-10-CM: M25.521 ICD-9-CM: 719.42 Pain in both knees, unspecified chronicity     ICD-10-CM: M25.561, M25.562 ICD-9-CM: 719.46   
 MGUS (monoclonal gammopathy of unknown significance)     ICD-10-CM: Q74.4 ICD-9-CM: 273.1 Chronic left shoulder pain     ICD-10-CM: M25.512, G89.29 ICD-9-CM: 719.41, 338.29 Myalgia     ICD-10-CM: M79.1 ICD-9-CM: 729.1 Pain of left upper extremity     ICD-10-CM: R20.441 ICD-9-CM: 729.5 Vaginal bleeding, abnormal     ICD-10-CM: N93.9 ICD-9-CM: 623.8 Diabetes mellitus without complication ([facility]     EHQ-24-OM: E11.9 ICD-9-CM: 250.00 Other pulmonary embolism without acute cor pulmonale, unspecified chronicity (HCC)     ICD-10-CM: I26.99 
ICD-9-CM: 415.19 Personal history of radiation therapy     ICD-10-CM: Z92.3 ICD-9-CM: V15.3 Pain of left hand     ICD-10-CM: T28.950 ICD-9-CM: 729.5 Left wrist pain     ICD-10-CM: M25.532 ICD-9-CM: 719.43 Uterine leiomyoma, unspecified location     ICD-10-CM: D25.9 ICD-9-CM: 218.9 Leg pain, bilateral     ICD-10-CM: M79.604, M79.605 ICD-9-CM: 729.5 Vitals Height(growth percentile) Weight(growth percentile) BMI OB Status Smoking Status 5' 6\" (1.676 m) 295 lb (133.8 kg) 47.61 kg/m2 Hysterectomy Former Smoker Vitals History BMI and BSA Data Body Mass Index Body Surface Area  
 47.61 kg/m 2 2.5 m 2 Preferred Pharmacy Pharmacy Name Phone [facility]Y [id number]- [place], [place] - [address] [phone] Your Updated Medication List  
  
   
This list is accurate as of: 10/24/17  9:41 AM.  Always use your most recent med list.  
  
  
  
  
 anastrozole 1 mg tablet Commonly known as:  ARIMIDEX Take 1 mg by mouth daily. apixaban 5 mg tablet Commonly known as:  [place] Take 1 Tab by mouth two (2) times a day. Indications: Cerebral Thromboembolism Prevention, DEEP VEIN THROMBOSIS PREVENTION, deep venous thrombosis, pulmonary thromboembolism  
  
 furosemide 80 mg tablet Commonly known as:  LASIX TAKE 1 TABLET BY MOUTH EVERY DAY  
  
 hydrALAZINE 25 mg tablet Commonly known as:  APRESOLINE Take 1 Tab by mouth daily. magnesium 250 mg Tab Take  by mouth daily. metoprolol succinate 100 mg tablet Commonly known as:  TOPROL-XL  
100 mg daily. * morphine CR 60 mg CR tablet Commonly known as:  MS CONTIN Take 1 Tab by mouth every twelve (12) hours. Max Daily Amount: 120 mg. Indications: Chronic Pain, Severe Pain * morphine CR 60 mg CR tablet Commonly known as:  MS CONTIN Take 1 Tab by mouth every twelve (12) hours. Max Daily Amount: 120 mg. Indications: Chronic Pain, Severe Pain Start taking on:  11/24/2017  
  
 naloxegol 25 mg Tab tablet Commonly known as:  Linda Gaines Take 1 Tab by mouth Daily (before breakfast). ondansetron 4 mg disintegrating tablet Commonly known as:  ZOFRAN ODT Take 1 Tab by mouth every eight (8) hours as needed for Nausea. OTHER  
MORPHINE CREAM APPLIES TO KNEES 3-4X DAILY  
  
 * oxyCODONE IR 20 mg immediate release tablet Commonly known as:  Josiah Pollack Take 1 Tab by mouth every four (4) hours as needed for Pain. Max Daily Amount: 120 mg. Indications: Pain * oxyCODONE IR 20 mg immediate release tablet Commonly known as:  Josiah Pollack Take 1 Tab by mouth every four (4) hours as needed for Pain. Max Daily Amount: 120 mg. Indications: Pain Start taking on:  11/24/2017  
  
 pantoprazole 40 mg tablet Commonly known as:  PROTONIX Take 1 Tab by mouth two (2) times a day. potassium chloride 20 mEq tablet Commonly known as:  K-DUR, KLOR-CON Take 1 Tab by mouth two (2) times a day. Indications: hypokalemia prevention VITAMIN D3 2,000 unit Tab Generic drug:  cholecalciferol (vitamin D3) Take  by mouth daily. * Notice: This list has 4 medication(s) that are the same as other medications prescribed for you. Read the directions carefully, and ask your doctor or other care provider to review them with you. Prescriptions Printed Refills  
 morphine CR (MS CONTIN) 60 mg CR tablet 0 Sig: Take 1 Tab by mouth every twelve (12) hours. Max Daily Amount: 120 mg. Indications: Chronic Pain, Severe Pain Class: Print Route: Oral  
 morphine CR (MS CONTIN) 60 mg CR tablet 0 Starting on: 11/24/2017 Sig: Take 1 Tab by mouth every twelve (12) hours. Max Daily Amount: 120 mg. Indications: Chronic Pain, Severe Pain Class: Print Route: Oral  
 oxyCODONE IR (ROXICODONE) 20 mg immediate release tablet 0 Sig: Take 1 Tab by mouth every four (4) hours as needed for Pain. Max Daily Amount: 120 mg. Indications: Pain Class: Print  Route: Oral  
 oxyCODONE IR (ROXICODONE) 20 mg immediate release tablet 0 Starting on: 11/24/2017 Sig: Take 1 Tab by mouth every four (4) hours as needed for Pain. Max Daily Amount: 120 mg. Indications: Pain Class: Print Route: Oral  
 naloxegol (MOVANTIK) 25 mg tab tablet 6 Sig: Take 1 Tab by mouth Daily (before breakfast). Class: Print Route: Oral  
  
Prescriptions Sent to Pharmacy Refills  
 pantoprazole (PROTONIX) 40 mg tablet 2 Sig: Take 1 Tab by mouth two (2) times a day. Class: Normal  
 Pharmacy: [facility] [address] [facility] [address]  #: [phone] Route: Oral  
  
We Performed the Following CBC W/O DIFF [27240 CPT(R)] HEMOGLOBIN A1C WITH EAG [75905 CPT(R)] METABOLIC PANEL, COMPREHENSIVE [76446 CPT(R)] TSH 3RD GENERATION [98372 CPT(R)] Introducing \[facility]\"" & HEALTH SERVICES! Dear Sis Cohwdhury: Thank you for requesting a [employer] account. Our records indicate that you have previously registered for a [employer] account but its currently inactive. Please call our [employer] support line at [phone]. Additional Information If you have questions, please visit the Frequently Asked Questions section of the [employer] website at [email or web address] Remember, [employer] is NOT to be used for urgent needs. For medical emergencies, dial 911. Now available from your iPhone and Android! Please provide this summary of care documentation to your next provider. Your primary care clinician is listed as Sunitha Dietz. If you have any questions after today's visit, please call [phone].

## 2017-10-25 LAB
ALBUMIN SERPL-MCNC: 3.8 G/DL (ref 3.5–4.8)
ALBUMIN/GLOB SERPL: 0.7 {RATIO} (ref 1.2–2.2)
ALP SERPL-CCNC: 71 IU/L (ref 39–117)
ALT SERPL-CCNC: 12 IU/L (ref 0–32)
AST SERPL-CCNC: 19 IU/L (ref 0–40)
BILIRUB SERPL-MCNC: 0.4 MG/DL (ref 0–1.2)
BUN SERPL-MCNC: 38 MG/DL (ref 8–27)
BUN/CREAT SERPL: 38 (ref 12–28)
CALCIUM SERPL-MCNC: 10.7 MG/DL (ref 8.7–10.3)
CHLORIDE SERPL-SCNC: 95 MMOL/L (ref 96–106)
CO2 SERPL-SCNC: 28 MMOL/L (ref 18–29)
CREAT SERPL-MCNC: 1 MG/DL (ref 0.57–1)
ERYTHROCYTE [DISTWIDTH] IN BLOOD BY AUTOMATED COUNT: 15.3 % (ref 12.3–15.4)
GFR SERPLBLD CREATININE-BSD FMLA CKD-EPI: 55 ML/MIN/1.73
GFR SERPLBLD CREATININE-BSD FMLA CKD-EPI: 63 ML/MIN/1.73
GLOBULIN SER CALC-MCNC: 5.2 G/DL (ref 1.5–4.5)
GLUCOSE SERPL-MCNC: 123 MG/DL (ref 65–99)
HCT VFR BLD AUTO: 29 % (ref 34–46.6)
HGB BLD-MCNC: 9.5 G/DL (ref 11.1–15.9)
INTERPRETATION: NORMAL
Lab: NORMAL
MCH RBC QN AUTO: 30.2 PG (ref 26.6–33)
MCHC RBC AUTO-ENTMCNC: 32.8 G/DL (ref 31.5–35.7)
MCV RBC AUTO: 92 FL (ref 79–97)
PLATELET # BLD AUTO: 195 X10E3/UL (ref 150–379)
POTASSIUM SERPL-SCNC: 3.6 MMOL/L (ref 3.5–5.2)
PROT SERPL-MCNC: 9 G/DL (ref 6–8.5)
RBC # BLD AUTO: 3.15 X10E6/UL (ref 3.77–5.28)
SODIUM SERPL-SCNC: 138 MMOL/L (ref 134–144)
TSH SERPL DL<=0.005 MIU/L-ACNC: 5.34 UIU/ML (ref 0.45–4.5)
WBC # BLD AUTO: 2.8 X10E3/UL (ref 3.4–10.8)

## 2017-10-31 ENCOUNTER — OFFICE VISIT (OUTPATIENT)
Dept: ONCOLOGY | Age: 77
End: 2017-10-31

## 2017-10-31 ENCOUNTER — DOCUMENTATION ONLY (OUTPATIENT)
Dept: ONCOLOGY | Age: 77
End: 2017-10-31

## 2017-10-31 VITALS
HEIGHT: 66 IN | RESPIRATION RATE: 20 BRPM | SYSTOLIC BLOOD PRESSURE: 122 MMHG | WEIGHT: 293 LBS | BODY MASS INDEX: 47.09 KG/M2 | OXYGEN SATURATION: 97 % | HEART RATE: 83 BPM | DIASTOLIC BLOOD PRESSURE: 72 MMHG | TEMPERATURE: 98.3 F

## 2017-10-31 DIAGNOSIS — C54.1 ENDOMETRIAL CANCER (HCC): ICD-10-CM

## 2017-10-31 DIAGNOSIS — M79.7 FIBROMYALGIA: ICD-10-CM

## 2017-10-31 DIAGNOSIS — D47.2 MGUS (MONOCLONAL GAMMOPATHY OF UNKNOWN SIGNIFICANCE): ICD-10-CM

## 2017-10-31 DIAGNOSIS — D64.9 ANEMIA, UNSPECIFIED TYPE: ICD-10-CM

## 2017-10-31 DIAGNOSIS — D47.2 MGUS (MONOCLONAL GAMMOPATHY OF UNKNOWN SIGNIFICANCE): Primary | ICD-10-CM

## 2017-10-31 DIAGNOSIS — E55.9 VITAMIN D DEFICIENCY: ICD-10-CM

## 2017-10-31 DIAGNOSIS — E11.00 TYPE 2 DIABETES MELLITUS WITH HYPEROSMOLARITY WITHOUT COMA, UNSPECIFIED LONG TERM INSULIN USE STATUS: ICD-10-CM

## 2017-10-31 DIAGNOSIS — I10 ESSENTIAL HYPERTENSION: ICD-10-CM

## 2017-10-31 DIAGNOSIS — Z87.19 HISTORY OF DIVERTICULITIS: ICD-10-CM

## 2017-10-31 DIAGNOSIS — G89.4 CHRONIC PAIN DISORDER: ICD-10-CM

## 2017-10-31 RX ORDER — GLUCOSAMINE SULFATE 1500 MG
1000 POWDER IN PACKET (EA) ORAL DAILY
COMMUNITY

## 2017-10-31 RX ORDER — FUROSEMIDE 80 MG/1
TABLET ORAL
COMMUNITY
Start: 2017-05-22 | End: 2017-10-31 | Stop reason: SDUPTHER

## 2017-10-31 RX ORDER — POTASSIUM CHLORIDE 20 MEQ/1
20 TABLET, EXTENDED RELEASE ORAL
COMMUNITY
Start: 2017-07-13 | End: 2017-10-31 | Stop reason: SDUPTHER

## 2017-10-31 RX ORDER — METOLAZONE 5 MG/1
5 TABLET ORAL DAILY
COMMUNITY
Start: 2017-08-22 | End: 2018-01-10 | Stop reason: SDUPTHER

## 2017-10-31 RX ORDER — CLONAZEPAM 1 MG/1
1 TABLET ORAL
COMMUNITY
Start: 2017-03-15

## 2017-10-31 RX ORDER — ASCORBATE CALCIUM 500 MG
500 TABLET ORAL
COMMUNITY
End: 2018-01-09

## 2017-10-31 RX ORDER — HYDRALAZINE HYDROCHLORIDE 25 MG/1
25 TABLET, FILM COATED ORAL
COMMUNITY
Start: 2017-01-25 | End: 2017-12-16

## 2017-10-31 RX ORDER — METOPROLOL SUCCINATE 100 MG/1
100 TABLET, EXTENDED RELEASE ORAL
COMMUNITY
Start: 2016-10-05 | End: 2017-10-31 | Stop reason: SDUPTHER

## 2017-10-31 NOTE — MR AVS SNAPSHOT
Visit Information Date & Time Provider Department Dept. Phone Encounter #  
 10/31/2017 10:00 AM Nelson Trinidad, Raffaele [address] [place] [address] Oncology at [facility] [phone] Follow-up Instructions Return in about 1 year (around 10/31/2018). Routing History Follow-up and Disposition History Upcoming Health Maintenance Date Due  
 MEDICARE YEARLY EXAM 7/20/2016 EYE EXAM RETINAL OR DILATED Q1 4/22/2017 INFLUENZA AGE 9 TO ADULT 8/1/2017 MICROALBUMIN Q1 2/23/2018 LIPID PANEL Q1 4/20/2018 GLAUCOMA SCREENING Q2Y 4/22/2018 HEMOGLOBIN A1C Q6M 4/24/2018 FOOT EXAM Q1 6/22/2018 COLONOSCOPY 12/21/2019 DTaP/Tdap/Td series (3 - Td) 10/20/2026 Allergies as of 10/31/2017  Review Complete On: 10/31/2017 By: Nelson Trinidad DO Severity Noted Reaction Type Reactions Latex  08/29/2013    Rash, Itching Advair Diskus [Fluticasone-salmeterol]  05/29/2012    Other (comments) \"breathing problems\" Bactrim [Sulfamethoprim]  08/29/2013   Side Effect Shortness of Breath, Itching, Swelling Swelling of tongue and throat Iodinated Contrast- Oral And Iv Dye  10/04/2016    Hives Ivp [Fd And C Blue No.1]  12/21/2010    Hives, Shortness of Breath Lovenox [Enoxaparin]  12/21/2010    Shortness of Breath Nsaids (Non-steroidal Anti-inflammatory Drug)  12/21/2010    Other (comments) Heartburn Sulfa (Sulfonamide Antibiotics)  12/23/2013    Shortness of Breath Current Immunizations  Reviewed on 10/31/2017 Name Date Influenza Vaccine (Quad) PF  Deferred (Medication not available) Tdap 5/17/2006 ZZZ-RETIRED (DO NOT USE) Pneumococcal Vaccine (Unspecified Type) 1/1/2010 Reviewed by Daniele Fofana LPN on 90/20/1918 at 10:41 AM  
You Were Diagnosed With   
  
 Codes Comments MGUS (monoclonal gammopathy of unknown significance)    -  Primary ICD-10-CM: P51.5 ICD-9-CM: 273.1 Anemia, unspecified type     ICD-10-CM: D64.9 ICD-9-CM: 285.9 History of diverticulitis     ICD-10-CM: Z87.19 ICD-9-CM: V12.70 Chronic pain disorder     ICD-10-CM: G89.4 ICD-9-CM: 338.4 Essential hypertension     ICD-10-CM: I10 
ICD-9-CM: 401.9 Fibromyalgia     ICD-10-CM: M79.7 ICD-9-CM: 729.1 Endometrial cancer ([facility]     ICD-10-CM: C54.1 ICD-9-CM: 182. 0 Vitamin D deficiency     ICD-10-CM: E55.9 ICD-9-CM: 268.9 Type 2 diabetes mellitus with hyperosmolarity without coma, unspecified long term insulin use status (HCC)     ICD-10-CM: E11.00 ICD-9-CM: 250.20 Vitals BP Pulse Temp Resp Height(growth percentile) Weight(growth percentile) 122/72 83 98.3 °F (36.8 °C) (Oral) 20 5' 6\" (1.676 m) 297 lb (134.7 kg) SpO2 BMI OB Status Smoking Status 97% 47.94 kg/m2 Hysterectomy Former Smoker Vitals History BMI and BSA Data Body Mass Index Body Surface Area  
 47.94 kg/m 2 2.5 m 2 Preferred Pharmacy Pharmacy Name Phone [facility]/PH[facility]Y [id number], [place] - [address] [phone] Your Updated Medication List  
  
   
This list is accurate as of: 10/31/17 11:07 AM.  Always use your most recent med list.  
  
  
  
  
 anastrozole 1 mg tablet Commonly known as:  ARIMIDEX Take 1 mg by mouth daily. * apixaban 5 mg tablet Commonly known as:  [place] Take 1 Tab by mouth two (2) times a day. Indications: Cerebral Thromboembolism Prevention, DEEP VEIN THROMBOSIS PREVENTION, deep venous thrombosis, pulmonary thromboembolism * apixaban 5 mg tablet Commonly known as:  [place] Take 5 mg by mouth. ascorbate calcium 500 mg Tab Take 500 mg by mouth.  
  
 clonazePAM 1 mg tablet Commonly known as:  [place] Take 1 mg by mouth.  
  
 cyanocobalamin 1,000 mcg tablet Commonly known as:  VITAMIN B-12 Take 1 Tab by mouth daily. furosemide 80 mg tablet Commonly known as:  LASIX TAKE 1 TABLET BY MOUTH EVERY DAY  
  
 * hydrALAZINE 25 mg tablet Commonly known as:  APRESOLINE Take 1 Tab by mouth daily. * hydrALAZINE 25 mg tablet Commonly known as:  APRESOLINE Take 25 mg by mouth.  
  
 magnesium 250 mg Tab Take  by mouth daily. metOLazone 5 mg tablet Commonly known as:  Oralia Tanner Take 5 mg by mouth.  
  
 metoprolol succinate 100 mg tablet Commonly known as:  TOPROL-XL  
100 mg daily. morphine CR 60 mg CR tablet Commonly known as:  MS CONTIN Take 1 Tab by mouth every twelve (12) hours. Max Daily Amount: 120 mg. Indications: Chronic Pain, Severe Pain  
  
 naloxegol 25 mg Tab tablet Commonly known as:  Evelyn Chaz Take 1 Tab by mouth Daily (before breakfast). ondansetron 4 mg disintegrating tablet Commonly known as:  ZOFRAN ODT Take 1 Tab by mouth every eight (8) hours as needed for Nausea. OTHER  
MORPHINE CREAM APPLIES TO KNEES 3-4X DAILY  
  
 oxyCODONE IR 20 mg immediate release tablet Commonly known as:  Antoniette Lee Take 1 Tab by mouth every four (4) hours as needed for Pain. Max Daily Amount: 120 mg. Indications: Pain  
  
 pantoprazole 40 mg tablet Commonly known as:  PROTONIX Take 1 Tab by mouth two (2) times a day. potassium chloride 20 mEq tablet Commonly known as:  K-DUR, KLOR-CON Take 1 Tab by mouth two (2) times a day. Indications: hypokalemia prevention * VITAMIN D3 2,000 unit Tab Generic drug:  cholecalciferol (vitamin D3) Take  by mouth daily. * cholecalciferol 1,000 unit Cap Commonly known as:  VITAMIN D3 Take  by mouth. * Notice: This list has 6 medication(s) that are the same as other medications prescribed for you. Read the directions carefully, and ask your doctor or other care provider to review them with you. We Performed the Following BETA-2 MICROGLOBULIN [id number] CPT(R)] FERRITIN [92195 CPT(R)] IRON PROFILE [id number] CPT(R)] RETICULOCYTE COUNT [id number] CPT(R)] VITAMIN B12 & FOLATE [34551 CPT(R)] VITAMIN D, 25 HYDROXY [id number] CPT(R)] Follow-up Instructions Return in about 1 year (around 10/31/2018). To-Do List   
 10/31/2017 Lab:  GAMMOPATHY EVAL, SPEP/SAIMA, IG QT/FLC Introducing \[facility]\"" & HEALTH SERVICES! Dear Mireya Avila: Thank you for requesting a [employer] account. Our records indicate that you have previously registered for a [employer] account but its currently inactive. Please call our [employer] support line at [phone]. Additional Information If you have questions, please visit the Frequently Asked Questions section of the [employer] website at [email or web address] Remember, [employer] is NOT to be used for urgent needs. For medical emergencies, dial 911. Now available from your iPhone and Android! Please provide this summary of care documentation to your next provider. Your primary care clinician is listed as Cecilia Washington. If you have any questions after today's visit, please call [phone].

## 2017-10-31 NOTE — PROGRESS NOTES
Breanne Jay is a 68 y.o. 1940 female and presents with Follow-up (MGUS)    CC .MGUS since 2007      INTERIM HISTORY:   1 year f/u for MGUS. Has not had gammopathy labs yet. CBC/ comp met stable. Still anemic but stable in 9s. Pt is seeing gyn/ONC for recurrent endometrial cancer and is on arimidex for this. Hx of XRT for this also. No records from cancer dx. Pt states she is to do a PET next month. Pt has multiple medical problems chronically. Pt is doing well overall today. Last visit:  Pt is walking with a cane and not using a w/c. Pt does not want a bone marrow. Pt has usual symptoms of chronic pain and multiple medical problems. Has RA, fibromyalgia, chronic pain. On chronic narcotics from PCP. Pt has chronic anemia stable.        Past Medical History:   Diagnosis Date    Acute kidney failure ([address] 11/19/2015    Acute sinusitis 2/11/2011    Adverse effect of anesthesia     SLOW WAKING PAST ANESTHESIA    Arthritis     RA    At risk for side effect of medication 9/30/2016    Atrial fibrillation ([address] 10/19/2011    Autoimmune disease ([address]     FIBROMYLGIA    Cancer ([address] 2016    ENDOMETRIAL     Cholelithiasis 11/19/2015    Chronic pain     Claudication of both lower extremities ([address] 8/25/2015    Diabetes mellitus type 2, controlled ([address] 1/19/2016    Diabetes mellitus, type II ([address] 10/10/2014    Fall against object 10/10/2014    Fatty liver 10/20/2015    Hypertension     Ill-defined condition     SPINAL STENOSIS    Left shoulder pain 10/10/2014    Leiomyoma of body of uterus 4/7/2016    Morbid obesity ([address]     Myalgia 9/30/2016    Nausea & vomiting     Personal history of radiation therapy 4/20/2017    Pneumonia     Preop examination 6/15/2015    Preoperative clearance 6/15/2015    Rash 2/11/2011    Rash of hands 10/10/2014    Screening for colon cancer 2/22/2016    Screening for glaucoma 2/22/2016    Sleep apnea     SOB (shortness of breath)     hospitalized 5/2012. angina, SOB    Thromboembolus ([address] 2011    LEFT LOWER LEG AND PE    Tinea pedis, recurrent 7/20/2015    Vaginal bleeding, abnormal 4/20/2017     Past Surgical History:   Procedure Laterality Date    COLONOSCOPY N/A 12/21/2016    COLONOSCOPY performed by Demar Zu[facility]iga MD at [facility]  12/21/2016         HX CATARACT REMOVAL Bilateral 2015    [address]    HX DILATION AND CURETTAGE  07/29/2016    HX DILATION AND CURETTAGE  2016    HX GYN  1960    etopic    HX HEART CATHETERIZATION  2001    NEGATIVE PER PATIENT    HX HYSTERECTOMY  2016    HX KNEE ARTHROSCOPY Left 1998    HX ORTHOPAEDIC Right 1960'S    BUNIONECTOMY    HX OTHER SURGICAL      BIOPSY OF VEIN IN FOREHEAD    HX TUBAL LIGATION  1963    TN COLSC FLX W/RMVL OF TUMOR POLYP LESION SNARE TQ  12/12/2011          Social History     Social History    Marital status: SINGLE     Spouse name: N/A    Number of children: N/A    Years of education: N/A     Social History Main Topics    Smoking status: Former Smoker     Packs/day: 0.25     Years: 15.00     Quit date: 8/23/1996    Smokeless tobacco: Never Used    Alcohol use No    Drug use: No    Sexual activity: No     Other Topics Concern    None     Social History Narrative     Family History   Problem Relation Age of Onset    Other Mother      ANEURYSM    Obesity Mother     Heart Disease Father     Other Father      VASCULAR DISEASE    Hypertension Sister     Heart Disease Brother     Heart Attack Brother     Kidney Disease Sister     Seizures Brother     Heart Attack Brother     Anesth Problems Neg Hx     Alcohol abuse Neg Hx        Current Outpatient Prescriptions   Medication Sig Dispense Refill    ascorbate calcium 500 mg tab Take 500 mg by mouth.  metOLazone (ZAROXOLYN) 5 mg tablet Take 5 mg by mouth.  cholecalciferol (VITAMIN D3) 1,000 unit cap Take  by mouth.       hydrALAZINE (APRESOLINE) 25 mg tablet Take 25 mg by mouth.  morphine CR (MS CONTIN) 60 mg CR tablet Take 1 Tab by mouth every twelve (12) hours. Max Daily Amount: 120 mg. Indications: Chronic Pain, Severe Pain 60 Tab 0    oxyCODONE IR (ROXICODONE) 20 mg immediate release tablet Take 1 Tab by mouth every four (4) hours as needed for Pain. Max Daily Amount: 120 mg. Indications: Pain 180 Tab 0    naloxegol (MOVANTIK) 25 mg tab tablet Take 1 Tab by mouth Daily (before breakfast). 30 Tab 6    pantoprazole (PROTONIX) 40 mg tablet Take 1 Tab by mouth two (2) times a day. 60 Tab 2    cyanocobalamin (VITAMIN B-12) 1,000 mcg tablet Take 1 Tab by mouth daily. 30 Tab 6    anastrozole (ARIMIDEX) 1 mg tablet Take 1 mg by mouth daily.  potassium chloride (K-DUR, KLOR-CON) 20 mEq tablet Take 1 Tab by mouth two (2) times a day. Indications: hypokalemia prevention 30 Tab 1    furosemide (LASIX) 80 mg tablet TAKE 1 TABLET BY MOUTH EVERY DAY  11    OTHER MORPHINE CREAM APPLIES TO KNEES 3-4X DAILY      metoprolol succinate (TOPROL-XL) 100 mg tablet 100 mg daily.  magnesium 250 mg tab Take  by mouth daily.  cholecalciferol, vitamin D3, (VITAMIN D3) 2,000 unit tab Take  by mouth daily.  clonazePAM (KLONOPIN) 1 mg tablet Take 1 mg by mouth.  apixaban (ELIQUIS) 5 mg tablet Take 5 mg by mouth.  ondansetron (ZOFRAN ODT) 4 mg disintegrating tablet Take 1 Tab by mouth every eight (8) hours as needed for Nausea. 16 Tab 0    apixaban (ELIQUIS) 5 mg tablet Take 1 Tab by mouth two (2) times a day. Indications: Cerebral Thromboembolism Prevention, DEEP VEIN THROMBOSIS PREVENTION, deep venous thrombosis, pulmonary thromboembolism 60 Tab 11    hydrALAZINE (APRESOLINE) 25 mg tablet Take 1 Tab by mouth daily.  90 Tab 6       Allergies   Allergen Reactions    Latex Rash and Itching    Advair Diskus [Fluticasone-Salmeterol] Other (comments)     \"breathing problems\"    Bactrim [Sulfamethoprim] Shortness of Breath, Itching and Swelling     Swelling of tongue and throat    Iodinated Contrast- Oral And Iv Dye Hives    Ivp [Fd And C Blue No.1] Hives and Shortness of Breath    Lovenox [Enoxaparin] Shortness of Breath    Nsaids (Non-Steroidal Anti-Inflammatory Drug) Other (comments)     Heartburn    Sulfa (Sulfonamide Antibiotics) Shortness of Breath       Review of Systems    A comprehensive review of systems was negative except for: per HPI   Chronic pain, fibromyalgia, RA    Objective:  Visit Vitals    /72    Pulse 83    Temp 98.3 °F (36.8 °C) (Oral)    Resp 20    Ht 5' 6\" (1.676 m)    Wt 297 lb (134.7 kg)    SpO2 97%    BMI 47.94 kg/m2         Physical Exam:   General appearance - alert, well appearing, and in no distress  Mental status - alert, oriented to person, place, and time, normal mood, behavior, speech, dress, motor activity, and thought processes  EYE-conj clear  CV reg  Lungs clear  GI soft  Ext chronic edema  Skin-Warm and dry. Neuro -nonfocal walking with a walker      Diagnostic Imaging   reviewed    No results found for this or any previous visit. Results for orders placed during the hospital encounter of 05/30/17   XR CHEST PA LAT    Narrative Indication:  rapid heart rate, intermittent. Wound check. Exam: AP upright and lateral views of the chest.    Direct comparison is made to prior CXR dated September 2016. Findings: Cardiomediastinal silhouette is stable. Lungs are clear bilaterally. Pleural spaces are normal. Osseous structures are intact. Impression IMPRESSION: No acute cardiopulmonary disease. Results for orders placed during the hospital encounter of 09/19/17   CT ABD PELV WO CONT    Narrative EXAM:  CT ABD PELV WO CONT    INDICATION: Abdominal pain, rectal bleeding  , intermittent bleeding for one  month, worsening in the past 2 days    COMPARISON: 1/12/2017    CONTRAST:  None. TECHNIQUE:   Thin axial images were obtained through the abdomen and pelvis.  Coronal and  sagittal reconstructions were generated. Oral contrast was not administered. CT  dose reduction was achieved through use of a standardized protocol tailored for  this examination and automatic exposure control for dose modulation. The absence of intravenous contrast material reduces the sensitivity for  evaluation of the solid parenchymal organs of the abdomen. FINDINGS:   LUNG BASES: Clear. INCIDENTALLY IMAGED HEART AND MEDIASTINUM: Unremarkable. LIVER: No mass or biliary dilatation. GALLBLADDER: Unremarkable. SPLEEN: No mass. PANCREAS: No mass or ductal dilatation. ADRENALS: Unremarkable. KIDNEYS/URETERS: No mass, calculus, or hydronephrosis. STOMACH: Unremarkable. SMALL BOWEL: No dilatation or wall thickening. COLON: Extensive colonic diverticulosis. Suspect mild acute diverticulitis of  the middle third of the descending colon with mild pericolonic haziness. Pericolic haziness increased slightly since prior study. No associated abscess. No bowel obstruction. APPENDIX: Unremarkable. PERITONEUM: Small amount of ascites in the abdomen and pelvis. No localized  fluid collections. RETROPERITONEUM: No lymphadenopathy or aortic aneurysm. REPRODUCTIVE ORGANS: Surgically absent. URINARY BLADDER: No mass or calculus. BONES: Facet joint arthropathy in the lumbar spine with mild degenerative  anterolisthesis at L3-4 and L4-5. ADDITIONAL COMMENTS: 2.1 x 2.0 cm localized area of fluid density directly  adjacent to the umbilicus on the left, in the anterior abdominal wall. New since  prior study. Question of small associated abdominal wall defect. Small bilateral  fat-containing inguinal hernias. Impression IMPRESSION:  1. Colonic diverticulosis. Possible mild acute diverticulitis of the descending  colon. 2. Small amount of ascites. 3. Small fluid containing left periumbilical abdominal wall abnormality,  question of small fluid containing hernia or other fluid containing lesion.   4. Small fat-containing inguinal hernias. 5. Small amount of ascites. Lab Results  reviewed  Lab Results   Component Value Date/Time    WBC 2.8 10/24/2017 09:56 AM    HGB 9.5 10/24/2017 09:56 AM    HCT 29.0 10/24/2017 09:56 AM    PLATELET 045 42/92/2350 09:56 AM    MCV 92 10/24/2017 09:56 AM       Lab Results   Component Value Date/Time    Sodium 138 10/24/2017 09:56 AM    Potassium 3.6 10/24/2017 09:56 AM    Chloride 95 10/24/2017 09:56 AM    CO2 28 10/24/2017 09:56 AM    Anion gap 7 09/19/2017 12:20 PM    Glucose 123 10/24/2017 09:56 AM    BUN 38 10/24/2017 09:56 AM    Creatinine 1.00 10/24/2017 09:56 AM    BUN/Creatinine ratio 38 10/24/2017 09:56 AM    GFR est AA 63 10/24/2017 09:56 AM    GFR est non-AA 55 10/24/2017 09:56 AM    Calcium 10.7 10/24/2017 09:56 AM    AST (SGOT) 19 10/24/2017 09:56 AM    Alk. phosphatase 71 10/24/2017 09:56 AM    Protein, total 9.0 10/24/2017 09:56 AM    Albumin 3.8 10/24/2017 09:56 AM    Globulin 6.4 09/19/2017 12:20 PM    A-G Ratio 0.7 10/24/2017 09:56 AM    ALT (SGPT) 12 10/24/2017 09:56 AM       .    Assessment/Plan:     Virgle Gottron is a 70 y.o. 1940 female and presents with Follow-up  . INTERIM HISTORY:   f/u for MGUS and pt is same overall. Multiple medical problems. Has RA, fibromyalgia, chronic pain. Obese in a w/c chronically. Pt clinically stable. 1.  MGUS chronic since 2007. Labs basically stable for years. IGG 3600 and has been up and down overall. Pt has elevated beta microglobulin and serum free lights chains. It is possible pt has smoldering MM at this point. Pt has mild anemia and stable. Will check anemia labs. Gammopathy labs ordered today. Continue course of surveillence for MGUS at this point. 2.  Endometrial cancer post surgery. Per GYN/onc  Pt did not want chemo adjuvantly. Could not tolerate. On arimidex now. Get records from gyn/ONC dr Brittany Keller. And [facility]. Pt is to have a PET done soon.      2. Multiple medical problems  Per PCP. Call if questions. Return in 12 months with labs. ICD-10-CM ICD-9-CM    1. MGUS (monoclonal gammopathy of unknown significance) D47.2 273.1 GAMMOPATHY EVAL, SPEP/SAIMA, IG QT/FLC      BETA-2 MICROGLOBULIN      IRON PROFILE      VITAMIN B12 & FOLATE      FERRITIN      RETICULOCYTE COUNT      VITAMIN D, 25 HYDROXY   2. Anemia, unspecified type D64.9 285.9 GAMMOPATHY EVAL, SPEP/SAIMA, IG QT/FLC      BETA-2 MICROGLOBULIN      IRON PROFILE      VITAMIN B12 & FOLATE      FERRITIN      RETICULOCYTE COUNT      VITAMIN D, 25 HYDROXY   3. History of diverticulitis Z87.19 V12.70 GAMMOPATHY EVAL, SPEP/SAIMA, IG QT/FLC      BETA-2 MICROGLOBULIN      IRON PROFILE      VITAMIN B12 & FOLATE      FERRITIN      RETICULOCYTE COUNT      VITAMIN D, 25 HYDROXY   4. Chronic pain disorder G89.4 338.4 GAMMOPATHY EVAL, SPEP/SAIMA, IG QT/FLC      BETA-2 MICROGLOBULIN      IRON PROFILE      VITAMIN B12 & FOLATE      FERRITIN      RETICULOCYTE COUNT      VITAMIN D, 25 HYDROXY   5. Essential hypertension I10 401.9 GAMMOPATHY EVAL, SPEP/SAIMA, IG QT/FLC      BETA-2 MICROGLOBULIN      IRON PROFILE      VITAMIN B12 & FOLATE      FERRITIN      RETICULOCYTE COUNT      VITAMIN D, 25 HYDROXY   6. Fibromyalgia M79.7 729.1 GAMMOPATHY EVAL, SPEP/SAIMA, IG QT/FLC      BETA-2 MICROGLOBULIN      IRON PROFILE      VITAMIN B12 & FOLATE      FERRITIN      RETICULOCYTE COUNT      VITAMIN D, 25 HYDROXY   7. Endometrial cancer (HCC) C54.1 182.0 GAMMOPATHY EVAL, SPEP/SAIMA, IG QT/FLC      BETA-2 MICROGLOBULIN      IRON PROFILE      VITAMIN B12 & FOLATE      FERRITIN      RETICULOCYTE COUNT      VITAMIN D, 25 HYDROXY   8. Vitamin D deficiency E55.9 268.9 GAMMOPATHY EVAL, SPEP/SAIMA, IG QT/FLC      BETA-2 MICROGLOBULIN      IRON PROFILE      VITAMIN B12 & FOLATE      FERRITIN      RETICULOCYTE COUNT      VITAMIN D, 25 HYDROXY   9.  Type 2 diabetes mellitus with hyperosmolarity without coma, unspecified long term insulin use status (HCC) E11.00 250.20 GAMMOPATHY EVAL, SPEP/SAIMA, IG QT/FLC      BETA-2 MICROGLOBULIN      IRON PROFILE      VITAMIN B12 & FOLATE      FERRITIN      RETICULOCYTE COUNT      VITAMIN D, 25 HYDROXY     Orders Placed This Encounter    GAMMOPATHY EVAL, SPEP/SAIMA, IG QT/FLC     Standing Status:   Future     Standing Expiration Date:   2018    BETA-2 MICROGLOBULIN    IRON PROFILE    VITAMIN B12 & FOLATE    FERRITIN    RETICULOCYTE COUNT    VITAMIN D, 25 HYDROXY    ascorbate calcium 500 mg tab     Sig: Take 500 mg by mouth.  clonazePAM (KLONOPIN) 1 mg tablet     Sig: Take 1 mg by mouth.  metOLazone (ZAROXOLYN) 5 mg tablet     Sig: Take 5 mg by mouth.  apixaban (ELIQUIS) 5 mg tablet     Sig: Take 5 mg by mouth.  cholecalciferol (VITAMIN D3) 1,000 unit cap     Sig: Take  by mouth.  DISCONTD: furosemide (LASIX) 80 mg tablet     Sig: TAKE 1 TABLET BY MOUTH EVERY DAY    hydrALAZINE (APRESOLINE) 25 mg tablet     Sig: Take 25 mg by mouth.  DISCONTD: metoprolol succinate (TOPROL-XL) 100 mg tablet     Si mg.    DISCONTD: potassium chloride (K-DUR, KLOR-CON) 20 mEq tablet     Sig: Take 20 mEq by mouth.       continue present plan, call if any problems  There are no Patient Instructions on file for this visit. Follow-up Disposition:  Return in about 1 year (around 10/31/2018).     Austen Ortiz DO

## 2017-11-13 ENCOUNTER — HOSPITAL ENCOUNTER (OUTPATIENT)
Dept: LAB | Age: 77
Discharge: HOME | End: 2017-11-13
Payer: MEDICARE

## 2017-11-13 PROCEDURE — 82728 ASSAY OF FERRITIN: CPT

## 2017-11-13 PROCEDURE — 82306 VITAMIN D 25 HYDROXY: CPT

## 2017-11-13 PROCEDURE — 82607 VITAMIN B-12: CPT

## 2017-11-13 PROCEDURE — 85045 AUTOMATED RETICULOCYTE COUNT: CPT

## 2017-11-13 PROCEDURE — 36415 COLL VENOUS BLD VENIPUNCTURE: CPT

## 2017-11-13 PROCEDURE — 82232 ASSAY OF BETA-2 PROTEIN: CPT

## 2017-11-13 PROCEDURE — 83550 IRON BINDING TEST: CPT

## 2017-11-13 PROCEDURE — 84165 PROTEIN E-PHORESIS SERUM: CPT

## 2017-11-15 LAB
25(OH)D3+25(OH)D2 SERPL-MCNC: 43.1 NG/ML (ref 30–100)
ALBUMIN SERPL ELPH-MCNC: 3.5 G/DL (ref 2.9–4.4)
ALBUMIN/GLOB SERPL: 0.7 {RATIO} (ref 0.7–1.7)
ALPHA1 GLOB SERPL ELPH-MCNC: 0.2 G/DL (ref 0–0.4)
ALPHA2 GLOB SERPL ELPH-MCNC: 0.7 G/DL (ref 0.4–1)
B-GLOBULIN SERPL ELPH-MCNC: 1 G/DL (ref 0.7–1.3)
B2 MICROGLOB SERPL-MCNC: 5 MG/L (ref 0.6–2.4)
FERRITIN SERPL-MCNC: 264 NG/ML (ref 15–150)
FOLATE SERPL-MCNC: 19.3 NG/ML
GAMMA GLOB SERPL ELPH-MCNC: 3.4 G/DL (ref 0.4–1.8)
GLOBULIN SER-MCNC: 5.3 G/DL (ref 2.2–3.9)
IGA SERPL-MCNC: 56 MG/DL (ref 64–422)
IGG SERPL-MCNC: 3838 MG/DL (ref 700–1600)
IGM SERPL-MCNC: 15 MG/DL (ref 26–217)
INTERPRETATION SERPL IEP-IMP: ABNORMAL
IRON SATN MFR SERPL: 23 % (ref 15–55)
IRON SERPL-MCNC: 75 UG/DL (ref 27–139)
KAPPA LC FREE SER-MCNC: 114.4 MG/L (ref 3.3–19.4)
KAPPA LC FREE/LAMBDA FREE SER: 15.67 {RATIO} (ref 0.26–1.65)
LAMBDA LC FREE SERPL-MCNC: 7.3 MG/L (ref 5.7–26.3)
M PROTEIN SERPL ELPH-MCNC: 3.1 G/DL
PLEASE NOTE:, 149534: ABNORMAL
PROT SERPL-MCNC: 8.8 G/DL (ref 6–8.5)
RETICS/RBC NFR AUTO: 1 % (ref 0.6–2.6)
TIBC SERPL-MCNC: 329 UG/DL (ref 250–450)
UIBC SERPL-MCNC: 254 UG/DL (ref 118–369)
VIT B12 SERPL-MCNC: 1327 PG/ML (ref 211–946)

## 2017-11-15 RX ORDER — LANOLIN ALCOHOL/MO/W.PET/CERES
1000 CREAM (GRAM) TOPICAL DAILY
Qty: 90 TAB | Refills: 1 | Status: SHIPPED | OUTPATIENT
Start: 2017-11-15 | End: 2018-07-25 | Stop reason: SDUPTHER

## 2017-11-15 NOTE — PROGRESS NOTES
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   Calcium is up a little and needs ionized calcium at next labs  We can still follow

## 2017-11-16 ENCOUNTER — TELEPHONE (OUTPATIENT)
Dept: ONCOLOGY | Age: 77
End: 2017-11-16

## 2017-11-16 NOTE — TELEPHONE ENCOUNTER
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   Calcium is up a little and needs ionized calcium at next labs  We can still follow

## 2017-11-16 NOTE — TELEPHONE ENCOUNTER
Called patient to discuss lab results per Dr. Avery Mccormick. Reviewed most recent labs results and that Dr. Avery Mccormick recommends continuing to follow labs as she has previously. Discussed that further work-up of myeloma would entail a bone marrow biopsy and patient declines. Discussed anemia and reviewed most recent iron studies with patient as well. She states she has a question that only Dr. Avery Mccormick can answer. She declines to ask the question on the phone. She requests a return call from Dr. Avery Mccormick.

## 2017-11-17 DIAGNOSIS — Z92.3 PERSONAL HISTORY OF RADIATION THERAPY: ICD-10-CM

## 2017-11-17 DIAGNOSIS — I26.99 OTHER PULMONARY EMBOLISM WITHOUT ACUTE COR PULMONALE, UNSPECIFIED CHRONICITY (HCC): ICD-10-CM

## 2017-11-17 DIAGNOSIS — G89.29 CHRONIC PAIN OF RIGHT ANKLE: ICD-10-CM

## 2017-11-17 DIAGNOSIS — M25.562 PAIN IN BOTH KNEES, UNSPECIFIED CHRONICITY: ICD-10-CM

## 2017-11-17 DIAGNOSIS — D47.2 MGUS (MONOCLONAL GAMMOPATHY OF UNKNOWN SIGNIFICANCE): ICD-10-CM

## 2017-11-17 DIAGNOSIS — M25.521 ELBOW PAIN, RIGHT: ICD-10-CM

## 2017-11-17 DIAGNOSIS — G89.4 CHRONIC PAIN DISORDER: ICD-10-CM

## 2017-11-17 DIAGNOSIS — D47.2 MONOCLONAL GAMMOPATHIES: ICD-10-CM

## 2017-11-17 DIAGNOSIS — C54.1 ENDOMETRIAL CANCER (HCC): ICD-10-CM

## 2017-11-17 DIAGNOSIS — G89.29 CHRONIC LEFT SHOULDER PAIN: ICD-10-CM

## 2017-11-17 DIAGNOSIS — M79.10 MYALGIA: ICD-10-CM

## 2017-11-17 DIAGNOSIS — M35.1 MCTD (MIXED CONNECTIVE TISSUE DISEASE) (HCC): ICD-10-CM

## 2017-11-17 DIAGNOSIS — M25.561 PAIN IN BOTH KNEES, UNSPECIFIED CHRONICITY: ICD-10-CM

## 2017-11-17 DIAGNOSIS — M25.571 CHRONIC PAIN OF RIGHT ANKLE: ICD-10-CM

## 2017-11-17 DIAGNOSIS — R31.9 BLOOD IN URINE: ICD-10-CM

## 2017-11-17 DIAGNOSIS — M79.602 PAIN OF LEFT UPPER EXTREMITY: ICD-10-CM

## 2017-11-17 DIAGNOSIS — I73.9 CLAUDICATION OF BOTH LOWER EXTREMITIES (HCC): ICD-10-CM

## 2017-11-17 DIAGNOSIS — M25.512 CHRONIC LEFT SHOULDER PAIN: ICD-10-CM

## 2017-11-17 DIAGNOSIS — E11.9 DIABETES MELLITUS WITHOUT COMPLICATION (HCC): ICD-10-CM

## 2017-11-17 DIAGNOSIS — R10.30 LOWER ABDOMINAL PAIN: ICD-10-CM

## 2017-11-17 DIAGNOSIS — M79.601 ARM PAIN, RIGHT: ICD-10-CM

## 2017-11-17 DIAGNOSIS — E11.22 CONTROLLED TYPE 2 DIABETES MELLITUS WITH CHRONIC KIDNEY DISEASE, UNSPECIFIED CKD STAGE, UNSPECIFIED LONG TERM INSULIN USE STATUS: ICD-10-CM

## 2017-11-17 DIAGNOSIS — N93.9 VAGINAL BLEEDING, ABNORMAL: ICD-10-CM

## 2017-11-17 DIAGNOSIS — M79.7 FIBROMYALGIA: ICD-10-CM

## 2017-11-20 RX ORDER — POTASSIUM CHLORIDE 1500 MG/1
TABLET, EXTENDED RELEASE ORAL
Qty: 30 TAB | Refills: 1 | Status: SHIPPED | OUTPATIENT
Start: 2017-11-20 | End: 2018-05-21 | Stop reason: SDUPTHER

## 2017-11-21 ENCOUNTER — TELEPHONE (OUTPATIENT)
Dept: ONCOLOGY | Age: 77
End: 2017-11-21

## 2017-11-21 NOTE — TELEPHONE ENCOUNTER
HIPAA verified. Numerous questions about treatment plan. Advised to make appointment to discuss plan. Stated to have PET scan by Dr. Kierra Jean on 11/27/17 and wanted to know if Dr. Zane Rashid wanted to add another test.  Does not want bone marrow biopsy due to fibromyalgia. Active listening employed. To provider for review. Will need appt and sent to [facility].

## 2017-11-27 NOTE — TELEPHONE ENCOUNTER
Called pt back again and no answer  Please get GYN/ONC records  As pt is seeing [phone] doctor Jose Luis Espinoza

## 2017-11-29 ENCOUNTER — DOCUMENTATION ONLY (OUTPATIENT)
Dept: ONCOLOGY | Age: 77
End: 2017-11-29

## 2017-11-29 ENCOUNTER — OFFICE VISIT (OUTPATIENT)
Dept: ONCOLOGY | Age: 77
End: 2017-11-29

## 2017-11-29 VITALS
RESPIRATION RATE: 20 BRPM | HEART RATE: 89 BPM | TEMPERATURE: 98.7 F | WEIGHT: 293 LBS | BODY MASS INDEX: 47.09 KG/M2 | DIASTOLIC BLOOD PRESSURE: 88 MMHG | SYSTOLIC BLOOD PRESSURE: 152 MMHG | HEIGHT: 66 IN | OXYGEN SATURATION: 94 %

## 2017-11-29 DIAGNOSIS — M79.7 FIBROMYALGIA: ICD-10-CM

## 2017-11-29 DIAGNOSIS — E66.01 MORBID OBESITY (HCC): ICD-10-CM

## 2017-11-29 DIAGNOSIS — G89.4 CHRONIC PAIN DISORDER: ICD-10-CM

## 2017-11-29 DIAGNOSIS — E11.22 CONTROLLED TYPE 2 DIABETES MELLITUS WITH CHRONIC KIDNEY DISEASE, UNSPECIFIED CKD STAGE, UNSPECIFIED LONG TERM INSULIN USE STATUS: ICD-10-CM

## 2017-11-29 DIAGNOSIS — C54.1 ENDOMETRIAL CANCER (HCC): ICD-10-CM

## 2017-11-29 DIAGNOSIS — D47.2 SMOLDERING MULTIPLE MYELOMA: Primary | ICD-10-CM

## 2017-11-29 NOTE — PROGRESS NOTES
Get last note from dr Jeanna Pryor gyn/onc and PET report done at [facility] on Monday

## 2017-11-29 NOTE — MR AVS SNAPSHOT
Visit Information Date & Time Provider Department Dept. Phone Encounter #  
 11/29/2017  8:00 AM Anita Solorio, [address]  Oncology at [address] [address] Follow-up Instructions Return in about 6 months (around 5/29/2018). Routing History Follow-up and Disposition History Upcoming Health Maintenance Date Due  
 MEDICARE YEARLY EXAM 7/20/2016 EYE EXAM RETINAL OR DILATED Q1 4/22/2017 Influenza Age 5 to Adult 8/1/2017 MICROALBUMIN Q1 2/23/2018 LIPID PANEL Q1 4/20/2018 GLAUCOMA SCREENING Q2Y 4/22/2018 HEMOGLOBIN A1C Q6M 4/24/2018 FOOT EXAM Q1 6/22/2018 COLONOSCOPY 12/21/2019 DTaP/Tdap/Td series (3 - Td) 10/20/2026 Allergies as of 11/29/2017  Review Complete On: 11/29/2017 By: Anita Solorio DO Severity Noted Reaction Type Reactions Latex  08/29/2013    Rash, Itching Advair Diskus [Fluticasone-salmeterol]  05/29/2012    Other (comments) \"breathing problems\" Bactrim [Sulfamethoprim]  08/29/2013   Side Effect Shortness of Breath, Itching, Swelling Swelling of tongue and throat Eliquis [Apixaban]  11/29/2017    Other (comments) Iodinated Contrast- Oral And Iv Dye  10/04/2016    Hives Ivp [Fd And C Blue No.1]  12/21/2010    Hives, Shortness of Breath Lovenox [Enoxaparin]  12/21/2010    Shortness of Breath Nsaids (Non-steroidal Anti-inflammatory Drug)  12/21/2010    Other (comments) Heartburn Sulfa (Sulfonamide Antibiotics)  12/23/2013    Shortness of Breath Current Immunizations  Reviewed on 11/29/2017 Name Date Influenza Vaccine (Quad) PF  Deferred (Medication not available) Tdap 5/17/2006 ZZZ-RETIRED (DO NOT USE) Pneumococcal Vaccine (Unspecified Type) 1/1/2010  Reviewed by Ronnie Whaley LPN on 79/31/1998 at  8:14 AM  
 Reviewed by Ronnie Whaley LPN on 59/61/0229 at  8:15 AM  
 Reviewed by Ronnie Whaley LPN on 69/77/7230 at  8:25 AM  
 You Were Diagnosed With   
  
 Codes Comments Smoldering multiple myeloma ([facility]    -  Primary ICD-10-CM: C90.00 ICD-9-CM: 203.00 Endometrial cancer ([facility]     ICD-10-CM: C54.1 ICD-9-CM: 182.0 Controlled type 2 diabetes mellitus with chronic kidney disease, unspecified CKD stage, unspecified long term insulin use status ([facility]     ICD-10-CM: E11.22 
ICD-9-CM: 250.40, 585.9 Chronic pain disorder     ICD-10-CM: G89.4 ICD-9-CM: 338.4 Fibromyalgia     ICD-10-CM: M79.7 ICD-9-CM: 729.1 Morbid obesity ([facility]     ICD-10-CM: E66.01 
ICD-9-CM: 278.01 Vitals BP Pulse Temp Resp Height(growth percentile) Weight(growth percentile) 152/88 89 98.7 °F (37.1 °C) (Oral) 20 5' 6\" (1.676 m) 294 lb 9.6 oz (133.6 kg) SpO2 BMI OB Status Smoking Status 94% 47.55 kg/m2 Hysterectomy Former Smoker Vitals History BMI and BSA Data Body Mass Index Body Surface Area  
 47.55 kg/m 2 2.49 m 2 Preferred Pharmacy Pharmacy Name Phone [id number] [address] [phone] Your Updated Medication List  
  
   
This list is accurate as of: 11/29/17  9:23 AM.  Always use your most recent med list.  
  
  
  
  
 anastrozole 1 mg tablet Commonly known as:  ARIMIDEX Take 1 mg by mouth daily. * apixaban 5 mg tablet Commonly known as:  Timmothy [place] Take 1 Tab by mouth two (2) times a day. Indications: Cerebral Thromboembolism Prevention, DEEP VEIN THROMBOSIS PREVENTION, deep venous thrombosis, pulmonary thromboembolism * apixaban 5 mg tablet Commonly known as:  Timmothy [place] Take 5 mg by mouth. ascorbate calcium 500 mg Tab Take 500 mg by mouth.  
  
 clonazePAM 1 mg tablet Commonly known as:  Catha Jovi Take 1 mg by mouth.  
  
 cyanocobalamin 1,000 mcg tablet Commonly known as:  VITAMIN B-12 Take 1 Tab by mouth daily. furosemide 80 mg tablet Commonly known as:  LASIX TAKE 1 TABLET BY MOUTH EVERY DAY  
  
 * hydrALAZINE 25 mg tablet Commonly known as:  APRESOLINE Take 1 Tab by mouth daily. * hydrALAZINE 25 mg tablet Commonly known as:  APRESOLINE Take 25 mg by mouth. KLOR-CON M20 20 mEq tablet Generic drug:  potassium chloride TAKE 1 TABLET BY MOUTH TWICE A DAY  
  
 magnesium 250 mg Tab Take  by mouth daily. methylnaltrexone 150 mg Tab tablet Commonly known as:  RELISTOR Take 3 Tabs by mouth Daily (before breakfast). metOLazone 5 mg tablet Commonly known as:  Axtell Velasquez Take 5 mg by mouth.  
  
 metoprolol succinate 100 mg tablet Commonly known as:  TOPROL-XL  
100 mg daily. morphine CR 60 mg CR tablet Commonly known as:  MS CONTIN Take 1 Tab by mouth every twelve (12) hours. Max Daily Amount: 120 mg. Indications: Chronic Pain, Severe Pain  
  
 naloxegol 25 mg Tab tablet Commonly known as:  Duran Merlin Take 1 Tab by mouth Daily (before breakfast). ondansetron 4 mg disintegrating tablet Commonly known as:  ZOFRAN ODT Take 1 Tab by mouth every eight (8) hours as needed for Nausea. OTHER  
MORPHINE CREAM APPLIES TO KNEES 3-4X DAILY  
  
 oxyCODONE IR 20 mg immediate release tablet Commonly known as:  [place] Take 1 Tab by mouth every four (4) hours as needed for Pain. Max Daily Amount: 120 mg. Indications: Pain  
  
 pantoprazole 40 mg tablet Commonly known as:  PROTONIX Take 1 Tab by mouth two (2) times a day. * VITAMIN D3 2,000 unit Tab Generic drug:  cholecalciferol (vitamin D3) Take  by mouth daily. * cholecalciferol 1,000 unit Cap Commonly known as:  VITAMIN D3 Take  by mouth. * Notice: This list has 6 medication(s) that are the same as other medications prescribed for you. Read the directions carefully, and ask your doctor or other care provider to review them with you. Follow-up Instructions Return in about 6 months (around 5/29/2018). Introducing [facility]! Dear Doris Zhang: Thank you for requesting a [employer] account. Our records indicate that you have previously registered for a [employer] account but its currently inactive. Please call our [employer] support line at [phone]. Additional Information If you have questions, please visit the Frequently Asked Questions section of the [employer] website at [email or web address] Remember, [employer] is NOT to be used for urgent needs. For medical emergencies, dial 911. Now available from your iPhone and Android! Please provide this summary of care documentation to your next provider. Your primary care clinician is listed as [facility] Alert. If you have any questions after today's visit, please call [phone].

## 2017-11-29 NOTE — PROGRESS NOTES
Request for PET faxed complete to [facility] Medical Records. Office note from today and request for most recent office note faxed complete to Dr. Nelli Irving as previous note not legible.

## 2017-11-29 NOTE — PROGRESS NOTES
Mara Munoz is a 68 y.o. 1940 female and presents with Follow-up (MGUS)    CC .MGUS since 2007      INTERIM HISTORY:   1 month f/u per pt request for MGUS. Pt has questions about smoldering MM today also. Pt still dealing with recurrrent endometrial cancer with Dr Hao Bertrand. Just had a PET Monday. Waiting for results. Pt has question about a cream.   C/o right side pain/ has chronic pain/ FM. Walks with a walker chronically. Pt does not want bone marrow biopsy yet. Last visit:  Has not had gammopathy labs yet. CBC/ comp met stable. Still anemic but stable in 9s. Pt is seeing gyn/ONC for recurrent endometrial cancer and is on arimidex for this. Hx of XRT for this also. No records from cancer dx. Pt states she is to do a PET next month. Pt has multiple medical problems chronically. Pt is doing well overall today. Last visit:  Pt is walking with a cane and not using a w/c. Pt does not want a bone marrow. Pt has usual symptoms of chronic pain and multiple medical problems. Has RA, fibromyalgia, chronic pain. On chronic narcotics from PCP. Pt has chronic anemia stable.        Past Medical History:   Diagnosis Date    Acute kidney failure ([address] 11/19/2015    Acute sinusitis 2/11/2011    Adverse effect of anesthesia     SLOW WAKING PAST ANESTHESIA    Arthritis     RA    At risk for side effect of medication 9/30/2016    Atrial fibrillation ([address] 10/19/2011    Autoimmune disease ([address]     FIBROMYLGIA    Cancer ([address] 2016    ENDOMETRIAL     Cholelithiasis 11/19/2015    Chronic pain     Claudication of both lower extremities ([address] 8/25/2015    Diabetes mellitus type 2, controlled ([address] 1/19/2016    Diabetes mellitus, type II ([address] 10/10/2014    Fall against object 10/10/2014    Fatty liver 10/20/2015    Hypertension     Ill-defined condition     SPINAL STENOSIS    Left shoulder pain 10/10/2014    Leiomyoma of body of uterus 4/7/2016    Morbid obesity [facility]     Myalgia 9/30/2016    Nausea & vomiting     Personal history of radiation therapy 4/20/2017    Pneumonia     Preop examination 6/15/2015    Preoperative clearance 6/15/2015    Rash 2/11/2011    Rash of hands 10/10/2014    Screening for colon cancer 2/22/2016    Screening for glaucoma 2/22/2016    Sleep apnea     SOB (shortness of breath)     hospitalized 5/2012.  angina, SOB    Thromboembolus ([facility] 2011    LEFT LOWER LEG AND PE    Tinea pedis, recurrent 7/20/2015    Vaginal bleeding, abnormal 4/20/2017     Past Surgical History:   Procedure Laterality Date    COLONOSCOPY N/A 12/21/2016    COLONOSCOPY performed by Kendrick Ríos MD at [address] [place] [address]  12/21/2016         HX CATARACT REMOVAL Bilateral 2015    [address]    HX DILATION AND CURETTAGE  07/29/2016    HX DILATION AND CURETTAGE  2016    HX GYN  1960    etopic    HX HEART CATHETERIZATION  2001    NEGATIVE PER PATIENT    HX HYSTERECTOMY  2016    HX KNEE ARTHROSCOPY Left 1998    HX ORTHOPAEDIC Right 1960'S    BUNIONECTOMY    HX OTHER SURGICAL      BIOPSY OF VEIN IN FOREHEAD    HX TUBAL LIGATION  1963    IA COLSC FLX W/RMVL OF TUMOR POLYP LESION SNARE TQ  12/12/2011          Social History     Social History    Marital status: SINGLE     Spouse name: N/A    Number of children: N/A    Years of education: N/A     Social History Main Topics    Smoking status: Former Smoker     Packs/day: 0.25     Years: 15.00     Quit date: 8/23/1996    Smokeless tobacco: Never Used    Alcohol use No    Drug use: No    Sexual activity: No     Other Topics Concern    None     Social History Narrative     Family History   Problem Relation Age of Onset    Other Mother      ANEURYSM    Obesity Mother     Heart Disease Father     Other Father      VASCULAR DISEASE    Hypertension Sister     Heart Disease Brother     Heart Attack Brother     Kidney Disease Sister     Seizures Brother  Heart Attack Brother     Anesth Problems Neg Hx     Alcohol abuse Neg Hx        Current Outpatient Prescriptions   Medication Sig Dispense Refill    KLOR-CON M20 20 mEq tablet TAKE 1 TABLET BY MOUTH TWICE A DAY 30 Tab 1    cyanocobalamin (VITAMIN B-12) 1,000 mcg tablet Take 1 Tab by mouth daily. 90 Tab 1    ascorbate calcium 500 mg tab Take 500 mg by mouth.  metOLazone (ZAROXOLYN) 5 mg tablet Take 5 mg by mouth.  cholecalciferol (VITAMIN D3) 1,000 unit cap Take  by mouth.  hydrALAZINE (APRESOLINE) 25 mg tablet Take 25 mg by mouth.  morphine CR (MS CONTIN) 60 mg CR tablet Take 1 Tab by mouth every twelve (12) hours. Max Daily Amount: 120 mg. Indications: Chronic Pain, Severe Pain 60 Tab 0    oxyCODONE IR (ROXICODONE) 20 mg immediate release tablet Take 1 Tab by mouth every four (4) hours as needed for Pain. Max Daily Amount: 120 mg. Indications: Pain 180 Tab 0    pantoprazole (PROTONIX) 40 mg tablet Take 1 Tab by mouth two (2) times a day. 60 Tab 2    anastrozole (ARIMIDEX) 1 mg tablet Take 1 mg by mouth daily.  furosemide (LASIX) 80 mg tablet TAKE 1 TABLET BY MOUTH EVERY DAY  11    hydrALAZINE (APRESOLINE) 25 mg tablet Take 1 Tab by mouth daily. 90 Tab 6    OTHER MORPHINE CREAM APPLIES TO KNEES 3-4X DAILY      metoprolol succinate (TOPROL-XL) 100 mg tablet 100 mg daily.  magnesium 250 mg tab Take  by mouth daily.  cholecalciferol, vitamin D3, (VITAMIN D3) 2,000 unit tab Take  by mouth daily.  clonazePAM (KLONOPIN) 1 mg tablet Take 1 mg by mouth.  apixaban (ELIQUIS) 5 mg tablet Take 5 mg by mouth.  methylnaltrexone (RELISTOR) 150 mg tab tablet Take 3 Tabs by mouth Daily (before breakfast). 90 Tab 4    naloxegol (MOVANTIK) 25 mg tab tablet Take 1 Tab by mouth Daily (before breakfast). 30 Tab 6    ondansetron (ZOFRAN ODT) 4 mg disintegrating tablet Take 1 Tab by mouth every eight (8) hours as needed for Nausea.  16 Tab 0    apixaban (ELIQUIS) 5 mg tablet Take 1 Tab by mouth two (2) times a day. Indications: Cerebral Thromboembolism Prevention, DEEP VEIN THROMBOSIS PREVENTION, deep venous thrombosis, pulmonary thromboembolism 60 Tab 11       Allergies   Allergen Reactions    Latex Rash and Itching    Advair Diskus [Fluticasone-Salmeterol] Other (comments)     \"breathing problems\"    Bactrim [Sulfamethoprim] Shortness of Breath, Itching and Swelling     Swelling of tongue and throat    Eliquis [Apixaban] Other (comments)    Iodinated Contrast- Oral And Iv Dye Hives    Ivp [Fd And C Blue No.1] Hives and Shortness of Breath    Lovenox [Enoxaparin] Shortness of Breath    Nsaids (Non-Steroidal Anti-Inflammatory Drug) Other (comments)     Heartburn    Sulfa (Sulfonamide Antibiotics) Shortness of Breath       Review of Systems    A comprehensive review of systems was negative except for: per HPI   Chronic pain, fibromyalgia, RA    Objective:  Visit Vitals    /88    Pulse 89    Temp 98.7 °F (37.1 °C) (Oral)    Resp 20    Ht 5' 6\" (1.676 m)    Wt 294 lb 9.6 oz (133.6 kg)    SpO2 94%    BMI 47.55 kg/m2         Physical Exam:   General appearance - alert, well appearing, and in no distress  Mental status - alert, oriented to person, place, and time, normal mood, behavior, speech, dress, motor activity, and thought processes  EYE-conj clear  Ext chronic edema  Skin-Warm and dry. Neuro -nonfocal walking with a walker      Diagnostic Imaging   reviewed    No results found for this or any previous visit. Results for orders placed during the hospital encounter of 05/30/17   XR CHEST PA LAT    Narrative Indication:  rapid heart rate, intermittent. Wound check. Exam: AP upright and lateral views of the chest.    Direct comparison is made to prior CXR dated September 2016. Findings: Cardiomediastinal silhouette is stable. Lungs are clear bilaterally. Pleural spaces are normal. Osseous structures are intact.       Impression IMPRESSION: No acute cardiopulmonary disease. Results for orders placed during the hospital encounter of 09/19/17   CT ABD PELV WO CONT    Narrative EXAM:  CT ABD PELV WO CONT    INDICATION: Abdominal pain, rectal bleeding  , intermittent bleeding for one  month, worsening in the past 2 days    COMPARISON: 1/12/2017    CONTRAST:  None. TECHNIQUE:   Thin axial images were obtained through the abdomen and pelvis. Coronal and  sagittal reconstructions were generated. Oral contrast was not administered. CT  dose reduction was achieved through use of a standardized protocol tailored for  this examination and automatic exposure control for dose modulation. The absence of intravenous contrast material reduces the sensitivity for  evaluation of the solid parenchymal organs of the abdomen. FINDINGS:   LUNG BASES: Clear. INCIDENTALLY IMAGED HEART AND MEDIASTINUM: Unremarkable. LIVER: No mass or biliary dilatation. GALLBLADDER: Unremarkable. SPLEEN: No mass. PANCREAS: No mass or ductal dilatation. ADRENALS: Unremarkable. KIDNEYS/URETERS: No mass, calculus, or hydronephrosis. STOMACH: Unremarkable. SMALL BOWEL: No dilatation or wall thickening. COLON: Extensive colonic diverticulosis. Suspect mild acute diverticulitis of  the middle third of the descending colon with mild pericolonic haziness. Pericolic haziness increased slightly since prior study. No associated abscess. No bowel obstruction. APPENDIX: Unremarkable. PERITONEUM: Small amount of ascites in the abdomen and pelvis. No localized  fluid collections. RETROPERITONEUM: No lymphadenopathy or aortic aneurysm. REPRODUCTIVE ORGANS: Surgically absent. URINARY BLADDER: No mass or calculus. BONES: Facet joint arthropathy in the lumbar spine with mild degenerative  anterolisthesis at L3-4 and L4-5. ADDITIONAL COMMENTS: 2.1 x 2.0 cm localized area of fluid density directly  adjacent to the umbilicus on the left, in the anterior abdominal wall.  New since  prior study. Question of small associated abdominal wall defect. Small bilateral  fat-containing inguinal hernias. Impression IMPRESSION:  1. Colonic diverticulosis. Possible mild acute diverticulitis of the descending  colon. 2. Small amount of ascites. 3. Small fluid containing left periumbilical abdominal wall abnormality,  question of small fluid containing hernia or other fluid containing lesion. 4. Small fat-containing inguinal hernias. 5. Small amount of ascites. Lab Results  reviewed  Lab Results   Component Value Date/Time    WBC 2.8 10/24/2017 09:56 AM    HGB 9.5 10/24/2017 09:56 AM    HCT 29.0 10/24/2017 09:56 AM    PLATELET 007 07/46/8870 09:56 AM    MCV 92 10/24/2017 09:56 AM       Lab Results   Component Value Date/Time    Sodium 138 10/24/2017 09:56 AM    Potassium 3.6 10/24/2017 09:56 AM    Chloride 95 10/24/2017 09:56 AM    CO2 28 10/24/2017 09:56 AM    Anion gap 7 09/19/2017 12:20 PM    Glucose 123 10/24/2017 09:56 AM    BUN 38 10/24/2017 09:56 AM    Creatinine 1.00 10/24/2017 09:56 AM    BUN/Creatinine ratio 38 10/24/2017 09:56 AM    GFR est AA 63 10/24/2017 09:56 AM    GFR est non-AA 55 10/24/2017 09:56 AM    Calcium 10.7 10/24/2017 09:56 AM    AST (SGOT) 19 10/24/2017 09:56 AM    Alk. phosphatase 71 10/24/2017 09:56 AM    Protein, total 8.8 11/13/2017 02:48 PM    Albumin 3.8 10/24/2017 09:56 AM    Globulin 6.4 09/19/2017 12:20 PM    A-G Ratio 0.7 10/24/2017 09:56 AM    ALT (SGPT) 12 10/24/2017 09:56 AM       .    Assessment/Plan:     Hannah Taveras is a 70 y.o. 1940 female and presents with Follow-up  . INTERIM HISTORY:   f/u for MGUS and pt is same overall. Multiple medical problems. Has RA, fibromyalgia, chronic pain. Obese in a w/c chronically. Pt clinically stable. 1.  MGUS chronic since 2007. Smoldering MM. Labs basically stable for years. IGG 3800 and has been up and down overall.     Pt has elevated beta microglobulin and serum free lights chains. Relatively stable over years. Reviewed labs today with pt. Pt has mild anemia and stable. Reviewed doing BMB and pt wants to hold this for now. Continue course of surveillence       2. Endometrial cancer post surgery. Per GYN/onc  Pt did not want chemo adjuvantly. Could not tolerate. On arimidex now. Get records from gyn/ONC dr Keara Cain. And [facility]. Get PET report. 2. Multiple chronic medical problems  Per PCP. Call if questions. Return in 6 months with labs. ICD-10-CM ICD-9-CM    1. 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 Endometrial cancer (HCC) C54.1 182.0    3. Controlled type 2 diabetes mellitus with chronic kidney disease, unspecified CKD stage, unspecified long term insulin use status (HCC) E11.22 250.40      585.9    4. Chronic pain disorder G89.4 338.4    5. Fibromyalgia M79.7 729.1    6. Morbid obesity ([facility] E66.01 278.01      No orders of the defined types were placed in this encounter. continue present plan, call if any problems  There are no Patient Instructions on file for this visit. Follow-up Disposition:  Return in about 6 months (around 5/29/2018).     Marcella Figueroa DO

## 2017-12-09 ENCOUNTER — APPOINTMENT (OUTPATIENT)
Dept: GENERAL RADIOLOGY | Age: 77
DRG: 253 | End: 2017-12-09
Attending: EMERGENCY MEDICINE
Payer: MEDICARE

## 2017-12-09 ENCOUNTER — HOSPITAL ENCOUNTER (INPATIENT)
Age: 77
LOS: 7 days | Discharge: NURSING, REHAB OR LONG-TERM CARE | DRG: 253 | End: 2017-12-16
Attending: EMERGENCY MEDICINE | Admitting: INTERNAL MEDICINE
Payer: MEDICARE

## 2017-12-09 ENCOUNTER — APPOINTMENT (OUTPATIENT)
Dept: ULTRASOUND IMAGING | Age: 77
DRG: 253 | End: 2017-12-09
Attending: EMERGENCY MEDICINE
Payer: MEDICARE

## 2017-12-09 DIAGNOSIS — I82.409 DEEP VEIN THROMBOSIS (DVT) OF LOWER EXTREMITY, UNSPECIFIED CHRONICITY, UNSPECIFIED LATERALITY, UNSPECIFIED VEIN (HCC): ICD-10-CM

## 2017-12-09 DIAGNOSIS — N17.9 ACUTE RENAL FAILURE, UNSPECIFIED ACUTE RENAL FAILURE TYPE (HCC): ICD-10-CM

## 2017-12-09 DIAGNOSIS — D64.9 CHRONIC ANEMIA: ICD-10-CM

## 2017-12-09 DIAGNOSIS — K62.5 RECTAL BLEEDING: ICD-10-CM

## 2017-12-09 DIAGNOSIS — I82.411 ACUTE DEEP VEIN THROMBOSIS (DVT) OF FEMORAL VEIN OF RIGHT LOWER EXTREMITY (HCC): Primary | ICD-10-CM

## 2017-12-09 DIAGNOSIS — M79.651 ACUTE THIGH PAIN, RIGHT: ICD-10-CM

## 2017-12-09 DIAGNOSIS — C54.1 ENDOMETRIAL CANCER (HCC): ICD-10-CM

## 2017-12-09 DIAGNOSIS — G89.29 OTHER CHRONIC PAIN: ICD-10-CM

## 2017-12-09 DIAGNOSIS — E88.09 HYPOALBUMINEMIA: ICD-10-CM

## 2017-12-09 DIAGNOSIS — R50.9 FEVER, UNSPECIFIED FEVER CAUSE: ICD-10-CM

## 2017-12-09 DIAGNOSIS — Z71.89 GOALS OF CARE, COUNSELING/DISCUSSION: ICD-10-CM

## 2017-12-09 DIAGNOSIS — R65.10 SIRS (SYSTEMIC INFLAMMATORY RESPONSE SYNDROME) (HCC): ICD-10-CM

## 2017-12-09 DIAGNOSIS — K59.00 CONSTIPATION, UNSPECIFIED CONSTIPATION TYPE: ICD-10-CM

## 2017-12-09 DIAGNOSIS — K59.03 CONSTIPATION DUE TO PAIN MEDICATION: ICD-10-CM

## 2017-12-09 PROBLEM — I82.401 RIGHT LEG DVT (HCC): Status: ACTIVE | Noted: 2017-12-09

## 2017-12-09 LAB
ALBUMIN SERPL-MCNC: 2.7 G/DL (ref 3.5–5)
ALBUMIN/GLOB SERPL: 0.4 {RATIO} (ref 1.1–2.2)
ALP SERPL-CCNC: 70 U/L (ref 45–117)
ALT SERPL-CCNC: 14 U/L (ref 12–78)
ANION GAP SERPL CALC-SCNC: 6 MMOL/L (ref 5–15)
APPEARANCE UR: ABNORMAL
AST SERPL-CCNC: 16 U/L (ref 15–37)
BACTERIA URNS QL MICRO: ABNORMAL /HPF
BASOPHILS # BLD: 0 K/UL (ref 0–0.1)
BASOPHILS NFR BLD: 0 % (ref 0–1)
BILIRUB SERPL-MCNC: 1.2 MG/DL (ref 0.2–1)
BILIRUB UR QL CFM: NEGATIVE
BNP SERPL-MCNC: 367 PG/ML (ref 0–450)
BUN SERPL-MCNC: 29 MG/DL (ref 6–20)
BUN/CREAT SERPL: 14 (ref 12–20)
CALCIUM SERPL-MCNC: 10.1 MG/DL (ref 8.5–10.1)
CHLORIDE SERPL-SCNC: 99 MMOL/L (ref 97–108)
CO2 SERPL-SCNC: 29 MMOL/L (ref 21–32)
COLOR UR: ABNORMAL
CREAT SERPL-MCNC: 2.03 MG/DL (ref 0.55–1.02)
DIFFERENTIAL METHOD BLD: ABNORMAL
EOSINOPHIL # BLD: 0 K/UL (ref 0–0.4)
EOSINOPHIL NFR BLD: 0 % (ref 0–7)
EPITH CASTS URNS QL MICRO: ABNORMAL /LPF
ERYTHROCYTE [DISTWIDTH] IN BLOOD BY AUTOMATED COUNT: 15.4 % (ref 11.5–14.5)
FLUAV AG NPH QL IA: NEGATIVE
FLUBV AG NOSE QL IA: NEGATIVE
GLOBULIN SER CALC-MCNC: 7.3 G/DL (ref 2–4)
GLUCOSE SERPL-MCNC: 117 MG/DL (ref 65–100)
GLUCOSE UR STRIP.AUTO-MCNC: NEGATIVE MG/DL
HCT VFR BLD AUTO: 26.9 % (ref 35–47)
HGB BLD-MCNC: 8.7 G/DL (ref 11.5–16)
HGB UR QL STRIP: NEGATIVE
INR PPP: 1.6 (ref 0.9–1.1)
KETONES UR QL STRIP.AUTO: NEGATIVE MG/DL
LACTATE SERPL-SCNC: 1.5 MMOL/L (ref 0.4–2)
LEUKOCYTE ESTERASE UR QL STRIP.AUTO: ABNORMAL
LYMPHOCYTES # BLD: 0.5 K/UL (ref 0.8–3.5)
LYMPHOCYTES NFR BLD: 6 % (ref 12–49)
MCH RBC QN AUTO: 29.6 PG (ref 26–34)
MCHC RBC AUTO-ENTMCNC: 32.3 G/DL (ref 30–36.5)
MCV RBC AUTO: 91.5 FL (ref 80–99)
MONOCYTES # BLD: 1.2 K/UL (ref 0–1)
MONOCYTES NFR BLD: 14 % (ref 5–13)
NEUTS SEG # BLD: 6.6 K/UL (ref 1.8–8)
NEUTS SEG NFR BLD: 80 % (ref 32–75)
NITRITE UR QL STRIP.AUTO: NEGATIVE
OTHER,OTHU: ABNORMAL
PH UR STRIP: 6 [PH] (ref 5–8)
PLATELET # BLD AUTO: 139 K/UL (ref 150–400)
POTASSIUM SERPL-SCNC: 4 MMOL/L (ref 3.5–5.1)
PROT SERPL-MCNC: 10 G/DL (ref 6.4–8.2)
PROT UR STRIP-MCNC: 100 MG/DL
PROTHROMBIN TIME: 16.4 SEC (ref 9–11.1)
RBC # BLD AUTO: 2.94 M/UL (ref 3.8–5.2)
RBC #/AREA URNS HPF: ABNORMAL /HPF (ref 0–5)
RBC MORPH BLD: ABNORMAL
SODIUM SERPL-SCNC: 134 MMOL/L (ref 136–145)
SP GR UR REFRACTOMETRY: 1.02 (ref 1–1.03)
TROPONIN I SERPL-MCNC: <0.04 NG/ML
UROBILINOGEN UR QL STRIP.AUTO: 1 EU/DL (ref 0.2–1)
WBC # BLD AUTO: 8.3 K/UL (ref 3.6–11)
WBC URNS QL MICRO: ABNORMAL /HPF (ref 0–4)

## 2017-12-09 PROCEDURE — 74011250636 HC RX REV CODE- 250/636: Performed by: INTERNAL MEDICINE

## 2017-12-09 PROCEDURE — 80053 COMPREHEN METABOLIC PANEL: CPT | Performed by: EMERGENCY MEDICINE

## 2017-12-09 PROCEDURE — 83880 ASSAY OF NATRIURETIC PEPTIDE: CPT | Performed by: EMERGENCY MEDICINE

## 2017-12-09 PROCEDURE — 85025 COMPLETE CBC W/AUTO DIFF WBC: CPT | Performed by: EMERGENCY MEDICINE

## 2017-12-09 PROCEDURE — 96374 THER/PROPH/DIAG INJ IV PUSH: CPT

## 2017-12-09 PROCEDURE — 84484 ASSAY OF TROPONIN QUANT: CPT | Performed by: EMERGENCY MEDICINE

## 2017-12-09 PROCEDURE — 30233N1 TRANSFUSION OF NONAUTOLOGOUS RED BLOOD CELLS INTO PERIPHERAL VEIN, PERCUTANEOUS APPROACH: ICD-10-PCS | Performed by: INTERNAL MEDICINE

## 2017-12-09 PROCEDURE — 74011250636 HC RX REV CODE- 250/636: Performed by: EMERGENCY MEDICINE

## 2017-12-09 PROCEDURE — 36415 COLL VENOUS BLD VENIPUNCTURE: CPT | Performed by: EMERGENCY MEDICINE

## 2017-12-09 PROCEDURE — 93971 EXTREMITY STUDY: CPT

## 2017-12-09 PROCEDURE — 99285 EMERGENCY DEPT VISIT HI MDM: CPT

## 2017-12-09 PROCEDURE — 94761 N-INVAS EAR/PLS OXIMETRY MLT: CPT

## 2017-12-09 PROCEDURE — 83605 ASSAY OF LACTIC ACID: CPT | Performed by: EMERGENCY MEDICINE

## 2017-12-09 PROCEDURE — 65660000000 HC RM CCU STEPDOWN

## 2017-12-09 PROCEDURE — 87086 URINE CULTURE/COLONY COUNT: CPT | Performed by: INTERNAL MEDICINE

## 2017-12-09 PROCEDURE — 87040 BLOOD CULTURE FOR BACTERIA: CPT | Performed by: EMERGENCY MEDICINE

## 2017-12-09 PROCEDURE — 96375 TX/PRO/DX INJ NEW DRUG ADDON: CPT

## 2017-12-09 PROCEDURE — 74011250637 HC RX REV CODE- 250/637: Performed by: INTERNAL MEDICINE

## 2017-12-09 PROCEDURE — 93005 ELECTROCARDIOGRAM TRACING: CPT

## 2017-12-09 PROCEDURE — 51701 INSERT BLADDER CATHETER: CPT

## 2017-12-09 PROCEDURE — 81001 URINALYSIS AUTO W/SCOPE: CPT | Performed by: EMERGENCY MEDICINE

## 2017-12-09 PROCEDURE — 77030005563 HC CATH URETH INT MMGH -A

## 2017-12-09 PROCEDURE — 71010 XR CHEST PORT: CPT

## 2017-12-09 PROCEDURE — 74011250637 HC RX REV CODE- 250/637: Performed by: EMERGENCY MEDICINE

## 2017-12-09 PROCEDURE — 85610 PROTHROMBIN TIME: CPT | Performed by: INTERNAL MEDICINE

## 2017-12-09 PROCEDURE — 87804 INFLUENZA ASSAY W/OPTIC: CPT | Performed by: EMERGENCY MEDICINE

## 2017-12-09 RX ORDER — LANOLIN ALCOHOL/MO/W.PET/CERES
1000 CREAM (GRAM) TOPICAL DAILY
Status: DISCONTINUED | OUTPATIENT
Start: 2017-12-10 | End: 2017-12-16 | Stop reason: HOSPADM

## 2017-12-09 RX ORDER — HYDROMORPHONE HYDROCHLORIDE 2 MG/ML
1 INJECTION, SOLUTION INTRAMUSCULAR; INTRAVENOUS; SUBCUTANEOUS
Status: DISCONTINUED | OUTPATIENT
Start: 2017-12-09 | End: 2017-12-09

## 2017-12-09 RX ORDER — SODIUM CHLORIDE 0.9 % (FLUSH) 0.9 %
5-10 SYRINGE (ML) INJECTION EVERY 8 HOURS
Status: DISCONTINUED | OUTPATIENT
Start: 2017-12-09 | End: 2017-12-16 | Stop reason: HOSPADM

## 2017-12-09 RX ORDER — HEPARIN SODIUM 5000 [USP'U]/ML
10000 INJECTION, SOLUTION INTRAVENOUS; SUBCUTANEOUS AS NEEDED
Status: DISCONTINUED | OUTPATIENT
Start: 2017-12-09 | End: 2017-12-12

## 2017-12-09 RX ORDER — HEPARIN SODIUM 10000 [USP'U]/100ML
11.3-36 INJECTION, SOLUTION INTRAVENOUS
Status: DISCONTINUED | OUTPATIENT
Start: 2017-12-09 | End: 2017-12-12

## 2017-12-09 RX ORDER — SODIUM CHLORIDE 0.9 % (FLUSH) 0.9 %
5-10 SYRINGE (ML) INJECTION AS NEEDED
Status: DISCONTINUED | OUTPATIENT
Start: 2017-12-09 | End: 2017-12-16 | Stop reason: HOSPADM

## 2017-12-09 RX ORDER — SODIUM CHLORIDE 9 MG/ML
75 INJECTION, SOLUTION INTRAVENOUS CONTINUOUS
Status: DISCONTINUED | OUTPATIENT
Start: 2017-12-09 | End: 2017-12-14

## 2017-12-09 RX ORDER — MELATONIN
1000 DAILY
Status: DISCONTINUED | OUTPATIENT
Start: 2017-12-10 | End: 2017-12-16 | Stop reason: HOSPADM

## 2017-12-09 RX ORDER — ACETAMINOPHEN 325 MG/1
650 TABLET ORAL
Status: COMPLETED | OUTPATIENT
Start: 2017-12-09 | End: 2017-12-09

## 2017-12-09 RX ORDER — ONDANSETRON 2 MG/ML
4 INJECTION INTRAMUSCULAR; INTRAVENOUS
Status: DISCONTINUED | OUTPATIENT
Start: 2017-12-09 | End: 2017-12-16 | Stop reason: HOSPADM

## 2017-12-09 RX ORDER — FACIAL-BODY WIPES
10 EACH TOPICAL DAILY PRN
Status: DISCONTINUED | OUTPATIENT
Start: 2017-12-09 | End: 2017-12-16 | Stop reason: HOSPADM

## 2017-12-09 RX ORDER — METOPROLOL SUCCINATE 50 MG/1
100 TABLET, EXTENDED RELEASE ORAL DAILY
Status: DISCONTINUED | OUTPATIENT
Start: 2017-12-10 | End: 2017-12-16 | Stop reason: HOSPADM

## 2017-12-09 RX ORDER — OXYCODONE HYDROCHLORIDE 5 MG/1
20 TABLET ORAL
Status: DISCONTINUED | OUTPATIENT
Start: 2017-12-09 | End: 2017-12-09

## 2017-12-09 RX ORDER — ONDANSETRON 2 MG/ML
4 INJECTION INTRAMUSCULAR; INTRAVENOUS
Status: COMPLETED | OUTPATIENT
Start: 2017-12-09 | End: 2017-12-09

## 2017-12-09 RX ORDER — MORPHINE SULFATE 30 MG/1
60 TABLET, FILM COATED, EXTENDED RELEASE ORAL EVERY 12 HOURS
Status: DISCONTINUED | OUTPATIENT
Start: 2017-12-09 | End: 2017-12-12

## 2017-12-09 RX ORDER — MORPHINE SULFATE 2 MG/ML
4 INJECTION, SOLUTION INTRAMUSCULAR; INTRAVENOUS ONCE
Status: COMPLETED | OUTPATIENT
Start: 2017-12-09 | End: 2017-12-09

## 2017-12-09 RX ORDER — HYDROMORPHONE HYDROCHLORIDE 2 MG/ML
1-2 INJECTION, SOLUTION INTRAMUSCULAR; INTRAVENOUS; SUBCUTANEOUS
Status: DISCONTINUED | OUTPATIENT
Start: 2017-12-09 | End: 2017-12-13

## 2017-12-09 RX ORDER — NALOXONE HYDROCHLORIDE 0.4 MG/ML
0.4 INJECTION, SOLUTION INTRAMUSCULAR; INTRAVENOUS; SUBCUTANEOUS AS NEEDED
Status: DISCONTINUED | OUTPATIENT
Start: 2017-12-09 | End: 2017-12-16 | Stop reason: HOSPADM

## 2017-12-09 RX ORDER — HEPARIN SODIUM 5000 [USP'U]/ML
74.7 INJECTION, SOLUTION INTRAVENOUS; SUBCUTANEOUS ONCE
Status: COMPLETED | OUTPATIENT
Start: 2017-12-09 | End: 2017-12-09

## 2017-12-09 RX ORDER — HEPARIN SODIUM 5000 [USP'U]/ML
5000 INJECTION, SOLUTION INTRAVENOUS; SUBCUTANEOUS AS NEEDED
Status: DISCONTINUED | OUTPATIENT
Start: 2017-12-09 | End: 2017-12-12

## 2017-12-09 RX ORDER — ACETAMINOPHEN 325 MG/1
650 TABLET ORAL
Status: DISCONTINUED | OUTPATIENT
Start: 2017-12-09 | End: 2017-12-14

## 2017-12-09 RX ADMIN — MORPHINE SULFATE 60 MG: 30 TABLET, EXTENDED RELEASE ORAL at 22:17

## 2017-12-09 RX ADMIN — HEPARIN SODIUM AND DEXTROSE 11.3 UNITS/KG/HR: 10000; 5 INJECTION INTRAVENOUS at 21:34

## 2017-12-09 RX ADMIN — SODIUM CHLORIDE 500 ML: 900 INJECTION, SOLUTION INTRAVENOUS at 18:18

## 2017-12-09 RX ADMIN — ACETAMINOPHEN 650 MG: 325 TABLET ORAL at 17:53

## 2017-12-09 RX ADMIN — MORPHINE SULFATE 4 MG: 2 INJECTION, SOLUTION INTRAMUSCULAR; INTRAVENOUS at 17:47

## 2017-12-09 RX ADMIN — ONDANSETRON 4 MG: 2 INJECTION INTRAMUSCULAR; INTRAVENOUS at 17:47

## 2017-12-09 RX ADMIN — HEPARIN SODIUM 10000 UNITS: 5000 INJECTION, SOLUTION INTRAVENOUS; SUBCUTANEOUS at 21:09

## 2017-12-09 RX ADMIN — HYDROMORPHONE HYDROCHLORIDE 1 MG: 2 INJECTION INTRAMUSCULAR; INTRAVENOUS; SUBCUTANEOUS at 21:56

## 2017-12-09 RX ADMIN — HYDROMORPHONE HYDROCHLORIDE 1 MG: 2 INJECTION INTRAMUSCULAR; INTRAVENOUS; SUBCUTANEOUS at 22:17

## 2017-12-09 NOTE — IP AVS SNAPSHOT
[address] [facility] 
[phone] Patient: Marifer Glass MRN: [id number] WALDO:[id number] My Medications STOP taking these medications   
 apixaban 5 mg tablet Commonly known as:  ELIQUIS  
   
  
  
TAKE these medications as instructed Instructions Each Dose to Equal  
 Morning Noon Evening Bedtime  
 anastrozole 1 mg tablet Commonly known as:  ARIMIDEX Notes to Patient:  Not taking at hospital  
   
 Resume if ok with your oncologist  
     
   
   
   
  
 ascorbate calcium 500 mg Tab Notes to Patient:  Not taking at hospital  
   
 Take 500 mg by mouth. 500 mg  
    
   
   
   
  
 bisacodyl 10 mg suppository Commonly known as:  DULCOLAX Notes to Patient:  Not taking at hospital  
   
 Insert 10 mg into rectum daily. 10 mg  
    
   
   
   
  
 clonazePAM 1 mg tablet Commonly known as:  [place] Listen Notes to Patient:  Not taking at hospital  
   
 Take 1 mg by mouth. 1 mg  
    
   
   
   
  
 cyanocobalamin 1,000 mcg tablet Commonly known as:  VITAMIN B-12 Your last dose was: Today 12/16/17 10 am  
Your next dose is:  Tomorrow 12/17/17 Take 1 Tab by mouth daily. 1000 mcg  
    
   
   
   
  
 furosemide 80 mg tablet Commonly known as:  LASIX Your last dose was:  12/14/17 TAKE 1 TABLET BY MOUTH EVERY DAY  
     
   
   
   
  
 gabapentin 100 mg capsule Commonly known as:  NEURONTIN Your last dose was: Today 12/16/17 at 1000 Take 1 Cap by mouth three (3) times daily. 100 mg  
    
   
  
   
   
  
  
 hydrALAZINE 25 mg tablet Commonly known as:  APRESOLINE Notes to Patient:  Not taking at hospital  
   
 Take 1 Tab by mouth daily. 25 mg KLOR-CON M20 20 mEq tablet Generic drug:  potassium chloride Notes to Patient:  Not taking at hospital  
   
 TAKE 1 TABLET BY MOUTH TWICE A DAY  
     
   
   
   
  
 magnesium 250 mg Tab Notes to Patient:  Not taking at hospital  
   
 Take  by mouth daily. methylnaltrexone 150 mg Tab tablet Commonly known as:  RELISTOR Take 3 Tabs by mouth Daily (before breakfast). 450 mg  
    
   
   
   
  
 metoprolol succinate 100 mg tablet Commonly known as:  TOPROL-XL Your last dose was: Today 12/16/17 at 10 am  
Your next dose is:  Tomorrow 12/17/17   
   
 100 mg daily. 100 mg  
    
   
   
   
  
 morphine CR 60 mg CR tablet Commonly known as:  MS CONTIN Your last dose was: Today 12/16/17 at 10 am  
   
 Take 1 Tab by mouth every twelve (12) hours. Max Daily Amount: 120 mg. Indications: Chronic Pain, Severe Pain 60 mg  
    
   
   
   
  
  
 naloxegol 25 mg Tab tablet Commonly known as:  Linda Gaines Your last dose was: Today 12./16/17 at 7 am  
Your next dose is:  Tomorrow 12/17/17 Take 1 Tab by mouth Daily (before breakfast). 25 mg  
    
   
   
   
  
 ondansetron 4 mg disintegrating tablet Commonly known as:  ZOFRAN ODT Take 1 Tab by mouth every eight (8) hours as needed for Nausea. 4 mg  
    
   
   
   
  
 oxyCODONE IR 20 mg immediate release tablet Commonly known as:  [place] Take 1 Tab by mouth every four (4) hours as needed for Pain. Max Daily Amount: 120 mg. Indications: Pain 20 mg  
    
   
   
   
  
 pantoprazole 40 mg tablet Commonly known as:  PROTONIX Notes to Patient:  Not taking at hospital  
   
 Take 1 Tab by mouth two (2) times a day. 40 mg  
    
   
   
   
  
 polyethylene glycol 17 gram packet Commonly known as:  Caryn Vargas Notes to Patient:  Not taking at hospital  
   
 Take 1 Packet by mouth two (2) times a day. 17 g  
    
   
   
   
  
 senna-docusate 8.6-50 mg per tablet Commonly known as:  Raj Punt Notes to Patient:  Not taking at hospital  
   
 Take 2 Tabs by mouth two (2) times a day. 2 Tab * VITAMIN D3 2,000 unit Tab Generic drug:  cholecalciferol (vitamin D3) Your last dose was: Today 12/16/17 at 10 am  
Your next dose is:  Tomorrow 12/17/17 Notes to Patient:  Not taking at hospital  
   
 Take  by mouth daily. * cholecalciferol 1,000 unit Cap Commonly known as:  VITAMIN D3 Your last dose was: Today 12/16/17 at 10 am  
Your next dose is:  Tomorrow 12/17/17 Take  by mouth. * Notice: This list has 2 medication(s) that are the same as other medications prescribed for you. Read the directions carefully, and ask your doctor or other care provider to review them with you. ASK your physician about these medications Instructions Each Dose to Equal  
 Morning Noon Evening Bedtime  
 metOLazone 5 mg tablet Commonly known as:  Mary Wood Take 5 mg by mouth. 5 mg OTHER  
   
 MORPHINE CREAM APPLIES TO KNEES 3-4X DAILY Where to Get Your Medications Information on where to get these meds will be given to you by the nurse or doctor. ! Ask your nurse or doctor about these medications  
  anastrozole 1 mg tablet  
 bisacodyl 10 mg suppository  
 gabapentin 100 mg capsule  
 polyethylene glycol 17 gram packet  
 senna-docusate 8.6-50 mg per tablet

## 2017-12-09 NOTE — ED PROVIDER NOTES
EMERGENCY DEPARTMENT HISTORY AND PHYSICAL EXAM      Date: 12/9/2017  Patient Name: Robby Gómez    History of Presenting Illness     Chief Complaint   Patient presents with    Leg Pain     R thigh pain radiating to R knee onset yesterday       History Provided By: Patient and Patient's Son    HPI: Robby Gómez, 68 y.o. female with PMHx significant for HTN, DM, acute kidney failure, arthritis, A-fib, cancer, presents via EMS to the ED with cc of acute onset right leg pain x 2 days. Pt also c/o dyspnea with exertion and a HA x few days. Per son, pt does have a hx of DVT which progressed into a PE and required admission to the ICU. Pt was on Eliquis in the past which was discontinued 2 months ago due to rectal bleeding. Pt reports her current pain is similar to her pain when she was diagnosed with the DVT. Their main concern today is to rule out DVT and for pain control. Pt was recently diagnosed with uterine cancer and has a mass on her lung, which is also believed to be cancer. Her diagnosis is fairly recent and she has not yet started her chemotherapy. Pt specifically denies any CP, abdominal pain, nausea, vomiting, diarrhea, fever, chills. Social Hx: - Tobacco (former smoker), - EtOH (-), - illicit drug use (-)    PCP: Kamila Whitehead MD    There are no other complaints, changes, or physical findings at this time. Current Facility-Administered Medications   Medication Dose Route Frequency Provider Last Rate Last Dose    sodium chloride (NS) flush 5-10 mL  5-10 mL IntraVENous PRN Darcy Mcneal MD         Current Outpatient Prescriptions   Medication Sig Dispense Refill    KLOR-CON M20 20 mEq tablet TAKE 1 TABLET BY MOUTH TWICE A DAY 30 Tab 1    cyanocobalamin (VITAMIN B-12) 1,000 mcg tablet Take 1 Tab by mouth daily. 90 Tab 1    ascorbate calcium 500 mg tab Take 500 mg by mouth.  clonazePAM (KLONOPIN) 1 mg tablet Take 1 mg by mouth.  metOLazone (ZAROXOLYN) 5 mg tablet Take 5 mg by mouth.  apixaban (ELIQUIS) 5 mg tablet Take 5 mg by mouth.  cholecalciferol (VITAMIN D3) 1,000 unit cap Take  by mouth.  hydrALAZINE (APRESOLINE) 25 mg tablet Take 25 mg by mouth.  methylnaltrexone (RELISTOR) 150 mg tab tablet Take 3 Tabs by mouth Daily (before breakfast). 90 Tab 4    morphine CR (MS CONTIN) 60 mg CR tablet Take 1 Tab by mouth every twelve (12) hours. Max Daily Amount: 120 mg. Indications: Chronic Pain, Severe Pain 60 Tab 0    oxyCODONE IR (ROXICODONE) 20 mg immediate release tablet Take 1 Tab by mouth every four (4) hours as needed for Pain. Max Daily Amount: 120 mg. Indications: Pain 180 Tab 0    naloxegol (MOVANTIK) 25 mg tab tablet Take 1 Tab by mouth Daily (before breakfast). 30 Tab 6    pantoprazole (PROTONIX) 40 mg tablet Take 1 Tab by mouth two (2) times a day. 60 Tab 2    anastrozole (ARIMIDEX) 1 mg tablet Take 1 mg by mouth daily.  ondansetron (ZOFRAN ODT) 4 mg disintegrating tablet Take 1 Tab by mouth every eight (8) hours as needed for Nausea. 16 Tab 0    apixaban (ELIQUIS) 5 mg tablet Take 1 Tab by mouth two (2) times a day. Indications: Cerebral Thromboembolism Prevention, DEEP VEIN THROMBOSIS PREVENTION, deep venous thrombosis, pulmonary thromboembolism 60 Tab 11    furosemide (LASIX) 80 mg tablet TAKE 1 TABLET BY MOUTH EVERY DAY  11    hydrALAZINE (APRESOLINE) 25 mg tablet Take 1 Tab by mouth daily. 90 Tab 6    OTHER MORPHINE CREAM APPLIES TO KNEES 3-4X DAILY      metoprolol succinate (TOPROL-XL) 100 mg tablet 100 mg daily.  magnesium 250 mg tab Take  by mouth daily.  cholecalciferol, vitamin D3, (VITAMIN D3) 2,000 unit tab Take  by mouth daily.          Past History     Past Medical History:  Past Medical History:   Diagnosis Date    Acute kidney failure ([facility] [address] 11/19/2015    Acute sinusitis 2/11/2011    Adverse effect of anesthesia     SLOW WAKING PAST ANESTHESIA    Arthritis     RA    At risk for side effect of medication 9/30/2016    Atrial fibrillation ([address] 10/19/2011    Autoimmune disease ([address]     FIBROMYLGIA    Cancer ([address] 2016    ENDOMETRIAL     Cholelithiasis 11/19/2015    Chronic pain     Claudication of both lower extremities ([address] 8/25/2015    Diabetes mellitus type 2, controlled ([address] 1/19/2016    Diabetes mellitus, type II ([address] 10/10/2014    Fall against object 10/10/2014    Fatty liver 10/20/2015    Hypertension     Ill-defined condition     SPINAL STENOSIS    Left shoulder pain 10/10/2014    Leiomyoma of body of uterus 4/7/2016    Morbid obesity ([address]     Myalgia 9/30/2016    Nausea & vomiting     Personal history of radiation therapy 4/20/2017    Pneumonia     Preop examination 6/15/2015    Preoperative clearance 6/15/2015    Rash 2/11/2011    Rash of hands 10/10/2014    Screening for colon cancer 2/22/2016    Screening for glaucoma 2/22/2016    Sleep apnea     SOB (shortness of breath)     hospitalized 5/2012.  angina, SOB    Thromboembolus ([address] 2011    LEFT LOWER LEG AND PE    Tinea pedis, recurrent 7/20/2015    Vaginal bleeding, abnormal 4/20/2017       Past Surgical History:  Past Surgical History:   Procedure Laterality Date    COLONOSCOPY N/A 12/21/2016    COLONOSCOPY performed by Olivia Seaman MD at [address] [place] [address]  12/21/2016         HX CATARACT REMOVAL Bilateral 2015    [address]    HX DILATION AND CURETTAGE  07/29/2016    HX DILATION AND CURETTAGE  2016    HX GYN  1960    etopic    HX HEART CATHETERIZATION  2001    NEGATIVE PER PATIENT    HX HYSTERECTOMY  2016    HX KNEE ARTHROSCOPY Left 1998    HX ORTHOPAEDIC Right 1960'S    BUNIONECTOMY    HX OTHER SURGICAL      BIOPSY OF VEIN IN FOREHEAD    HX TUBAL LIGATION  1963    OR COLSC FLX W/RMVL OF TUMOR POLYP LESION SNARE TQ  12/12/2011            Family History:  Family History   Problem Relation Age of Onset    Other Mother      ANEURYSM    Obesity Mother     Heart Disease Father  Other Father      VASCULAR DISEASE    Hypertension Sister     Heart Disease Brother     Heart Attack Brother     Kidney Disease Sister     Seizures Brother     Heart Attack Brother     Anesth Problems Neg Hx     Alcohol abuse Neg Hx        Social History:  Social History   Substance Use Topics    Smoking status: Former Smoker     Packs/day: 0.25     Years: 15.00     Quit date: 8/23/1996    Smokeless tobacco: Never Used    Alcohol use No       Allergies: Allergies   Allergen Reactions    Latex Rash and Itching    Advair Diskus [Fluticasone-Salmeterol] Other (comments)     \"breathing problems\"    Bactrim [Sulfamethoprim] Shortness of Breath, Itching and Swelling     Swelling of tongue and throat    Eliquis [Apixaban] Other (comments)    Iodinated Contrast- Oral And Iv Dye Hives    Ivp [Fd And C Blue No.1] Hives and Shortness of Breath    Lovenox [Enoxaparin] Shortness of Breath    Nsaids (Non-Steroidal Anti-Inflammatory Drug) Other (comments)     Heartburn    Sulfa (Sulfonamide Antibiotics) Shortness of Breath         Review of Systems   Review of Systems   Constitutional: Negative. Negative for chills and fever. HENT: Negative. Negative for congestion and rhinorrhea. Respiratory: Positive for shortness of breath (on exertion). Negative for cough, chest tightness and wheezing. Cardiovascular: Negative. Negative for chest pain and palpitations. Gastrointestinal: Negative. Negative for abdominal pain, constipation, nausea and vomiting. Endocrine: Negative. Genitourinary: Negative. Negative for decreased urine volume, flank pain, hematuria and pelvic pain. Musculoskeletal: Positive for myalgias (right leg ). Negative for back pain and neck pain. Skin: Negative. Negative for color change, pallor and rash. Neurological: Positive for headaches. Negative for dizziness, seizures, weakness and numbness. Hematological: Negative. Negative for adenopathy. Psychiatric/Behavioral: Negative. All other systems reviewed and are negative. Physical Exam   Physical Exam   Constitutional: She is oriented to person, place, and time. She appears well-developed and well-nourished. No distress. HENT:   Head: Normocephalic and atraumatic. Mouth/Throat: No oropharyngeal exudate. Eyes: Conjunctivae are normal. Pupils are equal, round, and reactive to light. Right eye exhibits no discharge. Left eye exhibits no discharge. No scleral icterus. Neck: Normal range of motion. Neck supple. No JVD present. No thyromegaly present. Cardiovascular: Regular rhythm, normal heart sounds and intact distal pulses. Tachycardia present. Exam reveals no gallop and no friction rub. No murmur heard. Pulmonary/Chest: Effort normal. No stridor. No respiratory distress. She has wheezes. She has no rales. She exhibits no tenderness. Abdominal: Soft. Bowel sounds are normal. She exhibits no distension and no mass. There is no tenderness. There is no rebound and no guarding. Musculoskeletal: She exhibits edema and tenderness. Right lower leg: She exhibits tenderness, swelling and edema. She exhibits no bony tenderness, no deformity and no laceration. Neurological: She is alert and oriented to person, place, and time. She has normal strength. She is not disoriented. She displays normal reflexes. No cranial nerve deficit or sensory deficit. She exhibits normal muscle tone. Coordination normal. GCS eye subscore is 4. GCS verbal subscore is 5. GCS motor subscore is 6. Skin: Skin is warm. No rash noted. She is not diaphoretic. No pallor. Vitals reviewed.         Diagnostic Study Results     Labs -     Recent Results (from the past 12 hour(s))   CBC WITH AUTOMATED DIFF    Collection Time: 12/09/17  5:35 PM   Result Value Ref Range    WBC 8.3 3.6 - 11.0 K/uL    RBC 2.94 (L) 3.80 - 5.20 M/uL    HGB 8.7 (L) 11.5 - 16.0 g/dL    HCT 26.9 (L) 35.0 - 47.0 %    MCV 91.5 80.0 - 99.0 FL    MCH 29.6 26.0 - 34.0 PG    MCHC 32.3 30.0 - 36.5 g/dL    RDW 15.4 (H) 11.5 - 14.5 %    PLATELET 875 (L) 723 - 400 K/uL    NEUTROPHILS 80 (H) 32 - 75 %    LYMPHOCYTES 6 (L) 12 - 49 %    MONOCYTES 14 (H) 5 - 13 %    EOSINOPHILS 0 0 - 7 %    BASOPHILS 0 0 - 1 %    ABS. NEUTROPHILS 6.6 1.8 - 8.0 K/UL    ABS. LYMPHOCYTES 0.5 (L) 0.8 - 3.5 K/UL    ABS. MONOCYTES 1.2 (H) 0.0 - 1.0 K/UL    ABS. EOSINOPHILS 0.0 0.0 - 0.4 K/UL    ABS. BASOPHILS 0.0 0.0 - 0.1 K/UL    DF SMEAR SCANNED      RBC COMMENTS NORMOCYTIC, NORMOCHROMIC     METABOLIC PANEL, COMPREHENSIVE    Collection Time: 12/09/17  5:35 PM   Result Value Ref Range    Sodium 134 (L) 136 - 145 mmol/L    Potassium 4.0 3.5 - 5.1 mmol/L    Chloride 99 97 - 108 mmol/L    CO2 29 21 - 32 mmol/L    Anion gap 6 5 - 15 mmol/L    Glucose 117 (H) 65 - 100 mg/dL    BUN 29 (H) 6 - 20 MG/DL    Creatinine 2.03 (H) 0.55 - 1.02 MG/DL    BUN/Creatinine ratio 14 12 - 20      GFR est AA 29 (L) >60 ml/min/1.73m2    GFR est non-AA 24 (L) >60 ml/min/1.73m2    Calcium 10.1 8.5 - 10.1 MG/DL    Bilirubin, total 1.2 (H) 0.2 - 1.0 MG/DL    ALT (SGPT) 14 12 - 78 U/L    AST (SGOT) 16 15 - 37 U/L    Alk.  phosphatase 70 45 - 117 U/L    Protein, total 10.0 (H) 6.4 - 8.2 g/dL    Albumin 2.7 (L) 3.5 - 5.0 g/dL    Globulin 7.3 (H) 2.0 - 4.0 g/dL    A-G Ratio 0.4 (L) 1.1 - 2.2     LACTIC ACID    Collection Time: 12/09/17  5:35 PM   Result Value Ref Range    Lactic acid 1.5 0.4 - 2.0 MMOL/L   NT-PRO BNP    Collection Time: 12/09/17  5:35 PM   Result Value Ref Range    NT pro- 0 - 450 PG/ML   TROPONIN I    Collection Time: 12/09/17  5:35 PM   Result Value Ref Range    Troponin-I, Qt. <0.04 <0.05 ng/mL   INFLUENZA A & B AG (RAPID TEST)    Collection Time: 12/09/17  5:54 PM   Result Value Ref Range    Influenza A Antigen NEGATIVE  NEG      Influenza B Antigen NEGATIVE  NEG     EKG, 12 LEAD, INITIAL    Collection Time: 12/09/17  6:05 PM   Result Value Ref Range    Ventricular Rate 115 BPM    Atrial Rate 115 BPM    P-R Interval 172 ms    QRS Duration 84 ms    Q-T Interval 318 ms    QTC Calculation (Bezet) 439 ms    Calculated P Axis 77 degrees    Calculated R Axis 12 degrees    Calculated T Axis 23 degrees    Diagnosis       Sinus tachycardia with Possible premature atrial complexes with aberrant   conduction  Otherwise normal ECG  When compared with ECG of 19-SEP-2017 11:40,  aberrant conduction is now present  Vent. rate has increased BY  51 BPM         Radiologic Studies -   DUPLEX LOWER EXT VENOUS RIGHT   Final Result   Study Result      Indication: Right leg swelling, pain, DVT suspected      Examination: Right lower extremity venous Doppler.     Findings: Grayscale, color, and duplex imaging of the right lower lower  extremity deep venous system was performed. Study severely limited by soft  tissue swelling. There is however occlusive thrombus in the right common femoral  and proximal right superficial femoral veins. Distal vessels could not be  evaluated .        IMPRESSION  Impression:  1. Acute deep venous thrombosis of the right common femoral vein extending in  the proximal superficial femoral vein     Findings were relayed to the Dr. Cinthya Fleming emergency department at the time of  interpretation                      CXR Results  (Last 48 hours)               12/09/17 1752  XR CHEST PORT Final result    Impression:  IMPRESSION:       No acute process. Narrative:  EXAM:  XR CHEST PORT       INDICATION:  Fever. Right leg pain. COMPARISON: 5/30/2017       TECHNIQUE: Portable AP upright chest view at 1749 hours       FINDINGS: Cardiac monitoring wires overlie the thorax. The cardiomediastinal   contours are stable. The pulmonary vasculature is within normal limits. The lungs and pleural spaces are clear. There is no pneumothorax. The bones and   upper abdomen are stable. Medical Decision Making   I am the first provider for this patient.     I reviewed the vital signs, available nursing notes, past medical history, past surgical history, family history and social history. Vital Signs-Reviewed the patient's vital signs. Patient Vitals for the past 12 hrs:   Temp Pulse Resp BP SpO2   12/09/17 1930 - (!) 107 14 126/68 97 %   12/09/17 1920 - (!) 112 20 - 96 %   12/09/17 1919 - - - 117/69 -   12/09/17 1845 99.7 °F (37.6 °C) - - - -   12/09/17 1830 - (!) 111 20 134/58 94 %   12/09/17 1730 - (!) 109 22 169/74 98 %   12/09/17 1717 (!) 102.7 °F (39.3 °C) - - - -   12/09/17 1716 99.7 °F (37.6 °C) (!) 119 18 153/71 98 %     EKG interpretation: (Preliminary)  18:05  Rhythm: sinus tachycardia with possible PAC's; and regular . Rate (approx.): 115; Axis: normal; OK interval: normal; QRS interval: normal ; ST/T wave: normal; Other findings: normal.  Written by Dilma Cam ED Scribe, as dictated by Suma French MD.    Records Reviewed: Nursing Notes, Old Medical Records and Previous electrocardiograms    Provider Notes (Medical Decision Making):   DDx: DVT, cellulitis, fasciitis, PE, PNA, electrolyte abnormality, renal failure, coronary syndrome. Impression plan: Complex pt with prior hx of DVT and PE presents with acute leg pain., Does have a large DVT, will admit to the hospitalist for anticoagulation and further treatment. ED Course:   Initial assessment performed. The patients presenting problems have been discussed, and they are in agreement with the care plan formulated and outlined with them. I have encouraged them to ask questions as they arise throughout their visit. PROGRESS NOTE:  5:33 PM  Pt has a hx of CHF. Will avoid aggressive fluid bolus until x-ray is performed. Pt has stable BP at this time. PROGRESS NOTE:  7:14 PM  Radiologist called me, made aware the pt has a DVT. CONSULT NOTE:   7:49 PM  Suma French MD spoke with Dr. Elodia Hussein,   Specialty: Hospitalist  Discussed pt's hx, disposition, and available diagnostic and imaging results. Reviewed care plans. Consultant will evaluate pt for admission. Written by Kiah Beltran ED Scribe, as dictated by William Hancock MD.    CRITICAL CARE NOTE :    7:50 PM      IMPENDING DETERIORATION -Airway, Respiratory, Cardiovascular and Metabolic    ASSOCIATED RISK FACTORS - Hypoxia, Dysrhythmia and Metabolic changes    MANAGEMENT- Bedside Assessment and Supervision of Care    INTERPRETATION -  ECG and Blood Pressure    INTERVENTIONS - Metobolic interventions and IV heparin    CASE REVIEW - Hospitalist, Medical Sub-Specialist, Nursing and Family    TREATMENT RESPONSE -Stable    PERFORMED BY - Self        NOTES   :      I have spent 45 minutes of critical care time involved in lab review, consultations with specialist, family decision- making, bedside attention and documentation. During this entire length of time I was immediately available to the patient . William Hancock MD    Disposition:  ADMIT NOTE:  7:49 PM  Patient is being admitted to the hospital by Dr. Javier Tang. The results of their tests and reasons for their admission have been discussed with them and/or available family. They convey agreement and understanding for the need to be admitted and for their admission diagnosis. Consultation has been made with the inpatient physician specialist for hospitalization. PLAN: Admit the pt. Diagnosis     Clinical Impression:   1. Acute deep vein thrombosis (DVT) of femoral vein of right lower extremity (HCC)    2. SIRS (systemic inflammatory response syndrome) (HCC)        Attestations: This note is prepared by Kiah Beltran acting as Scribe for MD William Milan MD : The scribe's documentation has been prepared under my direction and personally reviewed by me in its entirety. I confirm that the note above accurately reflects all work, treatment, procedures, and medical decision making performed by me.

## 2017-12-09 NOTE — IP AVS SNAPSHOT
[address] [facility]. 
[phone] Patient: Breanne Jay MRN: [id number] About your hospitalization You were admitted on:  December 9, 2017 You last received care in the:  [facility] 2 GENERAL SURGERY You were discharged on:  December 16, 2017 Why you were hospitalized Your primary diagnosis was:  Not on File Your diagnoses also included:  Right Leg Dvt (Hcc) Things You Need To Do (next 8 weeks) Wednesday Dec 20, 2017 Any with Anni Tavarez MD at  4:45 PM  
Where:  [address] [facility] ([facility]) Monday Jan 08, 2018 TRANSITIONAL CARE MANAGEMENT with Anni Tavarez MD at  3:00 PM  
Where:  [address] [facility] ([facility]) Discharge Orders None A check lars indicates which time of day the medication should be taken. My Medications STOP taking these medications   
 apixaban 5 mg tablet Commonly known as:  ELIQUIS  
   
  
  
TAKE these medications as instructed Instructions Each Dose to Equal  
 Morning Noon Evening Bedtime  
 anastrozole 1 mg tablet Commonly known as:  ARIMIDEX Notes to Patient:  Not taking at hospital  
   
 Resume if ok with your oncologist  
     
   
   
   
  
 ascorbate calcium 500 mg Tab Notes to Patient:  Not taking at hospital  
   
 Take 500 mg by mouth. 500 mg  
    
   
   
   
  
 bisacodyl 10 mg suppository Commonly known as:  DULCOLAX Notes to Patient:  Not taking at hospital  
   
 Insert 10 mg into rectum daily. 10 mg  
    
   
   
   
  
 clonazePAM 1 mg tablet Commonly known as:  Savanna Radhaen Notes to Patient:  Not taking at hospital  
   
 Take 1 mg by mouth. 1 mg  
    
   
   
   
  
 cyanocobalamin 1,000 mcg tablet Commonly known as:  VITAMIN B-12 Your last dose was:   Today 12/16/17 10 am  
 Your next dose is:  Tomorrow 12/17/17 Take 1 Tab by mouth daily. 1000 mcg  
    
   
   
   
  
 furosemide 80 mg tablet Commonly known as:  LASIX Your last dose was:  12/14/17 TAKE 1 TABLET BY MOUTH EVERY DAY  
     
   
   
   
  
 gabapentin 100 mg capsule Commonly known as:  NEURONTIN Your last dose was: Today 12/16/17 at 1000 Take 1 Cap by mouth three (3) times daily. 100 mg  
    
   
  
   
   
  
  
 hydrALAZINE 25 mg tablet Commonly known as:  APRESOLINE Notes to Patient:  Not taking at hospital  
   
 Take 1 Tab by mouth daily. 25 mg KLOR-CON M20 20 mEq tablet Generic drug:  potassium chloride Notes to Patient:  Not taking at hospital  
   
 TAKE 1 TABLET BY MOUTH TWICE A DAY  
     
   
   
   
  
 magnesium 250 mg Tab Notes to Patient:  Not taking at hospital  
   
 Take  by mouth daily. methylnaltrexone 150 mg Tab tablet Commonly known as:  RELISTOR Take 3 Tabs by mouth Daily (before breakfast). 450 mg  
    
   
   
   
  
 metoprolol succinate 100 mg tablet Commonly known as:  TOPROL-XL Your last dose was: Today 12/16/17 at 10 am  
Your next dose is:  Tomorrow 12/17/17   
   
 100 mg daily. 100 mg  
    
   
   
   
  
 morphine CR 60 mg CR tablet Commonly known as:  MS CONTIN Your last dose was: Today 12/16/17 at 10 am  
   
 Take 1 Tab by mouth every twelve (12) hours. Max Daily Amount: 120 mg. Indications: Chronic Pain, Severe Pain 60 mg  
    
   
   
   
  
  
 naloxegol 25 mg Tab tablet Commonly known as:  Aura Rios Your last dose was: Today 12./16/17 at 7 am  
Your next dose is:  Tomorrow 12/17/17 Take 1 Tab by mouth Daily (before breakfast). 25 mg  
    
   
   
   
  
 ondansetron 4 mg disintegrating tablet Commonly known as:  ZOFRAN ODT Take 1 Tab by mouth every eight (8) hours as needed for Nausea. 4 mg oxyCODONE IR 20 mg immediate release tablet Commonly known as:  Marleni Gun Take 1 Tab by mouth every four (4) hours as needed for Pain. Max Daily Amount: 120 mg. Indications: Pain 20 mg  
    
   
   
   
  
 pantoprazole 40 mg tablet Commonly known as:  PROTONIX Notes to Patient:  Not taking at hospital  
   
 Take 1 Tab by mouth two (2) times a day. 40 mg  
    
   
   
   
  
 polyethylene glycol 17 gram packet Commonly known as:  [place] [employer] Notes to Patient:  Not taking at hospital  
   
 Take 1 Packet by mouth two (2) times a day. 17 g  
    
   
   
   
  
 senna-docusate 8.6-50 mg per tablet Commonly known as:  Monica Calk Notes to Patient:  Not taking at hospital  
   
 Take 2 Tabs by mouth two (2) times a day. 2 Tab * VITAMIN D3 2,000 unit Tab Generic drug:  cholecalciferol (vitamin D3) Your last dose was: Today 12/16/17 at 10 am  
Your next dose is:  Tomorrow 12/17/17 Notes to Patient:  Not taking at hospital  
   
 Take  by mouth daily. * cholecalciferol 1,000 unit Cap Commonly known as:  VITAMIN D3 Your last dose was: Today 12/16/17 at 10 am  
Your next dose is:  Tomorrow 12/17/17 Take  by mouth. * Notice: This list has 2 medication(s) that are the same as other medications prescribed for you. Read the directions carefully, and ask your doctor or other care provider to review them with you. ASK your physician about these medications Instructions Each Dose to Equal  
 Morning Noon Evening Bedtime  
 metOLazone 5 mg tablet Commonly known as:  Fabiene Solid Take 5 mg by mouth. 5 mg OTHER  
   
 MORPHINE CREAM APPLIES TO KNEES 3-4X DAILY Where to Get Your Medications Information on where to get these meds will be given to you by the nurse or doctor. ! Ask your nurse or doctor about these medications anastrozole 1 mg tablet  
 bisacodyl 10 mg suppository  
 gabapentin 100 mg capsule  
 polyethylene glycol 17 gram packet  
 senna-docusate 8.6-50 mg per tablet Discharge Instructions None ACO Transitions of Care Introducing [employer] [address] offers a voluntary care coordination program to provide high quality service and care to [facility] fee-for-service beneficiaries. Linda Garcia was designed to help you enhance your health and well-being through the following services: ? Transitions of Care  support for individuals who are transitioning from one care setting to another (example: Hospital to home). ? Chronic and Complex Care Coordination  support for individuals and caregivers of those with serious or chronic illnesses or with more than one chronic (ongoing) condition and those who take a number of different medications. If you meet specific medical criteria, a [facility] [address] may call you directly to coordinate your care with your primary care physician and your other care providers. For questions about the [facility] programs, please, contact your physicians office. For general questions or additional information about Accountable Care Organizations: 
Please visit www.medicare.gov/acos. html or call 1-800-MEDICARE (3-492.900.5532) TTY users should call 3-519.475.7834. [employer] Announcement We are excited to announce that we are making your provider's discharge notes available to you in [employer]. You will see these notes when they are completed and signed by the physician that discharged you from your recent hospital stay. If you have any questions or concerns about any information you see in [employer], please call the Health Information Department where you were seen or reach out to your Primary Care Provider for more information about your plan of care. Introducing [facility] & HEALTH SERVICES! Dear Radhames Shine: Thank you for requesting a [employer] account. Our records indicate that you have previously registered for a [employer] account but its currently inactive. Please call our [employer] support line at [phone]. Additional Information If you have questions, please visit the Frequently Asked Questions section of the [employer] website at [email or web address] Remember, [employer] is NOT to be used for urgent needs. For medical emergencies, dial 911. Now available from your iPhone and Android! Unresulted Labs-Please follow up with your PCP about these lab tests Order Current Status CULTURE, URINE In process CULTURE, BLOOD, PAIRED Preliminary result Providers Seen During Your Hospitalization Provider Specialty Primary office phone Syeda Newton MD Emergency Medicine [phone] Velia Bruce MD Hospitalist [phone] Warden Sheng MD Hospitalist [phone] Yobany Lira MD Internal Medicine [phone] Your Primary Care Physician (PCP) Primary Care Physician Office Phone Office Fax [employer] [place] [phone] You are allergic to the following Allergen Reactions Latex Rash Itching Advair Diskus (Fluticasone-Salmeterol) Other (comments) \"breathing problems\" Bactrim (Sulfamethoprim) Shortness of Breath Itching Swelling Swelling of tongue and throat Eliquis (Apixaban) Other (comments) Iodinated Contrast- Oral And Iv Dye Hives Ivp (Fd And C Blue No.1) Hives Shortness of Breath Lovenox (Enoxaparin) Other (comments) DIC Nsaids (Non-Steroidal Anti-Inflammatory Drug) Other (comments) Heartburn Sulfa (Sulfonamide Antibiotics) Shortness of Breath Xarelto (Rivaroxaban) Myalgia Paresthesia, spasmic muscle Recent Documentation Height Weight BMI OB Status Smoking Status 1.676 m 133.8 kg 47.61 kg/m2 Hysterectomy Former Smoker Emergency Contacts Name Discharge Info Relation Home Work Mobile [facility]OSS[facility] DISCHARGE CAREGIVER [3] Child [2]   [phone] [facility] DISCHARGE CAREGIVER [3] Child [2] [phone] Patient Belongings The following personal items are in your possession at time of discharge: 
  Dental Appliances: None  Visual Aid: None      Home Medications: None   Jewelry: None  Clothing: None    Other Valuables: Other (comment) (clutch purse purple with papers in it) Discharge Instructions Attachments/References DVT (DEEP VEIN THROMBOSIS): PREVENTION: GENERAL INFO (ENGLISH) Patient Handouts Learning About How to Prevent Blood Clots What is a blood clot? A blood clot is a clump of blood that forms in a blood vessel, such as a vein or an artery. If a clot gets stuck in a blood vessel, it can cause serious problems like a deep vein thrombosis (DVT) or a pulmonary embolism. A DVT is a blood clot in certain veins of the legs, pelvis, or arms. It most often occurs in the legs. Blood clots in these veins need to be treated, because they can get bigger, break loose, and travel through the bloodstream to the heart and then to the lungs. This causes a pulmonary embolism. A pulmonary embolism is a sudden blockage of an artery in the lung. Blood clots in the deep veins of the leg are the most common cause of a pulmonary embolism. In many cases, the clots are small. They may damage the lung. But if the clot is large and stops blood flow to the lung, it can be deadly. What increases your risk for blood clots? Some of the things that can increase your risk for a blood clot include: 
Slowed blood flow When blood doesn't flow normally, clots are more likely to develop. Reduced blood flow may result from long-term bed rest, such as after a surgery, injury, or serious illness.  Or it may result from sitting for a long time, especially when traveling long distances. Abnormal clotting Some people have blood that clots too easily or too quickly. Problems that may cause increased clotting include: 
· Having certain blood problems that make blood clot too easily. This is a problem that may run in families. · Having certain health problems, such as cancer, heart failure, stroke, or severe infection. · Being pregnant. A woman's risk of getting blood clots increases both during pregnancy and shortly after delivery or after a  section. · Using hormonal forms of birth control or hormone therapy. · Smoking. Injury to the blood vessel wall Blood is more likely to clot in veins and arteries shortly after they are injured. Injury can be caused by a recent medical procedure or surgery that involved your legs, hips, belly, or brain. Or it can be caused by an injury, such as a broken hip. What can you do to prevent blood clots? After any procedure or event that increases your risk · Take a blood-thinning medicine (called an anticoagulant) as directed if your doctor prescribes one. · Exercise your lower leg muscles to help keep the blood moving through your legs. Point your toes up toward your head so the calves of your legs are stretched, then relax. Repeat. This is a good exercise to do when you are sitting for long periods of time. · Get up out of bed as soon as you safely can or as soon as your doctor says it's okay after an illness or surgery. If you can't get out of bed, you can do the leg exercise described above. Try to do this leg exercise every hour when you are awake. This will help keep the blood moving through your legs. If you are in the hospital and need to stay in bed, your doctor may have you use a special device that inflates and deflates knee-high boots to help keep blood from pooling in your legs. · Use compression stockings if your doctor prescribes them.  These are specially fitted stockings that may prevent blood clots by keeping blood from pooling in your legs. When you travel · Take breaks when you travel. On long car trips, stop the car and walk around every hour or so. On long bus or train rides or plane flights, get out of your seat and walk up and down the aisle every hour or so. · Do leg exercises while you are seated. For example, pump your feet up and down by pulling your toes up toward your knees and then pointing them down. If you already have a risk of blood clots, talk to your doctor before taking a long trip. Your doctor may want you to wear compression stockings or take blood-thinning medicine. Take care of your body · Be active. Try to get 30 minutes or more of activity on most days of the week. · Don't smoke. Smoking can increase your risk of blood clots. If you need help quitting, talk to your doctor about stop-smoking programs and medicines. · Check with your doctor about whether you should use hormonal forms of birth control or hormone therapy. These may increase your risk of blood clots. When should you call for help? Call 911 anytime you think you may need emergency care. For example, call if: 
· You have symptoms of a blood clot in your lung (called a pulmonary embolism). These may include: 
¨ Sudden chest pain. ¨ Trouble breathing. ¨ Coughing up blood. Call your doctor now or seek immediate medical care if: 
· You have symptoms of a blood clot in your arm or leg (called a deep vein thrombosis). These may include: 
¨ Pain in the arm, calf, back of the knee, thigh, or groin. ¨ Redness and swelling in the arm, leg, or groin. Where can you learn more? Go to http://kiara-olesya.info/. Enter F229 in the search box to learn more about \"Learning About How to Prevent Blood Clots. \" Current as of: March 20, 2017 Content Version: 11.4 © 3432-2778 [employer]wise[employer].  Care instructions adapted under license by [address] (which disclaims liability or warranty for this information). If you have questions about a medical condition or this instruction, always ask your healthcare professional. [address] any warranty or liability for your use of this information. Please provide this summary of care documentation to your next provider. Signatures-by signing, you are acknowledging that this After Visit Summary has been reviewed with you and you have received a copy. Patient Signature:  ____________________________________________________________ Date:  ____________________________________________________________  
  
[facility] Provider Signature:  ____________________________________________________________ Date:  ____________________________________________________________

## 2017-12-10 ENCOUNTER — APPOINTMENT (OUTPATIENT)
Dept: CT IMAGING | Age: 77
DRG: 253 | End: 2017-12-10
Attending: INTERNAL MEDICINE
Payer: MEDICARE

## 2017-12-10 ENCOUNTER — APPOINTMENT (OUTPATIENT)
Dept: ULTRASOUND IMAGING | Age: 77
DRG: 253 | End: 2017-12-10
Attending: INTERNAL MEDICINE
Payer: MEDICARE

## 2017-12-10 LAB
ALBUMIN SERPL-MCNC: 2.3 G/DL (ref 3.5–5)
ALBUMIN/GLOB SERPL: 0.3 {RATIO} (ref 1.1–2.2)
ALP SERPL-CCNC: 64 U/L (ref 45–117)
ALT SERPL-CCNC: 12 U/L (ref 12–78)
ANION GAP SERPL CALC-SCNC: 8 MMOL/L (ref 5–15)
APTT PPP: 57.6 SEC (ref 22.1–32.5)
APTT PPP: 74.6 SEC (ref 22.1–32.5)
APTT PPP: 75.3 SEC (ref 22.1–32.5)
AST SERPL-CCNC: 15 U/L (ref 15–37)
ATRIAL RATE: 115 BPM
BASOPHILS # BLD: 0 K/UL (ref 0–0.1)
BASOPHILS NFR BLD: 0 % (ref 0–1)
BILIRUB SERPL-MCNC: 1 MG/DL (ref 0.2–1)
BUN SERPL-MCNC: 30 MG/DL (ref 6–20)
BUN/CREAT SERPL: 19 (ref 12–20)
CALCIUM SERPL-MCNC: 9.8 MG/DL (ref 8.5–10.1)
CALCULATED P AXIS, ECG09: 77 DEGREES
CALCULATED R AXIS, ECG10: 12 DEGREES
CALCULATED T AXIS, ECG11: 23 DEGREES
CHLORIDE SERPL-SCNC: 99 MMOL/L (ref 97–108)
CO2 SERPL-SCNC: 26 MMOL/L (ref 21–32)
CREAT SERPL-MCNC: 1.55 MG/DL (ref 0.55–1.02)
DIAGNOSIS, 93000: NORMAL
EOSINOPHIL # BLD: 0.1 K/UL (ref 0–0.4)
EOSINOPHIL NFR BLD: 1 % (ref 0–7)
ERYTHROCYTE [DISTWIDTH] IN BLOOD BY AUTOMATED COUNT: 15.6 % (ref 11.5–14.5)
GLOBULIN SER CALC-MCNC: 6.7 G/DL (ref 2–4)
GLUCOSE BLD STRIP.AUTO-MCNC: 115 MG/DL (ref 65–100)
GLUCOSE SERPL-MCNC: 102 MG/DL (ref 65–100)
HCT VFR BLD AUTO: 26.7 % (ref 35–47)
HGB BLD-MCNC: 8.6 G/DL (ref 11.5–16)
LYMPHOCYTES # BLD: 0.8 K/UL (ref 0.8–3.5)
LYMPHOCYTES NFR BLD: 10 % (ref 12–49)
MCH RBC QN AUTO: 29.9 PG (ref 26–34)
MCHC RBC AUTO-ENTMCNC: 32.2 G/DL (ref 30–36.5)
MCV RBC AUTO: 92.7 FL (ref 80–99)
MONOCYTES # BLD: 0.9 K/UL (ref 0–1)
MONOCYTES NFR BLD: 12 % (ref 5–13)
NEUTS SEG # BLD: 6 K/UL (ref 1.8–8)
NEUTS SEG NFR BLD: 77 % (ref 32–75)
P-R INTERVAL, ECG05: 172 MS
PLATELET # BLD AUTO: 138 K/UL (ref 150–400)
POTASSIUM SERPL-SCNC: 3.8 MMOL/L (ref 3.5–5.1)
PROT SERPL-MCNC: 9 G/DL (ref 6.4–8.2)
Q-T INTERVAL, ECG07: 318 MS
QRS DURATION, ECG06: 84 MS
QTC CALCULATION (BEZET), ECG08: 439 MS
RBC # BLD AUTO: 2.88 M/UL (ref 3.8–5.2)
SERVICE CMNT-IMP: ABNORMAL
SODIUM SERPL-SCNC: 133 MMOL/L (ref 136–145)
THERAPEUTIC RANGE,PTTT: ABNORMAL SECS (ref 58–77)
VENTRICULAR RATE, ECG03: 115 BPM
WBC # BLD AUTO: 7.8 K/UL (ref 3.6–11)

## 2017-12-10 PROCEDURE — 76770 US EXAM ABDO BACK WALL COMP: CPT

## 2017-12-10 PROCEDURE — 74011250636 HC RX REV CODE- 250/636: Performed by: INTERNAL MEDICINE

## 2017-12-10 PROCEDURE — 82962 GLUCOSE BLOOD TEST: CPT

## 2017-12-10 PROCEDURE — 85025 COMPLETE CBC W/AUTO DIFF WBC: CPT | Performed by: INTERNAL MEDICINE

## 2017-12-10 PROCEDURE — 74011250636 HC RX REV CODE- 250/636: Performed by: EMERGENCY MEDICINE

## 2017-12-10 PROCEDURE — 36415 COLL VENOUS BLD VENIPUNCTURE: CPT | Performed by: EMERGENCY MEDICINE

## 2017-12-10 PROCEDURE — 65270000029 HC RM PRIVATE

## 2017-12-10 PROCEDURE — 80053 COMPREHEN METABOLIC PANEL: CPT | Performed by: INTERNAL MEDICINE

## 2017-12-10 PROCEDURE — 85730 THROMBOPLASTIN TIME PARTIAL: CPT | Performed by: EMERGENCY MEDICINE

## 2017-12-10 PROCEDURE — 74011250637 HC RX REV CODE- 250/637: Performed by: INTERNAL MEDICINE

## 2017-12-10 RX ORDER — HYDROMORPHONE HYDROCHLORIDE 2 MG/ML
1 INJECTION, SOLUTION INTRAMUSCULAR; INTRAVENOUS; SUBCUTANEOUS ONCE
Status: COMPLETED | OUTPATIENT
Start: 2017-12-10 | End: 2017-12-10

## 2017-12-10 RX ADMIN — HYDROMORPHONE HYDROCHLORIDE 2 MG: 2 INJECTION INTRAMUSCULAR; INTRAVENOUS; SUBCUTANEOUS at 04:31

## 2017-12-10 RX ADMIN — HYDROMORPHONE HYDROCHLORIDE 2 MG: 2 INJECTION INTRAMUSCULAR; INTRAVENOUS; SUBCUTANEOUS at 16:10

## 2017-12-10 RX ADMIN — HYDROMORPHONE HYDROCHLORIDE 1 MG: 2 INJECTION INTRAMUSCULAR; INTRAVENOUS; SUBCUTANEOUS at 15:02

## 2017-12-10 RX ADMIN — MORPHINE SULFATE 60 MG: 30 TABLET, EXTENDED RELEASE ORAL at 11:07

## 2017-12-10 RX ADMIN — MORPHINE SULFATE 60 MG: 30 TABLET, EXTENDED RELEASE ORAL at 20:00

## 2017-12-10 RX ADMIN — NALOXEGOL OXALATE 25 MG: 25 TABLET, FILM COATED ORAL at 11:07

## 2017-12-10 RX ADMIN — VITAMIN D, TAB 1000IU (100/BT) 1000 UNITS: 25 TAB at 08:53

## 2017-12-10 RX ADMIN — HYDROMORPHONE HYDROCHLORIDE 2 MG: 2 INJECTION INTRAMUSCULAR; INTRAVENOUS; SUBCUTANEOUS at 23:23

## 2017-12-10 RX ADMIN — HYDROMORPHONE HYDROCHLORIDE 2 MG: 2 INJECTION INTRAMUSCULAR; INTRAVENOUS; SUBCUTANEOUS at 08:50

## 2017-12-10 RX ADMIN — METOPROLOL SUCCINATE 100 MG: 50 TABLET, EXTENDED RELEASE ORAL at 08:52

## 2017-12-10 RX ADMIN — Medication 10 ML: at 15:05

## 2017-12-10 RX ADMIN — HYDROMORPHONE HYDROCHLORIDE 2 MG: 2 INJECTION INTRAMUSCULAR; INTRAVENOUS; SUBCUTANEOUS at 01:21

## 2017-12-10 RX ADMIN — SODIUM CHLORIDE 75 ML/HR: 900 INJECTION, SOLUTION INTRAVENOUS at 01:21

## 2017-12-10 RX ADMIN — HEPARIN SODIUM AND DEXTROSE 12.3 UNITS/KG/HR: 10000; 5 INJECTION INTRAVENOUS at 14:13

## 2017-12-10 RX ADMIN — HYDROMORPHONE HYDROCHLORIDE 2 MG: 2 INJECTION INTRAMUSCULAR; INTRAVENOUS; SUBCUTANEOUS at 12:46

## 2017-12-10 RX ADMIN — HYDROMORPHONE HYDROCHLORIDE 2 MG: 2 INJECTION INTRAMUSCULAR; INTRAVENOUS; SUBCUTANEOUS at 19:40

## 2017-12-10 RX ADMIN — CYANOCOBALAMIN TAB 500 MCG 1000 MCG: 500 TAB at 08:53

## 2017-12-10 NOTE — ED NOTES
Bedside and Verbal shift change received from Jass Beckford RN. Report included the following information: SBAR, ED Summary, MAR and Recent Results.

## 2017-12-10 NOTE — ED NOTES
Explained plan of care to patient and family. Patient states she has had adverse reaction to Heparin, stating it \"made her bleed out of her nose and go upside down. \" Patient unable to provide clarification on the meaning of this, just stating it did not work well for her. MD sent to room to speak with patient and provide clarification.

## 2017-12-10 NOTE — ED NOTES
Straight catheter performed using sterile technique with assistance from Michael Brown RN and Bjorn Carter, ED Tech.

## 2017-12-10 NOTE — ED NOTES
Patient and patient's son refusing head CT, stating \"the doctor said I don't need the head CT and it's not safe for me. \" Attempted to explain to patient that head CT ordered for complaints of headache, patient continues to refuse.  Will notify MD.

## 2017-12-10 NOTE — PROGRESS NOTES
Pt arrived to 2121 on stretcher from ED on Heparin gtt at 11.3 ml/hr. Moved patient to bed x 3 RN. VSS. A&Ox4. C/o pain to RLE. Not yet due for next pain medication administration. Denies chest pain or SOB. Lung sounds clear. Placed on telemetry monitor and running ST in the 110s. Son at bedside. Skin intact. Four eye skin assessment completed with Ileana Montgomery RN. Will continue to monitor.

## 2017-12-10 NOTE — H&P
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  Taken off eliquis in Oct 2017 with recurrent rectal bleeding  Known metastatic uterine cancer, scheduled to start chemo in 2018 per her report  Increasing thigh pain x days, worse with walking  US in ED with Acute deep venous thrombosis of the right common femoral vein        Extends in the proximal superficial femoral vein  Still having intermittent BRBPR  IV heparin being started in case bleeding recurrent, ask GI to assess   Bleeding does not appear large volume to me, almost sounds hemorrhoidal   Last colonoscopy 2016 with diverticuli  Given cancer about to start chemo, lovenox long-term is the best option  If stable on IV heparin without significant bleeding, then will transition to lovenox    Acute kidney injury POA admit BUN/ Creat 29/2.03  Prior baseline creatinine 0.9 to 1.0  Takes lasix and metolazone, suspect hypovolemia  Hold diuretics  Gentle IVF  Renal US  Serial labs    SIRS POA fever to 102.7, , normal WBC count  Etiology unclear  CXR no ASD  No clear dysuria, UA is pending  Mod HA, but no meningismus  BC in Lab  Normal lactate  ? Cellulitis on right thigh, but erythema may related to the DVT  ?  Related to the DVT  Check UA and culture  Hold empiric antibiotics for now    Recent scant rectal bleeding POA  Not enough to fill bowel, almost sounds hemorrhoidal  Recently taken off eliquis due to recurrent rectal bleeding  Not back on [facility] with new DVT  Brown stool, heme positive  Watch for recurrent bleeding on the heparin  Serial HgBs  Ask Dr Danielle Phan to see    Uterine cancer POA now with LN mets  Scheduled to start chemo in 2018    Headaches POA   Check head CT scan with metastatic cancer  Not sure LP needed, will reassess  PRN pain meds    Essential HTN POA  Continue toprol Xl  PRN hydralazine    Chronic pain syndrome POA  Severe right leg pain POA likely due to DVT  Continue home MS contin 60 mg PO BID  PRN oxycodone 20 mg, PRN dilaudid IV if that is not effective    Incomplete data base  Ask pt to have family bring in her medication list in AM    DT prophylaxis with IV heparin    Code status full code, son is NOK    History     CHIEF COMPLAINT: \"I thought I had a blood clot in my right leg\"    HISTORY OF PRESENT ILLNESS:  68 Y. O WF  DVT with PE 2010 maintained on coumadin then eliquis  Recurrent rectal bleeding 9/2017   Last colonoscopy 12/2016 with   Decision made to stop eliquis ~ Oct 2017    Known uterine cancer d/x 8/2016  Underwent MASOUD/BSO no post op chemo  Local recurrence in vagina, S/P XRT in spring 2017  Followed by Oncology, recent PET 11/2017 showed cancer in right lung nodules, perigastric, inguinal and pelvic LN  Scheduled to start chemo per pt in Jan 2018    Still having intermittent rectal bleeding   Usually not enough to fill bowel, treats of blood around the stool  Past 3-4 days, increasing pain in right thigh, now severe 10/10  Worse with walking or getting OOB  Usually pain medications not helping  She was worried about recurrent blood clot, came to the ED  Mild SRIVASTAVA, but no pleuritic   No melena or hematemesis  Mild nausea  Mod headaches past week, no focal motor or sensory changes    ED  HD stable  Febrile to 102.7,   CXR negative, UA pending  Rectal with brown, heme positive stool  US right leg with Acute deep venous thrombosis of the right common femoral vein extending in  the proximal superficial femoral vein  IV heparin started       Past Medical History:   Diagnosis Date    Acute kidney failure ([facility] [address] 11/19/2015    Acute sinusitis 2/11/2011    Adverse effect of anesthesia     SLOW WAKING PAST ANESTHESIA    Arthritis     RA    At risk for side effect of medication 9/30/2016    Atrial fibrillation ([address] 10/19/2011    Autoimmune disease ([address]     FIBROMYLGIA    Cancer ([address] 2016    ENDOMETRIAL     Cholelithiasis 11/19/2015    Chronic pain     Claudication of both lower extremities ([address] 8/25/2015    Diabetes mellitus type 2, controlled ([address] 1/19/2016    Diabetes mellitus, type II ([address] 10/10/2014    Fall against object 10/10/2014    Fatty liver 10/20/2015    Hypertension     Ill-defined condition     SPINAL STENOSIS    Left shoulder pain 10/10/2014    Leiomyoma of body of uterus 4/7/2016    Morbid obesity ([address]     Myalgia 9/30/2016    Nausea & vomiting     Personal history of radiation therapy 4/20/2017    Pneumonia     Preop examination 6/15/2015    Preoperative clearance 6/15/2015    Rash 2/11/2011    Rash of hands 10/10/2014    Screening for colon cancer 2/22/2016    Screening for glaucoma 2/22/2016    Sleep apnea     SOB (shortness of breath)     hospitalized 5/2012.  angina, SOB    Thromboembolus ([address] 2011    LEFT LOWER LEG AND PE    Tinea pedis, recurrent 7/20/2015    Vaginal bleeding, abnormal 4/20/2017        Past Surgical History:   Procedure Laterality Date    COLONOSCOPY N/A 12/21/2016    COLONOSCOPY performed by Jimmy Nova MD at [address] [place] [address]  12/21/2016         HX CATARACT REMOVAL Bilateral 2015    [address]    HX DILATION AND CURETTAGE  07/29/2016    HX DILATION AND CURETTAGE  2016    HX GYN  1960    etopic    HX HEART CATHETERIZATION  2001    NEGATIVE PER PATIENT    HX HYSTERECTOMY  2016    HX KNEE ARTHROSCOPY Left 1998    HX ORTHOPAEDIC Right 1960'S    BUNIONECTOMY    HX OTHER SURGICAL      BIOPSY OF VEIN IN FOREHEAD    HX TUBAL LIGATION  1963     Ghassanrd [place]oma FLX W/RMVL OF TUMOR POLYP LESION SNARE TQ  12/12/2011            Social History   Substance Use Topics    Smoking status: Former Smoker     Packs/day: 0.25     Years: 15.00     Quit date: 8/23/1996    Smokeless tobacco: Never Used    Alcohol use No    , lives alone  Son, [place] Ask is NOK    Family History   Problem Relation Age of Onset    Other Mother      ANEURYSM    Obesity Mother     Heart Disease Father     Other Father      VASCULAR DISEASE    Hypertension Sister     Heart Disease Brother     Heart Attack Brother     Kidney Disease Sister     Seizures Brother     Heart Attack Brother     Anesth Problems Neg Hx     Alcohol abuse Neg Hx    3 children one with colon cancer    Allergies   Allergen Reactions    Latex Rash and Itching    Advair Diskus [Fluticasone-Salmeterol] Other (comments)     \"breathing problems\"    Bactrim [Sulfamethoprim] Shortness of Breath, Itching and Swelling     Swelling of tongue and throat    Eliquis [Apixaban] Other (comments)    Iodinated Contrast- Oral And Iv Dye Hives    Ivp [Fd And C Blue No.1] Hives and Shortness of Breath    Lovenox [Enoxaparin] Shortness of Breath    Nsaids (Non-Steroidal Anti-Inflammatory Drug) Other (comments)     Heartburn    Sulfa (Sulfonamide Antibiotics) Shortness of Breath        Prior to Admission medications    Medication Sig Start Date End Date Taking? Authorizing Provider   KLOR-CON M20 20 mEq tablet TAKE 1 TABLET BY MOUTH TWICE A DAY 11/20/17   Moo Le MD   cyanocobalamin (VITAMIN B-12) 1,000 mcg tablet Take 1 Tab by mouth daily. 11/15/17   Moo Le MD   ascorbate calcium 500 mg tab Take 500 mg by mouth. Historical Provider   clonazePAM (KLONOPIN) 1 mg tablet Take 1 mg by mouth. 3/15/17   Historical Provider   metOLazone (ZAROXOLYN) 5 mg tablet Take 5 mg by mouth. 8/22/17   Historical Provider   apixaban (ELIQUIS) 5 mg tablet Take 5 mg by mouth. 7/10/17   Historical Provider   cholecalciferol (VITAMIN D3) 1,000 unit cap Take  by mouth.     Historical Provider   hydrALAZINE (APRESOLINE) 25 mg tablet Take 25 mg by mouth. 1/25/17   Historical Provider   methylnaltrexone (RELISTOR) 150 mg tab tablet Take 3 Tabs by mouth Daily (before breakfast). 10/31/17   Natalya Galloway MD   morphine CR (MS CONTIN) 60 mg CR tablet Take 1 Tab by mouth every twelve (12) hours. Max Daily Amount: 120 mg. Indications: Chronic Pain, Severe Pain 10/24/17   Natalya Galloway MD   oxyCODONE IR (ROXICODONE) 20 mg immediate release tablet Take 1 Tab by mouth every four (4) hours as needed for Pain. Max Daily Amount: 120 mg. Indications: Pain 10/24/17   Natalya Galloway MD   naloxegol (MOVANTIK) 25 mg tab tablet Take 1 Tab by mouth Daily (before breakfast). 10/24/17   Natalya Galloway MD   pantoprazole (PROTONIX) 40 mg tablet Take 1 Tab by mouth two (2) times a day. 10/24/17   Natalya Galloway MD   anastrozole (ARIMIDEX) 1 mg tablet Take 1 mg by mouth daily. Carl Rae MD   ondansetron (ZOFRAN ODT) 4 mg disintegrating tablet Take 1 Tab by mouth every eight (8) hours as needed for Nausea. 9/19/17   Shahzad Massey DO   apixaban (ELIQUIS) 5 mg tablet Take 1 Tab by mouth two (2) times a day. Indications: Cerebral Thromboembolism Prevention, DEEP VEIN THROMBOSIS PREVENTION, deep venous thrombosis, pulmonary thromboembolism 7/10/17   Natalya Galloway MD   furosemide (LASIX) 80 mg tablet TAKE 1 TABLET BY MOUTH EVERY DAY 5/22/17   Historical Provider   hydrALAZINE (APRESOLINE) 25 mg tablet Take 1 Tab by mouth daily. 1/25/17   MD PLACIDO Guevara MORPHINE CREAM APPLIES TO KNEES 3-4X DAILY    Historical Provider   metoprolol succinate (TOPROL-XL) 100 mg tablet 100 mg daily. 10/5/16   Historical Provider   magnesium 250 mg tab Take  by mouth daily. Historical Provider   cholecalciferol, vitamin D3, (VITAMIN D3) 2,000 unit tab Take  by mouth daily.     Historical Provider       Review of symptoms:     POSITIVE= Bold  Negative = not bold  General:  fever, chills, sweats, generalized weakness  Eyes:    blurred vision, eye pain, double vision  ENT:    Coryza, sore throat, trouble swallowing  Respiratory:   cough, sputum, mild SOB with exertion  Cardiology:   chest pain, orthopnea, PND, edema  Gastrointestinal:  abdominal pain , Nausea without vomiting, diarrhea, constipation, melena or BRBPR  Genitourinary:  Urgency, dysuria, hematuria  Muskuloskeletal :  Joint redness, swelling or acute joint pain, myalgias     Right thigh pain worse with walking  Hematology:  easy bruising, nose or gum bleeding  Dermatological: rash, ulceration  Endocrine:   Polyuria or polydipsia, heat or hold intolerance  Neurological:  Headache, focal motor or sensory changes     Speech difficulties, memory loss  Psychological: depression, agitation      Objective:   VITALS:    Patient Vitals for the past 24 hrs:   Temp Pulse Resp BP SpO2   17 2030 - (!) 107 20 126/59 96 %   17 - (!) 109 15 116/58 96 %   17 1930 - (!) 107 14 126/68 97 %   17 1920 - (!) 112 20 - 96 %   17 1919 - - - 117/69 -   17 1845 99.7 °F (37.6 °C) - - - -   17 1830 - (!) 111 20 134/58 94 %   17 1730 - (!) 109 22 169/74 98 %   17 1717 (!) 102.7 °F (39.3 °C) - - - -   17 1716 99.7 °F (37.6 °C) (!) 119 18 153/71 98 %     Temp (24hrs), Av.7 °F (38.2 °C), Min:99.7 °F (37.6 °C), Max:102.7 °F (39.3 °C)      O2 Device: Room air    PHYSICAL EXAM:   General:    Alert, cooperative in no distress     HEENT: Normocephalic, atraumatic    PERRL, EOMI  Sclera no icterus    Nasal mucosa without masses or discharge  Hearing intact to voice    Oropharynx without erythema or exudate  No thrush  Pale MM  Neck:  No meningismus, trachea midline, no carotid bruits     Thyroid not enlarged, no nodules or tenderness  Lungs:   Clear to auscultation bilaterally. No wheezing or rales    No accessory muscle use or retractions. Heart:   Regular rate and rhythm,  no murmur or gallop. Right leg swollen compared to left  Abdomen:   Soft, non-tender. Not distended.   Bowel sounds normal.     No masses, No Hepatosplenomegaly, No Rebound or guarding  Lymph nodes: No cervical FRANCISCO, bilateral inguinal FRANCISCO  Musculoskeletal:  No Joint swelling, erythema, warmth. No Cyanosis or clubbing  Skin:     Mild erythema right medial, proximal thigh    Not Jaundiced   No nodules or thickening    Capillary refill normal  Neurologic: Alert and oriented X 3, follows commands     Cranial nerves 2 to 12 intact    Symmetric motor strength bilaterally       LAB DATA REVIEWED:    Recent Results (from the past 12 hour(s))   CBC WITH AUTOMATED DIFF    Collection Time: 12/09/17  5:35 PM   Result Value Ref Range    WBC 8.3 3.6 - 11.0 K/uL    RBC 2.94 (L) 3.80 - 5.20 M/uL    HGB 8.7 (L) 11.5 - 16.0 g/dL    HCT 26.9 (L) 35.0 - 47.0 %    MCV 91.5 80.0 - 99.0 FL    MCH 29.6 26.0 - 34.0 PG    MCHC 32.3 30.0 - 36.5 g/dL    RDW 15.4 (H) 11.5 - 14.5 %    PLATELET 752 (L) 871 - 400 K/uL    NEUTROPHILS 80 (H) 32 - 75 %    LYMPHOCYTES 6 (L) 12 - 49 %    MONOCYTES 14 (H) 5 - 13 %    EOSINOPHILS 0 0 - 7 %    BASOPHILS 0 0 - 1 %    ABS. NEUTROPHILS 6.6 1.8 - 8.0 K/UL    ABS. LYMPHOCYTES 0.5 (L) 0.8 - 3.5 K/UL    ABS. MONOCYTES 1.2 (H) 0.0 - 1.0 K/UL    ABS. EOSINOPHILS 0.0 0.0 - 0.4 K/UL    ABS. BASOPHILS 0.0 0.0 - 0.1 K/UL    DF SMEAR SCANNED      RBC COMMENTS NORMOCYTIC, NORMOCHROMIC     METABOLIC PANEL, COMPREHENSIVE    Collection Time: 12/09/17  5:35 PM   Result Value Ref Range    Sodium 134 (L) 136 - 145 mmol/L    Potassium 4.0 3.5 - 5.1 mmol/L    Chloride 99 97 - 108 mmol/L    CO2 29 21 - 32 mmol/L    Anion gap 6 5 - 15 mmol/L    Glucose 117 (H) 65 - 100 mg/dL    BUN 29 (H) 6 - 20 MG/DL    Creatinine 2.03 (H) 0.55 - 1.02 MG/DL    BUN/Creatinine ratio 14 12 - 20      GFR est AA 29 (L) >60 ml/min/1.73m2    GFR est non-AA 24 (L) >60 ml/min/1.73m2    Calcium 10.1 8.5 - 10.1 MG/DL    Bilirubin, total 1.2 (H) 0.2 - 1.0 MG/DL    ALT (SGPT) 14 12 - 78 U/L    AST (SGOT) 16 15 - 37 U/L    Alk.  phosphatase 70 45 - 117 U/L    Protein, total 10.0 (H) 6.4 - 8.2 g/dL    Albumin 2.7 (L) 3.5 - 5.0 g/dL    Globulin 7.3 (H) 2.0 - 4.0 g/dL    A-G Ratio 0.4 (L) 1.1 - 2.2     LACTIC ACID    Collection Time: 12/09/17  5:35 PM   Result Value Ref Range    Lactic acid 1.5 0.4 - 2.0 MMOL/L   NT-PRO BNP    Collection Time: 12/09/17  5:35 PM   Result Value Ref Range    NT pro- 0 - 450 PG/ML   TROPONIN I    Collection Time: 12/09/17  5:35 PM   Result Value Ref Range    Troponin-I, Qt. <0.04 <0.05 ng/mL   PROTHROMBIN TIME + INR    Collection Time: 12/09/17  5:35 PM   Result Value Ref Range    INR 1.6 (H) 0.9 - 1.1      Prothrombin time 16.4 (H) 9.0 - 11.1 sec   INFLUENZA A & B AG (RAPID TEST)    Collection Time: 12/09/17  5:54 PM   Result Value Ref Range    Influenza A Antigen NEGATIVE  NEG      Influenza B Antigen NEGATIVE  NEG     EKG, 12 LEAD, INITIAL    Collection Time: 12/09/17  6:05 PM   Result Value Ref Range    Ventricular Rate 115 BPM    Atrial Rate 115 BPM    P-R Interval 172 ms    QRS Duration 84 ms    Q-T Interval 318 ms    QTC Calculation (Bezet) 439 ms    Calculated P Axis 77 degrees    Calculated R Axis 12 degrees    Calculated T Axis 23 degrees    Diagnosis       Sinus tachycardia with Possible premature atrial complexes with aberrant   conduction  Otherwise normal ECG  When compared with ECG of 19-SEP-2017 11:40,  aberrant conduction is now present  Vent. rate has increased BY  51 BPM         EKG as read by me shows ST with PACs rate 115, normal axis, no LVH, no ischemic ST-Tchanges    CXR read by radiology and reviewed by myself shows FINDINGS:   Cardiac monitoring wires overlie the thorax. The cardiomediastinal  contours are stable. The pulmonary vasculature is within normal limits. The lungs and pleural spaces are clear. There is no pneumothorax. The bones and  upper abdomen are stable. IMPRESSION:  No acute process. Doppler US Findings:   Grayscale, color, and duplex imaging of the right lower lower  extremity deep venous system was performed. Study severely limited by soft  tissue swelling.  There is however occlusive thrombus in the right common femoral  and proximal right superficial femoral veins. Distal vessels could not be  evaluated  Impression:  1. Acute deep venous thrombosis of the right common femoral vein extending in  the proximal superficial femoral vein    Inpatient is warranted for this patient because they presents with  Right leg pain  I have a high level of concern for recurrent DVT right leg, acute kidney injury  I anticipate the stay in the hospital will span at least 2 midnights. My assessment of the clinical condition and my plan of care is as outlined above    I saw the patient personally, took a history and did a complete physical exam at the bedside. I performed complex decision making in coming up with a diagnostic and treatment plan for the patient. I reviewed the patient's past medical records, current laboratory and radiology results, and actual Xray films/EKG. I have also discussed this case with the involved ED physician.     Care Plan discussed with:    Patient, ED Doc    Risk of deterioration:  High    Total Time Coordinating Admission:   65  minutes    Total Critical Care Time:         Fay Arreguin MD

## 2017-12-10 NOTE — ED NOTES
Patient insistent on receiving additional pain medication, stating she normally takes more medication at home and \"that dose of Dilaudid isn't going to touch me. \" Informed patient that the IV dose of Dilaudid is more potent than medications she normally receives at home, but patient remains insistent on additional pain medications and became argumentative with RN, stating she \"has told me about her pain since I got here. \" Patient did not mention pain or need for pain medications until this time, and it was explained to patient that staff are attempting to treat her pain appropriately but we cannot give her too much medication safely. Spoke with Hospitalist who confirmed we may increase her Dilaudid dosage, and Roxicodone dosage will be discontinued. Patient insists on need for pain medication and patient's son states \"patient takes more medication than this at home and has issues with chronic pain. \" Patient and son then again informed that staff will attempt to make her as comfortable as possible and Dilaudid being administered is more potent than her normal medications. Explained to patient and family that, while it may not be reasonable to assume we could completely eliminate her pain, we will make every effort to make her pain as manageable as possible. Patient still appears agitated, but verbalizes understanding.

## 2017-12-10 NOTE — ED NOTES
Bedside and Verbal shift change report given to Song Gu RN (oncoming nurse) by Padmini Baldwin RN (offgoing nurse). Report included the following information SBAR, ED Summary, MAR and Recent Results.

## 2017-12-10 NOTE — ED NOTES
TRANSFER - OUT REPORT:    Verbal report given to Josue Parker RN (name) on Rosette Cortes  being transferred to Gen Surg (unit) for routine progression of care       Report consisted of patients Situation, Background, Assessment and   Recommendations(SBAR). Information from the following report(s) SBAR, Kardex, ED Summary, MAR, Accordion, Recent Results, Med Rec Status and Cardiac Rhythm Sinus Tach was reviewed with the receiving nurse. Lines:   Peripheral IV 12/09/17 Right Antecubital (Active)   Site Assessment Clean, dry, & intact 12/9/2017  5:48 PM   Phlebitis Assessment 0 12/9/2017  5:48 PM   Infiltration Assessment 0 12/9/2017  5:48 PM   Dressing Status Clean, dry, & intact 12/9/2017  5:48 PM   Dressing Type Transparent;Tape 12/9/2017  5:48 PM   Hub Color/Line Status Pink;Flushed 12/9/2017  5:48 PM   Action Taken Blood drawn 12/9/2017  5:48 PM        Opportunity for questions and clarification was provided.       Patient transported with:   Patient-specific medications from Pharmacy

## 2017-12-10 NOTE — ROUTINE PROCESS
Pt and family are refusing the CTs can of the head. pt stated. \"I don't want anymore scans. I just had a PET scan last week and I don't have a headache anymore. CT and MD notified.

## 2017-12-10 NOTE — PROGRESS NOTES
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 Still having intermittent BRBPR  GI consulted       Acute kidney injury POA admit BUN/ Creat 29/2.03  Prior baseline creatinine 0.9 to 1.0  Continue to Hold diuretics  Gentle IVF  Morning BUN/Cr. Urine studies tomorrow ,if renal function still low.     SIRS POA fever to 102.7, , normal WBC count  Etiology unclear  CXR no ASD  Check UA and culture, pending   Will monitor off  antibiotics for now     Recent scant rectal bleeding POA  Brown stool, heme positive  Watch for recurrent bleeding on the heparin  Will continue to see H and H   GI consulted.     Advanced Uterine cancer POA now with LN mets  Scheduled to start chemo in 2018  Follow up Headach CT head to rule out mets , gave hx of headaches. Essential HTN POA  Continue toprol Xl  PRN hydralazine     Chronic pain syndrome POA  Continue home MS contin 60 mg PO BID  PRN oxycodone 20 mg, PRN dilaudid IV if that is not effectiv      Body mass index is 47.61 kg/(m^2). Code status: Full  Prophylaxis: heparin IV   Recommended Disposition: TBD     Subjective:     Chief Complaint / Reason for Physician Visit  No fever, no headach , the right leg pain still there. .  Discussed with RN events overnight.      Review of Systems:  Symptom Y/N Comments  Symptom Y/N Comments   Fever/Chills n   Chest Pain n    Poor Appetite    Edema     Cough n   Abdominal Pain n    Sputum n   Joint Pain n    SOB/SRIVASTAVA n Pruritis/Rash n    Nausea/vomit n   Tolerating PT/OT     Diarrhea n   Tolerating Diet y    Constipation n   Other       Could NOT obtain due to:      Objective:     VITALS:   Last 24hrs VS reviewed since prior progress note. Most recent are:  Patient Vitals for the past 24 hrs:   Temp Pulse Resp BP SpO2   12/10/17 0807 - (!) 116 17 118/57 96 %   12/10/17 0321 98.4 °F (36.9 °C) (!) 107 18 134/74 98 %   12/10/17 0046 98.1 °F (36.7 °C) (!) 104 18 132/70 100 %   12/09/17 2300 - (!) 111 19 118/68 96 %   12/09/17 2230 - (!) 112 20 119/61 99 %   12/09/17 2130 - (!) 109 18 129/57 96 %   12/09/17 2030 - (!) 107 20 126/59 96 %   12/09/17 2000 - (!) 109 15 116/58 96 %   12/09/17 1930 - (!) 107 14 126/68 97 %   12/09/17 1920 - (!) 112 20 - 96 %   12/09/17 1919 - - - 117/69 -   12/09/17 1845 99.7 °F (37.6 °C) - - - -   12/09/17 1830 - (!) 111 20 134/58 94 %   12/09/17 1730 - (!) 109 22 169/74 98 %   12/09/17 1717 (!) 102.7 °F (39.3 °C) - - - -   12/09/17 1716 99.7 °F (37.6 °C) (!) 119 18 153/71 98 %       Intake/Output Summary (Last 24 hours) at 12/10/17 1057  Last data filed at 12/10/17 0121   Gross per 24 hour   Intake                0 ml   Output                0 ml   Net                0 ml        PHYSICAL EXAM:  General: WD, WN. Alert, cooperative, no acute distress   , obese   EENT:  EOMI. Anicteric sclerae. MMM  Resp:  CTA bilaterally, no wheezing or rales. No accessory muscle use  CV:  Regular  rhythm,  No edema  GI:  Soft, Non distended, Non tender.  +Bowel sounds  Neurologic:  Alert and oriented X 3, normal speech,   Psych:   Good insight. Not anxious nor agitated  Skin:  No rashes.   No jaundice , chronic venous skin changes in lower ext but no edema     Right leg/tigh bigger than the left , feels warm but no skin chagres     Reviewed most current lab test results and cultures  YES  Reviewed most current radiology test results   YES  Review and summation of old records today    NO  Reviewed patient's current orders and MAR    YES  PMH/SH reviewed - no change compared to H&P  ________________________________________________________________________  Care Plan discussed with:    Comments   Patient y    Family  y Son at bed side    RN y    Care Manager n    Consultant  n                      Multidiciplinary team rounds were held today with , nursing, pharmacist and clinical coordinator. Patient's plan of care was discussed; medications were reviewed and discharge planning was addressed. ________________________________________________________________________  Total NON critical care TIME:  50  Minutes    Total CRITICAL CARE TIME Spent:   Minutes non procedure based      Comments   >50% of visit spent in counseling and coordination of care y On weight loss    ________________________________________________________________________  Ivan Francisco MD     Procedures: see electronic medical records for all procedures/Xrays and details which were not copied into this note but were reviewed prior to creation of Plan. LABS:  I reviewed today's most current labs and imaging studies. Pertinent labs include:  Recent Labs      12/10/17   0402  12/09/17   1735   WBC  7.8  8.3   HGB  8.6*  8.7*   HCT  26.7*  26.9*   PLT  138*  139*     Recent Labs      12/10/17   0402  12/09/17   1735   NA  133*  134*   K  3.8  4.0   CL  99  99   CO2  26  29   GLU  102*  117*   BUN  30*  29*   CREA  1.55*  2.03*   CA  9.8  10.1   ALB  2.3*  2.7*   TBILI  1.0  1.2*   SGOT  15  16   ALT  12  14   INR   --   1.6*       Signed:  Ivan Francisco MD

## 2017-12-10 NOTE — ROUTINE PROCESS
General Surgery End of Shift Nursing Note    Bedside shift change report given to Myrna Flanagan (oncoming nurse) by Joseph Marinelli (offgoing nurse). Report included the following information SBAR, Kardex, Intake/Output, MAR, Accordion, Recent Results and Med Rec Status. Shift worked:   7a7p   Summary of shift:    Pain is an issue. MD paged about this. Pt takes quite a bit of pain medication normally at home   Issues for physician to address:        Number times ambulated in hallway past shift: 0    Number of times OOB to chair past shift: 0    Pain Management:  Current medication:   Patient states pain is manageable on current pain medication: NO    GI:    Current diet:  DIET CARDIAC Regular    Tolerating current diet: YES  Passing flatus: YES  Last Bowel Movement: today   Appearance: loose brown    Respiratory:    Incentive Spirometer at bedside: YES  Patient instructed on use: YES    Patient Safety:    Falls Score: 3  Bed Alarm On? No  Sitter?  No    Maritza Nolasco

## 2017-12-11 LAB
ANION GAP SERPL CALC-SCNC: 8 MMOL/L (ref 5–15)
APTT PPP: 74.2 SEC (ref 22.1–32.5)
BACTERIA SPEC CULT: NORMAL
BASOPHILS # BLD: 0 K/UL (ref 0–0.1)
BASOPHILS NFR BLD: 0 % (ref 0–1)
BUN SERPL-MCNC: 26 MG/DL (ref 6–20)
BUN/CREAT SERPL: 23 (ref 12–20)
CALCIUM SERPL-MCNC: 9.1 MG/DL (ref 8.5–10.1)
CC UR VC: NORMAL
CHLORIDE SERPL-SCNC: 100 MMOL/L (ref 97–108)
CO2 SERPL-SCNC: 26 MMOL/L (ref 21–32)
CREAT SERPL-MCNC: 1.13 MG/DL (ref 0.55–1.02)
EOSINOPHIL # BLD: 0.1 K/UL (ref 0–0.4)
EOSINOPHIL NFR BLD: 1 % (ref 0–7)
ERYTHROCYTE [DISTWIDTH] IN BLOOD BY AUTOMATED COUNT: 15.6 % (ref 11.5–14.5)
GLUCOSE SERPL-MCNC: 103 MG/DL (ref 65–100)
HCT VFR BLD AUTO: 25.4 % (ref 35–47)
HGB BLD-MCNC: 8.1 G/DL (ref 11.5–16)
LYMPHOCYTES # BLD: 0.9 K/UL (ref 0.8–3.5)
LYMPHOCYTES NFR BLD: 12 % (ref 12–49)
MCH RBC QN AUTO: 29.1 PG (ref 26–34)
MCHC RBC AUTO-ENTMCNC: 31.9 G/DL (ref 30–36.5)
MCV RBC AUTO: 91.4 FL (ref 80–99)
MONOCYTES # BLD: 0.8 K/UL (ref 0–1)
MONOCYTES NFR BLD: 9 % (ref 5–13)
NEUTS SEG # BLD: 6.3 K/UL (ref 1.8–8)
NEUTS SEG NFR BLD: 78 % (ref 32–75)
PLATELET # BLD AUTO: 151 K/UL (ref 150–400)
POTASSIUM SERPL-SCNC: 4.1 MMOL/L (ref 3.5–5.1)
RBC # BLD AUTO: 2.78 M/UL (ref 3.8–5.2)
SERVICE CMNT-IMP: NORMAL
SODIUM SERPL-SCNC: 134 MMOL/L (ref 136–145)
THERAPEUTIC RANGE,PTTT: ABNORMAL SECS (ref 58–77)
WBC # BLD AUTO: 8.1 K/UL (ref 3.6–11)

## 2017-12-11 PROCEDURE — G8979 MOBILITY GOAL STATUS: HCPCS | Performed by: PHYSICAL THERAPIST

## 2017-12-11 PROCEDURE — 74011250636 HC RX REV CODE- 250/636: Performed by: INTERNAL MEDICINE

## 2017-12-11 PROCEDURE — 80048 BASIC METABOLIC PNL TOTAL CA: CPT | Performed by: INTERNAL MEDICINE

## 2017-12-11 PROCEDURE — 97530 THERAPEUTIC ACTIVITIES: CPT | Performed by: PHYSICAL THERAPIST

## 2017-12-11 PROCEDURE — 74011250636 HC RX REV CODE- 250/636: Performed by: EMERGENCY MEDICINE

## 2017-12-11 PROCEDURE — 36415 COLL VENOUS BLD VENIPUNCTURE: CPT | Performed by: EMERGENCY MEDICINE

## 2017-12-11 PROCEDURE — 74011250637 HC RX REV CODE- 250/637: Performed by: INTERNAL MEDICINE

## 2017-12-11 PROCEDURE — 85025 COMPLETE CBC W/AUTO DIFF WBC: CPT | Performed by: INTERNAL MEDICINE

## 2017-12-11 PROCEDURE — G8978 MOBILITY CURRENT STATUS: HCPCS | Performed by: PHYSICAL THERAPIST

## 2017-12-11 PROCEDURE — 76450000000

## 2017-12-11 PROCEDURE — 85730 THROMBOPLASTIN TIME PARTIAL: CPT | Performed by: EMERGENCY MEDICINE

## 2017-12-11 PROCEDURE — 97161 PT EVAL LOW COMPLEX 20 MIN: CPT | Performed by: PHYSICAL THERAPIST

## 2017-12-11 PROCEDURE — 65270000029 HC RM PRIVATE

## 2017-12-11 PROCEDURE — 97166 OT EVAL MOD COMPLEX 45 MIN: CPT | Performed by: OCCUPATIONAL THERAPIST

## 2017-12-11 RX ORDER — OXYCODONE HYDROCHLORIDE 5 MG/1
20 TABLET ORAL
Status: DISCONTINUED | OUTPATIENT
Start: 2017-12-11 | End: 2017-12-16 | Stop reason: HOSPADM

## 2017-12-11 RX ADMIN — METOPROLOL SUCCINATE 100 MG: 50 TABLET, EXTENDED RELEASE ORAL at 09:33

## 2017-12-11 RX ADMIN — HYDROMORPHONE HYDROCHLORIDE 2 MG: 2 INJECTION INTRAMUSCULAR; INTRAVENOUS; SUBCUTANEOUS at 15:03

## 2017-12-11 RX ADMIN — HYDROMORPHONE HYDROCHLORIDE 2 MG: 2 INJECTION INTRAMUSCULAR; INTRAVENOUS; SUBCUTANEOUS at 22:00

## 2017-12-11 RX ADMIN — HEPARIN SODIUM AND DEXTROSE 12.33 UNITS/KG/HR: 10000; 5 INJECTION INTRAVENOUS at 05:32

## 2017-12-11 RX ADMIN — SODIUM CHLORIDE 75 ML/HR: 900 INJECTION, SOLUTION INTRAVENOUS at 13:54

## 2017-12-11 RX ADMIN — HYDROMORPHONE HYDROCHLORIDE 2 MG: 2 INJECTION INTRAMUSCULAR; INTRAVENOUS; SUBCUTANEOUS at 19:08

## 2017-12-11 RX ADMIN — NALOXEGOL OXALATE 25 MG: 25 TABLET, FILM COATED ORAL at 09:40

## 2017-12-11 RX ADMIN — SODIUM CHLORIDE 75 ML/HR: 900 INJECTION, SOLUTION INTRAVENOUS at 20:13

## 2017-12-11 RX ADMIN — HYDROMORPHONE HYDROCHLORIDE 2 MG: 2 INJECTION INTRAMUSCULAR; INTRAVENOUS; SUBCUTANEOUS at 10:33

## 2017-12-11 RX ADMIN — HEPARIN SODIUM AND DEXTROSE 12.33 UNITS/KG/HR: 10000; 5 INJECTION INTRAVENOUS at 20:11

## 2017-12-11 RX ADMIN — VITAMIN D, TAB 1000IU (100/BT) 1000 UNITS: 25 TAB at 09:33

## 2017-12-11 RX ADMIN — CYANOCOBALAMIN TAB 500 MCG 1000 MCG: 500 TAB at 09:33

## 2017-12-11 RX ADMIN — OXYCODONE HYDROCHLORIDE 20 MG: 5 TABLET ORAL at 17:41

## 2017-12-11 RX ADMIN — HYDROMORPHONE HYDROCHLORIDE 2 MG: 2 INJECTION INTRAMUSCULAR; INTRAVENOUS; SUBCUTANEOUS at 03:09

## 2017-12-11 RX ADMIN — HYDROMORPHONE HYDROCHLORIDE 2 MG: 2 INJECTION INTRAMUSCULAR; INTRAVENOUS; SUBCUTANEOUS at 05:27

## 2017-12-11 RX ADMIN — MORPHINE SULFATE 60 MG: 30 TABLET, EXTENDED RELEASE ORAL at 09:33

## 2017-12-11 RX ADMIN — MORPHINE SULFATE 60 MG: 30 TABLET, EXTENDED RELEASE ORAL at 20:13

## 2017-12-11 RX ADMIN — OXYCODONE HYDROCHLORIDE 20 MG: 5 TABLET ORAL at 13:51

## 2017-12-11 RX ADMIN — Medication 10 ML: at 15:03

## 2017-12-11 NOTE — PROGRESS NOTES
Problem: Mobility Impaired (Adult and Pediatric)  Goal: *Acute Goals and Plan of Care (Insert Text)  Physical Therapy Goals  Initiated 12/11/2017  1. Patient will move from supine to sit and sit to supine , scoot up and down and roll side to side in bed with minimal assistance/contact guard assist within 7 day(s). 2.  Patient will transfer from bed to chair and chair to bed with minimal assistance/contact guard assist using the least restrictive device within 7 day(s). 3.  Patient will perform sit to stand with minimal assistance/contact guard assist within 7 day(s). 4.  Patient will ambulate with minimal assistance/contact guard assist for 25 feet with the least restrictive device within 7 day(s). physical Therapy EVALUATION  Patient: Hannah Taveras (65 y.o. female)  Date: 12/11/2017  Primary Diagnosis: Right leg DVT (HCC)        Precautions: falls       ASSESSMENT :  Based on the objective data described below, the patient presents with generalized weakness, increased c/o pain in RLE, impaired mobility, impaired balance and impaired endurance all which limit patient's functional independence. Patient sitting on BSC upon arrival and required mod A of 2 to transition to standing. Patient able to take a few steps BSC to bed using RW with min A of 1-2. Patient demonstrates unsteady gait with poor ability to advance RLE. Patient then requiring mod A of 2 to return to bed. Additional time and encouragement required for all mobility tasks. Son reports that at baseline patient is modified independent using rollator for ambulation. She lives alone and has good support of family. Per son, patient demonstrating declining functional status over last week. Recommend SNF following discharge. Patient will benefit from skilled intervention to address the above impairments.   Patients rehabilitation potential is considered to be Fair  Factors which may influence rehabilitation potential include:   []         None noted  []         Mental ability/status  [x]         Medical condition  []         Home/family situation and support systems  []         Safety awareness  [x]         Pain tolerance/management  []         Other:      PLAN :  Recommendations and Planned Interventions:  [x]           Bed Mobility Training             []    Neuromuscular Re-Education  [x]           Transfer Training                   []    Orthotic/Prosthetic Training  [x]           Gait Training                         []    Modalities  []           Therapeutic Exercises           []    Edema Management/Control  [x]           Therapeutic Activities            [x]    Patient and Family Training/Education  []           Other (comment):    Frequency/Duration: Patient will be followed by physical therapy  4 times a week to address goals. Discharge Recommendations: Brian Christensen  Further Equipment Recommendations for Discharge: TBD by SNF     SUBJECTIVE:   Patient stated Go slow!  My fibromyalgia!!!.    OBJECTIVE DATA SUMMARY:   HISTORY:    Past Medical History:   Diagnosis Date    Acute kidney failure ([address] 11/19/2015    Acute sinusitis 2/11/2011    Adverse effect of anesthesia     SLOW WAKING PAST ANESTHESIA    Arthritis     RA    At risk for side effect of medication 9/30/2016    Atrial fibrillation ([address] 10/19/2011    Autoimmune disease ([address]     FIBROMYLGIA    Cancer ([address] 2016    ENDOMETRIAL     Cholelithiasis 11/19/2015    Chronic pain     Claudication of both lower extremities ([address] 8/25/2015    Diabetes mellitus type 2, controlled ([address] 1/19/2016    Diabetes mellitus, type II ([address] 10/10/2014    Fall against object 10/10/2014    Fatty liver 10/20/2015    Hypertension     Ill-defined condition     SPINAL STENOSIS    Left shoulder pain 10/10/2014    Leiomyoma of body of uterus 4/7/2016    Morbid obesity ([address]     Myalgia 9/30/2016    Nausea & vomiting     Personal history of radiation therapy 4/20/2017    Pneumonia     Preop examination 6/15/2015    Preoperative clearance 6/15/2015    Rash 2/11/2011    Rash of hands 10/10/2014    Screening for colon cancer 2/22/2016    Screening for glaucoma 2/22/2016    Sleep apnea     SOB (shortness of breath)     hospitalized 5/2012. angina, SOB    [facility]emb[facility] 2011    LEFT LOWER LEG AND PE    Tinea pedis, recurrent 7/20/2015    Vaginal bleeding, abnormal 4/20/2017     Past Surgical History:   Procedure Laterality Date    COLONOSCOPY N/A 12/21/2016    COLONOSCOPY performed by Merlinda Ferretti, MD at [address] [place] [address]  12/21/2016         HX CATARACT REMOVAL Bilateral 2015    [address]    HX DILATION AND CURETTAGE  07/29/2016    HX DILATION AND CURETTAGE  2016    HX GYN  1960    etopic    HX HEART CATHETERIZATION  2001    NEGATIVE PER PATIENT    HX HYSTERECTOMY  2016    HX KNEE ARTHROSCOPY Left 1998    HX ORTHOPAEDIC Right 1960'S    BUNIONECTOMY    HX OTHER SURGICAL      BIOPSY OF VEIN IN FOREHEAD    HX TUBAL LIGATION  1963    [place] COLSC FLX W/RMVL OF TUMOR POLYP LESION SNARE TQ  12/12/2011          Prior Level of Function/Home Situation: patient minimally verbally interactive which appears behavioral. Son reporting that patient is modified independent with all mobility and ADLs; uses rollator for ambulation      Home Situation  Home Environment: Private residence  # Steps to Enter: 5  Rails to Enter: Yes  Wheelchair Ramp: No  One/Two Story Residence: One story  Living Alone: Yes  Support Systems: Family member(s)  Patient Expects to be Discharged to[de-identified] Private residence  Current DME Used/Available at Home: Maximiliano Landers, rollator, Wheelchair, [id number] [employer] chair  Tub or Shower Type: Shower    EXAMINATION/PRESENTATION/DECISION MAKING:   Critical Behavior:   Patient is oriented to person; not verbally answering questions by therapist- appears oriented x 4           Hearing:   Auditory  Auditory Impairment: None    Range Of Motion:  AROM: Generally decreased, functional (refused to move R LE 2/2 pain)                       Strength:    Strength: Generally decreased, functional                    Tone & Sensation:   Tone: Normal                              Coordination:  Coordination: Generally decreased, functional  Vision:      Functional Mobility:  Bed Mobility:        Sit to Supine: Minimum assistance; Moderate assistance;Assist x2; Additional time  Scooting: Contact guard assistance; Additional time  Transfers:  Sit to Stand: Moderate assistance;Assist x2; Additional time  Stand to Sit: Minimum assistance;Assist x1;Additional time        Bed to Chair: Minimum assistance;Assist x1;Additional time              Balance:   Sitting: Intact  Standing: Impaired  Standing - Static: Fair;Constant support  Standing - Dynamic : Poor  Ambulation/Gait Training:  Distance (ft):  (few steps bed to chair)  Assistive Device: Walker, rolling  Ambulation - Level of Assistance: Minimal assistance; Additional time        Gait Abnormalities: Decreased step clearance (poor ability to advance RLE)        Base of Support: Widened     Speed/Kailee: Pace decreased (<100 feet/min); Delayed;Slow;Shuffled  Step Length: Left shortened;Right shortened      Gait is slow with delayed stepping and poor ability to advance RLE       Functional Measure:    Elder Mobility Scale    1/20         EMS and G-code impairment scale:  Percentage of impairment CH  0% CI  1-19% CJ  20-39% CK  40-59% CL  60-79% CM  80-99% CN  100%   EMS Score 0-20 20 17-19 13-16 9-12 5-8 1-4 0      Scores under 10  generally these patients are dependent in mobility maneuvers; require help with  basic ADL, such as transfers, toileting and dressing. Scores between 10  13  generally these patients are borderline in terms of safe mobility and  independence in ADL i.e. they require some help with some mobility maneuvers.     Scores over 14  Generally these patients are able to perform mobility maneuvers alone and safely  and are independent in basic ADL. G codes: In compliance with CMSs Claims Based Outcome Reporting, the following G-code set was chosen for this patient based on their primary functional limitation being treated: The outcome measure chosen to determine the severity of the functional limitation was the Elderly mobility scale with a score of 1/20 which was correlated with the impairment scale. ? Mobility - Walking and Moving Around:     - CURRENT STATUS: CM - 80%-99% impaired, limited or restricted    - GOAL STATUS: CK - 40%-59% impaired, limited or restricted    - D/C STATUS:  ---------------To be determined---------------          Pain:   Patient reports 10/10 pain in R LE with movement       After treatment:   []         Patient left in no apparent distress sitting up in chair  [x]         Patient left in no apparent distress in bed  [x]         Call bell left within reach  [x]         Nursing notified  [x]         Caregiver present  []         Bed alarm activated    COMMUNICATION/EDUCATION:   The patients plan of care was discussed with: Occupational Therapist, Registered Nurse and . [x]         Fall prevention education was provided and the patient/caregiver indicated understanding. [x]         Patient/family have participated as able in goal setting and plan of care. [x]         Patient/family agree to work toward stated goals and plan of care. [x]         Patient understands intent and goals of therapy, but is neutral about his/her participation. []         Patient is unable to participate in goal setting and plan of care.     Thank you for this referral.  Myrna Rm, PT   Time Calculation: 27 mins

## 2017-12-11 NOTE — PROGRESS NOTES
Hospitalist Progress Note    NAME: Samantha Gentile   :  1940   MRN:  [id number]       Assessment / Plan:  Right thigh DVT POA . 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 Hematology consult. Acute kidney injury POA   Improving , creatinine today is 1.1     FEVER/with SIRS POA   T-102.7F, likely from non infectious source , DVT   Will monitor off  antibiotics for now.     Recent scant rectal bleeding POA  Brown stool, heme positive  Watch for recurrent bleeding on the heparin  Will continue to see H and H   GI consulted.     Advanced Uterine cancer POA now with LN mets  Scheduled to start chemo in 2018  Follow up Headach CT head to rule out mets , gave hx of headaches. Essential HTN POA  Continue toprol Xl  PRN hydralazine     Chronic pain syndrome POA  Continue home MS contin 60 mg PO BID  PRN oxycodone 20 mg, PRN dilaudid IV if that is not effectiv      Body mass index is 47.61 kg/(m^2). Morbidly obese ,advised on weight loss and diet. Code status: Full  Prophylaxis: heparin IV   Recommended Disposition: TBD     Subjective:     Chief Complaint / Reason for Physician Visit  No fever, no headach , the right leg pain still there. .  Discussed with RN events overnight.      Review of Systems:  Symptom Y/N Comments  Symptom Y/N Comments   Fever/Chills n   Chest Pain n    Poor Appetite    Edema     Cough n   Abdominal Pain n    Sputum n   Joint Pain n    SOB/SRIVASTAVA n   Pruritis/Rash n    Nausea/vomit n   Tolerating PT/OT     Diarrhea n   Tolerating Diet y    Constipation n   Other       Could NOT obtain due to: Objective:     VITALS:   Last 24hrs VS reviewed since prior progress note. Most recent are:  Patient Vitals for the past 24 hrs:   Temp Pulse Resp BP SpO2   12/11/17 1236 100.2 °F (37.9 °C) (!) 101 20 169/66 93 %   12/11/17 0931 98.7 °F (37.1 °C) (!) 101 22 139/59 97 %   12/11/17 0043 98.9 °F (37.2 °C) (!) 104 18 141/52 100 %   12/10/17 1556 99.6 °F (37.6 °C) 93 17 121/58 98 %     No intake or output data in the 24 hours ending 12/11/17 1251     PHYSICAL EXAM:  General: WD, WN. Alert, cooperative, no acute distress   , obese   EENT:  EOMI. Anicteric sclerae. MMM  Resp:  CTA bilaterally, no wheezing or rales. No accessory muscle use  CV:  Regular  rhythm,  No edema  GI:  Soft, Non distended, Non tender.  +Bowel sounds  Neurologic:  Alert and oriented X 3, normal speech,   Psych:   Good insight. Not anxious nor agitated  Skin:  No rashes. No jaundice , chronic venous skin changes in lower ext but no edema     Right leg/tigh bigger than the left , feels warm but no skin chagres     Reviewed most current lab test results and cultures  YES  Reviewed most current radiology test results   YES  Review and summation of old records today    NO  Reviewed patient's current orders and MAR    YES  PMH/ reviewed - no change compared to H&P  ________________________________________________________________________  Care Plan discussed with:    Comments   Patient y    Family  y Son at bed side    RN y    Care Manager n    Consultant  n                      Multidiciplinary team rounds were held today with , nursing, pharmacist and clinical coordinator. Patient's plan of care was discussed; medications were reviewed and discharge planning was addressed.      ________________________________________________________________________  Total NON critical care TIME:  50  Minutes    Total CRITICAL CARE TIME Spent:   Minutes non procedure based      Comments   >50% of visit spent in counseling and coordination of care y On weight loss    ________________________________________________________________________  John Daswon MD     Procedures: see electronic medical records for all procedures/Xrays and details which were not copied into this note but were reviewed prior to creation of Plan. LABS:  I reviewed today's most current labs and imaging studies. Pertinent labs include:  Recent Labs      12/11/17   0430  12/10/17   0402  12/09/17   1735   WBC  8.1  7.8  8.3   HGB  8.1*  8.6*  8.7*   HCT  25.4*  26.7*  26.9*   PLT  151  138*  139*     Recent Labs      12/11/17   0210  12/10/17   0402  12/09/17   1735   NA  134*  133*  134*   K  4.1  3.8  4.0   CL  100  99  99   CO2  26  26  29   GLU  103*  102*  117*   BUN  26*  30*  29*   CREA  1.13*  1.55*  2.03*   CA  9.1  9.8  10.1   ALB   --   2.3*  2.7*   TBILI   --   1.0  1.2*   SGOT   --   15  16   ALT   --   12  14   INR   --    --   1.6*       Signed:  John Dawson MD

## 2017-12-11 NOTE — PROGRESS NOTES
Problem: Self Care Deficits Care Plan (Adult)  Goal: *Acute Goals and Plan of Care (Insert Text)  Occupational Therapy Goals:  Initiated 12/11/2017  1. Patient will perform grooming standing with minimal assistance within 7 days. 2. Patient will perform toileting with moderate assistance  within 7 days. 3. Patient will perform lower body dressing with moderate assistance  With AE PRN within 7 days. 4. Patient will improve dynamic standing balance to CGA for increased independence with ADLS within 7 days. 5. Patient will transfer from toilet with minimal assistance/contact guard assist using the least restrictive device and appropriate durable medical equipment within 7 days. Occupational Therapy EVALUATION  Patient: Breanne Jay (89 y.o. female)  Date: 12/11/2017  Primary Diagnosis: Right leg DVT (HCC)        Precautions: fall, bed alarm       ASSESSMENT :  Based on the objective data described below, the patient presents with increased pain throughout her body but most noticeably in BLE. Pt is performing stand pivot transfers with rolling walker to and from bedside commode with moderate assist for sit to stand and min assist to pivot to and from surface. Pt reported nausea after mobility and was anxious. Poor standing balance dynamically this session and fair statically with RW. Pt has functional BUE. Pt is performing UB ADLS at a set up to min assist level and lower body ADLs at a max/total assist level. Pt will SNF for rehab at discharge due to the above. Patient will benefit from skilled intervention to address the above impairments.   Patients rehabilitation potential is considered to be Fair  Factors which may influence rehabilitation potential include:   []             None noted  []             Mental ability/status  [x]             Medical condition  []             Home/family situation and support systems  []             Safety awareness  [x]             Pain tolerance/management  [] Other: PLAN :  Recommendations and Planned Interventions:  [x]               Self Care Training                  [x]        Therapeutic Activities  [x]               Functional Mobility Training    []        Cognitive Retraining  [x]               Therapeutic Exercises           []        Endurance Activities  [x]               Balance Training                   []        Neuromuscular Re-Education  []               Visual/Perceptual Training     [x]   Home Safety Training  [x]               Patient Education                 [x]        Family Training/Education  []               Other (comment):    Frequency/Duration: Patient will be followed by occupational therapy 4 times a week to address goals. Discharge Recommendations: Brian Christensen  Further Equipment Recommendations for Discharge: bariatric bedside commode     SUBJECTIVE:   Patient stated Oh it hurts!  You know how to work with people with fibromyalgia    OBJECTIVE DATA SUMMARY:   HISTORY:   Past Medical History:   Diagnosis Date    Acute kidney failure ([address] 11/19/2015    Acute sinusitis 2/11/2011    Adverse effect of anesthesia     SLOW WAKING PAST ANESTHESIA    Arthritis     RA    At risk for side effect of medication 9/30/2016    Atrial fibrillation ([address] 10/19/2011    Autoimmune disease ([address]     FIBROMYLGIA    Cancer ([address] 2016    ENDOMETRIAL     Cholelithiasis 11/19/2015    Chronic pain     Claudication of both lower extremities ([address] 8/25/2015    Diabetes mellitus type 2, controlled ([address] 1/19/2016    Diabetes mellitus, type II ([address] 10/10/2014    Fall against object 10/10/2014    Fatty liver 10/20/2015    Hypertension     Ill-defined condition     SPINAL STENOSIS    Left shoulder pain 10/10/2014    Leiomyoma of body of uterus 4/7/2016    Morbid obesity ([address]     Myalgia 9/30/2016    Nausea & vomiting     Personal history of radiation therapy 4/20/2017    Pneumonia     Preop examination 6/15/2015  Preoperative clearance 6/15/2015    Rash 2/11/2011    Rash of hands 10/10/2014    Screening for colon cancer 2/22/2016    Screening for glaucoma 2/22/2016    Sleep apnea     SOB (shortness of breath)     hospitalized 5/2012. angina, SOB    Thromboembolus ([address] 2011    LEFT LOWER LEG AND PE    Tinea pedis, recurrent 7/20/2015    Vaginal bleeding, abnormal 4/20/2017     Past Surgical History:   Procedure Laterality Date    COLONOSCOPY N/A 12/21/2016    COLONOSCOPY performed by Darryle Pou, MD at [address] [place] [address]  12/21/2016         HX CATARACT REMOVAL Bilateral 2015    [address]    HX DILATION AND CURETTAGE  07/29/2016    HX DILATION AND CURETTAGE  2016    HX GYN  1960    etopic    HX HEART CATHETERIZATION  2001    NEGATIVE PER PATIENT    HX HYSTERECTOMY  2016    HX KNEE ARTHROSCOPY Left 1998    HX ORTHOPAEDIC Right 1960'S    BUNIONECTOMY    HX OTHER SURGICAL      BIOPSY OF VEIN IN FOREHEAD    HX TUBAL LIGATION  1963    [place] COLSC FLX W/RMVL OF TUMOR POLYP LESION SNARE TQ  12/12/2011            Prior Level of Function/Environment/Context: son has been assisting but pt has been decreasing in abilities recently and requiring increased assist for mobility; just recently pt was modified independent with ADLS and mobility with rollator walker;   Her son reports that she was getting dressed on her own and performing toileting on her own; her shower is small and has a shower chair    Expanded or extensive additional review of patient history:     Home Situation  Home Environment: Private residence  # Steps to Enter: 5  Rails to Enter: Yes  Wheelchair Ramp: No  One/Two Story Residence: One story  Living Alone: Yes  Support Systems: Family member(s)  Patient Expects to be Discharged to[de-identified] Private residence  Current DME Used/Available at Home: Erlinda Figueroa, taylor, Wheelchair, [address] [facility] [employer] chair  Tub or Shower Type: Shower  [x]  Right hand dominant   []  Left hand dominant    EXAMINATION OF PERFORMANCE DEFICITS:  Cognitive/Behavioral Status:  Neurologic State: Alert  Orientation Level: Oriented to person  Cognition: Decreased attention/concentration; Follows commands  Perception: Appears intact  Perseveration: No perseveration noted  Safety/Judgement: Fall prevention;Decreased awareness of need for safety;Decreased awareness of need for assistance      Hearing: Auditory  Auditory Impairment: None    Vision/Perceptual:                           Acuity:  (grossly intact)         Range of Motion:    AROM: Generally decreased, functional (refused to move R LE 2/2 pain)                         Strength:    Strength: Generally decreased, functional                Coordination:  Coordination: Generally decreased, functional  Fine Motor Skills-Upper: Left Intact; Right Intact    Gross Motor Skills-Upper: Left Intact; Right Intact    Tone & Sensation:    Tone: Normal                         Balance:  Sitting: Intact  Standing: Impaired  Standing - Static: Fair;Constant support  Standing - Dynamic : Poor    Functional Mobility and Transfers for ADLs:  Bed Mobility:  Sit to Supine: Minimum assistance; Moderate assistance;Assist x2; Additional time  Scooting: Contact guard assistance; Additional time    Transfers:  Sit to Stand: Moderate assistance;Assist x2; Additional time  Stand to Sit: Minimum assistance;Assist x1;Additional time  Bed to Chair: Minimum assistance;Assist x1;Additional time  Toilet Transfer : Minimum assistance; Moderate assistance (stand pivot with RW to and from [facility])    ADL Assessment:  Feeding: Setup    Oral Facial Hygiene/Grooming: Setup (seated)    Bathing: Maximum assistance    Upper Body Dressing: Minimum assistance    Lower Body Dressing: Total assistance    Toileting:  Total assistance                ADL Intervention and task modifications:   see assessment    Cognitive Retraining  Safety/Judgement: Fall prevention;Decreased awareness of need for safety;Decreased awareness of need for assistance      Functional Measure:  Barthel Index:    Bathin  Bladder: 5  Bowels: 10  Groomin  Dressin  Feeding: 10  Mobility: 0  Stairs: 0  Toilet Use: 0  Transfer (Bed to Chair and Back): 5  Total: 40       Barthel and G-code impairment scale:  Percentage of impairment CH  0% CI  1-19% CJ  20-39% CK  40-59% CL  60-79% CM  80-99% CN  100%   Barthel Score 0-100 100 99-80 79-60 59-40 20-39 1-19   0   Barthel Score 0-20 20 17-19 13-16 9-12 5-8 1-4 0      The Barthel ADL Index: Guidelines  1. The index should be used as a record of what a patient does, not as a record of what a patient could do. 2. The main aim is to establish degree of independence from any help, physical or verbal, however minor and for whatever reason. 3. The need for supervision renders the patient not independent. 4. A patient's performance should be established using the best available evidence. Asking the patient, friends/relatives and nurses are the usual sources, but direct observation and common sense are also important. However direct testing is not needed. 5. Usually the patient's performance over the preceding 24-48 hours is important, but occasionally longer periods will be relevant. 6. Middle categories imply that the patient supplies over 50 per cent of the effort. 7. Use of aids to be independent is allowed. Matteo Webster., Barthel, D.W. (3298). Functional evaluation: the Barthel Index. [facility] (14)2. Ayaan Campos, BASIM.ADITYA.F, Braden Art., [place] [place], [address] [place] [address] (). Measuring the change indisability after inpatient rehabilitation; comparison of the responsiveness of the Barthel Index and Functional [place] Measure. Journal of Neurology, Neurosurgery, and Psychiatry, 66(4), 102-338. RICH Garcia.[email or web address].[email or web address], OLIVIER Barbosa, & Shanda Darling, M.A. (2004.) Assessment of post-stroke quality of life in cost-effectiveness studies:  The usefulness of the Barthel Index and the EuroQoL-5D. Quality of Life Research, 13, 652-06         G codes: In compliance with CMSs Claims Based Outcome Reporting, the following G-code set was chosen for this patient based on their primary functional limitation being treated: The outcome measure chosen to determine the severity of the functional limitation was the barthel with a score of 40/100 which was correlated with the impairment scale. ? Self Care:     - CURRENT STATUS: CK - 40%-59% impaired, limited or restricted    - GOAL STATUS: CJ - 20%-39% impaired, limited or restricted    - D/C STATUS:  ---------------To be determined---------------     Occupational Therapy Evaluation Charge Determination   History Examination Decision-Making   LOW Complexity : Brief history review  MEDIUM Complexity : 3-5 performance deficits relating to physical, cognitive , or psychosocial skils that result in activity limitations and / or participation restrictions MEDIUM Complexity : Patient may present with comorbidities that affect occupational performnce. Miniml to moderate modification of tasks or assistance (eg, physical or verbal ) with assesment(s) is necessary to enable patient to complete evaluation       Based on the above components, the patient evaluation is determined to be of the following complexity level: LOW   Pain:          10/10 left ankle and right leg           Activity Tolerance:     Please refer to the flowsheet for vital signs taken during this treatment. After treatment:   [] Patient left in no apparent distress sitting up in chair  [x] Patient left in no apparent distress in bed  [x] Call bell left within reach  [x] Nursing notified  [x] Caregiver present  [x] Bed alarm activated    COMMUNICATION/EDUCATION:   The patients plan of care was discussed with: Physical Therapist, Registered Nurse and patient. [] Home safety education was provided and the patient/caregiver indicated understanding.   [x] Patient/family have participated as able in goal setting and plan of care. [x] Patient/family agree to work toward stated goals and plan of care. [] Patient understands intent and goals of therapy, but is neutral about his/her participation. [] Patient is unable to participate in goal setting and plan of care. This patients plan of care is appropriate for delegation to KATIE.     Thank you for this referral.  Sami Mortensen, OTR/L  Time Calculation: 15 mins

## 2017-12-11 NOTE — PROGRESS NOTES
Brief Progress Note    Consult received. Chart reviewed. We were unable to visit w/ pt today. We plan to visit as soon as we can. Full, initial consult note to follow. Should you have questions/concerns or the need for a bedside visit by our team, please do not hesitate to contact us at [phone]). Thank you for providing us w/ the opportunity to be involved in this patient's care.       Alfie Murcia MD  Palliative Care Team

## 2017-12-11 NOTE — PROGRESS NOTES
Pt is a 68 y.o. , admitted for acute deep vein thrombosis of femoral vein of right lower extremity. Pt reported that she resides alone in her one story home (3 steps into main entrance). Pt reported that she is independent with ADLs, and she drives. Pt reported that she is active with PCP: seen Summer 2017 for physical.  Pt reported that she uses [facility]. Pt reported that she has DME at home: walker. Pt reported no HH/SNF. Pt will have transportation home at d/c and will have family support once returning home. CM will continue to follow up with pt and make referrals as deemed necessary. Care Management Interventions  PCP Verified by CM: Yes  Palliative Care Criteria Met (RRAT>21 & CHF Dx)?: Yes  Palliative Consult Recommended?: Yes  Mode of Transport at Discharge:  Other (see comment)  Transition of Care Consult (CM Consult): Discharge Planning  Discharge Durable Medical Equipment: No  Physical Therapy Consult: Yes  Occupational Therapy Consult: Yes  Speech Therapy Consult: No  Current Support Network: Lives Alone, Own Home  Confirm Follow Up Transport: Family  Plan discussed with Pt/Family/Caregiver: Yes  Discharge Location  Discharge Placement: [facility] [address], MSW   [phone]

## 2017-12-11 NOTE — CDMP QUERY
Account Number: [de-identified]  MRN: [id number]  Patient: Marely Young  Created: [id number]  Clinician Name: Cb Hernandez H :  Please clarify if this patient is being treated/managed for:    =>Sirs d/t non-infectious process with acute organ dysfunction in the setting of dvt, lizy,  hr 107--111, temp 102 req  iv heparin, hold abx  =>Other Explanation of clinical findings  =>Unable to Determine (no explanation of clinical findings)    The medical record reflects the following clinical findings, treatment, and risk factors:    Risk Factors: 77f adm w/ right thigh dvt, uterine ca  Clinical Indicators: buncr 26/2.03/24, hr 107--111, temp 102, H&P--sirs, etiology unclear, lizy  Treatment: iv heparin, hold abx,    Please clarify and document your clinical opinion in the progress notes and discharge summary including the definitive and/or presumptive diagnosis, (suspected or probable), related to the above clinical findings. Please include clinical findings supporting your diagnosis.     Thank Ce Becerril

## 2017-12-11 NOTE — CONSULTS
Gastroenterology Consult   Samantha Gentile  1940  [id number]    Referring Physician:    Consult Date: 12/11/2017     Subjective:     Chief Complaint: blood on stool    History of Present Illness: Samantha Gentile is a 68 y.o. female who is seen in consultation for blood on stool several days ago but none since. On heparin for DVT. Colonoscopy Dec 2016 diverticulosis. Problem with anticoagulant and bleeding in the past.  .    Past Medical History:   Diagnosis Date    Acute kidney failure ([address] 11/19/2015    Acute sinusitis 2/11/2011    Adverse effect of anesthesia     SLOW WAKING PAST ANESTHESIA    Arthritis     RA    At risk for side effect of medication 9/30/2016    Atrial fibrillation ([address] 10/19/2011    Autoimmune disease ([address]     FIBROMYLGIA    Cancer ([address] 2016    ENDOMETRIAL     Cholelithiasis 11/19/2015    Chronic pain     Claudication of both lower extremities ([address] 8/25/2015    Diabetes mellitus type 2, controlled ([address] 1/19/2016    Diabetes mellitus, type II ([address] 10/10/2014    Fall against object 10/10/2014    Fatty liver 10/20/2015    Hypertension     Ill-defined condition     SPINAL STENOSIS    Left shoulder pain 10/10/2014    Leiomyoma of body of uterus 4/7/2016    Morbid obesity ([address]     Myalgia 9/30/2016    Nausea & vomiting     Personal history of radiation therapy 4/20/2017    Pneumonia     Preop examination 6/15/2015    Preoperative clearance 6/15/2015    Rash 2/11/2011    Rash of hands 10/10/2014    Screening for colon cancer 2/22/2016    Screening for glaucoma 2/22/2016    Sleep apnea     SOB (shortness of breath)     hospitalized 5/2012.  angina, SOB    Thromboembolus ([address] 2011    LEFT LOWER LEG AND PE    Tinea pedis, recurrent 7/20/2015    Vaginal bleeding, abnormal 4/20/2017     Past Surgical History:   Procedure Laterality Date    COLONOSCOPY N/A 12/21/2016    COLONOSCOPY performed by Néstor Brizuela MD at [address] [place] [address] 2016         HX CATARACT REMOVAL Bilateral 2015    HX  [address] [facility]    HX DILATION AND CURETTAGE  2016    HX DILATION AND CURETTAGE  2016    HX GYN  1960    etopic    HX HEART CATHETERIZATION  2001    NEGATIVE PER PATIENT    HX HYSTERECTOMY  2016    HX KNEE ARTHROSCOPY Left 1998    HX ORTHOPAEDIC Right 1960'S    BUNIONECTOMY    HX OTHER SURGICAL      BIOPSY OF VEIN IN FOREHEAD    HX TUBAL LIGATION  1963    [place] COLSC FLX W/RMVL OF TUMOR POLYP LESION SNARE TQ  2011           Family History   Problem Relation Age of Onset    Other Mother      ANEURYSM    Obesity Mother     Heart Disease Father     Other Father      VASCULAR DISEASE    Hypertension Sister     Heart Disease Brother     Heart Attack Brother     Kidney Disease Sister     Seizures Brother     Heart Attack Brother     Anesth Problems Neg Hx     Alcohol abuse Neg Hx      Social History   Substance Use Topics    Smoking status: Former Smoker     Packs/day: 0.25     Years: 15.00     Quit date: 1996    Smokeless tobacco: Never Used    Alcohol use No      Allergies   Allergen Reactions    Latex Rash and Itching    Advair Diskus [Fluticasone-Salmeterol] Other (comments)     \"breathing problems\"    Bactrim [Sulfamethoprim] Shortness of Breath, Itching and Swelling     Swelling of tongue and throat    Eliquis [Apixaban] Other (comments)    Iodinated Contrast- Oral And Iv Dye Hives    Ivp [Fd And C Blue No.1] Hives and Shortness of Breath    Lovenox [Enoxaparin] Shortness of Breath    Nsaids (Non-Steroidal Anti-Inflammatory Drug) Other (comments)     Heartburn    Sulfa (Sulfonamide Antibiotics) Shortness of Breath     Current Facility-Administered Medications   Medication Dose Route Frequency    oxyCODONE IR (ROXICODONE) tablet 20 mg  20 mg Oral Q4H PRN    sodium chloride (NS) flush 5-10 mL  5-10 mL IntraVENous PRN    heparin 25,000 units in D5W 250 ml infusion  11.3-36 Units/kg/hr IntraVENous TITRATE    sodium chloride (NS) flush 5-10 mL  5-10 mL IntraVENous Q8H    sodium chloride (NS) flush 5-10 mL  5-10 mL IntraVENous PRN    acetaminophen (TYLENOL) tablet 650 mg  650 mg Oral Q6H PRN    naloxone (NARCAN) injection 0.4 mg  0.4 mg IntraVENous PRN    ondansetron (ZOFRAN) injection 4 mg  4 mg IntraVENous Q4H PRN    bisacodyl (DULCOLAX) suppository 10 mg  10 mg Rectal DAILY PRN    cyanocobalamin (VITAMIN B12) tablet 1,000 mcg  1,000 mcg Oral DAILY    cholecalciferol (VITAMIN D3) tablet 1,000 Units  1,000 Units Oral DAILY    morphine CR (MS CONTIN) tablet 60 mg  60 mg Oral Q12H    naloxegol (MOVANTIK) tablet 25 mg  25 mg Oral ACB    metoprolol succinate (TOPROL-XL) XL tablet 100 mg  100 mg Oral DAILY    0.9% sodium chloride infusion  75 mL/hr IntraVENous CONTINUOUS    heparin (porcine) injection 10,000 Units  10,000 Units IntraVENous PRN    heparin (porcine) injection 5,000 Units  5,000 Units IntraVENous PRN    HYDROmorphone (DILAUDID) injection 1-2 mg  1-2 mg IntraVENous Q3H PRN        Review of Systems:  A detailed 10 organ review of systems is obtained with pertinent positives as listed in the History of Present Illness and Past Medical History. All others are negative. Objective:     Physical Exam:  Visit Vitals    /66 (BP 1 Location: Left arm)    Pulse (!) 101    Temp 100.2 °F (37.9 °C)    Resp 20    Ht 5' 6\" (1.676 m)    Wt 133.8 kg (295 lb)    SpO2 93%    BMI 47.61 kg/m2        Skin:  Extremities and face reveal no rashes. No sherman erythema. No telangiectasias on the chest wall. HEENT: Sclerae anicteric. Extra-occular muscles are intact. No oral ulcers. No abnormal pigmentation of the lips. The neck is supple. Cardiovascular: Regular rate and rhythm. No murmurs, gallops, or rubs. PMI nondisplaced. Carotids without bruits. Respiratory:  Comfortable breathing with no accessory muscle use. Clear breath sounds with no wheezes, rales, or rhonchi.   GI:  Abdomen nondistended, soft, and nontender. Normal active bowel sounds. No enlargement of the liver or spleen. No masses palpable. Rectal:  Deferred  Musculoskeletal:  No pitting edema of the lower legs. Extremities have good range of motion. No costovertebral tenderness. Neurological:  Gross memory appears intact. Patient is alert and oriented. Psychiatric:  Mood appears appropriate with judgement intact. Lymphatic:  No cervical or supraclavicular adenopathy. Laboratory:    Recent Results (from the past 24 hour(s))   PTT    Collection Time: 12/10/17  7:52 PM   Result Value Ref Range    aPTT 74.6 (H) 22.1 - 32.5 sec    aPTT, therapeutic range     58.0 - 30.4 SECS   METABOLIC PANEL, BASIC    Collection Time: 12/11/17  2:10 AM   Result Value Ref Range    Sodium 134 (L) 136 - 145 mmol/L    Potassium 4.1 3.5 - 5.1 mmol/L    Chloride 100 97 - 108 mmol/L    CO2 26 21 - 32 mmol/L    Anion gap 8 5 - 15 mmol/L    Glucose 103 (H) 65 - 100 mg/dL    BUN 26 (H) 6 - 20 MG/DL    Creatinine 1.13 (H) 0.55 - 1.02 MG/DL    BUN/Creatinine ratio 23 (H) 12 - 20      GFR est AA 57 (L) >60 ml/min/1.73m2    GFR est non-AA 47 (L) >60 ml/min/1.73m2    Calcium 9.1 8.5 - 10.1 MG/DL   PTT    Collection Time: 12/11/17  4:30 AM   Result Value Ref Range    aPTT 74.2 (H) 22.1 - 32.5 sec    aPTT, therapeutic range     58.0 - 77.0 SECS   CBC WITH AUTOMATED DIFF    Collection Time: 12/11/17  4:30 AM   Result Value Ref Range    WBC 8.1 3.6 - 11.0 K/uL    RBC 2.78 (L) 3.80 - 5.20 M/uL    HGB 8.1 (L) 11.5 - 16.0 g/dL    HCT 25.4 (L) 35.0 - 47.0 %    MCV 91.4 80.0 - 99.0 FL    MCH 29.1 26.0 - 34.0 PG    MCHC 31.9 30.0 - 36.5 g/dL    RDW 15.6 (H) 11.5 - 14.5 %    PLATELET 697 821 - 525 K/uL    NEUTROPHILS 78 (H) 32 - 75 %    LYMPHOCYTES 12 12 - 49 %    MONOCYTES 9 5 - 13 %    EOSINOPHILS 1 0 - 7 %    BASOPHILS 0 0 - 1 %    ABS. NEUTROPHILS 6.3 1.8 - 8.0 K/UL    ABS. LYMPHOCYTES 0.9 0.8 - 3.5 K/UL    ABS. MONOCYTES 0.8 0.0 - 1.0 K/UL    ABS.  EOSINOPHILS 0.1 0.0 - 0.4 K/UL    ABS. BASOPHILS 0.0 0.0 - 0.1 K/UL         Assessment/Plan:     Active Problems:    Right leg DVT ([facility] (12/9/2017)         Imp: blood on toilet paper. Plan: continue heparin . Anticoagulate at home as needed.

## 2017-12-12 LAB
AMORPH CRY URNS QL MICRO: ABNORMAL
APPEARANCE UR: ABNORMAL
APTT PPP: 31.7 SEC (ref 22.1–32.5)
APTT PPP: 37.9 SEC (ref 22.1–32.5)
APTT PPP: >130 SEC (ref 22.1–32.5)
APTT PPP: >130 SEC (ref 22.1–32.5)
BACTERIA URNS QL MICRO: NEGATIVE /HPF
BASOPHILS # BLD: 0 K/UL (ref 0–0.1)
BASOPHILS NFR BLD: 0 % (ref 0–1)
BILIRUB UR QL CFM: NEGATIVE
COLOR UR: ABNORMAL
EOSINOPHIL # BLD: 0.1 K/UL (ref 0–0.4)
EOSINOPHIL NFR BLD: 1 % (ref 0–7)
EPITH CASTS URNS QL MICRO: ABNORMAL /LPF
ERYTHROCYTE [DISTWIDTH] IN BLOOD BY AUTOMATED COUNT: 15.7 % (ref 11.5–14.5)
GLUCOSE UR STRIP.AUTO-MCNC: NEGATIVE MG/DL
HCT VFR BLD AUTO: 24.8 % (ref 35–47)
HGB BLD-MCNC: 7.8 G/DL (ref 11.5–16)
HGB UR QL STRIP: NEGATIVE
KETONES UR QL STRIP.AUTO: NEGATIVE MG/DL
LEUKOCYTE ESTERASE UR QL STRIP.AUTO: NEGATIVE
LYMPHOCYTES # BLD: 0.6 K/UL (ref 0.8–3.5)
LYMPHOCYTES NFR BLD: 9 % (ref 12–49)
MCH RBC QN AUTO: 28.9 PG (ref 26–34)
MCHC RBC AUTO-ENTMCNC: 31.5 G/DL (ref 30–36.5)
MCV RBC AUTO: 91.9 FL (ref 80–99)
MONOCYTES # BLD: 0.7 K/UL (ref 0–1)
MONOCYTES NFR BLD: 10 % (ref 5–13)
NEUTS SEG # BLD: 5.1 K/UL (ref 1.8–8)
NEUTS SEG NFR BLD: 80 % (ref 32–75)
NITRITE UR QL STRIP.AUTO: NEGATIVE
PH UR STRIP: 6 [PH] (ref 5–8)
PLATELET # BLD AUTO: 180 K/UL (ref 150–400)
PROT UR STRIP-MCNC: 30 MG/DL
RBC # BLD AUTO: 2.7 M/UL (ref 3.8–5.2)
RBC #/AREA URNS HPF: ABNORMAL /HPF (ref 0–5)
SP GR UR REFRACTOMETRY: 1.02 (ref 1–1.03)
THERAPEUTIC RANGE,PTTT: ABNORMAL SECS (ref 58–77)
THERAPEUTIC RANGE,PTTT: NORMAL SECS (ref 58–77)
UA: UC IF INDICATED,UAUC: ABNORMAL
UROBILINOGEN UR QL STRIP.AUTO: 1 EU/DL (ref 0.2–1)
WBC # BLD AUTO: 6.4 K/UL (ref 3.6–11)
WBC URNS QL MICRO: ABNORMAL /HPF (ref 0–4)

## 2017-12-12 PROCEDURE — 74011250636 HC RX REV CODE- 250/636: Performed by: EMERGENCY MEDICINE

## 2017-12-12 PROCEDURE — 36415 COLL VENOUS BLD VENIPUNCTURE: CPT | Performed by: EMERGENCY MEDICINE

## 2017-12-12 PROCEDURE — 85730 THROMBOPLASTIN TIME PARTIAL: CPT | Performed by: INTERNAL MEDICINE

## 2017-12-12 PROCEDURE — 65270000029 HC RM PRIVATE

## 2017-12-12 PROCEDURE — 81001 URINALYSIS AUTO W/SCOPE: CPT | Performed by: INTERNAL MEDICINE

## 2017-12-12 PROCEDURE — 85025 COMPLETE CBC W/AUTO DIFF WBC: CPT | Performed by: INTERNAL MEDICINE

## 2017-12-12 PROCEDURE — 87040 BLOOD CULTURE FOR BACTERIA: CPT | Performed by: INTERNAL MEDICINE

## 2017-12-12 PROCEDURE — 74011250637 HC RX REV CODE- 250/637: Performed by: INTERNAL MEDICINE

## 2017-12-12 PROCEDURE — 74011250636 HC RX REV CODE- 250/636: Performed by: INTERNAL MEDICINE

## 2017-12-12 PROCEDURE — 85730 THROMBOPLASTIN TIME PARTIAL: CPT | Performed by: EMERGENCY MEDICINE

## 2017-12-12 RX ORDER — AMOXICILLIN 250 MG
2 CAPSULE ORAL 2 TIMES DAILY
Status: DISCONTINUED | OUTPATIENT
Start: 2017-12-12 | End: 2017-12-16 | Stop reason: HOSPADM

## 2017-12-12 RX ORDER — ENOXAPARIN SODIUM 100 MG/ML
120 INJECTION SUBCUTANEOUS 2 TIMES DAILY
Status: DISCONTINUED | OUTPATIENT
Start: 2017-12-13 | End: 2017-12-13

## 2017-12-12 RX ORDER — POLYETHYLENE GLYCOL 3350 17 G/17G
17 POWDER, FOR SOLUTION ORAL DAILY
Status: DISCONTINUED | OUTPATIENT
Start: 2017-12-12 | End: 2017-12-15

## 2017-12-12 RX ORDER — MORPHINE SULFATE 30 MG/1
60 TABLET, FILM COATED, EXTENDED RELEASE ORAL EVERY 8 HOURS
Status: DISCONTINUED | OUTPATIENT
Start: 2017-12-12 | End: 2017-12-13

## 2017-12-12 RX ORDER — ENOXAPARIN SODIUM 100 MG/ML
120 INJECTION SUBCUTANEOUS ONCE
Status: COMPLETED | OUTPATIENT
Start: 2017-12-12 | End: 2017-12-12

## 2017-12-12 RX ADMIN — NALOXEGOL OXALATE 25 MG: 25 TABLET, FILM COATED ORAL at 08:49

## 2017-12-12 RX ADMIN — HYDROMORPHONE HYDROCHLORIDE 2 MG: 2 INJECTION INTRAMUSCULAR; INTRAVENOUS; SUBCUTANEOUS at 02:43

## 2017-12-12 RX ADMIN — DOCUSATE SODIUM AND SENNOSIDES 2 TABLET: 8.6; 5 TABLET, FILM COATED ORAL at 12:21

## 2017-12-12 RX ADMIN — HEPARIN SODIUM AND DEXTROSE 9.33 UNITS/KG/HR: 10000; 5 INJECTION INTRAVENOUS at 11:28

## 2017-12-12 RX ADMIN — MORPHINE SULFATE 60 MG: 30 TABLET, EXTENDED RELEASE ORAL at 19:11

## 2017-12-12 RX ADMIN — SODIUM CHLORIDE 75 ML/HR: 900 INJECTION, SOLUTION INTRAVENOUS at 19:11

## 2017-12-12 RX ADMIN — OXYCODONE HYDROCHLORIDE 20 MG: 5 TABLET ORAL at 15:11

## 2017-12-12 RX ADMIN — OXYCODONE HYDROCHLORIDE 20 MG: 5 TABLET ORAL at 11:12

## 2017-12-12 RX ADMIN — HYDROMORPHONE HYDROCHLORIDE 2 MG: 2 INJECTION INTRAMUSCULAR; INTRAVENOUS; SUBCUTANEOUS at 12:21

## 2017-12-12 RX ADMIN — METOPROLOL SUCCINATE 100 MG: 50 TABLET, EXTENDED RELEASE ORAL at 09:59

## 2017-12-12 RX ADMIN — HYDROMORPHONE HYDROCHLORIDE 2 MG: 2 INJECTION INTRAMUSCULAR; INTRAVENOUS; SUBCUTANEOUS at 07:40

## 2017-12-12 RX ADMIN — Medication 10 ML: at 22:00

## 2017-12-12 RX ADMIN — POLYETHYLENE GLYCOL 3350 17 G: 17 POWDER, FOR SOLUTION ORAL at 11:12

## 2017-12-12 RX ADMIN — Medication 10 ML: at 15:13

## 2017-12-12 RX ADMIN — MORPHINE SULFATE 60 MG: 30 TABLET, EXTENDED RELEASE ORAL at 09:59

## 2017-12-12 RX ADMIN — OXYCODONE HYDROCHLORIDE 20 MG: 5 TABLET ORAL at 04:55

## 2017-12-12 RX ADMIN — ENOXAPARIN SODIUM 120 MG: 60 INJECTION SUBCUTANEOUS at 22:00

## 2017-12-12 RX ADMIN — OXYCODONE HYDROCHLORIDE 20 MG: 5 TABLET ORAL at 00:07

## 2017-12-12 RX ADMIN — CYANOCOBALAMIN TAB 500 MCG 1000 MCG: 500 TAB at 09:59

## 2017-12-12 RX ADMIN — VITAMIN D, TAB 1000IU (100/BT) 1000 UNITS: 25 TAB at 10:01

## 2017-12-12 RX ADMIN — ACETAMINOPHEN 650 MG: 325 TABLET ORAL at 20:05

## 2017-12-12 RX ADMIN — DOCUSATE SODIUM AND SENNOSIDES 2 TABLET: 8.6; 5 TABLET, FILM COATED ORAL at 19:11

## 2017-12-12 RX ADMIN — ENOXAPARIN SODIUM 120 MG: 60 INJECTION SUBCUTANEOUS at 14:12

## 2017-12-12 RX ADMIN — HYDROMORPHONE HYDROCHLORIDE 2 MG: 2 INJECTION INTRAMUSCULAR; INTRAVENOUS; SUBCUTANEOUS at 16:45

## 2017-12-12 NOTE — PROGRESS NOTES
General Daily Progress Note    Admit Date: 12/9/2017  Hospital day 12/12/2017    Subjective:no stool, no rectal bleeding  O- hgb 7.8. On heparin     Y  N  [] [x]  Abd Pain:    [] [x]  Nausea:  [] [x] Vomiting:  [] []  Diarrhea:  [] []  Constipation:  [] []  Melena:  [] []  Hematochezia:  [x] []  Tolerating Diet:    Current Facility-Administered Medications   Medication Dose Route Frequency    morphine CR (MS CONTIN) tablet 60 mg  60 mg Oral Q8H    senna-docusate (PERICOLACE) 8.6-50 mg per tablet 2 Tab  2 Tab Oral BID    polyethylene glycol (MIRALAX) packet 17 g  17 g Oral DAILY    [START ON 12/13/2017] enoxaparin (LOVENOX) injection 120 mg  120 mg SubCUTAneous BID    enoxaparin (LOVENOX) injection 120 mg  120 mg SubCUTAneous ONCE    enoxaparin (LOVENOX) injection 120 mg  120 mg SubCUTAneous ONCE    oxyCODONE IR (ROXICODONE) tablet 20 mg  20 mg Oral Q4H PRN    sodium chloride (NS) flush 5-10 mL  5-10 mL IntraVENous PRN    sodium chloride (NS) flush 5-10 mL  5-10 mL IntraVENous Q8H    sodium chloride (NS) flush 5-10 mL  5-10 mL IntraVENous PRN    acetaminophen (TYLENOL) tablet 650 mg  650 mg Oral Q6H PRN    naloxone (NARCAN) injection 0.4 mg  0.4 mg IntraVENous PRN    ondansetron (ZOFRAN) injection 4 mg  4 mg IntraVENous Q4H PRN    bisacodyl (DULCOLAX) suppository 10 mg  10 mg Rectal DAILY PRN    cyanocobalamin (VITAMIN B12) tablet 1,000 mcg  1,000 mcg Oral DAILY    cholecalciferol (VITAMIN D3) tablet 1,000 Units  1,000 Units Oral DAILY    naloxegol (MOVANTIK) tablet 25 mg  25 mg Oral ACB    metoprolol succinate (TOPROL-XL) XL tablet 100 mg  100 mg Oral DAILY    0.9% sodium chloride infusion  75 mL/hr IntraVENous CONTINUOUS    HYDROmorphone (DILAUDID) injection 1-2 mg  1-2 mg IntraVENous Q3H PRN        Review of Systems  Pertinent items are noted in HPI.     Objective:     Patient Vitals for the past 8 hrs:   BP Temp Pulse Resp SpO2   12/12/17 1132 135/69 100.1 °F (37.8 °C) (!) 105 20 90 % 12/12/17 1001 135/60 (!) 100.7 °F (38.2 °C) (!) 109 20 95 %     12/12 0701 - 12/12 1900  In: -   Out: 200 [Urine:200]       Physical Exam:   Visit Vitals    /69 (BP 1 Location: Left arm, BP Patient Position: At rest)    Pulse (!) 105    Temp 100.1 °F (37.8 °C)    Resp 20    Ht 5' 6\" (1.676 m)    Wt 133.8 kg (295 lb)    SpO2 90%    BMI 47.61 kg/m2     General appearance: alert, cooperative, no distress, appears stated age  Abdomen: soft, non-tender. Bowel sounds normal. No masses,  no organomegaly      Data Review   Recent Results (from the past 24 hour(s))   PTT    Collection Time: 12/12/17  5:47 AM   Result Value Ref Range    aPTT >130.0 (HH) 22.1 - 32.5 sec    aPTT, therapeutic range     58.0 - 77.0 SECS   CBC WITH AUTOMATED DIFF    Collection Time: 12/12/17  5:47 AM   Result Value Ref Range    WBC 6.4 3.6 - 11.0 K/uL    RBC 2.70 (L) 3.80 - 5.20 M/uL    HGB 7.8 (L) 11.5 - 16.0 g/dL    HCT 24.8 (L) 35.0 - 47.0 %    MCV 91.9 80.0 - 99.0 FL    MCH 28.9 26.0 - 34.0 PG    MCHC 31.5 30.0 - 36.5 g/dL    RDW 15.7 (H) 11.5 - 14.5 %    PLATELET 770 220 - 802 K/uL    NEUTROPHILS 80 (H) 32 - 75 %    LYMPHOCYTES 9 (L) 12 - 49 %    MONOCYTES 10 5 - 13 %    EOSINOPHILS 1 0 - 7 %    BASOPHILS 0 0 - 1 %    ABS. NEUTROPHILS 5.1 1.8 - 8.0 K/UL    ABS. LYMPHOCYTES 0.6 (L) 0.8 - 3.5 K/UL    ABS. MONOCYTES 0.7 0.0 - 1.0 K/UL    ABS. EOSINOPHILS 0.1 0.0 - 0.4 K/UL    ABS. BASOPHILS 0.0 0.0 - 0.1 K/UL   PTT    Collection Time: 12/12/17  8:00 AM   Result Value Ref Range    aPTT >130.0 (HH) 22.1 - 32.5 sec    aPTT, therapeutic range     58.0 - 77.0 SECS   PTT    Collection Time: 12/12/17 10:30 AM   Result Value Ref Range    aPTT 37.9 (H) 22.1 - 32.5 sec    aPTT, therapeutic range     58.0 - 77.0 SECS         Assessment:     Active Problems:    Right leg DVT ([address] (12/9/2017)        Plan: Will see on request. Call if rectal bleeding.   Thanks

## 2017-12-12 NOTE — ROUTINE PROCESS
General Surgery End of Shift Nursing Note    Bedside shift change report given to [address] (oncoming nurse) by Beatrice Ordonez (offgoing nurse). Report included the following information SBAR, Kardex, ED Summary, Intake/Output and Recent Results. Shift worked:   night   Summary of shift:    AM PTT <130. Oncoming nurse [address] RN aware. MD placed orders for redraw and temporary stopping of heparin. Issues for physician to address: On assessment pt hands were cyanotic and cool. Pt reports she still feels sensation. Number times ambulated in hallway past shift: 0    Number of times OOB to chair past shift: 0    Pain Management:  Current medication: dilaudid/morphine/rosa  Patient states pain is manageable on current pain medication: NO    GI:    Current diet:  DIET CARDIAC Regular    Tolerating current diet: YES  Passing flatus: YES  Last Bowel Movement: several days ago   Appearance:     Respiratory:    Incentive Spirometer at bedside: NO  Patient instructed on use: NO    Patient Safety:    Falls Score: 4  Bed Alarm On?  Yes      Holland Mixon

## 2017-12-12 NOTE — PROGRESS NOTES
Pt was called now with recurrent DVT currently on lovenox Heparin unable to take Eliquis, Xarelto, lovenox, but able to tolerate the meds, f/u o aki pt aware

## 2017-12-12 NOTE — PROGRESS NOTES
Increased swelling in bilateral LE. Pulses diminished today from yesterday, not palpable but both pedal pulses detected with dopplar. Low grade fever throughout the day. Gave lovenox and will monitor continuous pulse ox to monitor for reaction given documented hx of SOB in reaction. Dr. Grace Vazquez notified at patient's bedside.

## 2017-12-12 NOTE — PROGRESS NOTES
Problem: Falls - Risk of  Goal: *Absence of Falls  Document Estela Fall Risk and appropriate interventions in the flowsheet.    Outcome: Progressing Towards Goal  Fall Risk Interventions:  Mobility Interventions: Patient to call before getting OOB         Medication Interventions: Patient to call before getting OOB    Elimination Interventions: Patient to call for help with toileting needs

## 2017-12-12 NOTE — CONSULTS
Palliative Medicine Consult  Mark: [phone] ([phone])    Patient Name: Samantha Gentile  YOB: 1940    Date of Initial Consult: 12/12/17  Reason for Consult: overwhelming symptoms  Requesting Provider: Earnestine Kay  Primary Care Physician: Mohit Dominguez MD     SUMMARY:   Samantha Gentile is a 68 y.o. with a past history of metastatic uterine cancer to start chemo Jan 2018, RA/fibromyalgia, DM, sleep apnea, LLE DVT/PE 2010 (on coumadin then eliquis, stopped Oct 2017 due to rectal bleeding), MGUS declined bone marrow bx/ chronic anemia, who was admitted on 12/9/2017 from home with a diagnosis of Right Thigh pain for several days. Current medical issues leading to Palliative Medicine involvement include: new R leg DVT (R common femoral vein), uncontrolled pain    Patient lives alone, normally independent with ADLS. She relies on her son, Mei Gone [phone] for assistance     PALLIATIVE DIAGNOSES:   1. Acute R thigh pain, due to DVT  2. ARF, improving with holding diuretics  3. Metastatic endometrial cancer  4. Chronic anemia, multifactorial (MGUS, chronic disease)  5. Fever ? Due to DVT  6. Constipation secondary to medications  7. Chronic pain, chronic opioid use  8. hypoalbuminemia       PLAN:   1. Acute pain  1. Recommend increasing MS Contin from 60mg Q12 to Q8 hours. (with plan to titrate back to previous dose as DVT pain resolves)  2. Continue Oxycodone IR 20mg PO Q4hr PRN   3. Dilaudid 1-2mg IV Q3hr PRN pain not controlled by other meds  2. Constipation  1. Start bowel regimen, senokot 2 tabs BID and miralax 1 scoop PO every day  3. Palliative medicine services introduced briefly to patient, uncontrolled pain is main focus today. We talked about addressing long term care planning/ AMD during this admission, on a day when she is feeling better. 4. Initial consult note routed to primary continuity provider  5.  Communicated plan of care with: Palliative IDT       GOALS OF CARE / TREATMENT PREFERENCES:     GOALS OF CARE:   manage pain  To further discuss during this admission      TREATMENT PREFERENCES:   Code Status: Full Code    Advance Care Planning:  Advance Care Planning 12/10/2017   Patient's Healthcare Decision Maker is: Verbal statement (Legal Next of Kin remains as decision maker)   Primary Decision Maker Name -   Primary Decision Maker Phone Number -   Primary Decision Maker Relationship to Patient -   Secondary Decision Maker Name -   Secondary Decision [address] Phone Number -   Secondary Decision Maker Relationship to Patient -   Confirm Advance Directive None   Patient Would Like to Complete Advance Directive -   Does the patient have other document types -           Other Instructions: Other:    As far as possible, the palliative care team has discussed with patient / health care proxy about goals of care / treatment preferences for patient. HISTORY:     History obtained from: patient    CHIEF COMPLAINT: admitted with right leg pain    HPI/SUBJECTIVE:    The patient is:   [x] Verbal and participatory  [] Non-participatory due to:     68year old female admitted with right thigh pain that started a few days ago. It's worse with walking  Right now 10/10 , very bad. It's a little better after IV dilaudid. She can't concentrate and her appetite is gone. She did get some sleep last night. She is not sure when she last moved her bowels. , does need some medication for that      Clinical Pain Assessment (nonverbal scale for severity on nonverbal patients):   Clinical Pain Assessment  Severity: 10  Location: right thigh  Character: ache burn  Duration: several days  Effect: low appetite, can't concentrate  Factors: worse with walking  Frequency: constant          Duration: for how long has pt been experiencing pain (e.g., 2 days, 1 month, years)  Frequency: how often pain is an issue (e.g., several times per day, once every few days, constant)     FUNCTIONAL ASSESSMENT: Palliative Performance Scale (PPS):  PPS: 40       PSYCHOSOCIAL/SPIRITUAL SCREENING:     Palliative IDT has assessed this patient for cultural preferences / practices and a referral made as appropriate to needs (Cultural Services, Patient Advocacy, Ethics, etc.)    Advance Care Planning:  Advance Care Planning 12/10/2017   Patient's Healthcare Decision Maker is: Verbal statement (Legal Next of Kin remains as decision maker)   Primary Decision Maker Name -   Primary Decision [address] Phone Number -   Primary Decision Maker Relationship to Patient -   Secondary Decision [address] Name -   Secondary Decision [address] Phone Number -   Secondary Decision Maker Relationship to Patient -   Confirm Advance Directive None   Patient Would Like to Complete Advance Directive -   Does the patient have other document types -       Any spiritual / Anabaptism concerns:  [] Yes /  [x] No    Caregiver Burnout:  [] Yes /  [] No /  [x] No Caregiver Present      Anticipatory grief assessment:   [x] Normal  / [] Maladaptive       ESAS Anxiety: Anxiety: 0    ESAS Depression: Depression: 0        REVIEW OF SYSTEMS:     Positive and pertinent negative findings in ROS are noted above in HPI. The following systems were [x] reviewed / [] unable to be reviewed as noted in HPI  Other findings are noted below. Systems: constitutional, ears/nose/mouth/throat, respiratory, gastrointestinal, genitourinary, musculoskeletal, integumentary, neurologic, psychiatric, endocrine. Positive findings noted below. Modified ESAS Completed by: provider   Fatigue: 5 Drowsiness: 2   Depression: 0 Pain: 10   Anxiety: 0 Nausea: 0   Anorexia: 8 Dyspnea: 0     Constipation: Yes     Stool Occurrence(s): 1        PHYSICAL EXAM:     From RN flowsheet:  Wt Readings from Last 3 Encounters:   12/09/17 295 lb (133.8 kg)   11/29/17 294 lb 9.6 oz (133.6 kg)   10/31/17 297 lb (134.7 kg)     Blood pressure 135/60, pulse (!) 109, temperature (!) 100.7 °F (38.2 °C), resp.  rate 20, height 5' 6\" (1.676 m), weight 295 lb (133.8 kg), SpO2 95 %. Pain Scale 1: Numeric (0 - 10)  Pain Intensity 1: 10     Pain Location 1: Generalized  Pain Orientation 1: Right  Pain Description 1: Aching  Pain Intervention(s) 1: Medication (see MAR)  Last bowel movement, if known:     Constitutional: uncomfortable appearing  Winces at times with trying to move her legs  Eyes: pupils equal, anicteric  Right thigh is tender to touch anteriorly  Obese legs, ? Edema  Chronic hyperpigmentation of ankles bilaterally  No respiratory distress  Her attention is diminished   Affect a bit flat  Pleasant and tries to engage in conversation, appears to be uncomfortable. HISTORY:     Active Problems:    Right leg DVT ([address] (12/9/2017)      Past Medical History:   Diagnosis Date    Acute kidney failure ([address] 11/19/2015    Acute sinusitis 2/11/2011    Adverse effect of anesthesia     SLOW WAKING PAST ANESTHESIA    Arthritis     RA    At risk for side effect of medication 9/30/2016    Atrial fibrillation ([address] 10/19/2011    Autoimmune disease ([address]     FIBROMYLGIA    Cancer ([address] 2016    ENDOMETRIAL     Cholelithiasis 11/19/2015    Chronic pain     Claudication of both lower extremities ([address] 8/25/2015    Diabetes mellitus type 2, controlled ([address] 1/19/2016    Diabetes mellitus, type II ([address] 10/10/2014    Fall against object 10/10/2014    Fatty liver 10/20/2015    Hypertension     Ill-defined condition     SPINAL STENOSIS    Left shoulder pain 10/10/2014    Leiomyoma of body of uterus 4/7/2016    Morbid obesity ([address]     Myalgia 9/30/2016    Nausea & vomiting     Personal history of radiation therapy 4/20/2017    Pneumonia     Preop examination 6/15/2015    Preoperative clearance 6/15/2015    Rash 2/11/2011    Rash of hands 10/10/2014    Screening for colon cancer 2/22/2016    Screening for glaucoma 2/22/2016    Sleep apnea     SOB (shortness of breath)     hospitalized 5/2012.  angina, SOB    Thromboembolus ([address] 2011    LEFT LOWER LEG AND PE    Tinea pedis, recurrent 7/20/2015    Vaginal bleeding, abnormal 4/20/2017      Past Surgical History:   Procedure Laterality Date    COLONOSCOPY N/A 12/21/2016    COL[facility]  12/21/2016         HX CATARACT REMOVAL Bilateral 2015    [address]    HX DILATION AND CURETTAGE  07/29/2016    HX DILATION AND CURETTAGE  2016    HX GYN  1960    etopic    HX HEART CATHETERIZATION  2001    NEGATIVE PER PATIENT    HX HYSTERECTOMY  2016    HX KNEE ARTHROSCOPY Left 1998    HX ORTHOPAEDIC Right 1960'S    BUNIONECTOMY    HX OTHER SURGICAL      BIOPSY OF VEIN IN FOREHEAD    HX TUBAL LIGATION  1963    MS COLSC FLX W/RMVL OF TUMOR POLYP LESION SNARE TQ  12/12/2011           Family History   Problem Relation Age of Onset    Other Mother      ANEURYSM    Obesity Mother     Heart Disease Father     Other Father      VASCULAR DISEASE    Hypertension Sister     Heart Disease Brother     Heart Attack Brother     Kidney Disease Sister     Seizures Brother     Heart Attack Brother     Anesth Problems Neg Hx     Alcohol abuse Neg Hx       History reviewed, no pertinent family history.   Social History   Substance Use Topics    Smoking status: Former Smoker     Packs/day: 0.25     Years: 15.00     Quit date: 8/23/1996    Smokeless tobacco: Never Used    Alcohol use No     Allergies   Allergen Reactions    Latex Rash and Itching    Advair Diskus [Fluticasone-Salmeterol] Other (comments)     \"breathing problems\"    Bactrim [Sulfamethoprim] Shortness of Breath, Itching and Swelling     Swelling of tongue and throat    Eliquis [Apixaban] Other (comments)    Iodinated Contrast- Oral And Iv Dye Hives    Ivp [Fd And C Blue No.1] Hives and Shortness of Breath    Lovenox [Enoxaparin] Shortness of Breath    Nsaids (Non-Steroidal Anti-Inflammatory Drug) Other (comments)     Heartburn    Sulfa (Sulfonamide Antibiotics) Shortness of Breath      Current Facility-Administered Medications   Medication Dose Route Frequency    morphine CR (MS CONTIN) tablet 60 mg  60 mg Oral Q8H    senna-docusate (PERICOLACE) 8.6-50 mg per tablet 2 Tab  2 Tab Oral BID    polyethylene glycol (MIRALAX) packet 17 g  17 g Oral DAILY    oxyCODONE IR (ROXICODONE) tablet 20 mg  20 mg Oral Q4H PRN    sodium chloride (NS) flush 5-10 mL  5-10 mL IntraVENous PRN    heparin 25,000 units in D5W 250 ml infusion  11.3-36 Units/kg/hr IntraVENous TITRATE    sodium chloride (NS) flush 5-10 mL  5-10 mL IntraVENous Q8H    sodium chloride (NS) flush 5-10 mL  5-10 mL IntraVENous PRN    acetaminophen (TYLENOL) tablet 650 mg  650 mg Oral Q6H PRN    naloxone (NARCAN) injection 0.4 mg  0.4 mg IntraVENous PRN    ondansetron (ZOFRAN) injection 4 mg  4 mg IntraVENous Q4H PRN    bisacodyl (DULCOLAX) suppository 10 mg  10 mg Rectal DAILY PRN    cyanocobalamin (VITAMIN B12) tablet 1,000 mcg  1,000 mcg Oral DAILY    cholecalciferol (VITAMIN D3) tablet 1,000 Units  1,000 Units Oral DAILY    naloxegol (MOVANTIK) tablet 25 mg  25 mg Oral ACB    metoprolol succinate (TOPROL-XL) XL tablet 100 mg  100 mg Oral DAILY    0.9% sodium chloride infusion  75 mL/hr IntraVENous CONTINUOUS    heparin (porcine) injection 10,000 Units  10,000 Units IntraVENous PRN    heparin (porcine) injection 5,000 Units  5,000 Units IntraVENous PRN    HYDROmorphone (DILAUDID) injection 1-2 mg  1-2 mg IntraVENous Q3H PRN          LAB AND IMAGING FINDINGS:     Lab Results   Component Value Date/Time    WBC 6.4 12/12/2017 05:47 AM    HGB 7.8 12/12/2017 05:47 AM    PLATELET 019 33/10/5455 05:47 AM     Lab Results   Component Value Date/Time    Sodium 134 12/11/2017 02:10 AM    Potassium 4.1 12/11/2017 02:10 AM    Chloride 100 12/11/2017 02:10 AM    CO2 26 12/11/2017 02:10 AM    BUN 26 12/11/2017 02:10 AM    Creatinine 1.13 12/11/2017 02:10 AM    Calcium 9.1 12/11/2017 02:10 AM    Magnesium 2.1 09/01/2016 05:04 PM      Lab Results   Component Value Date/Time    AST (SGOT) 15 12/10/2017 04:02 AM    Alk. phosphatase 64 12/10/2017 04:02 AM    Protein, total 9.0 12/10/2017 04:02 AM    Albumin 2.3 12/10/2017 04:02 AM    Globulin 6.7 12/10/2017 04:02 AM     Lab Results   Component Value Date/Time    INR 1.6 12/09/2017 05:35 PM    Prothrombin time 16.4 12/09/2017 05:35 PM    aPTT >130.0 12/12/2017 08:00 AM      Lab Results   Component Value Date/Time    Iron 75 11/13/2017 02:48 PM    TIBC 329 11/13/2017 02:48 PM    Iron % saturation 23 11/13/2017 02:48 PM    Ferritin 264 11/13/2017 02:48 PM      No results found for: PH, PCO2, PO2  No components found for: [place]   Lab Results   Component Value Date/Time    CK 62 06/26/2014 01:35 PM    CK - MB 0.5 03/08/2014 04:20 PM                Total time:   Counseling / coordination time, spent as noted above:   > 50% counseling / coordination?:     Prolonged service was provided for  []30 min   []75 min in face to face time in the presence of the patient, spent as noted above. Time Start:   Time End:   Note: this can only be billed with 93330 (initial) or 92412 (follow up). If multiple start / stop times, list each separately.

## 2017-12-12 NOTE — CONSULTS
Oncology Inpatient Consult      Reason for consultation: Thrombus in the right common femoral      Subjective:     Milo Selby is a 68 y.o. woman who we were asked to see by Dr. Danita Murguia for an occlusive thrombus in the right common femoral vein. She has a history of recurrent endometrial cancer and is being treated by Dr. Gustabo Martin. She has a history of DVT/PE in 2010 and was initially on coumadin and then Eliquis. Eliquis was stopped in October 2017 d/t recurrent GI bleed. Ms. Jeff Ventura presented to the ED on Saturday with complaints of acute onset right leg pain for 2 days. Dopplers showed an acute right DVT and she was admitted for further management. Ms. Jeff Ventura continues to have pain and swelling in her right thigh. Past Medical History:   Diagnosis Date    Acute kidney failure ([address] 11/19/2015    Acute sinusitis 2/11/2011    Adverse effect of anesthesia     SLOW WAKING PAST ANESTHESIA    Arthritis     RA    At risk for side effect of medication 9/30/2016    Atrial fibrillation ([address] 10/19/2011    Autoimmune disease ([address]     FIBROMYLGIA    Cancer ([address] 2016    ENDOMETRIAL     Cholelithiasis 11/19/2015    Chronic pain     Claudication of both lower extremities ([address] 8/25/2015    Diabetes mellitus type 2, controlled ([address] 1/19/2016    Diabetes mellitus, type II ([address] 10/10/2014    Fall against object 10/10/2014    Fatty liver 10/20/2015    Hypertension     Ill-defined condition     SPINAL STENOSIS    Left shoulder pain 10/10/2014    Leiomyoma of body of uterus 4/7/2016    Morbid obesity ([address]     Myalgia 9/30/2016    Nausea & vomiting     Personal history of radiation therapy 4/20/2017    Pneumonia     Preop examination 6/15/2015    Preoperative clearance 6/15/2015    Rash 2/11/2011    Rash of hands 10/10/2014    Screening for colon cancer 2/22/2016    Screening for glaucoma 2/22/2016    Sleep apnea     SOB (shortness of breath)     hospitalized 5/2012.  angina, SOB    Thromboembolus ([address] 2011    LEFT LOWER LEG AND PE    Tinea pedis, recurrent 7/20/2015    Vaginal bleeding, abnormal 4/20/2017     Past Surgical History:   Procedure Laterality Date    COLONOSCOPY N/A 12/21/2016    COLONOSCOPY performed by Sherita Valenzuela MD at [facility]  12/21/2016         HX CATARACT REMOVAL Bilateral 2015    [address]    HX DILATION AND CURETTAGE  07/29/2016    HX DILATION AND CURETTAGE  2016    HX GYN  1960    etopic    HX HEART CATHETERIZATION  2001    NEGATIVE PER PATIENT    HX HYSTERECTOMY  2016    HX KNEE ARTHROSCOPY Left 1998    HX ORTHOPAEDIC Right 1960'S    BUNIONECTOMY    HX OTHER SURGICAL      BIOPSY OF VEIN IN FOREHEAD    HX TUBAL LIGATION  1963    SD COLSC FLX W/RMVL OF TUMOR POLYP LESION SNARE TQ  12/12/2011           Family History   Problem Relation Age of Onset    Other Mother      ANEURYSM    Obesity Mother     Heart Disease Father     Other Father      VASCULAR DISEASE    Hypertension Sister     Heart Disease Brother     Heart Attack Brother     Kidney Disease Sister     Seizures Brother     Heart Attack Brother     Anesth Problems Neg Hx     Alcohol abuse Neg Hx      Social History   Substance Use Topics    Smoking status: Former Smoker     Packs/day: 0.25     Years: 15.00     Quit date: 8/23/1996    Smokeless tobacco: Never Used    Alcohol use No      Current Facility-Administered Medications   Medication Dose Route Frequency Provider Last Rate Last Dose    morphine CR (MS CONTIN) tablet 60 mg  60 mg Oral Q8H Annette Mccabe MD        senna-docusate (PERICOLACE) 8.6-50 mg per tablet 2 Tab  2 Tab Oral BID Suad Zuniga MD        polyethylene glycol (MIRALAX) packet 17 g  17 g Oral DAILY Suad Zuniga MD   17 g at 12/12/17 1112    [START ON 12/13/2017] enoxaparin (LOVENOX) injection 120 mg  120 mg SubCUTAneous BID Benji Gregory MD        enoxaparin (LOVENOX) injection 120 mg  120 mg SubCUTAneous ONCE Juan Estrella MD        enoxaparin (LOVENOX) injection 120 mg  120 mg SubCUTAneous ONCE Juan Estrella MD        oxyCODONE IR (ROXICODONE) tablet 20 mg  20 mg Oral Q4H PRN Jose Gotti MD   20 mg at 12/12/17 1112    sodium chloride (NS) flush 5-10 mL  5-10 mL IntraVENous PRN Jake Block MD        sodium chloride (NS) flush 5-10 mL  5-10 mL IntraVENous Q8H Lotus Leslie MD   Stopped at 12/12/17 0600    sodium chloride (NS) flush 5-10 mL  5-10 mL IntraVENous PRN Lotus Leslie MD        acetaminophen (TYLENOL) tablet 650 mg  650 mg Oral Q6H PRN Lotus Leslie MD        [facility]) injection 0.4 mg  0.4 mg IntraVENous PRN Lotus Leslie MD        ondansetron [facility]) injection 4 mg  4 mg IntraVENous Q4H PRN Lotus Leslie MD        bisacodyl (DULCOLAX) suppository 10 mg  10 mg Rectal DAILY PRN Lotus Leslie MD        cyanocobalamin (VITAMIN B12) tablet 1,000 mcg  1,000 mcg Oral DAILY Lotus Leslie MD   1,000 mcg at 12/12/17 4234    cholecalciferol (VITAMIN D3) tablet 1,000 Units  1,000 Units Oral DAILY Lotus Leslie MD   1,000 Units at 12/12/17 1001    naloxegol (MOVANTIK) tablet 25 mg  25 mg Oral ACB Lotus Leslie MD   25 mg at 12/12/17 0849    metoprolol succinate (TOPROL-XL) XL tablet 100 mg  100 mg Oral DAILY Lotus Leslie MD   100 mg at 12/12/17 9938    0.9% sodium chloride infusion  75 mL/hr IntraVENous CONTINUOUS Lotus Leslie MD 75 mL/hr at 12/11/17 2013 75 mL/hr at 12/11/17 2013    HYDROmorphone (DILAUDID) injection 1-2 mg  1-2 mg IntraVENous Q3H PRN Jose Gotti MD   2 mg at 12/12/17 0740        Allergies   Allergen Reactions    Latex Rash and Itching    Advair Diskus [Fluticasone-Salmeterol] Other (comments)     \"breathing problems\"    Bactrim [Sulfamethoprim] Shortness of Breath, Itching and Swelling     Swelling of tongue and throat    Eliquis [Apixaban] Other (comments)    Iodinated Contrast- Oral And Iv Dye Hives    Ivp [Fd And C Blue No.1] Hives and Shortness of Breath    Lovenox [Enoxaparin] Shortness of Breath    Nsaids (Non-Steroidal Anti-Inflammatory Drug) Other (comments)     Heartburn    Sulfa (Sulfonamide Antibiotics) Shortness of Breath        Review of Systems:  A comprehensive review of systems was negative except for that written in the History of Present Illness. Objective:     Visit Vitals    /69 (BP 1 Location: Left arm, BP Patient Position: At rest)    Pulse (!) 105    Temp 100.1 °F (37.8 °C)    Resp 20    Ht 5' 6\" (1.676 m)    Wt 295 lb (133.8 kg)    SpO2 90%    BMI 47.61 kg/m2       Physical Exam:   General appearance: alert, cooperative, no distress, appears stated age  Lungs: clear to auscultation bilaterally  Heart: regular rate and rhythm, S1, S2 normal, no murmur, click, rub or gallop  Abdomen: soft, non-tender. Bowel sounds normal. No masses,  no organomegaly  Extremities: right thigh warm, swollen, and tender to touch  Neurologic: Grossly normal      Lab/Data Review:  Recent Results (from the past 24 hour(s))   PTT    Collection Time: 12/12/17  5:47 AM   Result Value Ref Range    aPTT >130.0 (HH) 22.1 - 32.5 sec    aPTT, therapeutic range     58.0 - 77.0 SECS   CBC WITH AUTOMATED DIFF    Collection Time: 12/12/17  5:47 AM   Result Value Ref Range    WBC 6.4 3.6 - 11.0 K/uL    RBC 2.70 (L) 3.80 - 5.20 M/uL    HGB 7.8 (L) 11.5 - 16.0 g/dL    HCT 24.8 (L) 35.0 - 47.0 %    MCV 91.9 80.0 - 99.0 FL    MCH 28.9 26.0 - 34.0 PG    MCHC 31.5 30.0 - 36.5 g/dL    RDW 15.7 (H) 11.5 - 14.5 %    PLATELET 972 943 - 039 K/uL    NEUTROPHILS 80 (H) 32 - 75 %    LYMPHOCYTES 9 (L) 12 - 49 %    MONOCYTES 10 5 - 13 %    EOSINOPHILS 1 0 - 7 %    BASOPHILS 0 0 - 1 %    ABS. NEUTROPHILS 5.1 1.8 - 8.0 K/UL    ABS. LYMPHOCYTES 0.6 (L) 0.8 - 3.5 K/UL    ABS. MONOCYTES 0.7 0.0 - 1.0 K/UL    ABS.  EOSINOPHILS 0.1 0.0 - 0.4 K/UL ABS. BASOPHILS 0.0 0.0 - 0.1 K/UL   PTT    Collection Time: 12/12/17  8:00 AM   Result Value Ref Range    aPTT >130.0 (HH) 22.1 - 32.5 sec    aPTT, therapeutic range     58.0 - 77.0 SECS   PTT    Collection Time: 12/12/17 10:30 AM   Result Value Ref Range    aPTT 37.9 (H) 22.1 - 32.5 sec    aPTT, therapeutic range     58.0 - 77.0 SECS         Assessment:     1. Acute right LE DVT    > Doppler (12/9/2017): acute DVT of the right common femoral vein extending in the proximal superficial femoral vein  > h/o DVT/PE 2010, on long term anticoagulation with coumadin then eliquis  > Eliquis discontinued in October 2017 d/t recurrent rectal bleed    > Patient has a documented allergy to Lovenox. Patient is unclear of reaction or when it occurred. Will plan to re-challenge with Lovenox in-patient.  > Start Lovenox 120 mg SQ BID - calculated dose of 130 mg reduced to 120 mg to allow for easier patient compliance after discharge home. > Check anti-factor Xa after discharge      2. MGUS    > Following with Dr. Oswald Mclain  > Patient refused BM biopsy      3. Endometrial cancer    > Following with Dr. Mariusz Varghese  > Per patient, plan to start chemotherapy at [facility] in January      Plan:       > Stop heparin  > Lovenox 120 mg SQ BID  > Plan to discharge home on Lovenox        Signed By: Gwyn Moreira NP     December 12, 2017             Attending Physician Note:     I have reviewed the history, physical examination, assessment and the plan of the care of the patient. I saw the patient with [address] and I participated in the examination and critical decision making. I agree with the note as stated above. Ms. Romel Marley is a elderly women with MDS and endometrial cancer who has been admitted with new DVT. We shall re-institute LMW heparin.       Signed by: Benji Gregory MD                     December 13, 2017

## 2017-12-12 NOTE — PROGRESS NOTES
Hospitalist Progress Note    NAME: Marifer Glass   :  1940   MRN:  [id number]       Assessment / Plan:  Right thigh DVT POA . 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 Continue  Lovenox  Hematology consult inputs appreciated. Acute kidney injury POA   Improving , creatinine recently was 1.1  Check creatinine tomorrow.     FEVER/with SIRS POA   T-102.7F, likely from non infectious source , DVT   Will monitor off  antibiotics for now.     Recent scant rectal bleeding POA  No active gross bleeding   GI help appreciated      Advanced Uterine cancer POA now with LN mets  Scheduled to start chemo in 2018    Essential HTN POA  Continue toprol Xl  PRN hydralazine     Chronic pain syndrome POA  Will follow up palliative care recommendations       Body mass index is 47.61 kg/(m^2). Morbidly obese ,advised on weight loss and diet. Code status: Full  Prophylaxis: Lovenox treatment dose. Recommended Disposition: TBD     Subjective:     Chief Complaint / Reason for Physician Visit  No fever, no headach , the right leg pain still there. .  Discussed with RN events overnight. Review of Systems:  Symptom Y/N Comments  Symptom Y/N Comments   Fever/Chills n   Chest Pain n    Poor Appetite    Edema     Cough n   Abdominal Pain n    Sputum n   Joint Pain n    SOB/SRIVASTAVA n   Pruritis/Rash n    Nausea/vomit n   Tolerating PT/OT     Diarrhea n   Tolerating Diet y    Constipation n   Other       Could NOT obtain due to:      Objective:     VITALS:   Last 24hrs VS reviewed since prior progress note.  Most recent are:  Patient Vitals for the past 24 hrs:   Temp Pulse Resp BP SpO2   17 1132 100.1 °F (37.8 °C) (!) 105 20 135/69 90 %   12/12/17 1001 (!) 100.7 °F (38.2 °C) (!) 109 20 135/60 95 %   12/12/17 0251 98.6 °F (37 °C) (!) 110 18 148/70 94 %   12/11/17 1617 99.2 °F (37.3 °C) 99 20 130/63 97 %       Intake/Output Summary (Last 24 hours) at 12/12/17 1516  Last data filed at 12/12/17 0742   Gross per 24 hour   Intake                0 ml   Output              200 ml   Net             -200 ml        PHYSICAL EXAM:  General: WD, WN. Alert, cooperative, no acute distress   , obese   EENT:  EOMI. Anicteric sclerae. MMM  Resp:  CTA bilaterally, no wheezing or rales. No accessory muscle use  CV:  Regular  rhythm,  No edema  GI:  Soft, Non distended, Non tender.  +Bowel sounds  Neurologic:  Alert and oriented X 3, normal speech,   Psych:   Good insight. Not anxious nor agitated  Skin:  No rashes. No jaundice , chronic venous skin changes in lower ext but no edema     Right leg/tigh bigger than the left , feels warm but no skin chagres     Reviewed most current lab test results and cultures  YES  Reviewed most current radiology test results   YES  Review and summation of old records today    NO  Reviewed patient's current orders and MAR    YES  PMH/SH reviewed - no change compared to H&P  ________________________________________________________________________  Care Plan discussed with:    Comments   Patient y    Family  y Son at bed side    RN y    Care Manager n    Consultant  n                      Multidiciplinary team rounds were held today with , nursing, pharmacist and clinical coordinator. Patient's plan of care was discussed; medications were reviewed and discharge planning was addressed.      ________________________________________________________________________  Total NON critical care TIME:  50  Minutes    Total CRITICAL CARE TIME Spent:   Minutes non procedure based      Comments   >50% of visit spent in counseling and coordination of care y On weight loss ________________________________________________________________________  Salomón Lou MD     Procedures: see electronic medical records for all procedures/Xrays and details which were not copied into this note but were reviewed prior to creation of Plan. LABS:  I reviewed today's most current labs and imaging studies. Pertinent labs include:  Recent Labs      12/12/17   0547  12/11/17   0430  12/10/17   0402   WBC  6.4  8.1  7.8   HGB  7.8*  8.1*  8.6*   HCT  24.8*  25.4*  26.7*   PLT  180  151  138*     Recent Labs      12/11/17   0210  12/10/17   0402  12/09/17   1735   NA  134*  133*  134*   K  4.1  3.8  4.0   CL  100  99  99   CO2  26  26  29   GLU  103*  102*  117*   BUN  26*  30*  29*   CREA  1.13*  1.55*  2.03*   CA  9.1  9.8  10.1   ALB   --   2.3*  2.7*   TBILI   --   1.0  1.2*   SGOT   --   15  16   ALT   --   12  14   INR   --    --   1.6*       Signed:  Salomón Lou MD

## 2017-12-12 NOTE — PROGRESS NOTES
Aptt drawn this AM was obtained from the same are as the heparin drip while it was still infusing. Dr. Joi Castillo and pharmacy notified. Redraw send to lab.  Heparin stopped per Dr. Joi Castillo until redraw results post.

## 2017-12-13 ENCOUNTER — APPOINTMENT (OUTPATIENT)
Dept: INTERVENTIONAL RADIOLOGY/VASCULAR | Age: 77
DRG: 253 | End: 2017-12-13
Attending: INTERNAL MEDICINE
Payer: MEDICARE

## 2017-12-13 LAB
ANION GAP SERPL CALC-SCNC: 8 MMOL/L (ref 5–15)
BASOPHILS # BLD: 0 K/UL (ref 0–0.1)
BASOPHILS NFR BLD: 0 % (ref 0–1)
BUN SERPL-MCNC: 23 MG/DL (ref 6–20)
BUN/CREAT SERPL: 21 (ref 12–20)
CALCIUM SERPL-MCNC: 9.9 MG/DL (ref 8.5–10.1)
CHLORIDE SERPL-SCNC: 102 MMOL/L (ref 97–108)
CO2 SERPL-SCNC: 27 MMOL/L (ref 21–32)
CREAT SERPL-MCNC: 1.1 MG/DL (ref 0.55–1.02)
EOSINOPHIL # BLD: 0.1 K/UL (ref 0–0.4)
EOSINOPHIL NFR BLD: 1 % (ref 0–7)
ERYTHROCYTE [DISTWIDTH] IN BLOOD BY AUTOMATED COUNT: 15.6 % (ref 11.5–14.5)
GLUCOSE SERPL-MCNC: 106 MG/DL (ref 65–100)
HCT VFR BLD AUTO: 23.5 % (ref 35–47)
HEMOCCULT STL QL: POSITIVE
HGB BLD-MCNC: 7.5 G/DL (ref 11.5–16)
LYMPHOCYTES # BLD: 0.7 K/UL (ref 0.8–3.5)
LYMPHOCYTES NFR BLD: 12 % (ref 12–49)
MCH RBC QN AUTO: 29.2 PG (ref 26–34)
MCHC RBC AUTO-ENTMCNC: 31.9 G/DL (ref 30–36.5)
MCV RBC AUTO: 91.4 FL (ref 80–99)
MONOCYTES # BLD: 0.6 K/UL (ref 0–1)
MONOCYTES NFR BLD: 9 % (ref 5–13)
NEUTS SEG # BLD: 4.9 K/UL (ref 1.8–8)
NEUTS SEG NFR BLD: 78 % (ref 32–75)
PLATELET # BLD AUTO: 201 K/UL (ref 150–400)
POTASSIUM SERPL-SCNC: 3.4 MMOL/L (ref 3.5–5.1)
RBC # BLD AUTO: 2.57 M/UL (ref 3.8–5.2)
SODIUM SERPL-SCNC: 137 MMOL/L (ref 136–145)
WBC # BLD AUTO: 6.3 K/UL (ref 3.6–11)

## 2017-12-13 PROCEDURE — C1769 GUIDE WIRE: HCPCS

## 2017-12-13 PROCEDURE — C1892 INTRO/SHEATH,FIXED,PEEL-AWAY: HCPCS

## 2017-12-13 PROCEDURE — 85025 COMPLETE CBC W/AUTO DIFF WBC: CPT | Performed by: NURSE PRACTITIONER

## 2017-12-13 PROCEDURE — 74011250636 HC RX REV CODE- 250/636: Performed by: INTERNAL MEDICINE

## 2017-12-13 PROCEDURE — C1880 VENA CAVA FILTER: HCPCS

## 2017-12-13 PROCEDURE — 36415 COLL VENOUS BLD VENIPUNCTURE: CPT | Performed by: NURSE PRACTITIONER

## 2017-12-13 PROCEDURE — 06H03DZ INSERTION OF INTRALUMINAL DEVICE INTO INFERIOR VENA CAVA, PERCUTANEOUS APPROACH: ICD-10-PCS | Performed by: RADIOLOGY

## 2017-12-13 PROCEDURE — 82272 OCCULT BLD FECES 1-3 TESTS: CPT | Performed by: INTERNAL MEDICINE

## 2017-12-13 PROCEDURE — 74011000250 HC RX REV CODE- 250: Performed by: RADIOLOGY

## 2017-12-13 PROCEDURE — 74011250637 HC RX REV CODE- 250/637: Performed by: INTERNAL MEDICINE

## 2017-12-13 PROCEDURE — 74011250636 HC RX REV CODE- 250/636: Performed by: RADIOLOGY

## 2017-12-13 PROCEDURE — 37191 INS ENDOVAS VENA CAVA FILTR: CPT

## 2017-12-13 PROCEDURE — 65270000029 HC RM PRIVATE

## 2017-12-13 PROCEDURE — 80048 BASIC METABOLIC PNL TOTAL CA: CPT | Performed by: INTERNAL MEDICINE

## 2017-12-13 PROCEDURE — A9585 GADOBUTROL INJECTION: HCPCS | Performed by: RADIOLOGY

## 2017-12-13 PROCEDURE — A9577 INJ MULTIHANCE: HCPCS | Performed by: RADIOLOGY

## 2017-12-13 RX ORDER — HYDROMORPHONE HYDROCHLORIDE 2 MG/ML
1 INJECTION, SOLUTION INTRAMUSCULAR; INTRAVENOUS; SUBCUTANEOUS
Status: DISCONTINUED | OUTPATIENT
Start: 2017-12-13 | End: 2017-12-16 | Stop reason: HOSPADM

## 2017-12-13 RX ORDER — LIDOCAINE HYDROCHLORIDE 20 MG/ML
18 INJECTION, SOLUTION INFILTRATION; PERINEURAL ONCE
Status: COMPLETED | OUTPATIENT
Start: 2017-12-13 | End: 2017-12-13

## 2017-12-13 RX ORDER — MORPHINE SULFATE 30 MG/1
60 TABLET, FILM COATED, EXTENDED RELEASE ORAL EVERY 12 HOURS
Status: DISCONTINUED | OUTPATIENT
Start: 2017-12-13 | End: 2017-12-16 | Stop reason: HOSPADM

## 2017-12-13 RX ORDER — FENTANYL CITRATE 50 UG/ML
100 INJECTION, SOLUTION INTRAMUSCULAR; INTRAVENOUS
Status: DISCONTINUED | OUTPATIENT
Start: 2017-12-13 | End: 2017-12-13

## 2017-12-13 RX ORDER — ENOXAPARIN SODIUM 100 MG/ML
120 INJECTION SUBCUTANEOUS 2 TIMES DAILY
Status: DISCONTINUED | OUTPATIENT
Start: 2017-12-13 | End: 2017-12-13

## 2017-12-13 RX ADMIN — GADOBUTROL 20 ML: 604.72 INJECTION INTRAVENOUS at 17:05

## 2017-12-13 RX ADMIN — Medication 10 ML: at 14:00

## 2017-12-13 RX ADMIN — DOCUSATE SODIUM AND SENNOSIDES 2 TABLET: 8.6; 5 TABLET, FILM COATED ORAL at 08:41

## 2017-12-13 RX ADMIN — FENTANYL CITRATE 50 MCG: 50 INJECTION, SOLUTION INTRAMUSCULAR; INTRAVENOUS at 16:45

## 2017-12-13 RX ADMIN — DOCUSATE SODIUM AND SENNOSIDES 2 TABLET: 8.6; 5 TABLET, FILM COATED ORAL at 18:44

## 2017-12-13 RX ADMIN — Medication 10 ML: at 07:04

## 2017-12-13 RX ADMIN — OXYCODONE HYDROCHLORIDE 20 MG: 5 TABLET ORAL at 23:24

## 2017-12-13 RX ADMIN — MORPHINE SULFATE 60 MG: 30 TABLET, EXTENDED RELEASE ORAL at 08:42

## 2017-12-13 RX ADMIN — GADOBENATE DIMEGLUMINE 10 ML: 529 INJECTION, SOLUTION INTRAVENOUS at 17:05

## 2017-12-13 RX ADMIN — METOPROLOL SUCCINATE 100 MG: 50 TABLET, EXTENDED RELEASE ORAL at 08:41

## 2017-12-13 RX ADMIN — CYANOCOBALAMIN TAB 500 MCG 1000 MCG: 500 TAB at 08:41

## 2017-12-13 RX ADMIN — MORPHINE SULFATE 60 MG: 30 TABLET, EXTENDED RELEASE ORAL at 00:41

## 2017-12-13 RX ADMIN — MORPHINE SULFATE 60 MG: 30 TABLET, EXTENDED RELEASE ORAL at 20:59

## 2017-12-13 RX ADMIN — Medication 10 ML: at 05:40

## 2017-12-13 RX ADMIN — OXYCODONE HYDROCHLORIDE 20 MG: 5 TABLET ORAL at 10:00

## 2017-12-13 RX ADMIN — NALOXEGOL OXALATE 25 MG: 25 TABLET, FILM COATED ORAL at 07:30

## 2017-12-13 RX ADMIN — HYDROMORPHONE HYDROCHLORIDE 1 MG: 2 INJECTION INTRAMUSCULAR; INTRAVENOUS; SUBCUTANEOUS at 18:44

## 2017-12-13 RX ADMIN — OXYCODONE HYDROCHLORIDE 20 MG: 5 TABLET ORAL at 14:13

## 2017-12-13 RX ADMIN — FENTANYL CITRATE 50 MCG: 50 INJECTION, SOLUTION INTRAMUSCULAR; INTRAVENOUS at 16:52

## 2017-12-13 RX ADMIN — POLYETHYLENE GLYCOL 3350 17 G: 17 POWDER, FOR SOLUTION ORAL at 08:42

## 2017-12-13 RX ADMIN — Medication 10 ML: at 21:01

## 2017-12-13 RX ADMIN — LIDOCAINE HYDROCHLORIDE 200 MG: 20 INJECTION, SOLUTION INFILTRATION; PERINEURAL at 17:04

## 2017-12-13 RX ADMIN — VITAMIN D, TAB 1000IU (100/BT) 1000 UNITS: 25 TAB at 08:41

## 2017-12-13 NOTE — CONSULTS
Palliative Medicine Consult  [place]: [phone])    Patient Name: Tila Coles  YOB: 1940    Date of Initial Consult: 12/12/17  Reason for Consult: overwhelming symptoms  Requesting Provider: Crispin Nagel  Primary Care Physician: Hiral Curry MD     SUMMARY:   Tila Coles is a 68 y.o. with a past history of metastatic uterine cancer to start chemo Jan 2018, RA/fibromyalgia, DM, sleep apnea, LLE DVT/PE 2010 (on coumadin then eliquis, stopped Oct 2017 due to rectal bleeding), MGUS declined bone marrow bx/ chronic anemia, who was admitted on 12/9/2017 from home with a diagnosis of Right Thigh pain for several days. Current medical issues leading to Palliative Medicine involvement include: new R leg DVT (R common femoral vein), uncontrolled pain    Patient lives alone, normally independent with ADLS. She relies on her son, Rica Jackson [phone] for assistance     PALLIATIVE DIAGNOSES:   1. Acute R thigh pain, due to DVT  2. ARF, improving with holding diuretics  3. Metastatic endometrial cancer  4. Chronic anemia, multifactorial (MGUS, chronic disease)  5. Fever ? Due to DVT  6. Rectal bleeding, heparin now stopped  7. Constipation secondary to medications  8. Chronic pain, chronic opioid use  9. hypoalbuminemia       PLAN:   1. Acute pain  1. I decrease MS Contin back to 60mg PO Q12 hrs, she seemed to have developed some confusion overnight with the higher dose yesterday (and PRN dilaudid)  2. Continue Oxycodone IR 20mg PO Q4hr PRN   3. Dilaudid 1mg IV Q3hr PRN pain not controlled by other meds (not 2mg)  2. Constipation  1. Start bowel regimen, senokot 2 tabs BID and miralax 1 scoop PO every day  3. Education about code status discussed, her insight into poor outcomes of code blue is limited. She has a lot of fear about her son Aditi Guidry being unable to cope without her. Patient desires FULL CODE.     4. Education provided about AMD. She is willing to meet with our  to complete AMD. 5. Initial consult note routed to primary continuity provider  6. Communicated plan of care with: Palliative IDT       GOALS OF CARE / TREATMENT PREFERENCES:     GOALS OF CARE:   manage pain  Treat acute issues. Hoping to get chemo in January for endometrial cancer  Full Code    TREATMENT PREFERENCES:   Code Status: Full Code    Advance Care Planning:  Advance Care Planning 12/13/2017   Patient's Healthcare Decision Maker is: Verbal statement (Legal Next of Kin remains as decision maker)   Primary Decision Maker Name Katina Lakhani   Primary Decision Maker Phone Number [phone]   Primary Decision Maker Relationship to Patient Adult child   Secondary Decision Maker Name -   Secondary Decision [address] Phone Number -   Secondary Decision Maker Relationship to Patient -   Confirm Advance Directive None   Patient Would Like to Complete Advance Directive Yes   Does the patient have other document types -           Other Instructions: Other:    As far as possible, the palliative care team has discussed with patient / health care proxy about goals of care / treatment preferences for patient. HISTORY:     History obtained from: patient    CHIEF COMPLAINT: admitted with right leg pain    HPI/SUBJECTIVE:    The patient is:   [x] Verbal and participatory  [] Non-participatory due to:     68year old female admitted with right thigh pain that started a few days ago. It's worse with walking  Right now 10/10 , very bad. It's a little better after IV dilaudid. She can't concentrate and her appetite is gone. She did get some sleep last night. She is not sure when she last moved her bowels. , does need some medication for that    12/13/17 pain in leg is much better today.   Still hurts to move it much  Some abdominal cramping after eating lunch half a hamburger  Clinical Pain Assessment (nonverbal scale for severity on nonverbal patients):   Clinical Pain Assessment  Severity: 5  Location: right thigh  Character: ache burn  Duration: several days  Effect: low appetite, can't concentrate  Factors: worse with walking  Frequency: constant          Duration: for how long has pt been experiencing pain (e.g., 2 days, 1 month, years)  Frequency: how often pain is an issue (e.g., several times per day, once every few days, constant)     FUNCTIONAL ASSESSMENT:     Palliative Performance Scale (PPS):  PPS: 40       PSYCHOSOCIAL/SPIRITUAL SCREENING:     Palliative IDT has assessed this patient for cultural preferences / practices and a referral made as appropriate to needs (Cultural Services, Patient Advocacy, Ethics, etc.)    Advance Care Planning:  Advance Care Planning 12/13/2017   Patient's Healthcare Decision Maker is: Verbal statement (Legal Next of Kin remains as decision maker)   Primary Decision Maker Name Jen Gross   Primary Decision Maker Phone Number [phone]   Primary Decision Maker Relationship to Patient Adult child   Secondary Decision Maker Name -   Secondary Decision [address] Phone Number -   Secondary Decision Maker Relationship to Patient -   Confirm Advance Directive None   Patient Would Like to Complete Advance Directive Yes   Does the patient have other document types -       Any spiritual / Jainism concerns:  [] Yes /  [x] No    Caregiver Burnout:  [] Yes /  [] No /  [x] No Caregiver Present      Anticipatory grief assessment:   [x] Normal  / [] Maladaptive       ESAS Anxiety: Anxiety: 0    ESAS Depression: Depression: 0        REVIEW OF SYSTEMS:     Positive and pertinent negative findings in ROS are noted above in HPI. The following systems were [x] reviewed / [] unable to be reviewed as noted in HPI  Other findings are noted below. Systems: constitutional, ears/nose/mouth/throat, respiratory, gastrointestinal, genitourinary, musculoskeletal, integumentary, neurologic, psychiatric, endocrine. Positive findings noted below.   Modified ESAS Completed by: provider   Fatigue: 5 Drowsiness: 2   Depression: 0 Pain: 5   Anxiety: 0 Nausea: 0   Anorexia: 8 Dyspnea: 0     Constipation: Yes     Stool Occurrence(s): 1        PHYSICAL EXAM:     From RN flowsheet:  Wt Readings from Last 3 Encounters:   12/09/17 295 lb (133.8 kg)   11/29/17 294 lb 9.6 oz (133.6 kg)   10/31/17 297 lb (134.7 kg)     Blood pressure 152/71, pulse (!) 104, temperature 99.2 °F (37.3 °C), resp. rate 24, height 5' 6\" (1.676 m), weight 295 lb (133.8 kg), SpO2 99 %. Pain Scale 1: Numeric (0 - 10)  Pain Intensity 1: 5     Pain Location 1: Groin, Leg  Pain Orientation 1: Right  Pain Description 1: Aching  Pain Intervention(s) 1: Medication (see MAR)  Last bowel movement, if known:     Constitutional: uncomfortable appearing  Winces at times with trying to move her legs  Eyes: pupils equal, anicteric  Right thigh is tender to touch anteriorly  Obese legs, ? Edema  Chronic hyperpigmentation of ankles bilaterally  No respiratory distress  Her attention is diminished   Affect a bit flat  Pleasant and engaging in conversation  Some trouble with finding the right words at times, sometimes says the opposite of what she means, then corrects herself. Able to recall medical history, like discussions with her PCP about blood thinner.        HISTORY:     Active Problems:    Right leg DVT ([address] (12/9/2017)      Past Medical History:   Diagnosis Date    Acute kidney failure ([address] 11/19/2015    Acute sinusitis 2/11/2011    Adverse effect of anesthesia     SLOW WAKING PAST ANESTHESIA    Arthritis     RA    At risk for side effect of medication 9/30/2016    Atrial fibrillation ([address] 10/19/2011    Autoimmune disease ([address]     [address] ([address] 2016    ENDOMETRIAL     Cholelithiasis 11/19/2015    Chronic pain     Claudication of both lower extremities ([address] 8/25/2015    Diabetes mellitus type 2, controlled ([address] 1/19/2016    Diabetes mellitus, type II ([address] 10/10/2014    Fall against object 10/10/2014    Fatty liver 10/20/2015    Hypertension     Ill-defined condition     SPINAL STENOSIS    Left shoulder pain 10/10/2014    Leiomyoma of body of uterus 4/7/2016    [facility]     Myalgia 9/30/2016    Nausea & vomiting     Personal history of radiation therapy 4/20/2017    Pneumonia     Preop examination 6/15/2015    Preoperative clearance 6/15/2015    Rash 2/11/2011    Rash of hands 10/10/2014    Screening for colon cancer 2/22/2016    Screening for glaucoma 2/22/2016    Sleep apnea     SOB (shortness of breath)     hospitalized 5/2012. angina, SOB    Thromboembolus ([facility] 2011    LEFT LOWER LEG AND PE    Tinea pedis, recurrent 7/20/2015    Vaginal bleeding, abnormal 4/20/2017      Past Surgical History:   Procedure Laterality Date    COLONOSCOPY N/A 12/21/2016    COLONOSCOPY performed by Merlinda Ferretti, MD at [address] [place] [address]  12/21/2016         HX CATARACT REMOVAL Bilateral 2015    [address]    HX DILATION AND CURETTAGE  07/29/2016    HX DILATION AND CURETTAGE  2016    HX GYN  1960    etopic    HX HEART CATHETERIZATION  2001    NEGATIVE PER PATIENT    HX HYSTERECTOMY  2016    HX KNEE ARTHROSCOPY Left 1998    HX ORTHOPAEDIC Right 1960'S    BUNIONECTOMY    HX OTHER SURGICAL      BIOPSY OF VEIN IN FOREHEAD    HX TUBAL LIGATION  1963    NH COLSC FLX W/RMVL OF TUMOR POLYP LESION SNARE TQ  12/12/2011           Family History   Problem Relation Age of Onset    Other Mother      ANEURYSM    Obesity Mother     Heart Disease Father     Other Father      VASCULAR DISEASE    Hypertension Sister     Heart Disease Brother     Heart Attack Brother     Kidney Disease Sister     Seizures Brother     Heart Attack Brother     Anesth Problems Neg Hx     Alcohol abuse Neg Hx       History reviewed, no pertinent family history.   Social History   Substance Use Topics    Smoking status: Former Smoker     Packs/day: 0.25     Years: 15.00     Quit date: 8/23/1996    Smokeless tobacco: Never Used    Alcohol use No     Allergies   Allergen Reactions    Latex Rash and Itching    Advair Diskus [Fluticasone-Salmeterol] Other (comments)     \"breathing problems\"    Bactrim [Sulfamethoprim] Shortness of Breath, Itching and Swelling     Swelling of tongue and throat    Eliquis [Apixaban] Other (comments)    Iodinated Contrast- Oral And Iv Dye Hives    Ivp [Fd And C Blue No.1] Hives and Shortness of Breath    Lovenox [Enoxaparin] Other (comments)     DIC    Nsaids (Non-Steroidal Anti-Inflammatory Drug) Other (comments)     Heartburn    Sulfa (Sulfonamide Antibiotics) Shortness of Breath    Xarelto [Rivaroxaban] Myalgia     Paresthesia, spasmic muscle      Current Facility-Administered Medications   Medication Dose Route Frequency    enoxaparin (LOVENOX) injection 120 mg  120 mg SubCUTAneous BID    morphine CR (MS CONTIN) tablet 60 mg  60 mg Oral Q12H    senna-docusate (PERICOLACE) 8.6-50 mg per tablet 2 Tab  2 Tab Oral BID    polyethylene glycol (MIRALAX) packet 17 g  17 g Oral DAILY    oxyCODONE IR (ROXICODONE) tablet 20 mg  20 mg Oral Q4H PRN    sodium chloride (NS) flush 5-10 mL  5-10 mL IntraVENous PRN    sodium chloride (NS) flush 5-10 mL  5-10 mL IntraVENous Q8H    sodium chloride (NS) flush 5-10 mL  5-10 mL IntraVENous PRN    acetaminophen (TYLENOL) tablet 650 mg  650 mg Oral Q6H PRN    naloxone (NARCAN) injection 0.4 mg  0.4 mg IntraVENous PRN    ondansetron (ZOFRAN) injection 4 mg  4 mg IntraVENous Q4H PRN    bisacodyl (DULCOLAX) suppository 10 mg  10 mg Rectal DAILY PRN    cyanocobalamin (VITAMIN B12) tablet 1,000 mcg  1,000 mcg Oral DAILY    cholecalciferol (VITAMIN D3) tablet 1,000 Units  1,000 Units Oral DAILY    naloxegol (MOVANTIK) tablet 25 mg  25 mg Oral ACB    metoprolol succinate (TOPROL-XL) XL tablet 100 mg  100 mg Oral DAILY    0.9% sodium chloride infusion  75 mL/hr IntraVENous CONTINUOUS    HYDROmorphone (DILAUDID) injection 1-2 mg  1-2 mg IntraVENous Q3H PRN          LAB AND IMAGING FINDINGS:     Lab Results   Component Value Date/Time    WBC 6.3 12/13/2017 08:40 AM    HGB 7.5 12/13/2017 08:40 AM    PLATELET 662 81/27/8657 08:40 AM     Lab Results   Component Value Date/Time    Sodium 137 12/13/2017 10:08 AM    Potassium 3.4 12/13/2017 10:08 AM    Chloride 102 12/13/2017 10:08 AM    CO2 27 12/13/2017 10:08 AM    BUN 23 12/13/2017 10:08 AM    Creatinine 1.10 12/13/2017 10:08 AM    Calcium 9.9 12/13/2017 10:08 AM    Magnesium 2.1 09/01/2016 05:04 PM      Lab Results   Component Value Date/Time    AST (SGOT) 15 12/10/2017 04:02 AM    Alk. phosphatase 64 12/10/2017 04:02 AM    Protein, total 9.0 12/10/2017 04:02 AM    Albumin 2.3 12/10/2017 04:02 AM    Globulin 6.7 12/10/2017 04:02 AM     Lab Results   Component Value Date/Time    INR 1.6 12/09/2017 05:35 PM    Prothrombin time 16.4 12/09/2017 05:35 PM    aPTT 31.7 12/12/2017 09:54 PM      Lab Results   Component Value Date/Time    Iron 75 11/13/2017 02:48 PM    TIBC 329 11/13/2017 02:48 PM    Iron % saturation 23 11/13/2017 02:48 PM    Ferritin 264 11/13/2017 02:48 PM      No results found for: PH, PCO2, PO2  No components found for: [place]   Lab Results   Component Value Date/Time    CK 62 06/26/2014 01:35 PM    CK - MB 0.5 03/08/2014 04:20 PM                Total time: 35  Counseling / coordination time, spent as noted above: 25  > 50% counseling / coordination?:     Prolonged service was provided for  []30 min   []75 min in face to face time in the presence of the patient, spent as noted above. Time Start:   Time End:   Note: this can only be billed with 06603 (initial) or 84498 (follow up). If multiple start / stop times, list each separately.

## 2017-12-13 NOTE — PROGRESS NOTES
Notified Dr. Benjamin Felder of patient and patient's family's concern with continuing lovenox and other pharmacological anticoagulation given the patient's history of bleeding with these medications and positive FOBT this morning. He is okay with holding lovenox for now. Hematology to see the patient this afternoon. Discussed this with family who is in agreeance.

## 2017-12-13 NOTE — PROGRESS NOTES
General Daily Progress Note    Admit Date: 12/9/2017  Hospital day 12/13/2017    Subjective:ATSP by RN after stool with blood in stool and separate from it  O- hgb 7.8 to 7.5. Georgette Leo Colonoscopy a year ago showed diverticulosis. She has bled before on anticoagulants. Y  N  [] [x]  Abd Pain:    [] [x]  Nausea:  [] [x] Vomiting:  [] []  Diarrhea:  [] []  Constipation:  [] []  Melena:  [x] []  Hematochezia:  [] []  Tolerating Diet:    Current Facility-Administered Medications   Medication Dose Route Frequency    morphine CR (MS CONTIN) tablet 60 mg  60 mg Oral Q8H    senna-docusate (PERICOLACE) 8.6-50 mg per tablet 2 Tab  2 Tab Oral BID    polyethylene glycol (MIRALAX) packet 17 g  17 g Oral DAILY    oxyCODONE IR (ROXICODONE) tablet 20 mg  20 mg Oral Q4H PRN    sodium chloride (NS) flush 5-10 mL  5-10 mL IntraVENous PRN    sodium chloride (NS) flush 5-10 mL  5-10 mL IntraVENous Q8H    sodium chloride (NS) flush 5-10 mL  5-10 mL IntraVENous PRN    acetaminophen (TYLENOL) tablet 650 mg  650 mg Oral Q6H PRN    naloxone (NARCAN) injection 0.4 mg  0.4 mg IntraVENous PRN    ondansetron (ZOFRAN) injection 4 mg  4 mg IntraVENous Q4H PRN    bisacodyl (DULCOLAX) suppository 10 mg  10 mg Rectal DAILY PRN    cyanocobalamin (VITAMIN B12) tablet 1,000 mcg  1,000 mcg Oral DAILY    cholecalciferol (VITAMIN D3) tablet 1,000 Units  1,000 Units Oral DAILY    naloxegol (MOVANTIK) tablet 25 mg  25 mg Oral ACB    metoprolol succinate (TOPROL-XL) XL tablet 100 mg  100 mg Oral DAILY    0.9% sodium chloride infusion  75 mL/hr IntraVENous CONTINUOUS    HYDROmorphone (DILAUDID) injection 1-2 mg  1-2 mg IntraVENous Q3H PRN        Review of Systems  Pertinent items are noted in HPI.     Objective:     Patient Vitals for the past 8 hrs:   BP Temp Pulse Resp SpO2   12/13/17 0808 149/67 100.2 °F (37.9 °C) (!) 106 16 95 %   12/13/17 0540 117/64 98.9 °F (37.2 °C) 95 18 98 %        12/11 1901 - 12/13 0700  In: -   Out: 406 [Urine:550]    Physical Exam:   Visit Vitals    /67 (BP 1 Location: Left arm, BP Patient Position: At rest)    Pulse (!) 106    Temp 100.2 °F (37.9 °C)    Resp 16    Ht 5' 6\" (1.676 m)    Wt 133.8 kg (295 lb)    SpO2 95%    BMI 47.61 kg/m2     General appearance: alert, cooperative, no distress, appears stated age  Abdomen: soft, non-tender.  Bowel sounds normal. No masses,  no organomegaly      Data Review   Recent Results (from the past 24 hour(s))   URINALYSIS W/ REFLEX CULTURE    Collection Time: 12/12/17  8:40 PM   Result Value Ref Range    Color DARK YELLOW      Appearance CLOUDY (A) CLEAR      Specific gravity 1.023 1.003 - 1.030      pH (UA) 6.0 5.0 - 8.0      Protein 30 (A) NEG mg/dL    Glucose NEGATIVE  NEG mg/dL    Ketone NEGATIVE  NEG mg/dL    Blood NEGATIVE  NEG      Urobilinogen 1.0 0.2 - 1.0 EU/dL    Nitrites NEGATIVE  NEG      Leukocyte Esterase NEGATIVE  NEG      WBC 0-4 0 - 4 /hpf    RBC 0-5 0 - 5 /hpf    Epithelial cells FEW FEW /lpf    Bacteria NEGATIVE  NEG /hpf    UA:UC IF INDICATED CULTURE NOT INDICATED BY UA RESULT CNI      Amorphous Crystals 2+ (A) NEG   BILIRUBIN, CONFIRM    Collection Time: 12/12/17  8:40 PM   Result Value Ref Range    Bilirubin UA, confirm NEGATIVE  NEG     PTT    Collection Time: 12/12/17  9:54 PM   Result Value Ref Range    aPTT 31.7 22.1 - 32.5 sec    aPTT, therapeutic range     58.0 - 77.0 SECS   CULTURE, BLOOD, PAIRED    Collection Time: 12/12/17  9:54 PM   Result Value Ref Range    Special Requests: NO SPECIAL REQUESTS      Culture result: NO GROWTH AFTER 8 HOURS     CBC WITH AUTOMATED DIFF    Collection Time: 12/13/17  8:40 AM   Result Value Ref Range    WBC 6.3 3.6 - 11.0 K/uL    RBC 2.57 (L) 3.80 - 5.20 M/uL    HGB 7.5 (L) 11.5 - 16.0 g/dL    HCT 23.5 (L) 35.0 - 47.0 %    MCV 91.4 80.0 - 99.0 FL    MCH 29.2 26.0 - 34.0 PG    MCHC 31.9 30.0 - 36.5 g/dL    RDW 15.6 (H) 11.5 - 14.5 %    PLATELET 060 473 - 649 K/uL    NEUTROPHILS 78 (H) 32 - 75 % LYMPHOCYTES 12 12 - 49 %    MONOCYTES 9 5 - 13 %    EOSINOPHILS 1 0 - 7 %    BASOPHILS 0 0 - 1 %    ABS. NEUTROPHILS 4.9 1.8 - 8.0 K/UL    ABS. LYMPHOCYTES 0.7 (L) 0.8 - 3.5 K/UL    ABS. MONOCYTES 0.6 0.0 - 1.0 K/UL    ABS. EOSINOPHILS 0.1 0.0 - 0.4 K/UL    ABS. BASOPHILS 0.0 0.0 - 0.1 K/UL         Assessment:     Active Problems:    Right leg DVT ([address] (12/9/2017)        Plan:     If bleed persists then consider stopping heparin and putting in IVC filter.

## 2017-12-13 NOTE — PROGRESS NOTES
Name of procedure: IVC Filter    Complications, if any, r/t procedure: none    VS : Stable     Post Procedure Care Needed/order sets in connectcare: see connect care orders    Pt tolerated procedure well. VSS. No C/O pain. Dressing to site D&I. No bleeding or hematoma noted to site. Report called to [facility]. Floor RN to assume care of pt. Pt taken to room by transport. NAD noted at time of transfer.

## 2017-12-13 NOTE — PROGRESS NOTES
Physical Therapy  Attempting to see patient for PT this pm. Patient reporting that she is in too much pain to attempt any mobility at this time. Patient refusing therapy today and requesting therapy return tomorrow am.  Therapy session aborted. Will follow back tomorrow.   Thank you,  Brenda Francois, PT

## 2017-12-13 NOTE — PROGRESS NOTES
Hospitalist Progress Note    NAME: Tana Church   :  1940   MRN:  [id number]       Assessment / Plan:  Right leg swelling and pain from Right thigh DVT POA . 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 Patient had mild confusion yesterday ? May need to decrease dose of narcotics   Continue  Lovenox , heparin stopped for ? allergy  Hematology consult inputs appreciated. Hemoglobin dropping and rectal bleeding noted today  Will hold blood the thinner and consulted IR for possible IVC filter. GI on board. Acute kidney injury POA   Improving , creatinine recently was 1.1      FEVER/with SIRS POA   T-102.7F, likely from non infectious source , DVT   Will monitor off  antibiotics for now. repeat UA and blood cultures negative.     Recent scant rectal bleeding POA  No active gross bleeding since admission until today ,started to bleed on Lovenox. GI help appreciated      Advanced Uterine cancer POA now with LN mets  Scheduled to start chemo in 2018    MGUS  Following with Dr. Noel Fitzpatrick  Patient refused BM biopsy    Essential HTN POA  Continue toprol Xl  PRN hydralazine     Chronic pain syndrome POA  Will follow up palliative care recommendations       Body mass index is 47.61 kg/(m^2). Morbidly obese ,advised on weight loss and diet. Code status: Full  Prophylaxis: Lovenox treatment dose. Recommended Disposition: TBD     Subjective:     Chief Complaint / Reason for Physician Visit  No fever, no headach , the right leg pain still there. .  Discussed with RN events overnight.      Review of Systems:  Symptom Y/N Comments  Symptom Y/N Comments   Fever/Chills n   Chest Pain n Poor Appetite y   Edema y    Cough n   Abdominal Pain n    Sputum n   Joint Pain n    SOB/SRIVASTAVA n   Pruritis/Rash n    Nausea/vomit n   Tolerating PT/OT y    Diarrhea n   Tolerating Diet y    Constipation n   Other       Could NOT obtain due to:      Objective:     VITALS:   Last 24hrs VS reviewed since prior progress note. Most recent are:  Patient Vitals for the past 24 hrs:   Temp Pulse Resp BP SpO2   12/13/17 0808 100.2 °F (37.9 °C) (!) 106 16 149/67 95 %   12/13/17 0540 98.9 °F (37.2 °C) 95 18 117/64 98 %   12/12/17 2317 99.2 °F (37.3 °C) 95 18 108/53 95 %   12/12/17 1953 (!) 101 °F (38.3 °C) 99 18 131/67 95 %   12/12/17 1741 (!) 100.8 °F (38.2 °C) (!) 105 20 136/56 95 %   12/12/17 1527 99.9 °F (37.7 °C) (!) 102 20 123/75 94 %   12/12/17 1132 100.1 °F (37.8 °C) (!) 105 20 135/69 90 %   12/12/17 1001 (!) 100.7 °F (38.2 °C) (!) 109 20 135/60 95 %       Intake/Output Summary (Last 24 hours) at 12/13/17 0853  Last data filed at 12/12/17 1753   Gross per 24 hour   Intake                0 ml   Output              350 ml   Net             -350 ml        PHYSICAL EXAM:  General: WD, WN. Alert, cooperative, no acute distress   , obese   EENT:  EOMI. Anicteric sclerae. MMM  Resp:  CTA bilaterally, no wheezing or rales. No accessory muscle use  CV:  Regular  rhythm,  No edema  GI:  Soft, Non distended, Non tender.  +Bowel sounds  Neurologic:  Alert and oriented X 3, normal speech,   Psych:   Good insight. Not anxious nor agitated  Skin:  No rashes. No jaundice , chronic venous skin changes in lower ext but no edema Right leg/tigh bigger than the left , feels warm but no skin chagres ,Swelling actually is getting worse .     Reviewed most current lab test results and cultures  YES  Reviewed most current radiology test results   YES  Review and summation of old records today    NO  Reviewed patient's current orders and MAR    YES  PMH/SH reviewed - no change compared to H&P  ________________________________________________________________________  Care Plan discussed with:    Comments   Patient y    Family  y Son at bed side    RN y    Care Manager n    Consultant  n                      Multidiciplinary team rounds were held today with , nursing, pharmacist and clinical coordinator. Patient's plan of care was discussed; medications were reviewed and discharge planning was addressed. ________________________________________________________________________  Total NON critical care TIME:  50  Minutes    Total CRITICAL CARE TIME Spent:   Minutes non procedure based      Comments   >50% of visit spent in counseling and coordination of care y On weight loss    ________________________________________________________________________  Sharonda Garcia MD     Procedures: see electronic medical records for all procedures/Xrays and details which were not copied into this note but were reviewed prior to creation of Plan. LABS:  I reviewed today's most current labs and imaging studies. Pertinent labs include:  Recent Labs      12/12/17   0547  12/11/17   0430   WBC  6.4  8.1   HGB  7.8*  8.1*   HCT  24.8*  25.4*   PLT  180  151     Recent Labs      12/11/17   0210   NA  134*   K  4.1   CL  100   CO2  26   GLU  103*   BUN  26*   CREA  1.13*   CA  9.1       Signed:  Sharonda Garcia MD

## 2017-12-13 NOTE — PROGRESS NOTES
Blood noted in stool. Patient has been increasingly confused and slow with responses since last night. Dr. Nathan Booker notified.

## 2017-12-13 NOTE — PROGRESS NOTES
Patient is newly confused with disorientation to time and place and is slow with responses. On call physician paged (Dr. Courtney Alejandro). Notified her of concern for previous reaction to lovenox (SOB, DIC per patient's son). Last void was los, cloudy, and malodorous. Fever 101.

## 2017-12-13 NOTE — PROGRESS NOTES
Attempted to see pt for therapy services. Pt reports that she is still in significant pain despite pain meds given ~45 minutes ago. Pt is declining to participate due to pain and was requesting for therapy to attempt in the AM tomorrow. Educated pt on guided imagery and deep breathing to reduce pain and anxiety. Pt verbalized understanding.

## 2017-12-13 NOTE — PROGRESS NOTES
Problem: Falls - Risk of  Goal: *Absence of Falls  Document Estela Fall Risk and appropriate interventions in the flowsheet. Fall Risk Interventions:  Mobility Interventions: Patient to call before getting OOB    Mentation Interventions: Reorient patient    Medication Interventions: Patient to call before getting OOB    Elimination Interventions: Call light in reach     Patient instructed to call for help, call bell in reach, bed in low position, room clutter free.

## 2017-12-13 NOTE — PROGRESS NOTES
Progress Note    Subjective:     Hannah Taveras is a 68 y.o. woman with a thrombus in the right common femoral vein. She has a history of recurrent endometrial cancer and is being treated by Dr. Maria R Martinez. She has a history of DVT/PE in 2010 and was initially on coumadin and then Eliquis. Eliquis was stopped in October 2017 d/t recurrent GI bleed. Ms. Ad Marin presented to the ED on Saturday with complaints of acute onset right leg pain for 2 days. Dopplers showed an acute right DVT and she was admitted for further management. Ms. Ad Marin continues to have pain and swelling in her right thigh. She had blood in her stool this morning and GI was re-consulted. Given recurrence of GI bleed, hold anticoagulants at this time and move forward with IVC filter.       Past Medical History:   Diagnosis Date    Acute kidney failure ([address] 11/19/2015    Acute sinusitis 2/11/2011    Adverse effect of anesthesia     SLOW WAKING PAST ANESTHESIA    Arthritis     RA    At risk for side effect of medication 9/30/2016    Atrial fibrillation ([address] 10/19/2011    Autoimmune disease ([address]     FIBROMYLGIA    Cancer ([address] 2016    ENDOMETRIAL     Cholelithiasis 11/19/2015    Chronic pain     Claudication of both lower extremities ([address] 8/25/2015    Diabetes mellitus type 2, controlled ([address] 1/19/2016    Diabetes mellitus, type II ([address] 10/10/2014    Fall against object 10/10/2014    Fatty liver 10/20/2015    Hypertension     Ill-defined condition     SPINAL STENOSIS    Left shoulder pain 10/10/2014    Leiomyoma of body of uterus 4/7/2016    Morbid obesity ([address]     Myalgia 9/30/2016    Nausea & vomiting     Personal history of radiation therapy 4/20/2017    Pneumonia     Preop examination 6/15/2015    Preoperative clearance 6/15/2015    Rash 2/11/2011    Rash of hands 10/10/2014    Screening for colon cancer 2/22/2016    Screening for glaucoma 2/22/2016    Sleep apnea     SOB (shortness of breath) hospitalized 5/2012.  angina, SOB    Thromboembolus ([address] 2011    LEFT LOWER LEG AND PE    Tinea pedis, recurrent 7/20/2015    Vaginal bleeding, abnormal 4/20/2017     Past Surgical History:   Procedure Laterality Date    COLONOSCOPY N/A 12/21/2016    COLONOSCOPY performed by Mary Weaver MD at [facility]  12/21/2016         HX CATARACT REMOVAL Bilateral 2015    [address]    HX DILATION AND CURETTAGE  07/29/2016    HX DILATION AND CURETTAGE  2016    HX GYN  1960    etopic    HX HEART CATHETERIZATION  2001    NEGATIVE PER PATIENT    HX HYSTERECTOMY  2016    HX KNEE ARTHROSCOPY Left 1998    HX ORTHOPAEDIC Right 1960'S    BUNIONECTOMY    HX OTHER SURGICAL      BIOPSY OF VEIN IN FOREHEAD    HX TUBAL LIGATION  1963    [place] COLSC FLX W/RMVL OF TUMOR POLYP LESION SNARE TQ  12/12/2011           Family History   Problem Relation Age of Onset    Other Mother      ANEURYSM    Obesity Mother     Heart Disease Father     Other Father      VASCULAR DISEASE    Hypertension Sister     Heart Disease Brother     Heart Attack Brother     Kidney Disease Sister     Seizures Brother     Heart Attack Brother     Anesth Problems Neg Hx     Alcohol abuse Neg Hx      Social History   Substance Use Topics    Smoking status: Former Smoker     Packs/day: 0.25     Years: 15.00     Quit date: 8/23/1996    Smokeless tobacco: Never Used    Alcohol use No      Current Facility-Administered Medications   Medication Dose Route Frequency Provider Last Rate Last Dose    morphine CR (MS CONTIN) tablet 60 mg  60 mg Oral Q12H Annette Mccabe MD        HYDROmorphone (DILAUDID) injection 1 mg  1 mg IntraVENous Q3H PRN Jessica Castillo MD        senna-docusate (PERICOLACE) 8.6-50 mg per tablet 2 Tab  2 Tab Oral BID Jessica Castillo MD   2 Tab at 12/13/17 0841    polyethylene glycol (MIRALAX) packet 17 g  17 g Oral DAILY Jessica Castillo MD   17 g at 12/13/17 7152    oxyCODONE IR (ROXICODONE) tablet 20 mg  20 mg Oral Q4H PRN Lorraine Bonilla MD   20 mg at 12/13/17 1413    sodium chloride (NS) flush 5-10 mL  5-10 mL IntraVENous PRN Sneha Lam MD        sodium chloride (NS) flush 5-10 mL  5-10 mL IntraVENous Q8H Corey Wu MD   10 mL at 12/13/17 1400    sodium chloride (NS) flush 5-10 mL  5-10 mL IntraVENous PRN Corey Wu MD        acetaminophen (TYLENOL) tablet 650 mg  650 mg Oral Q6H PRN Corey Wu MD   650 mg at 12/12/17 2005    naloxone [facility]) injection 0.4 mg  0.4 mg IntraVENous PRN Corey Wu MD        ondansetron [facility]) injection 4 mg  4 mg IntraVENous Q4H PRN Corey Wu MD        bisacodyl (DULCOLAX) suppository 10 mg  10 mg Rectal DAILY PRN Corey Wu MD        cyanocobalamin (VITAMIN B12) tablet 1,000 mcg  1,000 mcg Oral DAILY Corey Wu MD   1,000 mcg at 12/13/17 0670    cholecalciferol (VITAMIN D3) tablet 1,000 Units  1,000 Units Oral DAILY Corey Wu MD   1,000 Units at 12/13/17 8483    naloxegol (MOVANTIK) tablet 25 mg  25 mg Oral ACB Corey Wu MD   25 mg at 12/13/17 0730    metoprolol succinate (TOPROL-XL) XL tablet 100 mg  100 mg Oral DAILY Corey Wu MD   100 mg at 12/13/17 8904    0.9% sodium chloride infusion  75 mL/hr IntraVENous CONTINUOUS Corey Wu MD 75 mL/hr at 12/12/17 1911 75 mL/hr at 12/12/17 1911        Allergies   Allergen Reactions    Latex Rash and Itching    Advair Diskus [Fluticasone-Salmeterol] Other (comments)     \"breathing problems\"    Bactrim [Sulfamethoprim] Shortness of Breath, Itching and Swelling     Swelling of tongue and throat    Eliquis [Apixaban] Other (comments)    Iodinated Contrast- Oral And Iv Dye Hives    Ivp [Fd And C Blue No.1] Hives and Shortness of Breath    Lovenox [Enoxaparin] Other (comments)     DIC    Nsaids (Non-Steroidal Anti-Inflammatory Drug) Other (comments) Heartburn    Sulfa (Sulfonamide Antibiotics) Shortness of Breath    Xarelto [Rivaroxaban] Myalgia     Paresthesia, spasmic muscle        Review of Systems:  A comprehensive review of systems was negative except for that written in the History of Present Illness. Objective:     Visit Vitals    /71 (BP 1 Location: Left arm, BP Patient Position: Sitting)    Pulse (!) 104    Temp 99.2 °F (37.3 °C)    Resp 24    Ht 5' 6\" (1.676 m)    Wt 295 lb (133.8 kg)    SpO2 99%    BMI 47.61 kg/m2       Physical Exam:   General appearance: alert, cooperative, no distress, appears stated age  Lungs: clear to auscultation bilaterally  Heart: regular rate and rhythm, S1, S2 normal, no murmur, click, rub or gallop  Abdomen: soft, non-tender.  Bowel sounds normal. No masses,  no organomegaly  Extremities: right thigh warm, swollen, and tender to touch  Neurologic: Grossly normal      Lab/Data Review:  Recent Results (from the past 24 hour(s))   URINALYSIS W/ REFLEX CULTURE    Collection Time: 12/12/17  8:40 PM   Result Value Ref Range    Color DARK YELLOW      Appearance CLOUDY (A) CLEAR      Specific gravity 1.023 1.003 - 1.030      pH (UA) 6.0 5.0 - 8.0      Protein 30 (A) NEG mg/dL    Glucose NEGATIVE  NEG mg/dL    Ketone NEGATIVE  NEG mg/dL    Blood NEGATIVE  NEG      Urobilinogen 1.0 0.2 - 1.0 EU/dL    Nitrites NEGATIVE  NEG      Leukocyte Esterase NEGATIVE  NEG      WBC 0-4 0 - 4 /hpf    RBC 0-5 0 - 5 /hpf    Epithelial cells FEW FEW /lpf    Bacteria NEGATIVE  NEG /hpf    UA:UC IF INDICATED CULTURE NOT INDICATED BY UA RESULT CNI      Amorphous Crystals 2+ (A) NEG   BILIRUBIN, CONFIRM    Collection Time: 12/12/17  8:40 PM   Result Value Ref Range    Bilirubin UA, confirm NEGATIVE  NEG     PTT    Collection Time: 12/12/17  9:54 PM   Result Value Ref Range    aPTT 31.7 22.1 - 32.5 sec    aPTT, therapeutic range     58.0 - 77.0 SECS   CULTURE, BLOOD, PAIRED    Collection Time: 12/12/17  9:54 PM   Result Value Ref Range    Special Requests: NO SPECIAL REQUESTS      Culture result: NO GROWTH AFTER 8 HOURS     CBC WITH AUTOMATED DIFF    Collection Time: 12/13/17  8:40 AM   Result Value Ref Range    WBC 6.3 3.6 - 11.0 K/uL    RBC 2.57 (L) 3.80 - 5.20 M/uL    HGB 7.5 (L) 11.5 - 16.0 g/dL    HCT 23.5 (L) 35.0 - 47.0 %    MCV 91.4 80.0 - 99.0 FL    MCH 29.2 26.0 - 34.0 PG    MCHC 31.9 30.0 - 36.5 g/dL    RDW 15.6 (H) 11.5 - 14.5 %    PLATELET 723 129 - 346 K/uL    NEUTROPHILS 78 (H) 32 - 75 %    LYMPHOCYTES 12 12 - 49 %    MONOCYTES 9 5 - 13 %    EOSINOPHILS 1 0 - 7 %    BASOPHILS 0 0 - 1 %    ABS. NEUTROPHILS 4.9 1.8 - 8.0 K/UL    ABS. LYMPHOCYTES 0.7 (L) 0.8 - 3.5 K/UL    ABS. MONOCYTES 0.6 0.0 - 1.0 K/UL    ABS. EOSINOPHILS 0.1 0.0 - 0.4 K/UL    ABS. BASOPHILS 0.0 0.0 - 0.1 K/UL   METABOLIC PANEL, BASIC    Collection Time: 12/13/17 10:08 AM   Result Value Ref Range    Sodium 137 136 - 145 mmol/L    Potassium 3.4 (L) 3.5 - 5.1 mmol/L    Chloride 102 97 - 108 mmol/L    CO2 27 21 - 32 mmol/L    Anion gap 8 5 - 15 mmol/L    Glucose 106 (H) 65 - 100 mg/dL    BUN 23 (H) 6 - 20 MG/DL    Creatinine 1.10 (H) 0.55 - 1.02 MG/DL    BUN/Creatinine ratio 21 (H) 12 - 20      GFR est AA 58 (L) >60 ml/min/1.73m2    GFR est non-AA 48 (L) >60 ml/min/1.73m2    Calcium 9.9 8.5 - 10.1 MG/DL   OCCULT BLOOD, STOOL    Collection Time: 12/13/17 10:37 AM   Result Value Ref Range    Occult blood, stool POSITIVE (A) NEG           Assessment:     1. Acute right LE DVT    > Doppler (12/9/2017): acute DVT of the right common femoral vein extending in the proximal superficial femoral vein  > h/o DVT/PE 2010, on long term anticoagulation with coumadin then eliquis  > Eliquis discontinued in October 2017 d/t recurrent rectal bleed    > Hold anticoagulation given recurrence of GI bleed  > Recommend placement of IVC filter    2. MGUS    > Following with Dr. Owen Kimble  > Patient refused BM biopsy    3.  Endometrial cancer    > Following with Dr. Warner Councilman  > Per patient, plan to start chemotherapy at [facility] in January      Plan:     > Hold anticoagulation in setting of GI bleed  > Recommend placement of IVC filter  > Pain management per Palliative Medicine      Signed By: Blanca Ferreira NP     December 13, 2017              Attending Physician Note:     I have reviewed the history, physical examination, assessment and the plan of the care of the patient. I saw the patient with Guanako Laura and I participated in the examination and critical decision making. I agree with the note as stated above. Ms. Natalya Sprague is a lady with acute DVT. She is also experiencing GI bleeding. She will undergo an IVC filter.          Signed by: Cami Auguste MD                     December 13, 2017

## 2017-12-13 NOTE — ROUTINE PROCESS
General Surgery End of Shift Nursing Note    Bedside shift change report given to [address] (oncoming nurse) by Starlett Oppenheim (offgoing nurse). Report included the following information SBAR, Kardex, ED Summary, Intake/Output and Recent Results. Shift worked:   7p-7a   Summary of shift:  Tylenol given for fever of 101.0 99.2 on reassessment  Blood culture sent. Result pending   Issues for physician to address: none     Number times ambulated in hallway past shift: 0    Number of times OOB to chair past shift: 0    Pain Management:  Current medication: dilaudid/morphine/rosa  Patient states pain is manageable on current pain medication: NO    GI:    Current diet:  DIET CARDIAC Regular    Tolerating current diet: YES  Passing flatus: YES  Last Bowel Movement: several days ago   Appearance:     Respiratory:    Incentive Spirometer at bedside: NO  Patient instructed on use: NO    Patient Safety:    Falls Score: 4  Bed Alarm On? Yes      Gabby Morelos RN

## 2017-12-14 LAB
ANION GAP SERPL CALC-SCNC: 7 MMOL/L (ref 5–15)
BACTERIA SPEC CULT: NORMAL
BASOPHILS # BLD: 0 K/UL (ref 0–0.1)
BASOPHILS NFR BLD: 0 % (ref 0–1)
BUN SERPL-MCNC: 20 MG/DL (ref 6–20)
BUN/CREAT SERPL: 21 (ref 12–20)
CALCIUM SERPL-MCNC: 9.6 MG/DL (ref 8.5–10.1)
CHLORIDE SERPL-SCNC: 104 MMOL/L (ref 97–108)
CO2 SERPL-SCNC: 26 MMOL/L (ref 21–32)
CREAT SERPL-MCNC: 0.96 MG/DL (ref 0.55–1.02)
EOSINOPHIL # BLD: 0.1 K/UL (ref 0–0.4)
EOSINOPHIL NFR BLD: 1 % (ref 0–7)
ERYTHROCYTE [DISTWIDTH] IN BLOOD BY AUTOMATED COUNT: 15.4 % (ref 11.5–14.5)
GLUCOSE SERPL-MCNC: 97 MG/DL (ref 65–100)
HCT VFR BLD AUTO: 22.6 % (ref 35–47)
HGB BLD-MCNC: 7.4 G/DL (ref 11.5–16)
LYMPHOCYTES # BLD: 0.7 K/UL (ref 0.8–3.5)
LYMPHOCYTES NFR BLD: 12 % (ref 12–49)
MCH RBC QN AUTO: 29.8 PG (ref 26–34)
MCHC RBC AUTO-ENTMCNC: 32.7 G/DL (ref 30–36.5)
MCV RBC AUTO: 91.1 FL (ref 80–99)
MONOCYTES # BLD: 0.6 K/UL (ref 0–1)
MONOCYTES NFR BLD: 11 % (ref 5–13)
NEUTS SEG # BLD: 4.2 K/UL (ref 1.8–8)
NEUTS SEG NFR BLD: 76 % (ref 32–75)
PLATELET # BLD AUTO: 193 K/UL (ref 150–400)
POTASSIUM SERPL-SCNC: 3.5 MMOL/L (ref 3.5–5.1)
RBC # BLD AUTO: 2.48 M/UL (ref 3.8–5.2)
RBC MORPH BLD: ABNORMAL
SERVICE CMNT-IMP: NORMAL
SODIUM SERPL-SCNC: 137 MMOL/L (ref 136–145)
WBC # BLD AUTO: 5.6 K/UL (ref 3.6–11)

## 2017-12-14 PROCEDURE — 36415 COLL VENOUS BLD VENIPUNCTURE: CPT | Performed by: INTERNAL MEDICINE

## 2017-12-14 PROCEDURE — 74011250636 HC RX REV CODE- 250/636: Performed by: INTERNAL MEDICINE

## 2017-12-14 PROCEDURE — 85025 COMPLETE CBC W/AUTO DIFF WBC: CPT | Performed by: INTERNAL MEDICINE

## 2017-12-14 PROCEDURE — 74011250637 HC RX REV CODE- 250/637: Performed by: INTERNAL MEDICINE

## 2017-12-14 PROCEDURE — 97530 THERAPEUTIC ACTIVITIES: CPT | Performed by: OCCUPATIONAL THERAPIST

## 2017-12-14 PROCEDURE — 97530 THERAPEUTIC ACTIVITIES: CPT

## 2017-12-14 PROCEDURE — 65270000029 HC RM PRIVATE

## 2017-12-14 PROCEDURE — 80048 BASIC METABOLIC PNL TOTAL CA: CPT | Performed by: INTERNAL MEDICINE

## 2017-12-14 PROCEDURE — 74011250637 HC RX REV CODE- 250/637: Performed by: NURSE PRACTITIONER

## 2017-12-14 RX ORDER — ACETAMINOPHEN 325 MG/1
650 TABLET ORAL EVERY 6 HOURS
Status: DISCONTINUED | OUTPATIENT
Start: 2017-12-14 | End: 2017-12-16 | Stop reason: HOSPADM

## 2017-12-14 RX ORDER — FUROSEMIDE 40 MG/1
80 TABLET ORAL DAILY
Status: DISCONTINUED | OUTPATIENT
Start: 2017-12-14 | End: 2017-12-16 | Stop reason: HOSPADM

## 2017-12-14 RX ADMIN — MORPHINE SULFATE 60 MG: 30 TABLET, EXTENDED RELEASE ORAL at 09:24

## 2017-12-14 RX ADMIN — NALOXEGOL OXALATE 25 MG: 25 TABLET, FILM COATED ORAL at 06:50

## 2017-12-14 RX ADMIN — ACETAMINOPHEN 650 MG: 325 TABLET ORAL at 23:36

## 2017-12-14 RX ADMIN — METOPROLOL SUCCINATE 100 MG: 50 TABLET, EXTENDED RELEASE ORAL at 09:25

## 2017-12-14 RX ADMIN — DOCUSATE SODIUM AND SENNOSIDES 2 TABLET: 8.6; 5 TABLET, FILM COATED ORAL at 09:23

## 2017-12-14 RX ADMIN — Medication 10 ML: at 21:12

## 2017-12-14 RX ADMIN — OXYCODONE HYDROCHLORIDE 20 MG: 5 TABLET ORAL at 10:52

## 2017-12-14 RX ADMIN — HYDROMORPHONE HYDROCHLORIDE 1 MG: 2 INJECTION INTRAMUSCULAR; INTRAVENOUS; SUBCUTANEOUS at 13:11

## 2017-12-14 RX ADMIN — VITAMIN D, TAB 1000IU (100/BT) 1000 UNITS: 25 TAB at 09:25

## 2017-12-14 RX ADMIN — OXYCODONE HYDROCHLORIDE 20 MG: 5 TABLET ORAL at 06:50

## 2017-12-14 RX ADMIN — OXYCODONE HYDROCHLORIDE 20 MG: 5 TABLET ORAL at 15:48

## 2017-12-14 RX ADMIN — CYANOCOBALAMIN TAB 500 MCG 1000 MCG: 500 TAB at 09:24

## 2017-12-14 RX ADMIN — OXYCODONE HYDROCHLORIDE 20 MG: 5 TABLET ORAL at 19:40

## 2017-12-14 RX ADMIN — Medication 10 ML: at 03:36

## 2017-12-14 RX ADMIN — FUROSEMIDE 80 MG: 40 TABLET ORAL at 14:26

## 2017-12-14 RX ADMIN — SODIUM CHLORIDE 75 ML/HR: 900 INJECTION, SOLUTION INTRAVENOUS at 06:51

## 2017-12-14 RX ADMIN — Medication 10 ML: at 14:30

## 2017-12-14 RX ADMIN — MORPHINE SULFATE 60 MG: 30 TABLET, EXTENDED RELEASE ORAL at 21:11

## 2017-12-14 RX ADMIN — ACETAMINOPHEN 650 MG: 325 TABLET ORAL at 17:28

## 2017-12-14 RX ADMIN — POLYETHYLENE GLYCOL 3350 17 G: 17 POWDER, FOR SOLUTION ORAL at 09:23

## 2017-12-14 NOTE — PROGRESS NOTES
Hospitalist Progress Note    NAME: Raven Hector   :  1940   MRN:  [id number]       Assessment / Plan:  Right leg swelling and pain from Right thigh DVT POA : IVC filter placed on 17, leg edematous, resume diuretics, was on Eliquis for PE/DVT but now with rectal bleeding not candidate for this anymore. Rectal Bleeding: yesterday, but none today, HH dropping , if no bleeding and HH remains stable may be D/ C tomorrow, may need SNF, PT/OT needs to work with her. Acute kidney injury POA  so far resolved, resume diuretics and monitor. FEVER/with SIRS POA   May be 2ry to DVT, so far no fever since yesterday, monitor. Advanced Uterine cancer POA now with LN mets, Scheduled to start chemo in 2018  MGUS Following with Dr. Dejesus Sos  Patient refused BM biopsy  Essential HTN POA  Continue toprol Xl  PRN hydralazine  Chronic pain syndrome POA Will follow up palliative care recommendations   Morbidly obese ,BMI 47 advised on weight loss and diet. Code status: Full  Prophylaxis: Lovenox treatment dose. Recommended Disposition: TBD     D/c plan likely for tomorrow if stable, may need SNF     Subjective:     Chief Complaint / Reason for Physician Visit  \"My leg hurts\". Discussed with RN events overnight. Review of Systems:  Symptom Y/N Comments  Symptom Y/N Comments   Fever/Chills n   Chest Pain n    Poor Appetite y   Edema y    Cough n   Abdominal Pain n    Sputum n   Joint Pain n    SOB/SRIVASTAVA n   Pruritis/Rash n    Nausea/vomit n   Tolerating PT/OT y    Diarrhea n   Tolerating Diet y    Constipation n   Other       Could NOT obtain due to:      Objective:     VITALS:   Last 24hrs VS reviewed since prior progress note.  Most recent are:  Patient Vitals for the past 24 hrs:   Temp Pulse Resp BP SpO2   17 0830 99.1 °F (37.3 °C) 97 20 155/57 99 %   17 0337 99.8 °F (37.7 °C) - 20 121/58 93 %   17 2301 99.8 °F (37.7 °C) (!) 113 18 153/84 94 %   17 1705 - (!) 110 24 140/65 99 % 12/13/17 1655 - (!) 122 24 144/65 99 %   12/13/17 1650 - (!) 124 24 148/62 99 %   12/13/17 1641 99 °F (37.2 °C) (!) 126 24 147/59 99 %       Intake/Output Summary (Last 24 hours) at 12/14/17 1350  Last data filed at 12/14/17 1139   Gross per 24 hour   Intake           348.75 ml   Output              200 ml   Net           148.75 ml        PHYSICAL EXAM:  General: WD, WN. Alert, cooperative, no acute distress   , obese   EENT:  EOMI. Anicteric sclerae. MMM  Resp:  CTA bilaterally, no wheezing or rales. No accessory muscle use  CV:  Regular  rhythm,  No edema  GI:  Soft, Non distended, Non tender.  +Bowel sounds  Neurologic:  Alert and oriented X 3, normal speech,   Psych:   Good insight. Not anxious nor agitated  Skin:  No rashes. No jaundice , chronic venous skin changes in lower ext but no edema Right leg/tigh bigger than the left , feels warm but no skin chagres ,Swelling actually is getting worse . Reviewed most current lab test results and cultures  YES  Reviewed most current radiology test results   YES  Review and summation of old records today    NO  Reviewed patient's current orders and MAR    YES  PMH/SH reviewed - no change compared to H&P  ________________________________________________________________________  Care Plan discussed with:    Comments   Patient y    Family  y Son at bed side    RN y    Care Manager n    Consultant  n                      Multidiciplinary team rounds were held today with , nursing, pharmacist and clinical coordinator. Patient's plan of care was discussed; medications were reviewed and discharge planning was addressed.      ________________________________________________________________________  Total NON critical care TIME:  30  Minutes    Total CRITICAL CARE TIME Spent:   Minutes non procedure based      Comments   >50% of visit spent in counseling and coordination of care y On weight loss ________________________________________________________________________  Andrés Del Toro MD     Procedures: see electronic medical records for all procedures/Xrays and details which were not copied into this note but were reviewed prior to creation of Plan. LABS:  I reviewed today's most current labs and imaging studies.   Pertinent labs include:  Recent Labs      12/14/17   0346  12/13/17   0840  12/12/17   0547   WBC  5.6  6.3  6.4   HGB  7.4*  7.5*  7.8*   HCT  22.6*  23.5*  24.8*   PLT  193  201  180     Recent Labs      12/14/17   0346  12/13/17   1008   NA  137  137   K  3.5  3.4*   CL  104  102   CO2  26  27   GLU  97  106*   BUN  20  23*   CREA  0.96  1.10*   CA  9.6  9.9       Signed: Andrés Del Toro MD

## 2017-12-14 NOTE — PROGRESS NOTES
Physical Therapy Note:    Chart reviewed, discussed with RN. Pt receiving treatment from another service at time of attempted treatment and unable to participate with PT. Will continue to follow per POC.

## 2017-12-14 NOTE — PROGRESS NOTES
Problem: Mobility Impaired (Adult and Pediatric)  Goal: *Acute Goals and Plan of Care (Insert Text)  Physical Therapy Goals  Initiated 12/11/2017  1. Patient will move from supine to sit and sit to supine , scoot up and down and roll side to side in bed with minimal assistance/contact guard assist within 7 day(s). 2.  Patient will transfer from bed to chair and chair to bed with minimal assistance/contact guard assist using the least restrictive device within 7 day(s). 3.  Patient will perform sit to stand with minimal assistance/contact guard assist within 7 day(s). 4.  Patient will ambulate with minimal assistance/contact guard assist for 25 feet with the least restrictive device within 7 day(s). physical Therapy TREATMENT  Patient: Lily Osborn (43 y.o. female)  Date: 12/14/2017  Diagnosis: Right leg DVT (HCC) <principal problem not specified>       Precautions:   Falls    ASSESSMENT:  Pt Salvador Moyer presents with decline in mobility and activity tolerance this date compared with previous treatment date. Pt appears lethargic, requires substantial amount of time for all mobility, and demonstrates poor initiation of mobility despite verbalized agreement of performance. She sits edge of bed x 10 mins and engages in lateral scooting activities before returning to supine. Progression toward goals:  []    Improving appropriately and progressing toward goals  []    Improving slowly and progressing toward goals  [x]    Not making progress toward goals     PLAN:  Patient continues to benefit from skilled intervention to address the above impairments. Continue treatment per established plan of care. Discharge Recommendations:  Skilled Nursing Facility  Further Equipment Recommendations for Discharge:  Defer to SNF     SUBJECTIVE:   Patient stated I don't want to have to hit someone.  Pt offers statement after period of quiet; statement appears unprovoked and pt does not answer questions regarding who or why she would hit someone. OBJECTIVE DATA SUMMARY:   Critical Behavior:  Neurologic State: Alert, Confused  Orientation Level: Oriented to place, Oriented to person, Disoriented to time  Cognition: Follows commands  Safety/Judgement: Fall prevention, Decreased awareness of need for safety, Decreased awareness of need for assistance  Functional Mobility Training:  Bed Mobility:     Supine to Sit: Additional time; one-step cues repeated for techniques with lower extremities and upper body. She requires manual assistance to initiate movement, then offers improved efforts. Mod assist to CGA provided for LEs; minimal assistance for trunk elevation. Sit to Supine: Additional time. Supervision for LUE to bed surface; min assist for RLE; CGA for trunk. Cues for positioning and task completion. Scooting: Additional time; laterally at edge of bed; offers effort x 3 attempts with CGA for 1-2 inch lateral excursions. Transfers:            room set-up for attempted transfers and pt cued accordingly. She demonstrates no initiation despite verbalizing, \"ok. \"                       Balance:  Sitting: Without support  Sitting - Static: Good (unsupported)      Ambulation/Gait Training:                  n/t                                 Pain:  Pain Scale 1: Numeric (0 - 10)  Pain Intensity 1: 10  Pain Location 1: Leg  Pain Orientation 1: Right  Pain Description 1: Aching  Pain Intervention(s) 1: Medication (see MAR); repositioned; pt declining elevation. Activity Tolerance:   Limited by fatigue  Please refer to the flowsheet for vital signs taken during this treatment.   After treatment:   []    Patient left in no apparent distress sitting up in chair  [x]    Patient left in no apparent distress in bed  [x]    Call bell left within reach  [x]    Nursing notified  [x]    Caregiver present  [x]    Bed alarm activated    COMMUNICATION/COLLABORATION:   The patients plan of care was discussed with: Occupational Therapist and Registered Nurse    Carol Burkett, PT, DPT   Time Calculation: 28 mins

## 2017-12-14 NOTE — PROGRESS NOTES
Problem: Self Care Deficits Care Plan (Adult)  Goal: *Acute Goals and Plan of Care (Insert Text)  Occupational Therapy Goals:  Initiated 12/11/2017  1. Patient will perform grooming standing with minimal assistance within 7 days. 2. Patient will perform toileting with moderate assistance  within 7 days. 3. Patient will perform lower body dressing with moderate assistance  With AE PRN within 7 days. 4. Patient will improve dynamic standing balance to CGA for increased independence with ADLS within 7 days. 5. Patient will transfer from toilet with minimal assistance/contact guard assist using the least restrictive device and appropriate durable medical equipment within 7 days. Occupational Therapy TREATMENT  Patient: Prashant Montague (75 y.o. female)  Date: 12/14/2017  Diagnosis: Right leg DVT ([facility] [address] <principal problem not specified>       Precautions:  fall    ASSESSMENT:  Pt remains very anxious and had decreased attention as she appears to be focused on her pain and her own thoughts. Max encouragement and calming presence needed for pt to perform bed mobility. Pt was educated on taking deep breaths in and blowing out while moving LE to edge of bed. Min assist needed for LE at times. Pt was able to manage UB without assist but needed bed rails. CGA to scoot back onto bed. Pt tends to work herself up and becomes quietly anxious with trembling. With interactions at times pt becomes wide eyed and fearful. Attempted to encourage pt to stand but pt did not attempt and was trembling. CGA to scoot hips back onto bed and min assist for right leg only back onto bed. Recommend SNF at discharge and I question the need for psych consult due to the above.     Progression toward goals:  []       Improving appropriately and progressing toward goals  [x]       Improving slowly and progressing toward goals  []       Not making progress toward goals and plan of care will be adjusted     PLAN:  Patient continues to benefit from skilled intervention to address the above impairments. Continue treatment per established plan of care. Discharge Recommendations:  Skilled Nursing Facility  Further Equipment Recommendations for Discharge:  TBD     SUBJECTIVE:   Patient stated I can't do it.     OBJECTIVE DATA SUMMARY:   Cognitive/Behavioral Status:  Neurologic State: Alert  Orientation Level: Oriented to person;Oriented to place  Cognition: Decreased attention/concentration; Follows commands  Perception: Appears intact  Perseveration: No perseveration noted  Safety/Judgement: Fall prevention    Functional Mobility and Transfers for ADLs:  Bed Mobility:  Supine to Sit: Additional time;Minimum assistance  Sit to Supine: Additional time;Minimum assistance  Scooting: Additional time;Contact guard assistance    Transfers:          Balance:  Sitting: Without support  Sitting - Static: Good (unsupported)  Standing:  (declined)    ADL Intervention:                           Lower Body Dressing Assistance  Socks: Total assistance (dependent)         Cognitive Retraining  Safety/Judgement: Fall prevention      Pain:  Pain Scale 1: Numeric (0 - 10)  Pain Intensity 1: 10  Pain Location 1: Leg  Pain Orientation 1: Right  Pain Description 1: Aching  Pain Intervention(s) 1: Medication (see MAR)  Activity Tolerance:     Please refer to the flowsheet for vital signs taken during this treatment.   After treatment:   [] Patient left in no apparent distress sitting up in chair  [x] Patient left in no apparent distress in bed  [x] Call bell left within reach  [x] Nursing notified  [] Caregiver present  [] Bed alarm activated    COMMUNICATION/COLLABORATION:   The patients plan of care was discussed with: Physical Therapist, Registered Nurse and patient    Oneal Lamb OTR/L  Time Calculation: 34 mins

## 2017-12-14 NOTE — PROGRESS NOTES
Interdisciplinary Rounds were completed on this patient. Rounds included nursing, clinical care leader, pharmacy, and case management. Patient was resting in bed. Patient had the following concerns: pain. Goals for the day will include: pain management, patient's son concerned about DVT and wants to ask MD about other options to treat it. Palliative care team is already following patient for pain control options.

## 2017-12-14 NOTE — PROGRESS NOTES
Patient's son Arlyn Aguilera reported missing $500 here in hospital, but unsure where it happens. Patient was advised to inform security, but he  refused.

## 2017-12-14 NOTE — PROGRESS NOTES
Palliative Medicine Consult  Mark: [phone] ([phone])    Patient Name: Tana Church  YOB: 1940    Date of Initial Consult: 12/12/17  Reason for Consult: overwhelming symptoms  Requesting Provider: Leroy Palomino  Primary Care Physician: Kameron Ferguson MD     SUMMARY:   Tana Church is a 68 y.o. with a past history of metastatic uterine cancer to start chemo Jan 2018, RA/fibromyalgia, DM, sleep apnea, LLE DVT/PE 2010 (on coumadin then eliquis, stopped Oct 2017 due to rectal bleeding), MGUS declined bone marrow bx/ chronic anemia, who was admitted on 12/9/2017 from home with a diagnosis of Right Thigh pain for several days. Current medical issues leading to Palliative Medicine involvement include: new R leg DVT (R common femoral vein), uncontrolled pain. Patient lives alone, normally independent with ADLS. She relies on her son, Jes Lepe [phone] for assistance     PALLIATIVE DIAGNOSES:   1. Acute R thigh pain, due to DVT  2. ARF, improving with holding diuretics  3. Metastatic endometrial cancer  4. Chronic anemia, multifactorial (MGUS, chronic disease)  5. Fever ? Due to DVT  6. Rectal bleeding, heparin now stopped  7. Constipation secondary to medications  8. Chronic pain, chronic opioid use  9. hypoalbuminemia       PLAN:   1. Acute pain with history of chronic pain-->  reviewed, consistently prescribed home regimen of MS contin 60 mg BID disp #60 and oxycodone ER 20 mg dis #180 every month by PCP Dr. Kameron Ferguson MD with no dosage escalations. 1. MS Contin was increased for acute pain related to DVT -- decreased back to 60mg PO Q12 hrs yesterday, as she seemed to have developed some confusion the night before with the higher dose (TID dosing and PRN dilaudid 1-2 mg q 3 hours prn). 2. Continue Oxycodone IR 20mg PO Q4hr PRN   3. Continue dilaudid 1mg IV Q3hr PRN pain not controlled by other meds (2mg dosing possibly contributing to confusion).  Will schedule tylenol 650 mg every sic hours.   2. Constipation  1. Patient and chart confirm that she had BM yesterday. Continue senokot 2 tabs BID and miralax 1 scoop PO every day. 3. Education about code status discussed 12/13, her insight into poor outcomes of code blue is limited. She has a lot of fear about her son Trang Marcelo being unable to cope without her. Patient desires FULL CODE. 4. Education provided about AMD. Mignon Dempsey to complete AMD with patient during hospital admission. 5. Family looking at SNF placement -- son was given list today to choose place for continued recovery/rehabilitation with date of discharge as early as tomorrow. 6. Initial consult note routed to primary continuity provider  7. Communicated plan of care with: Palliative IDT       GOALS OF CARE / TREATMENT PREFERENCES:     GOALS OF CARE:   manage pain  Treat acute issues. Hoping to get chemo in January for endometrial cancer  Full Code    TREATMENT PREFERENCES:   Code Status: Full Code    Advance Care Planning:  Advance Care Planning 12/13/2017   Patient's Healthcare Decision Maker is: Verbal statement (Legal Next of Kin remains as decision maker)   Primary Decision Maker Name Roberto Malcolm   Primary Decision Maker Phone Number [phone]   Primary Decision Maker Relationship to Patient Adult child   Secondary Decision Maker Name -   Secondary Decision [address] [place] [address] Ave Phone Number -   Secondary Decision Maker Relationship to Patient -   Confirm Advance Directive None   Patient Would Like to Complete Advance Directive Yes   Does the patient have other document types -           Other Instructions: Other:    As far as possible, the palliative care team has discussed with patient / health care proxy about goals of care / treatment preferences for patient.            HISTORY:     History obtained from: patient    CHIEF COMPLAINT: admitted with right leg pain    HPI/SUBJECTIVE:    The patient is:   [x] Verbal and participatory  [] Non-participatory due to:     68 year old female admitted with right thigh pain that started a few days ago. It's worse with walking  Right now 10/10 , very bad. It's a little better after IV dilaudid. She can't concentrate and her appetite is gone. She did get some sleep last night. She is not sure when she last moved her bowels. , does need some medication for that    12/13/17 pain in leg is much better today. Still hurts to move it much  Some abdominal cramping after eating lunch half a hamburger    12/14  IVC filter placed yesterday. Pain in right leg improved. Still reports that it \"aches in the folds of the leg\". Reports poor appetite still but managed to eat a good part of her lunch today (tray still in front of her). PT/OT recommending SNF. Had BM yesterday.     Clinical Pain Assessment (nonverbal scale for severity on nonverbal patients):   Clinical Pain Assessment  Severity: 4  Location: right thigh  Character: ache burn  Duration: several days  Effect: low appetite, can't concentrate  Factors: worse with walking  Frequency: constant          Duration: for how long has pt been experiencing pain (e.g., 2 days, 1 month, years)  Frequency: how often pain is an issue (e.g., several times per day, once every few days, constant)     FUNCTIONAL ASSESSMENT:     Palliative Performance Scale (PPS):  PPS: 40       PSYCHOSOCIAL/SPIRITUAL SCREENING:     Palliative IDT has assessed this patient for cultural preferences / practices and a referral made as appropriate to needs (Cultural Services, Patient Advocacy, Ethics, etc.)    Advance Care Planning:  Advance Care Planning 12/13/2017   Patient's Healthcare Decision Maker is: Verbal statement (Legal Next of Kin remains as decision maker)   Primary Decision Maker Name Ernestina Head   Primary Decision Maker Phone Number [phone]   Primary Decision Maker Relationship to Patient Adult child   Secondary Decision Maker Name -   Secondary Decision Maker Phone Number -   Secondary Decision Maker Relationship to Patient -   Confirm Advance Directive None   Patient Would Like to Complete Advance Directive Yes   Does the patient have other document types -       Any spiritual / Gnosticist concerns:  [] Yes /  [x] No    Caregiver Burnout:  [] Yes /  [] No /  [x] No Caregiver Present      Anticipatory grief assessment:   [x] Normal  / [] Maladaptive       ESAS Anxiety: Anxiety: 0    ESAS Depression: Depression: 0        REVIEW OF SYSTEMS:     Positive and pertinent negative findings in ROS are noted above in HPI. The following systems were [x] reviewed / [] unable to be reviewed as noted in HPI  Other findings are noted below. Systems: constitutional, ears/nose/mouth/throat, respiratory, gastrointestinal, genitourinary, musculoskeletal, integumentary, neurologic, psychiatric, endocrine. Positive findings noted below. Modified ESAS Completed by: provider   Fatigue: 2 Drowsiness: 0   Depression: 0 Pain: 4   Anxiety: 0 Nausea: 0   Anorexia: 5 Dyspnea: 0     Constipation: No     Stool Occurrence(s): 1        PHYSICAL EXAM:     From RN flowsheet:  Wt Readings from Last 3 Encounters:   12/09/17 295 lb (133.8 kg)   11/29/17 294 lb 9.6 oz (133.6 kg)   10/31/17 297 lb (134.7 kg)     Blood pressure 155/57, pulse 97, temperature 99.1 °F (37.3 °C), resp. rate 20, height 5' 6\" (1.676 m), weight 295 lb (133.8 kg), SpO2 99 %. Pain Scale 1: Numeric (0 - 10)  Pain Intensity 1: 10     Pain Location 1: Leg  Pain Orientation 1: Right  Pain Description 1: Aching  Pain Intervention(s) 1: Medication (see MAR)  Last bowel movement, if known:     Constitutional: NAD, appears comfortable  Eyes: pupils equal, anicteric  +Right thigh is tender to touch anteriorly with mild swelling  Obese legs, ?  Edema  Chronic hyperpigmentation of ankles bilaterally  No respiratory distress  Her attention is diminished   Affect a bit flat  Pleasant and engaging in conversation  Some trouble with finding the right words at times, sometimes says the opposite of what she means, then corrects herself. Able to recall medical history, like discussions with her PCP about blood thinner. HISTORY:     Active Problems:    Right leg DVT ([address] (2017)      Past Medical History:   Diagnosis Date    Acute kidney failure ([address] 2015    Acute sinusitis 2011    Adverse effect of anesthesia     SLOW WAKING PAST ANESTHESIA    Arthritis     RA    At risk for side effect of medication 2016    Atrial fibrillation ([address] 10/19/2011    Autoimmune disease ([address]     FIBROMYLGIA    Cancer ([address]     ENDOMETRIAL     Cholelithiasis 2015    Chronic pain     Claudication of both lower extremities ([address] 2015    Diabetes mellitus type 2, controlled ([address] 2016    Diabetes mellitus, type II ([address] 10/10/2014    Fall against object 10/10/2014    Fatty liver 10/20/2015    Hypertension     Ill-defined condition     SPINAL STENOSIS    Left shoulder pain 10/10/2014    Leiomyoma of body of uterus 2016    Morbid obesity ([address]     Myalgia 2016    Nausea & vomiting     Personal history of radiation therapy 2017    Pneumonia     Preop examination 6/15/2015    Preoperative clearance 6/15/2015    Rash 2011    Rash of hands 10/10/2014    Screening for colon cancer 2016    Screening for glaucoma 2016    Sleep apnea     SOB (shortness of breath)     hospitalized 2012.  angina, SOB    Thromboembolus ([address]     LEFT LOWER LEG AND PE    Tinea pedis, recurrent 2015    Vaginal bleeding, abnormal 2017      Past Surgical History:   Procedure Laterality Date    COLONOSCOPY N/A 2016    COLONOSCOPY performed by Kira Gloria MD at [address] [place] [address]  2016         HX CATARACT REMOVAL Bilateral 2015    HX  SECTION  1961    HX DILATION AND CURETTAGE  2016    HX DILATION AND CURETTAGE  2016    HX GYN  1960    etopic    HX HEART CATHETERIZATION  2001    NEGATIVE PER PATIENT    HX HYSTERECTOMY  2016    HX KNEE ARTHROSCOPY Left 1998    HX ORTHOPAEDIC Right 1960'S    BUNIONECTOMY    HX OTHER SURGICAL      BIOPSY OF VEIN IN FOREHEAD    HX TUBAL LIGATION  1963    AL COLSC FLX W/RMVL OF TUMOR POLYP LESION SNARE TQ  12/12/2011           Family History   Problem Relation Age of Onset    Other Mother      ANEURYSM    Obesity Mother     Heart Disease Father     Other Father      VASCULAR DISEASE    Hypertension Sister     Heart Disease Brother     Heart Attack Brother     Kidney Disease Sister     Seizures Brother     Heart Attack Brother     Anesth Problems Neg Hx     Alcohol abuse Neg Hx       History reviewed, no pertinent family history.   Social History   Substance Use Topics    Smoking status: Former Smoker     Packs/day: 0.25     Years: 15.00     Quit date: 8/23/1996    Smokeless tobacco: Never Used    Alcohol use No     Allergies   Allergen Reactions    Latex Rash and Itching    Advair Diskus [Fluticasone-Salmeterol] Other (comments)     \"breathing problems\"    Bactrim [Sulfamethoprim] Shortness of Breath, Itching and Swelling     Swelling of tongue and throat    Eliquis [Apixaban] Other (comments)    Iodinated Contrast- Oral And Iv Dye Hives    Ivp [Fd And C Blue No.1] Hives and Shortness of Breath    Lovenox [Enoxaparin] Other (comments)     DIC    Nsaids (Non-Steroidal Anti-Inflammatory Drug) Other (comments)     Heartburn    Sulfa (Sulfonamide Antibiotics) Shortness of Breath    Xarelto [Rivaroxaban] Myalgia     Paresthesia, spasmic muscle      Current Facility-Administered Medications   Medication Dose Route Frequency    furosemide (LASIX) tablet 80 mg  80 mg Oral DAILY    acetaminophen (TYLENOL) tablet 650 mg  650 mg Oral Q6H    morphine CR (MS CONTIN) tablet 60 mg  60 mg Oral Q12H    HYDROmorphone (DILAUDID) injection 1 mg  1 mg IntraVENous Q3H PRN    senna-docusate (PERICOLACE) 8.6-50 mg per tablet 2 Tab  2 Tab Oral BID    polyethylene glycol (MIRALAX) packet 17 g  17 g Oral DAILY    oxyCODONE IR (ROXICODONE) tablet 20 mg  20 mg Oral Q4H PRN    sodium chloride (NS) flush 5-10 mL  5-10 mL IntraVENous PRN    sodium chloride (NS) flush 5-10 mL  5-10 mL IntraVENous Q8H    sodium chloride (NS) flush 5-10 mL  5-10 mL IntraVENous PRN    naloxone (NARCAN) injection 0.4 mg  0.4 mg IntraVENous PRN    ondansetron (ZOFRAN) injection 4 mg  4 mg IntraVENous Q4H PRN    bisacodyl (DULCOLAX) suppository 10 mg  10 mg Rectal DAILY PRN    cyanocobalamin (VITAMIN B12) tablet 1,000 mcg  1,000 mcg Oral DAILY    cholecalciferol (VITAMIN D3) tablet 1,000 Units  1,000 Units Oral DAILY    naloxegol (MOVANTIK) tablet 25 mg  25 mg Oral ACB    metoprolol succinate (TOPROL-XL) XL tablet 100 mg  100 mg Oral DAILY          LAB AND IMAGING FINDINGS:     Lab Results   Component Value Date/Time    WBC 5.6 12/14/2017 03:46 AM    HGB 7.4 12/14/2017 03:46 AM    PLATELET 702 86/72/4757 03:46 AM     Lab Results   Component Value Date/Time    Sodium 137 12/14/2017 03:46 AM    Potassium 3.5 12/14/2017 03:46 AM    Chloride 104 12/14/2017 03:46 AM    CO2 26 12/14/2017 03:46 AM    BUN 20 12/14/2017 03:46 AM    Creatinine 0.96 12/14/2017 03:46 AM    Calcium 9.6 12/14/2017 03:46 AM    Magnesium 2.1 09/01/2016 05:04 PM      Lab Results   Component Value Date/Time    AST (SGOT) 15 12/10/2017 04:02 AM    Alk.  phosphatase 64 12/10/2017 04:02 AM    Protein, total 9.0 12/10/2017 04:02 AM    Albumin 2.3 12/10/2017 04:02 AM    Globulin 6.7 12/10/2017 04:02 AM     Lab Results   Component Value Date/Time    INR 1.6 12/09/2017 05:35 PM    Prothrombin time 16.4 12/09/2017 05:35 PM    aPTT 31.7 12/12/2017 09:54 PM      Lab Results   Component Value Date/Time    Iron 75 11/13/2017 02:48 PM    TIBC 329 11/13/2017 02:48 PM    Iron % saturation 23 11/13/2017 02:48 PM    Ferritin 264 11/13/2017 02:48 PM      No results found for: PH, PCO2, PO2  No components found for: [place]   Lab Results   Component Value Date/Time    CK 62 06/26/2014 01:35 PM    CK - MB 0.5 03/08/2014 04:20 PM                Total time: 35  Counseling / coordination time, spent as noted above: 25  > 50% counseling / coordination?:     Prolonged service was provided for  []30 min   []75 min in face to face time in the presence of the patient, spent as noted above. Time Start:   Time End:   Note: this can only be billed with 93108 (initial) or 50193 (follow up). If multiple start / stop times, list each separately.

## 2017-12-14 NOTE — PROGRESS NOTES
Spiritual Care Assessment/Progress Notes    Milo Selby [id number]  [id number]    1940  68 y.o.  female    Patient Telephone Number: [phone] (home)   Moravian Affiliation: Bowen Kimbrough   Language: English   Extended Emergency Contact Information  Primary Emergency Contact: Reshma  Mobile Phone: [phone]  Relation: Child  Secondary Emergency Contact: [address] [place] [address] Phone: [phone]  Relation: Child   Patient Active Problem List    Diagnosis Date Noted    Right leg DVT ([address] 12/09/2017    Smoldering multiple myeloma ([address] 11/29/2017    Anemia 10/31/2017    History of diverticulitis 10/31/2017    Vaginal bleeding, abnormal 04/20/2017    Personal history of radiation therapy 04/20/2017    Myalgia 09/30/2016    Pain of left hand 09/30/2016    Left wrist pain 09/30/2016    Pain of left upper extremity 09/30/2016    At risk for side effect of medication 09/30/2016    Endometrial cancer ([address] 08/11/2016    Leiomyoma of body of uterus 04/07/2016    Lower abdominal pain 03/17/2016    Screening for glaucoma 02/22/2016    Screening for colon cancer 02/22/2016    Diabetes mellitus type 2, controlled ([address] 01/19/2016    Acute kidney failure ([address] 11/19/2015    Cholelithiasis 11/19/2015    Fatty liver 10/20/2015    Claudication of both lower extremities ([address] 08/25/2015    Tinea pedis, recurrent 07/20/2015    Absolute anemia 06/15/2015    Preoperative clearance 06/15/2015    Left shoulder pain 10/10/2014    Diabetes mellitus, type II ([address] 10/10/2014    Rash of hands 10/10/2014    Fall against object 10/10/2014    Subclinical hypothyroidism 04/24/2014    Vitamin D deficiency 04/24/2014    MGUS (monoclonal gammopathy of unknown significance) 12/23/2013    TSH elevation 12/19/2013    Knee pain, bilateral 10/28/2013    Dental abscess 09/27/2013    Cataract 09/27/2013    Onychomycosis 08/29/2013    MRSA (methicillin resistant staph aureus) culture positive 06/20/2013    Cellulitis and abscess 06/12/2013    Dental cavities 05/02/2013    Heart failure, systolic and diastolic, chronic (HCC) 20/70/7227    Iritis, chronic 09/05/2012    Elevated blood sugar 08/01/2012    Pulmonary edema 06/11/2012    DM (diabetes mellitus) ([facility] 06/01/2012    SOB (shortness of breath) 05/29/2012    Elbow pain, right 03/26/2012    Arm pain, right 03/26/2012    Ankle pain, right 01/04/2012    Edema leg 11/14/2011    Tinea corporis 11/02/2011    Atrial fibrillation ([address] 10/19/2011    Otitis externa 10/19/2011    Cellulitis of nostril 10/12/2011    Bronchitis 09/02/2011    Leg pain, bilateral 08/05/2011    Morbid obesity ([address] 08/05/2011    MCTD (mixed connective tissue disease) ([facility] 03/28/2011    Edema leg 03/16/2011    Pulmonary embolism ([facility] 03/02/2011    Acute sinusitis 02/11/2011    Rash 02/11/2011    Monoclonal gammopathies 01/05/2011    DVT (deep venous thrombosis) ([facility] 12/21/2010    Chronic pain disorder 12/21/2010    HTN (hypertension) 12/21/2010    Fibromyalgia 12/21/2010    Hypercholesterolemia 12/21/2010        Date: 12/14/2017       Level of Worship/Spiritual Activity:  [x]         Involved in abdon tradition/spiritual practice    []         Not involved in abdon tradition/spiritual practice  [x]         Spiritually oriented    []         Claims no spiritual orientation    []         seeking spiritual identity  []         Feels alienated from Faith practice/tradition  []         Feels angry about Faith practice/tradition  [x]         Spirituality/Faith tradition is a resource for coping at this time.   []         Not able to assess due to medical condition    Services Provided Today:  []         crisis intervention    []         reading Scriptures  [x]         spiritual assessment    []         prayer  [x]         empathic listening/emotional support  []         rites and rituals (cite in comments)  [x]         life review (son)    []         Yarsanism support  []         theological development   []         advocacy  []         ethical dialog     [x]         blessing  []         bereavement support    [x]         support to family  []         anticipatory grief support   []         help with AMD  []         spiritual guidance    []         meditation      Spiritual Care Needs  [x]         Emotional Support  [x]         Spiritual/Worship Care  []         Loss/Adjustment  []         Advocacy/Referral                /Ethics  []         No needs expressed at               this time  []         Other: (note in               comments)  [address]  []         Follow up visits with               pt/family  []         Provide materials  []         Schedule sacraments  []         Contact Community               Clergy  [x]         Follow up as needed  []         Other: (note in               comments)     Comments:   Pt was leaning back on bed with head of bed raised. Pt's son, Zeina Anders, was in the room. Torres Castellanos shared that pt is a , as is several members of the family. Pt was not able to fully have conversation as she had just been given pain meds (according to son) and seemed rather sleepy. We did engage in conversation about completing an AMD, but the pt did not seem awake enough to be decisional.  AMD form was filled in with Zeina Anders, pt's son, listed as primary agent, but left in room for them to read through as they need to.  spoke with Palliative NP Carlos John who will follow up tomorrow as able. For Spiritual Care paging please call [phone]). Felisa Krishnamurthy MFreddieDiv.   Palliative  Fellow

## 2017-12-14 NOTE — PROGRESS NOTES
General Surgery End of Shift Nursing Note    Bedside shift change report given to [address] [place] [address] (oncoming nurse) by Carmelina Marks (offgoing nurse). Report included the following information SBAR, Kardex, ED Summary, Procedure Summary, Intake/Output, MAR, Accordion, Recent Results and Med Rec Status. Shift worked:   7a-7p   Summary of shift:    Pt very difficult transfer, plan for sit to stand lift. Pain meds PRN. Issues for physician to address:   none     Number times ambulated in hallway past shift: 0    Number of times OOB to chair past shift: 0    Pain Management:  Current medication: oxycodone, dilaudid, morphine  Patient states pain is manageable on current pain medication: YES    GI:    Current diet:  DIET CARDIAC Regular    Tolerating current diet: YES  Passing flatus: YES  Last Bowel Movement: today   Appearance: loose    Respiratory:    Incentive Spirometer at bedside: NO  Patient instructed on use: NO    Patient Safety:    Falls Score: 3  Bed Alarm On? No  Sitter? No    Diane Chappell RN        .

## 2017-12-14 NOTE — CARDIO/PULMONARY
C/P rehab note- chart reviewed for patient on list to be seen by Cardiac Rehab. Adm with Right leg swelling and pain from Right thigh DVT POA .    Hx includes DVT,HTN,PE, A-Fib, DM,Heart Failure systolic and diastolic chronic. Most recent EF from 2014 66%. Pro BNP WNL. No indication for teaching by Cardiac Rehab.

## 2017-12-14 NOTE — PROGRESS NOTES
*CM acknowledged consult*. CM informed pt's son of SNF list.  CM will send referral once pt's son and family selects and SNF placement for pt. UPDATE: 3:26AM    CM was informed that pt and family will like a referral sent to [facility].   JUDE will send referral, via [address] [place] [address], [facility] [place]  [phone]

## 2017-12-15 LAB
ALBUMIN SERPL-MCNC: 1.8 G/DL (ref 3.5–5)
ALBUMIN/GLOB SERPL: 0.3 {RATIO} (ref 1.1–2.2)
ALP SERPL-CCNC: 53 U/L (ref 45–117)
ALT SERPL-CCNC: 19 U/L (ref 12–78)
ANION GAP SERPL CALC-SCNC: 8 MMOL/L (ref 5–15)
AST SERPL-CCNC: 25 U/L (ref 15–37)
BASOPHILS # BLD: 0 K/UL (ref 0–0.1)
BASOPHILS NFR BLD: 0 % (ref 0–1)
BILIRUB SERPL-MCNC: 0.7 MG/DL (ref 0.2–1)
BUN SERPL-MCNC: 20 MG/DL (ref 6–20)
BUN/CREAT SERPL: 19 (ref 12–20)
CALCIUM SERPL-MCNC: 9.4 MG/DL (ref 8.5–10.1)
CHLORIDE SERPL-SCNC: 103 MMOL/L (ref 97–108)
CO2 SERPL-SCNC: 27 MMOL/L (ref 21–32)
CREAT SERPL-MCNC: 1.04 MG/DL (ref 0.55–1.02)
EOSINOPHIL # BLD: 0 K/UL (ref 0–0.4)
EOSINOPHIL NFR BLD: 1 % (ref 0–7)
ERYTHROCYTE [DISTWIDTH] IN BLOOD BY AUTOMATED COUNT: 15.3 % (ref 11.5–14.5)
GLOBULIN SER CALC-MCNC: 6.2 G/DL (ref 2–4)
GLUCOSE SERPL-MCNC: 100 MG/DL (ref 65–100)
HCT VFR BLD AUTO: 22.4 % (ref 35–47)
HGB BLD-MCNC: 7.1 G/DL (ref 11.5–16)
LYMPHOCYTES # BLD: 0.4 K/UL (ref 0.8–3.5)
LYMPHOCYTES NFR BLD: 7 % (ref 12–49)
MAGNESIUM SERPL-MCNC: 1.4 MG/DL (ref 1.6–2.4)
MCH RBC QN AUTO: 28.9 PG (ref 26–34)
MCHC RBC AUTO-ENTMCNC: 31.7 G/DL (ref 30–36.5)
MCV RBC AUTO: 91.1 FL (ref 80–99)
MONOCYTES # BLD: 0.7 K/UL (ref 0–1)
MONOCYTES NFR BLD: 12 % (ref 5–13)
NEUTS SEG # BLD: 4.5 K/UL (ref 1.8–8)
NEUTS SEG NFR BLD: 80 % (ref 32–75)
PLATELET # BLD AUTO: 191 K/UL (ref 150–400)
POTASSIUM SERPL-SCNC: 3.1 MMOL/L (ref 3.5–5.1)
PROT SERPL-MCNC: 8 G/DL (ref 6.4–8.2)
RBC # BLD AUTO: 2.46 M/UL (ref 3.8–5.2)
SODIUM SERPL-SCNC: 138 MMOL/L (ref 136–145)
WBC # BLD AUTO: 5.6 K/UL (ref 3.6–11)

## 2017-12-15 PROCEDURE — 85025 COMPLETE CBC W/AUTO DIFF WBC: CPT | Performed by: INTERNAL MEDICINE

## 2017-12-15 PROCEDURE — 87186 SC STD MICRODIL/AGAR DIL: CPT | Performed by: INTERNAL MEDICINE

## 2017-12-15 PROCEDURE — 87086 URINE CULTURE/COLONY COUNT: CPT | Performed by: INTERNAL MEDICINE

## 2017-12-15 PROCEDURE — 97530 THERAPEUTIC ACTIVITIES: CPT

## 2017-12-15 PROCEDURE — 83735 ASSAY OF MAGNESIUM: CPT | Performed by: INTERNAL MEDICINE

## 2017-12-15 PROCEDURE — 74011250637 HC RX REV CODE- 250/637: Performed by: INTERNAL MEDICINE

## 2017-12-15 PROCEDURE — 36415 COLL VENOUS BLD VENIPUNCTURE: CPT | Performed by: INTERNAL MEDICINE

## 2017-12-15 PROCEDURE — 74011250636 HC RX REV CODE- 250/636: Performed by: INTERNAL MEDICINE

## 2017-12-15 PROCEDURE — 80053 COMPREHEN METABOLIC PANEL: CPT | Performed by: INTERNAL MEDICINE

## 2017-12-15 PROCEDURE — 74011250637 HC RX REV CODE- 250/637: Performed by: NURSE PRACTITIONER

## 2017-12-15 PROCEDURE — 65270000029 HC RM PRIVATE

## 2017-12-15 PROCEDURE — 97530 THERAPEUTIC ACTIVITIES: CPT | Performed by: OCCUPATIONAL THERAPIST

## 2017-12-15 PROCEDURE — P9016 RBC LEUKOCYTES REDUCED: HCPCS | Performed by: INTERNAL MEDICINE

## 2017-12-15 PROCEDURE — 86923 COMPATIBILITY TEST ELECTRIC: CPT | Performed by: INTERNAL MEDICINE

## 2017-12-15 PROCEDURE — 87077 CULTURE AEROBIC IDENTIFY: CPT | Performed by: INTERNAL MEDICINE

## 2017-12-15 PROCEDURE — 36430 TRANSFUSION BLD/BLD COMPNT: CPT

## 2017-12-15 PROCEDURE — 86900 BLOOD TYPING SEROLOGIC ABO: CPT | Performed by: INTERNAL MEDICINE

## 2017-12-15 RX ORDER — GABAPENTIN 100 MG/1
100 CAPSULE ORAL 3 TIMES DAILY
Status: DISCONTINUED | OUTPATIENT
Start: 2017-12-15 | End: 2017-12-16 | Stop reason: HOSPADM

## 2017-12-15 RX ORDER — POLYETHYLENE GLYCOL 3350 17 G/17G
17 POWDER, FOR SOLUTION ORAL 2 TIMES DAILY
Status: DISCONTINUED | OUTPATIENT
Start: 2017-12-15 | End: 2017-12-16 | Stop reason: HOSPADM

## 2017-12-15 RX ORDER — SODIUM CHLORIDE 9 MG/ML
250 INJECTION, SOLUTION INTRAVENOUS AS NEEDED
Status: DISCONTINUED | OUTPATIENT
Start: 2017-12-15 | End: 2017-12-16 | Stop reason: HOSPADM

## 2017-12-15 RX ORDER — MAGNESIUM SULFATE HEPTAHYDRATE 40 MG/ML
2 INJECTION, SOLUTION INTRAVENOUS ONCE
Status: COMPLETED | OUTPATIENT
Start: 2017-12-15 | End: 2017-12-15

## 2017-12-15 RX ORDER — POTASSIUM CHLORIDE 750 MG/1
40 TABLET, FILM COATED, EXTENDED RELEASE ORAL
Status: COMPLETED | OUTPATIENT
Start: 2017-12-15 | End: 2017-12-15

## 2017-12-15 RX ORDER — NYSTATIN 100000 [USP'U]/G
POWDER TOPICAL 2 TIMES DAILY
Status: DISCONTINUED | OUTPATIENT
Start: 2017-12-15 | End: 2017-12-16 | Stop reason: HOSPADM

## 2017-12-15 RX ADMIN — POTASSIUM CHLORIDE 40 MEQ: 750 TABLET, FILM COATED, EXTENDED RELEASE ORAL at 09:25

## 2017-12-15 RX ADMIN — GABAPENTIN 100 MG: 100 CAPSULE ORAL at 21:05

## 2017-12-15 RX ADMIN — HYDROMORPHONE HYDROCHLORIDE 1 MG: 2 INJECTION INTRAMUSCULAR; INTRAVENOUS; SUBCUTANEOUS at 16:16

## 2017-12-15 RX ADMIN — NYSTATIN: 100000 POWDER TOPICAL at 21:05

## 2017-12-15 RX ADMIN — MORPHINE SULFATE 60 MG: 30 TABLET, EXTENDED RELEASE ORAL at 20:31

## 2017-12-15 RX ADMIN — VITAMIN D, TAB 1000IU (100/BT) 1000 UNITS: 25 TAB at 09:08

## 2017-12-15 RX ADMIN — Medication 10 ML: at 06:46

## 2017-12-15 RX ADMIN — OXYCODONE HYDROCHLORIDE 20 MG: 5 TABLET ORAL at 22:57

## 2017-12-15 RX ADMIN — OXYCODONE HYDROCHLORIDE 20 MG: 5 TABLET ORAL at 12:41

## 2017-12-15 RX ADMIN — ACETAMINOPHEN 650 MG: 325 TABLET ORAL at 06:45

## 2017-12-15 RX ADMIN — ACETAMINOPHEN 650 MG: 325 TABLET ORAL at 12:50

## 2017-12-15 RX ADMIN — CYANOCOBALAMIN TAB 500 MCG 1000 MCG: 500 TAB at 09:08

## 2017-12-15 RX ADMIN — OXYCODONE HYDROCHLORIDE 20 MG: 5 TABLET ORAL at 03:48

## 2017-12-15 RX ADMIN — NALOXEGOL OXALATE 25 MG: 25 TABLET, FILM COATED ORAL at 06:45

## 2017-12-15 RX ADMIN — HYDROMORPHONE HYDROCHLORIDE 1 MG: 2 INJECTION INTRAMUSCULAR; INTRAVENOUS; SUBCUTANEOUS at 20:34

## 2017-12-15 RX ADMIN — Medication 10 ML: at 16:16

## 2017-12-15 RX ADMIN — Medication 10 ML: at 20:36

## 2017-12-15 RX ADMIN — GABAPENTIN 100 MG: 100 CAPSULE ORAL at 16:16

## 2017-12-15 RX ADMIN — ACETAMINOPHEN 650 MG: 325 TABLET ORAL at 18:09

## 2017-12-15 RX ADMIN — MORPHINE SULFATE 60 MG: 30 TABLET, EXTENDED RELEASE ORAL at 09:08

## 2017-12-15 RX ADMIN — METOPROLOL SUCCINATE 100 MG: 50 TABLET, EXTENDED RELEASE ORAL at 09:11

## 2017-12-15 RX ADMIN — OXYCODONE HYDROCHLORIDE 20 MG: 5 TABLET ORAL at 18:09

## 2017-12-15 RX ADMIN — MAGNESIUM SULFATE HEPTAHYDRATE 2 G: 40 INJECTION, SOLUTION INTRAVENOUS at 09:35

## 2017-12-15 NOTE — PROGRESS NOTES
General Daily Progress Note    Admit Date: 12/9/2017  Hospital day 12/14/2017    Subjective: No signs of further bleeding. O- hgb stable     Y  N  [] [x]  Abd Pain:    [] [x]  Nausea:  [] [x] Vomiting:  [] []  Diarrhea:  [] []  Constipation:  [] []  Melena:  [] [x]  Hematochezia:  [x] []  Tolerating Diet:    Current Facility-Administered Medications   Medication Dose Route Frequency    furosemide (LASIX) tablet 80 mg  80 mg Oral DAILY    acetaminophen (TYLENOL) tablet 650 mg  650 mg Oral Q6H    morphine CR (MS CONTIN) tablet 60 mg  60 mg Oral Q12H    HYDROmorphone (DILAUDID) injection 1 mg  1 mg IntraVENous Q3H PRN    senna-docusate (PERICOLACE) 8.6-50 mg per tablet 2 Tab  2 Tab Oral BID    polyethylene glycol (MIRALAX) packet 17 g  17 g Oral DAILY    oxyCODONE IR (ROXICODONE) tablet 20 mg  20 mg Oral Q4H PRN    sodium chloride (NS) flush 5-10 mL  5-10 mL IntraVENous PRN    sodium chloride (NS) flush 5-10 mL  5-10 mL IntraVENous Q8H    sodium chloride (NS) flush 5-10 mL  5-10 mL IntraVENous PRN    naloxone (NARCAN) injection 0.4 mg  0.4 mg IntraVENous PRN    ondansetron (ZOFRAN) injection 4 mg  4 mg IntraVENous Q4H PRN    bisacodyl (DULCOLAX) suppository 10 mg  10 mg Rectal DAILY PRN    cyanocobalamin (VITAMIN B12) tablet 1,000 mcg  1,000 mcg Oral DAILY    cholecalciferol (VITAMIN D3) tablet 1,000 Units  1,000 Units Oral DAILY    naloxegol (MOVANTIK) tablet 25 mg  25 mg Oral ACB    metoprolol succinate (TOPROL-XL) XL tablet 100 mg  100 mg Oral DAILY        Review of Systems  Pertinent items are noted in HPI.     Objective:     Patient Vitals for the past 8 hrs:   BP Temp Pulse Resp SpO2   12/14/17 1933 120/52 100.2 °F (37.9 °C) 93 18 97 %   12/14/17 1535 141/73 99 °F (37.2 °C) 97 20 95 %        12/13 0701 - 12/14 1900  In: 348.8 [I.V.:348.8]  Out: 650 [Urine:650]    Physical Exam:   Visit Vitals    /52 (BP 1 Location: Left arm, BP Patient Position: At rest)    Pulse 93    Temp 100.2 °F (37.9 °C)    Resp 18    Ht 5' 6\" (1.676 m)    Wt 133.8 kg (295 lb)    SpO2 97%    BMI 47.61 kg/m2     General appearance: alert, cooperative, no distress, appears stated age  Abdomen: soft, non-tender. Bowel sounds normal. No masses,  no organomegaly      Data Review   Recent Results (from the past 24 hour(s))   CBC WITH AUTOMATED DIFF    Collection Time: 12/14/17  3:46 AM   Result Value Ref Range    WBC 5.6 3.6 - 11.0 K/uL    RBC 2.48 (L) 3.80 - 5.20 M/uL    HGB 7.4 (L) 11.5 - 16.0 g/dL    HCT 22.6 (L) 35.0 - 47.0 %    MCV 91.1 80.0 - 99.0 FL    MCH 29.8 26.0 - 34.0 PG    MCHC 32.7 30.0 - 36.5 g/dL    RDW 15.4 (H) 11.5 - 14.5 %    PLATELET 551 341 - 259 K/uL    NEUTROPHILS 76 (H) 32 - 75 %    LYMPHOCYTES 12 12 - 49 %    MONOCYTES 11 5 - 13 %    EOSINOPHILS 1 0 - 7 %    BASOPHILS 0 0 - 1 %    ABS. NEUTROPHILS 4.2 1.8 - 8.0 K/UL    ABS. LYMPHOCYTES 0.7 (L) 0.8 - 3.5 K/UL    ABS. MONOCYTES 0.6 0.0 - 1.0 K/UL    ABS. EOSINOPHILS 0.1 0.0 - 0.4 K/UL    ABS.  BASOPHILS 0.0 0.0 - 0.1 K/UL    RBC COMMENTS NORMOCYTIC, NORMOCHROMIC     METABOLIC PANEL, BASIC    Collection Time: 12/14/17  3:46 AM   Result Value Ref Range    Sodium 137 136 - 145 mmol/L    Potassium 3.5 3.5 - 5.1 mmol/L    Chloride 104 97 - 108 mmol/L    CO2 26 21 - 32 mmol/L    Anion gap 7 5 - 15 mmol/L    Glucose 97 65 - 100 mg/dL    BUN 20 6 - 20 MG/DL    Creatinine 0.96 0.55 - 1.02 MG/DL    BUN/Creatinine ratio 21 (H) 12 - 20      GFR est AA >60 >60 ml/min/1.73m2    GFR est non-AA 56 (L) >60 ml/min/1.73m2    Calcium 9.6 8.5 - 10.1 MG/DL         Assessment:     Active Problems:    Right leg DVT (HCC) (12/9/2017)        Plan:     -- agree with holding anticoagulation going forward with IVC  -- no direct plans for GI intervention

## 2017-12-15 NOTE — PROGRESS NOTES
Problem: Self Care Deficits Care Plan (Adult)  Goal: *Acute Goals and Plan of Care (Insert Text)  Occupational Therapy Goals:  Initiated 12/11/2017  1. Patient will perform grooming standing with minimal assistance within 7 days. 2. Patient will perform toileting with moderate assistance  within 7 days. 3. Patient will perform lower body dressing with moderate assistance  With AE PRN within 7 days. 4. Patient will improve dynamic standing balance to CGA for increased independence with ADLS within 7 days. 5. Patient will transfer from toilet with minimal assistance/contact guard assist using the least restrictive device and appropriate durable medical equipment within 7 days. Occupational Therapy TREATMENT  Patient: Lily Osborn (89 y.o. female)  Date: 12/15/2017  Diagnosis: Right leg DVT (HCC) <principal problem not specified>       Precautions:      ASSESSMENT:  Pts HGB was 7.1 and pt was pending blood transfusion. Bed level activity performed today. SBA to roll side to side in bed with bed rails to assist to change pads under pt. Pt was able to come to partial long sitting in bed with leaning on left elbow with SBA. Progression toward goals:  []       Improving appropriately and progressing toward goals  [x]       Improving slowly and progressing toward goals  []       Not making progress toward goals and plan of care will be adjusted     PLAN:  Patient continues to benefit from skilled intervention to address the above impairments. Continue treatment per established plan of care. Discharge Recommendations:  Skilled Nursing Facility  Further Equipment Recommendations for Discharge:  TBD     SUBJECTIVE:   Patient stated I am better.     OBJECTIVE DATA SUMMARY:   Cognitive/Behavioral Status:  Neurologic State: Alert  Orientation Level: Disoriented to person                Functional Mobility and Transfers for ADLs:  Bed Mobility:  Rolling: Stand-by asssistance (bed-rail use)    Transfers: deferred today due to low HGB      Pain:  Pain Scale 1: Numeric (0 - 10)  Pain Intensity 1: 0  Pain Location 1: Leg  Pain Orientation 1: Right;Left  Pain Description 1: Aching  Pain Intervention(s) 1: Medication (see MAR)  Activity Tolerance:     Please refer to the flowsheet for vital signs taken during this treatment.   After treatment:   [] Patient left in no apparent distress sitting up in chair  [x] Patient left in no apparent distress in bed  [x] Call bell left within reach  [x] Nursing notified  [x] Caregiver present  [x] Bed alarm activated    COMMUNICATION/COLLABORATION:   The patients plan of care was discussed with: Physical Therapist, Registered Nurse and patient    MARIKA Cantrell/L  Time Calculation: 10 mins

## 2017-12-15 NOTE — PROGRESS NOTES
Problem: Mobility Impaired (Adult and Pediatric)  Goal: *Acute Goals and Plan of Care (Insert Text)  Physical Therapy Goals  Initiated 12/11/2017  1. Patient will move from supine to sit and sit to supine , scoot up and down and roll side to side in bed with minimal assistance/contact guard assist within 7 day(s). 2.  Patient will transfer from bed to chair and chair to bed with minimal assistance/contact guard assist using the least restrictive device within 7 day(s). 3.  Patient will perform sit to stand with minimal assistance/contact guard assist within 7 day(s). 4.  Patient will ambulate with minimal assistance/contact guard assist for 25 feet with the least restrictive device within 7 day(s). physical Therapy TREATMENT  Patient: Lily Osborn (89 y.o. female)  Date: 12/15/2017  Diagnosis: Right leg DVT (HCC) <principal problem not specified>       Precautions:  Fall    ASSESSMENT:  Pt in bed needing assist with brief and eyal change. Pt participated with rolling efforts as below. Pt remaining in bed at this time for safety due to plans to receive a unit of blood per nursing. Continue to follow. Progression toward goals:  []    Improving appropriately and progressing toward goals  [x]    Improving slowly and progressing toward goals  []    Not making progress toward goals and plan of care will be adjusted     PLAN:  Patient continues to benefit from skilled intervention to address the above impairments. Continue treatment per established plan of care. Discharge Recommendations:  [facility]  Further Equipment Recommendations for Discharge:  tbd     SUBJECTIVE:   Patient stated Yojana Murguia think I can't walk, but I can.     OBJECTIVE DATA SUMMARY:   Critical Behavior:  Neurologic State: Alert  Orientation Level: Oriented to person, Oriented to place  Cognition: Decreased attention/concentration  Safety/Judgement: Fall prevention  Functional Mobility Training:  Bed Mobility:  Rolling: Stand-by asssistance (bed-rail use) ; right and left trials for assist with brief and eyal change          Pain:  Pain Scale 1: Numeric (0 - 10)  Pain Intensity 1: 0     Activity Tolerance:   Fair:  Please refer to the flowsheet for vital signs taken during this treatment.   After treatment:   []    Patient left in no apparent distress sitting up in chair  [x]    Patient left in no apparent distress in bed  [x]    Call bell left within reach  [x]    Nursing notified/present  []    Caregiver present  []    Bed alarm activated    COMMUNICATION/COLLABORATION:   The patients plan of care was discussed with: Occupational Therapist and Registered Nurse    Meme Montoya, PT, DPT   Time Calculation: 14 mins

## 2017-12-15 NOTE — PROGRESS NOTES
General Surgery End of Shift Nursing Note    Bedside shift change report given to Belkis Serrato RN (oncoming nurse) by Selin Hall RN (offgoing nurse). Report included the following information SBAR, Kardex, ED Summary, Procedure Summary, Intake/Output, MAR, Accordion, Recent Results and Med Rec Status. Shift worked:   7p-7a   Summary of shift:   patient requires maximum assistance for transfer. Pain well managed with PRN meds. Issues for physician to address:   none     Number times ambulated in hallway past shift: 0    Number of times OOB to chair past shift: 0    Pain Management:  Current medication: oxycodone, dilaudid, morphine  Patient states pain is manageable on current pain medication: YES    GI:    Current diet:  DIET CARDIAC Regular    Tolerating current diet: YES  Passing flatus: YES  Last Bowel Movement: today   Appearance: loose    Respiratory:    Incentive Spirometer at bedside: NO  Patient instructed on use: NO    Patient Safety:    Falls Score: 3  Bed Alarm On? No  Sitter? No    Gabby Zabala RN        .

## 2017-12-15 NOTE — PROGRESS NOTES
General Daily Progress Note  Madison Santos for Monica Knowles)    Admit Date: 12/9/2017  Hospital day 12/15/2017    Subjective: urinating frequently. Complains of having to get up to go and not making it to the toilet. No bleeding per rectum. IVC filter in. eliquis stopped. O- hgb slightly lower w/o any output     Y  N  [] [x]  Abd Pain:    [] [x]  Nausea:  [] [x] Vomiting:  [] []  Diarrhea:  [] []  Constipation:  [] []  Melena:  [] [x]  Hematochezia:  [x] []  Tolerating Diet:    Current Facility-Administered Medications   Medication Dose Route Frequency    furosemide (LASIX) tablet 80 mg  80 mg Oral DAILY    acetaminophen (TYLENOL) tablet 650 mg  650 mg Oral Q6H    morphine CR (MS CONTIN) tablet 60 mg  60 mg Oral Q12H    HYDROmorphone (DILAUDID) injection 1 mg  1 mg IntraVENous Q3H PRN    senna-docusate (PERICOLACE) 8.6-50 mg per tablet 2 Tab  2 Tab Oral BID    polyethylene glycol (MIRALAX) packet 17 g  17 g Oral DAILY    oxyCODONE IR (ROXICODONE) tablet 20 mg  20 mg Oral Q4H PRN    sodium chloride (NS) flush 5-10 mL  5-10 mL IntraVENous PRN    sodium chloride (NS) flush 5-10 mL  5-10 mL IntraVENous Q8H    sodium chloride (NS) flush 5-10 mL  5-10 mL IntraVENous PRN    naloxone (NARCAN) injection 0.4 mg  0.4 mg IntraVENous PRN    ondansetron (ZOFRAN) injection 4 mg  4 mg IntraVENous Q4H PRN    bisacodyl (DULCOLAX) suppository 10 mg  10 mg Rectal DAILY PRN    cyanocobalamin (VITAMIN B12) tablet 1,000 mcg  1,000 mcg Oral DAILY    cholecalciferol (VITAMIN D3) tablet 1,000 Units  1,000 Units Oral DAILY    naloxegol (MOVANTIK) tablet 25 mg  25 mg Oral ACB    metoprolol succinate (TOPROL-XL) XL tablet 100 mg  100 mg Oral DAILY        Review of Systems  Pertinent items are noted in HPI.     Objective:     Patient Vitals for the past 8 hrs:   BP Temp Pulse Resp SpO2   12/15/17 0351 118/56 98.9 °F (37.2 °C) 90 18 98 %   12/15/17 0118 119/53 98.3 °F (36.8 °C) 98 18 99 %        12/13 1901 - 12/15 0700  In: 348.8 [I.V.:348.8]  Out: 650 [Urine:650]    Physical Exam:   Visit Vitals    /56 (BP 1 Location: Left arm, BP Patient Position: At rest)    Pulse 90    Temp 98.9 °F (37.2 °C)    Resp 18    Ht 5' 6\" (1.676 m)    Wt 133.8 kg (295 lb)    SpO2 98%    BMI 47.61 kg/m2     General appearance: alert, cooperative, no distress, appears stated age  Abdomen: soft, non-tender. Bowel sounds normal. No masses,  no organomegaly      Data Review   Recent Results (from the past 24 hour(s))   CBC WITH AUTOMATED DIFF    Collection Time: 12/15/17  3:38 AM   Result Value Ref Range    WBC 5.6 3.6 - 11.0 K/uL    RBC 2.46 (L) 3.80 - 5.20 M/uL    HGB 7.1 (L) 11.5 - 16.0 g/dL    HCT 22.4 (L) 35.0 - 47.0 %    MCV 91.1 80.0 - 99.0 FL    MCH 28.9 26.0 - 34.0 PG    MCHC 31.7 30.0 - 36.5 g/dL    RDW 15.3 (H) 11.5 - 14.5 %    PLATELET 709 304 - 951 K/uL    NEUTROPHILS 80 (H) 32 - 75 %    LYMPHOCYTES 7 (L) 12 - 49 %    MONOCYTES 12 5 - 13 %    EOSINOPHILS 1 0 - 7 %    BASOPHILS 0 0 - 1 %    ABS. NEUTROPHILS 4.5 1.8 - 8.0 K/UL    ABS. LYMPHOCYTES 0.4 (L) 0.8 - 3.5 K/UL    ABS. MONOCYTES 0.7 0.0 - 1.0 K/UL    ABS. EOSINOPHILS 0.0 0.0 - 0.4 K/UL    ABS. BASOPHILS 0.0 0.0 - 0.1 K/UL   MAGNESIUM    Collection Time: 12/15/17  3:38 AM   Result Value Ref Range    Magnesium 1.4 (L) 1.6 - 2.4 mg/dL   METABOLIC PANEL, COMPREHENSIVE    Collection Time: 12/15/17  3:38 AM   Result Value Ref Range    Sodium 138 136 - 145 mmol/L    Potassium 3.1 (L) 3.5 - 5.1 mmol/L    Chloride 103 97 - 108 mmol/L    CO2 27 21 - 32 mmol/L    Anion gap 8 5 - 15 mmol/L    Glucose 100 65 - 100 mg/dL    BUN 20 6 - 20 MG/DL    Creatinine 1.04 (H) 0.55 - 1.02 MG/DL    BUN/Creatinine ratio 19 12 - 20      GFR est AA >60 >60 ml/min/1.73m2    GFR est non-AA 51 (L) >60 ml/min/1.73m2    Calcium 9.4 8.5 - 10.1 MG/DL    Bilirubin, total 0.7 0.2 - 1.0 MG/DL    ALT (SGPT) 19 12 - 78 U/L    AST (SGOT) 25 15 - 37 U/L    Alk.  phosphatase 53 45 - 117 U/L    Protein, total 8.0 6.4 - 8.2 g/dL Albumin 1.8 (L) 3.5 - 5.0 g/dL    Globulin 6.2 (H) 2.0 - 4.0 g/dL    A-G Ratio 0.3 (L) 1.1 - 2.2           Assessment:     Active Problems:    Right leg DVT ([facility] (12/9/2017)        Plan:     -- no direct plans for GI intervention  -- follow H/H   -- follow GI output for further signs of bleeding  -- avoid constipation/straining, will increase miralax

## 2017-12-15 NOTE — PROGRESS NOTES
Hospitalist Progress Note    NAME: Trinity    :  1940   MRN:  [id number]       Interim Hospital Summary: 68 y.o. female whom presented on 2017 with      Assessment / Plan:      Right leg swelling and pain from Right thigh DVT POA : IVC filter placed on 17, leg edematous, resume diuretics, was on Eliquis for PE/DVT but now with rectal bleeding not candidate for this anymore. Rectal Bleeding: none today, HH dropping  Will give a unit of blood today  need SNF, PT/OT needs to work with her. Acute kidney injury POA  so far resolved, resume diuretics and monitor. FEVER/with SIRS POA   May be 2ry to DVT, so far no fever since yesterday, monitor. Advanced Uterine cancer POA now with LN mets, Scheduled to start chemo in 2018  MGUS Following with Dr. Susi Kennedy  Patient refused BM biopsy  Essential HTN POA  Continue toprol Xl  PRN hydralazine  Chronic pain syndrome POA Will follow up palliative care recommendations   Morbidly obese ,BMI 47 advised on weight loss and diet. Code status: Full  Prophylaxis:see above     D/c plan likely today if we get a bed           Subjective:     Chief Complaint / Reason for Physician Visit no bleeding, doing well    Discussed with RN events overnight. Review of Systems:  Symptom Y/N Comments  Symptom Y/N Comments   Fever/Chills n   Chest Pain n    Poor Appetite n   Edema     Cough n   Abdominal Pain n    Sputum    Joint Pain     SOB/SRIVASTAVA n   Pruritis/Rash     Nausea/vomit n   Tolerating PT/OT     Diarrhea    Tolerating Diet y    Constipation    Other       Could NOT obtain due to:      Objective:     VITALS:   Last 24hrs VS reviewed since prior progress note.  Most recent are:  Patient Vitals for the past 24 hrs:   Temp Pulse Resp BP SpO2   12/15/17 0351 98.9 °F (37.2 °C) 90 18 118/56 98 %   12/15/17 0118 98.3 °F (36.8 °C) 98 18 119/53 99 %   17 1933 100.2 °F (37.9 °C) 93 18 120/52 97 %   17 1535 99 °F (37.2 °C) 97 20 141/73 95 %   17 0830 99.1 °F (37.3 °C) 97 20 155/57 99 %       Intake/Output Summary (Last 24 hours) at 12/15/17 0714  Last data filed at 12/14/17 1632   Gross per 24 hour   Intake           348.75 ml   Output              450 ml   Net          -101.25 ml        PHYSICAL EXAM:  General: WD, WN. Alert, cooperative, obese female no acute distress    EENT:  EOMI. Anicteric sclerae. MMM  Resp:  CTA bilaterally, no wheezing or rales. No accessory muscle use  CV:  Regular  Rhythm, right thigh warm, swollen, and tender to touch  GI:  Soft, obese Non distended, Non tender.  +Bowel sounds  Neurologic:  Alert and oriented X 3, normal speech,   Psych:   Good insight. Not anxious nor agitated  Skin:  No rashes. No jaundice chronic venous skin changes in lower ext    Reviewed most current lab test results and cultures  YES  Reviewed most current radiology test results   YES  Review and summation of old records today    NO  Reviewed patient's current orders and MAR    YES  PMH/ reviewed - no change compared to H&P  ________________________________________________________________________  Care Plan discussed with:    Comments   Patient x    Family  x    RN x    Care Manager x    Consultant                        Multidiciplinary team rounds were held today with , nursing, pharmacist and clinical coordinator. Patient's plan of care was discussed; medications were reviewed and discharge planning was addressed. ________________________________________________________________________  Total NON critical care TIME:  30   Minutes    Total CRITICAL CARE TIME Spent:   Minutes non procedure based      Comments   >50% of visit spent in counseling and coordination of care x    ________________________________________________________________________  Augustine Thomas MD     Procedures: see electronic medical records for all procedures/Xrays and details which were not copied into this note but were reviewed prior to creation of Plan. LABS:  I reviewed today's most current labs and imaging studies.   Pertinent labs include:  Recent Labs      12/15/17   0338  12/14/17 0346 12/13/17   0840   WBC  5.6  5.6  6.3   HGB  7.1*  7.4*  7.5*   HCT  22.4*  22.6*  23.5*   PLT  191  193  201     Recent Labs      12/15/17   0338  12/14/17   0346  12/13/17   1008   NA  138  137  137   K  3.1*  3.5  3.4*   CL  103  104  102   CO2  27  26  27   GLU  100  97  106*   BUN  20  20  23*   CREA  1.04*  0.96  1.10*   CA  9.4  9.6  9.9   MG  1.4*   --    --    ALB  1.8*   --    --    TBILI  0.7   --    --    SGOT  25   --    --    ALT  19   --    --        Signed: Dhaval Bragg MD

## 2017-12-15 NOTE — PROGRESS NOTES
JUDE was informed that [facility] has accepted pt to SNF, they wanted additional information regarding pt's chemo treatments.     JARAD Wild   [phone]

## 2017-12-16 ENCOUNTER — APPOINTMENT (OUTPATIENT)
Dept: ULTRASOUND IMAGING | Age: 77
DRG: 253 | End: 2017-12-16
Attending: INTERNAL MEDICINE
Payer: MEDICARE

## 2017-12-16 VITALS
HEART RATE: 97 BPM | SYSTOLIC BLOOD PRESSURE: 118 MMHG | BODY MASS INDEX: 47.09 KG/M2 | RESPIRATION RATE: 18 BRPM | DIASTOLIC BLOOD PRESSURE: 52 MMHG | TEMPERATURE: 98.6 F | HEIGHT: 66 IN | OXYGEN SATURATION: 92 % | WEIGHT: 293 LBS

## 2017-12-16 LAB
ANION GAP SERPL CALC-SCNC: 5 MMOL/L (ref 5–15)
BUN SERPL-MCNC: 16 MG/DL (ref 6–20)
BUN/CREAT SERPL: 20 (ref 12–20)
CALCIUM SERPL-MCNC: 9.3 MG/DL (ref 8.5–10.1)
CHLORIDE SERPL-SCNC: 104 MMOL/L (ref 97–108)
CO2 SERPL-SCNC: 29 MMOL/L (ref 21–32)
CREAT SERPL-MCNC: 0.82 MG/DL (ref 0.55–1.02)
GLUCOSE SERPL-MCNC: 87 MG/DL (ref 65–100)
HCT VFR BLD AUTO: 24.8 % (ref 35–47)
HGB BLD-MCNC: 7.9 G/DL (ref 11.5–16)
POTASSIUM SERPL-SCNC: 3.5 MMOL/L (ref 3.5–5.1)
SODIUM SERPL-SCNC: 138 MMOL/L (ref 136–145)

## 2017-12-16 PROCEDURE — 74011250637 HC RX REV CODE- 250/637: Performed by: NURSE PRACTITIONER

## 2017-12-16 PROCEDURE — 77030011255 HC DSG AQUACEL AG BMS -A

## 2017-12-16 PROCEDURE — 36415 COLL VENOUS BLD VENIPUNCTURE: CPT | Performed by: INTERNAL MEDICINE

## 2017-12-16 PROCEDURE — 74011250637 HC RX REV CODE- 250/637: Performed by: INTERNAL MEDICINE

## 2017-12-16 PROCEDURE — 93971 EXTREMITY STUDY: CPT

## 2017-12-16 PROCEDURE — 74011250636 HC RX REV CODE- 250/636: Performed by: INTERNAL MEDICINE

## 2017-12-16 PROCEDURE — 77030009526 HC GEL CARSYN MDII -A

## 2017-12-16 PROCEDURE — 85018 HEMOGLOBIN: CPT | Performed by: INTERNAL MEDICINE

## 2017-12-16 PROCEDURE — 80048 BASIC METABOLIC PNL TOTAL CA: CPT | Performed by: INTERNAL MEDICINE

## 2017-12-16 PROCEDURE — 77030018836 HC SOL IRR NACL ICUM -A

## 2017-12-16 RX ORDER — FACIAL-BODY WIPES
10 EACH TOPICAL DAILY
Qty: 5 SUPPOSITORY | Refills: 0 | Status: SHIPPED | OUTPATIENT
Start: 2017-12-16

## 2017-12-16 RX ORDER — MORPHINE SULFATE 60 MG/1
60 TABLET, FILM COATED, EXTENDED RELEASE ORAL EVERY 12 HOURS
Qty: 10 TAB | Refills: 0 | Status: SHIPPED | OUTPATIENT
Start: 2017-12-16 | End: 2018-02-05 | Stop reason: SDUPTHER

## 2017-12-16 RX ORDER — GABAPENTIN 100 MG/1
100 CAPSULE ORAL 3 TIMES DAILY
Qty: 10 CAP | Refills: 0 | Status: SHIPPED | OUTPATIENT
Start: 2017-12-16 | End: 2018-06-13 | Stop reason: SDUPTHER

## 2017-12-16 RX ORDER — ANASTROZOLE 1 MG/1
TABLET ORAL
Qty: 1 TAB | Refills: 0 | Status: SHIPPED | OUTPATIENT
Start: 2017-12-16

## 2017-12-16 RX ORDER — AMOXICILLIN 250 MG
2 CAPSULE ORAL 2 TIMES DAILY
Qty: 10 TAB | Refills: 0 | Status: SHIPPED | OUTPATIENT
Start: 2017-12-16

## 2017-12-16 RX ORDER — HYDROMORPHONE HYDROCHLORIDE 2 MG/ML
2 INJECTION, SOLUTION INTRAMUSCULAR; INTRAVENOUS; SUBCUTANEOUS ONCE
Status: COMPLETED | OUTPATIENT
Start: 2017-12-16 | End: 2017-12-16

## 2017-12-16 RX ORDER — OXYCODONE AND ACETAMINOPHEN 5; 325 MG/1; MG/1
1 TABLET ORAL
Qty: 10 TAB | Refills: 0 | Status: SHIPPED | OUTPATIENT
Start: 2017-12-16 | End: 2018-02-05 | Stop reason: ALTCHOICE

## 2017-12-16 RX ORDER — POLYETHYLENE GLYCOL 3350 17 G/17G
17 POWDER, FOR SOLUTION ORAL 2 TIMES DAILY
Qty: 20 PACKET | Refills: 0 | Status: SHIPPED | OUTPATIENT
Start: 2017-12-16

## 2017-12-16 RX ADMIN — GABAPENTIN 100 MG: 100 CAPSULE ORAL at 16:54

## 2017-12-16 RX ADMIN — OXYCODONE HYDROCHLORIDE 20 MG: 5 TABLET ORAL at 05:32

## 2017-12-16 RX ADMIN — NYSTATIN: 100000 POWDER TOPICAL at 09:14

## 2017-12-16 RX ADMIN — HYDROMORPHONE HYDROCHLORIDE 1 MG: 2 INJECTION INTRAMUSCULAR; INTRAVENOUS; SUBCUTANEOUS at 01:33

## 2017-12-16 RX ADMIN — HYDROMORPHONE HYDROCHLORIDE 2 MG: 2 INJECTION INTRAMUSCULAR; INTRAVENOUS; SUBCUTANEOUS at 14:19

## 2017-12-16 RX ADMIN — ACETAMINOPHEN 650 MG: 325 TABLET ORAL at 05:32

## 2017-12-16 RX ADMIN — NALOXEGOL OXALATE 25 MG: 25 TABLET, FILM COATED ORAL at 07:02

## 2017-12-16 RX ADMIN — CYANOCOBALAMIN TAB 500 MCG 1000 MCG: 500 TAB at 09:12

## 2017-12-16 RX ADMIN — OXYCODONE HYDROCHLORIDE 20 MG: 5 TABLET ORAL at 16:58

## 2017-12-16 RX ADMIN — MORPHINE SULFATE 60 MG: 30 TABLET, EXTENDED RELEASE ORAL at 09:13

## 2017-12-16 RX ADMIN — Medication 10 ML: at 05:40

## 2017-12-16 RX ADMIN — OXYCODONE HYDROCHLORIDE 20 MG: 5 TABLET ORAL at 12:50

## 2017-12-16 RX ADMIN — ACETAMINOPHEN 650 MG: 325 TABLET ORAL at 01:32

## 2017-12-16 RX ADMIN — HYDROMORPHONE HYDROCHLORIDE 1 MG: 2 INJECTION INTRAMUSCULAR; INTRAVENOUS; SUBCUTANEOUS at 05:40

## 2017-12-16 RX ADMIN — Medication 10 ML: at 12:45

## 2017-12-16 RX ADMIN — VITAMIN D, TAB 1000IU (100/BT) 1000 UNITS: 25 TAB at 09:12

## 2017-12-16 RX ADMIN — METOPROLOL SUCCINATE 100 MG: 50 TABLET, EXTENDED RELEASE ORAL at 09:13

## 2017-12-16 RX ADMIN — GABAPENTIN 100 MG: 100 CAPSULE ORAL at 09:11

## 2017-12-16 RX ADMIN — ACETAMINOPHEN 650 MG: 325 TABLET ORAL at 12:45

## 2017-12-16 RX ADMIN — Medication 10 ML: at 01:34

## 2017-12-16 NOTE — PROGRESS NOTES
General Surgery End of Shift Nursing Note    Bedside shift change report given to Sherley Bella (oncoming nurse) by Rosendo Reilly (offgoing nurse). Report included the following information SBAR, Kardex, ED Summary, Procedure Summary, Intake/Output, MAR, Accordion, Recent Results and Med Rec Status. Shift worked:   7p-7a   Summary of shift:       Issues for physician to address:        Number times ambulated in hallway past shift: 0    Number of times OOB to chair past shift: 0    Pain Management:  Current medication: dilaudid, morphine, oxycodone, tylenol  Patient states pain is manageable on current pain medication: YES    GI:    Current diet:  DIET CARDIAC Regular    Tolerating current diet: YES  Passing flatus: YES      Respiratory:    Incentive Spirometer at bedside: NO  Patient instructed on use: NO    Patient Safety:    Falls Score: 3  Bed Alarm On? No  Sitter?  No    Keith Martinez RN

## 2017-12-16 NOTE — DISCHARGE SUMMARY
Hospitalist Discharge Summary     Patient ID:  Rosette Cortes  [id number]  68 y.o.  1940    PCP on record: Higinio Aparicio MD    Admit date: 12/9/2017  Discharge date and time: 12/16/2017      DISCHARGE DIAGNOSIS:    Right leg swelling and pain from Right thigh DVT POA : IVC filter placed on 12/13/17,  Rectal Bleeding: RESOLVED  Acute kidney injury POA  RESOLVED  FEVER/with SIRS POA  RESOLVED  Advanced Uterine cancer POA  MGUS   Essential HTN POA   Chronic pain syndrome POA  Morbidly obese ,BMI 47       CONSULTATIONS:  IP CONSULT TO GASTROENTEROLOGY  IP CONSULT TO PALLIATIVE CARE - PROVIDER  IP CONSULT TO HEMATOLOGY  IP CONSULT TO INTERVENTIONAL RADIOLOGY    Excerpted HPI from H&P of [place] MD:  CHIEF COMPLAINT: \"I thought I had a blood clot in my right leg\"     HISTORY OF PRESENT ILLNESS:  68 Y. O WF  DVT with PE 2010 maintained on coumadin then eliquis  Recurrent rectal bleeding 9/2017                        Last colonoscopy 12/2016 with   Decision made to stop eliquis ~ Oct 2017     Known uterine cancer d/x 8/2016  Underwent MASOUD/BSO no post op chemo  Local recurrence in vagina, S/P XRT in spring 2017  Followed by Oncology, recent PET 11/2017 showed cancer in right lung nodules, perigastric, inguinal and pelvic LN  Scheduled to start chemo per pt in Jan 2018     Still having intermittent rectal bleeding                        Usually not enough to fill bowel, treats of blood around the stool  Past 3-4 days, increasing pain in right thigh, now severe 10/10  Worse with walking or getting OOB  Usually pain medications not helping  She was worried about recurrent blood clot, came to the ED  Mild SRIVASTAVA, but no pleuritic   No melena or hematemesis  Mild nausea  Mod headaches past week, no focal motor or sensory changes     ED  HD stable  Febrile to 102.7,   CXR negative, UA pending  Rectal with brown, heme positive stool  US right leg with Acute deep venous thrombosis of the right common femoral vein extending in  the proximal superficial femoral vein  IV heparin started        ______________________________________________________________________  DISCHARGE SUMMARY/HOSPITAL COURSE:  for full details see H&P, daily progress notes, labs, consult notes. Right leg swelling and pain from Right thigh DVT POA : IVC filter placed on 12/13/17, leg edematous, resume diuretics, was on Eliquis for PE/DVT but now with rectal bleeding not candidate for this anymore. Rectal Bleeding: yesterday, but none today, HH dropping , if no bleeding and HH remains stable may be D/ C tomorrow, may need SNF, PT/OT needs to work with her. S/P BLOOD TRANSFUSION ON 12/15 ONE UNIT    PER GI  -- no direct plans for GI intervention  -- follow H/H   -- follow GI output for further signs of bleeding  -- avoid constipation/straining, will increase miralax                     Acute kidney injury POA  so far resolved, resume diuretics and monitor. FEVER/with SIRS POA   May be 2ry to DVT, so far no fever since yesterday, monitor. Advanced Uterine cancer POA now with LN mets, Scheduled to start chemo in Jan 2018  MGUS Following with Dr. Quiana Mckeon  Patient refused BM biopsy    PER HEMATOLOGY     1. Acute right LE DVT     > Doppler (12/9/2017): acute DVT of the right common femoral vein extending in the proximal superficial femoral vein  > h/o DVT/PE 2010, on long term anticoagulation with coumadin then eliquis  > Eliquis discontinued in October 2017 d/t recurrent rectal bleed     > Hold anticoagulation given recurrence of GI bleed  > Recommend placement of IVC filter     2. MGUS     > Following with Dr. Quiana Mckeon  > Patient refused BM biopsy     3.  Endometrial cancer     > Following with Dr. Darrius Avila  > Per patient, plan to start chemotherapy at [facility] in January    Essential HTN POA  Continue toprol Xl  PRN hydralazine  Chronic pain syndrome POA Will follow up palliative care recommendations   [address] [place] [address] MEDS  Morbidly obese ,BMI 47 advised on weight loss and diet. Code status: Full  Prophylaxis:   Recommended Disposition: snf      _______________________________________________________________________  Patient seen and examined by me on discharge day. Pertinent Findings:  Gen:    Not in distress  Chest: Clear lungs  CVS:   Regular rhythm. No edema  Abd:  Soft, not distended, not tender  Neuro:  Alert, O X3  _______________________________________________________________________  DISCHARGE MEDICATIONS:   Current Discharge Medication List      START taking these medications    Details   senna-docusate (PERICOLACE) 8.6-50 mg per tablet Take 2 Tabs by mouth two (2) times a day. Qty: 10 Tab, Refills: 0      polyethylene glycol (MIRALAX) 17 gram packet Take 1 Packet by mouth two (2) times a day. Qty: 20 Packet, Refills: 0      gabapentin (NEURONTIN) 100 mg capsule Take 1 Cap by mouth three (3) times daily. Qty: 10 Cap, Refills: 0      bisacodyl (DULCOLAX) 10 mg suppository Insert 10 mg into rectum daily. Qty: 5 Suppository, Refills: 0         CONTINUE these medications which have CHANGED    Details   anastrozole (ARIMIDEX) 1 mg tablet Resume if ok with your oncologist  Qty: 1 Tab, Refills: 0         CONTINUE these medications which have NOT CHANGED    Details   KLOR-CON M20 20 mEq tablet TAKE 1 TABLET BY MOUTH TWICE A DAY  Qty: 30 Tab, Refills: 1    Associated Diagnoses: Diabetes mellitus without complication ([facility] Blood in urine; Lower abdominal pain; Chronic pain disorder; Other pulmonary embolism without acute cor pulmonale, unspecified chronicity (HCC); MCTD (mixed connective tissue disease) ([facility] [address] [facility] Claudication of both lower extremities ([facility] Controlled type 2 diabetes mellitus with chronic kidney disease, unspecified CKD stage, unspecified long term insulin use status ([facility] Endometrial cancer ([facility] Fibromyalgia; Monoclonal gammopathies; Chronic pain of right ankle;  Arm pain, right; Elbow pain, right; Pain in both knees, unspecified chronicity; MGUS (monoclonal gammopathy of unknown significance); Chronic left shoulder pain; Myalgia; Pain of left upper extremity; Vaginal bleeding, abnormal; Personal history of radiation therapy      cholecalciferol (VITAMIN D3) 1,000 unit cap Take  by mouth.      morphine CR (MS CONTIN) 60 mg CR tablet Take 1 Tab by mouth every twelve (12) hours. Max Daily Amount: 120 mg. Indications: Chronic Pain, Severe Pain  Qty: 60 Tab, Refills: 0    Associated Diagnoses: Lower abdominal pain; Chronic pain disorder; MCTD (mixed connective tissue disease) ([facility] [address] [facility] Claudication of both lower extremities ([address] Controlled type 2 diabetes mellitus with chronic kidney disease, unspecified CKD stage, unspecified long term insulin use status ([facility] [address] [facility] Endometrial cancer ([facility] [address] [facility] Fibromyalgia; Monoclonal gammopathies; Chronic pain of right ankle; Arm pain, right; Elbow pain, right; Pain in both knees, unspecified chronicity; MGUS (monoclonal gammopathy of unknown significance); Chronic left shoulder pain; Myalgia; Pain of left upper extremity; Vaginal bleeding, abnormal; Diabetes mellitus without complication ([address] Other pulmonary embolism without acute cor pulmonale, unspecified chronicity ([address] Personal history of radiation therapy; Pain of left hand; Left wrist pain; Uterine leiomyoma, unspecified location; Leg pain, bilateral      oxyCODONE IR (ROXICODONE) 20 mg immediate release tablet Take 1 Tab by mouth every four (4) hours as needed for Pain. Max Daily Amount: 120 mg. Indications: Pain  Qty: 180 Tab, Refills: 0    Associated Diagnoses: Chronic pain disorder; Lower abdominal pain; MCTD (mixed connective tissue disease) ([address] Claudication of both lower extremities ([address] Controlled type 2 diabetes mellitus with chronic kidney disease, unspecified CKD stage, unspecified long term insulin use status ([facility] [address] [facility] Endometrial cancer ([facility] [address] Fibromyalgia; Monoclonal gammopathies; Chronic pain of right ankle;  Arm pain, right; Elbow pain, right; Pain in both knees, unspecified chronicity; MGUS (monoclonal gammopathy of unknown significance); Chronic left shoulder pain; Myalgia; Pain of left upper extremity; Vaginal bleeding, abnormal; Diabetes mellitus without complication ([facility] [facility] Other pulmonary embolism without acute cor pulmonale, unspecified chronicity ([facility] [address] Personal history of radiation therapy; Pain of left hand; Left wrist pain; Uterine leiomyoma, unspecified location; Leg pain, bilateral      furosemide (LASIX) 80 mg tablet TAKE 1 TABLET BY MOUTH EVERY DAY  Refills: 11      metoprolol succinate (TOPROL-XL) 100 mg tablet 100 mg daily. magnesium 250 mg tab Take  by mouth daily. cyanocobalamin (VITAMIN B-12) 1,000 mcg tablet Take 1 Tab by mouth daily. Qty: 90 Tab, Refills: 1      ascorbate calcium 500 mg tab Take 500 mg by mouth.      clonazePAM (KLONOPIN) 1 mg tablet Take 1 mg by mouth.      methylnaltrexone (RELISTOR) 150 mg tab tablet Take 3 Tabs by mouth Daily (before breakfast). Qty: 90 Tab, Refills: 4      naloxegol (MOVANTIK) 25 mg tab tablet Take 1 Tab by mouth Daily (before breakfast). Qty: 30 Tab, Refills: 6      pantoprazole (PROTONIX) 40 mg tablet Take 1 Tab by mouth two (2) times a day. Qty: 60 Tab, Refills: 2      ondansetron (ZOFRAN ODT) 4 mg disintegrating tablet Take 1 Tab by mouth every eight (8) hours as needed for Nausea. Qty: 16 Tab, Refills: 0      hydrALAZINE (APRESOLINE) 25 mg tablet Take 1 Tab by mouth daily. Qty: 90 Tab, Refills: 6      cholecalciferol, vitamin D3, (VITAMIN D3) 2,000 unit tab Take  by mouth daily.          STOP taking these medications       apixaban (ELIQUIS) 5 mg tablet Comments:   Reason for Stopping:         apixaban (ELIQUIS) 5 mg tablet Comments:   Reason for Stopping:               My Recommended Diet, Activity, Wound Care, and follow-up labs are listed in the patient's Discharge Insturctions which I have personally completed and reviewed.     ______________________________________________________________________    Risk of deterioration: MODERATE     Condition at Discharge:  Stable  ______________________________________________________________________    Disposition  snf  ______________________________________________________________________    Care Plan discussed with:   , RN, FAMILY AND PATIENT     Comment:   ______________________________________________________________________    Code Status: FULL CODE  ______________________________________________________________________      Follow up with:   PCP : Claudis Phalen, MD  Follow-up Information     Follow up With Details Comments Contact Info    [id number] [facility]   [address] [facility]  [address], [place] On 1/8/2018 3;00pm  hospital follow-up [address]                 Total time in minutes spent coordinating this discharge (includes going over instructions, follow-up, prescriptions, and preparing report for sign off to her PCP) :  30  minutes    Signed:  Aidan Trujillo MD

## 2017-12-16 NOTE — PROGRESS NOTES
D/C instructions reviewed with son at bedside. Pt D/C'd via ambulance to [facility] facility accompanied by family who will follow in car.

## 2017-12-16 NOTE — PROGRESS NOTES
[facility] to [place] [address] [address]                                                                        68 y.o.   female    [address]   Room: 2121/01    [facility] 2 GENERAL SURGERY  Unit Phone# :  [phone]      [address]  [facility] [address]  [address] [address]  Dept: [phone]  Loc: [phone]                    SITUATION     Admitted:  12/9/2017         Attending Provider:  Jarrell Love MD       Consultations:  IP CONSULT TO GASTROENTEROLOGY  IP CONSULT TO PALLIATIVE CARE - PROVIDER  IP CONSULT TO HEMATOLOGY  IP CONSULT TO [address] [place] [address]    PCP:  Cecilia Washington MD   [phone]    Treatment Team: Attending Provider: Jarrell Love MD; Consulting Provider: Ang Hancock MD; Consulting Provider: Kira Gloria MD; Consulting Provider: Jeremiah Ricks MD; Utilization Review: Domingo Riggs; Care Manager: Wale Douglas;  Consulting Provider: Yariel Lauren MD    Admitting Dx:  Right leg DVT ([address]       Principal Problem: <principal problem not specified>    * No surgery found * of      BY: * Surgery not found *             ON: * No surgery found *                  Code Status: Full Code                Advance Directives:   Advance Care Planning 12/13/2017   Patient's Healthcare Decision Maker is: Verbal statement (Legal Next of Kin remains as decision maker)   Primary Decision Maker Name Brock Carrasco   Primary Decision Maker Phone Number [phone]   Primary Decision Maker Relationship to Patient Adult child   Secondary Decision Maker Name -   Secondary Decision [address] Phone Number -   Secondary Decision Maker Relationship to Patient -   Confirm Advance Directive None   Patient Would Like to Complete Advance Directive Yes   Does the patient have other document types -    (Send w/patient)   No Doesnt Have       Isolation:  There are currently no Active Isolations       MDRO: No current active infections    Pain Medications given:  Roxicodone    Last dose: 12/16/17 at  1700    Special Equipment needed: yes  Type of equipment: Large BSC/  Walker         BACKGROUND     Allergies:   Allergies   Allergen Reactions    Latex Rash and Itching    Advair Diskus [Fluticasone-Salmeterol] Other (comments)     \"breathing problems\"    Bactrim [Sulfamethoprim] Shortness of Breath, Itching and Swelling     Swelling of tongue and throat    Eliquis [Apixaban] Other (comments)    Iodinated Contrast- Oral And Iv Dye Hives    Ivp [Fd And C Blue No.1] Hives and Shortness of Breath    Lovenox [Enoxaparin] Other (comments)     DIC    Nsaids (Non-Steroidal Anti-Inflammatory Drug) Other (comments)     Heartburn    Sulfa (Sulfonamide Antibiotics) Shortness of Breath    Xarelto [Rivaroxaban] Myalgia     Paresthesia, spasmic muscle       Past Medical History:   Diagnosis Date    Acute kidney failure ([address] 11/19/2015    Acute sinusitis 2/11/2011    Adverse effect of anesthesia     SLOW WAKING PAST ANESTHESIA    Arthritis     RA    At risk for side effect of medication 9/30/2016    Atrial fibrillation ([address] 10/19/2011    Autoimmune disease ([address]     FIBROMYLGIA    Cancer ([address] 2016    ENDOMETRIAL     Cholelithiasis 11/19/2015    Chronic pain     Claudication of both lower extremities ([address] 8/25/2015    Diabetes mellitus type 2, controlled ([address] 1/19/2016    Diabetes mellitus, type II ([address] 10/10/2014    Fall against object 10/10/2014    Fatty liver 10/20/2015    Hypertension     Ill-defined condition     SPINAL STENOSIS    Left shoulder pain 10/10/2014    Leiomyoma of body of uterus 4/7/2016    Morbid obesity ([address]     Myalgia 9/30/2016    Nausea & vomiting     Personal history of radiation therapy 4/20/2017    Pneumonia     Preop examination 6/15/2015    Preoperative clearance 6/15/2015    Rash 2/11/2011    Rash of hands 10/10/2014    Screening for colon cancer 2/22/2016    Screening for glaucoma 2/22/2016    Sleep apnea     SOB (shortness of breath)     hospitalized 5/2012. angina, SOB    Thromboembolus ([address] 2011    LEFT LOWER LEG AND PE    Tinea pedis, recurrent 7/20/2015    Vaginal bleeding, abnormal 4/20/2017       Past Surgical History:   Procedure Laterality Date    COLONOSCOPY N/A 12/21/2016    COLONOSCOPY performed by Mary Weaver MD at [facility]  12/21/2016         HX CATARACT REMOVAL Bilateral 2015    [address]    HX DILATION AND CURETTAGE  07/29/2016    HX DILATION AND CURETTAGE  2016    HX GYN  1960    etopic    HX HEART CATHETERIZATION  2001    NEGATIVE PER PATIENT    HX HYSTERECTOMY  2016    HX KNEE ARTHROSCOPY Left 1998    HX ORTHOPAEDIC Right 1960'S    BUNIONECTOMY    HX OTHER SURGICAL      BIOPSY OF VEIN IN FOREHEAD    HX TUBAL LIGATION  1963    OK COLSC FLX W/RMVL OF TUMOR POLYP LESION SNARE TQ  12/12/2011            Prescriptions Prior to Admission   Medication Sig    KLOR-CON M20 20 mEq tablet TAKE 1 TABLET BY MOUTH TWICE A DAY    cholecalciferol (VITAMIN D3) 1,000 unit cap Take  by mouth.  hydrALAZINE (APRESOLINE) 25 mg tablet Take 25 mg by mouth.  morphine CR (MS CONTIN) 60 mg CR tablet Take 1 Tab by mouth every twelve (12) hours. Max Daily Amount: 120 mg. Indications: Chronic Pain, Severe Pain    oxyCODONE IR (ROXICODONE) 20 mg immediate release tablet Take 1 Tab by mouth every four (4) hours as needed for Pain. Max Daily Amount: 120 mg. Indications: Pain    furosemide (LASIX) 80 mg tablet TAKE 1 TABLET BY MOUTH EVERY DAY    metoprolol succinate (TOPROL-XL) 100 mg tablet 100 mg daily.  magnesium 250 mg tab Take  by mouth daily.  cyanocobalamin (VITAMIN B-12) 1,000 mcg tablet Take 1 Tab by mouth daily.  ascorbate calcium 500 mg tab Take 500 mg by mouth.  clonazePAM (KLONOPIN) 1 mg tablet Take 1 mg by mouth.  metOLazone (ZAROXOLYN) 5 mg tablet Take 5 mg by mouth.     apixaban (ELIQUIS) 5 mg tablet Take 5 mg by mouth.  methylnaltrexone (RELISTOR) 150 mg tab tablet Take 3 Tabs by mouth Daily (before breakfast).  naloxegol (MOVANTIK) 25 mg tab tablet Take 1 Tab by mouth Daily (before breakfast).  pantoprazole (PROTONIX) 40 mg tablet Take 1 Tab by mouth two (2) times a day.  ondansetron (ZOFRAN ODT) 4 mg disintegrating tablet Take 1 Tab by mouth every eight (8) hours as needed for Nausea.  apixaban (ELIQUIS) 5 mg tablet Take 1 Tab by mouth two (2) times a day. Indications: Cerebral Thromboembolism Prevention, DEEP VEIN THROMBOSIS PREVENTION, deep venous thrombosis, pulmonary thromboembolism    hydrALAZINE (APRESOLINE) 25 mg tablet Take 1 Tab by mouth daily.  OTHER MORPHINE CREAM APPLIES TO KNEES 3-4X DAILY    cholecalciferol, vitamin D3, (VITAMIN D3) 2,000 unit tab Take  by mouth daily. Hard scripts included in transfer packet yes    Vaccinations:    Immunization History   Administered Date(s) Administered    Tdap 05/17/2006    ZZZ-RETIRED (DO NOT USE) Pneumococcal Vaccine (Unspecified Type) 01/01/2010       Readmission Risks:    Known Risks: Morbid Obesity        The Charlson CoMorbitiy Index tool is an evidenced based tool that has more automatic generated information. The tool looks at many different items such as the age of the patient, how many times they were admitted in the last calendar year, current length of stay in the hospital and their diagnosis. All of these items are pulled automatically from information documented in the chart from various places and will generate a score that predicts whether a patient is at low (less than 13), medium (13-20) or high (21 or greater) risk of being readmitted.         ASSESSMENT                Temp: 98.4 °F (36.9 °C) (12/16/17 0822) Pulse (Heart Rate): 98 (12/16/17 0822)     Resp Rate: 18 (12/16/17 0822)           BP: 121/62 (12/16/17 0822)     O2 Sat (%): 98 % (12/16/17 0822)     Weight: 133.8 kg (295 lb)    Height: 5' 6\" (167.6 cm) (12/09/17 1716)       If above not within 1 hour of discharge:    BP:_____  P:____  R:____ T:_____ O2 Sat: ___%  O2: ______    Active Orders   Diet    DIET CARDIAC Regular         Orientation: oriented to time, place, person and situation     Active Behaviors: None                                   Active Lines/Drains:  (Peg Tube / Archuleta / CL or S/L?): no    Urinary Status: Voiding     Last BM: Last Bowel Movement Date: 12/16/17     Skin Integrity: Intact             Mobility: Very limited   Weight Bearing Status: WBAT (Weight Bearing as Tolerated)      Gait Training  Assistive Device: Walker, rolling  Ambulation - Level of Assistance: Minimal assistance, Additional time  Distance (ft):  (few steps bed to chair)         Lab Results   Component Value Date/Time    Glucose 87 12/16/2017 06:25 AM    Hemoglobin A1c 6.1 06/22/2017 12:20 PM    INR 1.6 12/09/2017 05:35 PM    INR 1.3 12/22/2016 11:40 AM    INR 1.5 09/29/2016 11:21 AM    HGB 7.9 12/16/2017 06:25 AM    HGB 7.1 12/15/2017 03:38 AM        RECOMMENDATION     See After Visit Summary (AVS) for:  · Discharge instructions  · After [address]   · Special equipment needed (entered pre-discharge by Care Management)  · Medication Reconciliation    · Follow up Appointment(s)         Report given/sent by:  Radha Zepeda RN                    Verbal report given to: Jane Dumont  FAXED to:  N/A Paperwork sent with ambulance         Estimated discharge time:  12/16/2017 at  12/17/17 at 1700

## 2017-12-16 NOTE — ACP (ADVANCE CARE PLANNING)
Follow up visit with Ms. Tim Salcedo and her son Rica Jackson who was present. Patient and family had completed most of the Advance Directive prior to my visit; it was left by previous . I desired to confirm patient's desires as listed on AMD so family and I entered the room. Son was in garza speaking with Dr. Jarrett Fairchild upon my arrival.      We all entered patient's room together and Ms. Tim Salcedo was quietly moaning as if in pain. Upon addressing her she quickly perked up and became very clear. She affirmed her wishes for Mr. Reyes Harper to be her medical power of  and that she continues to want everything done. She also affirmed her ability to \"run up and down the garza if she needed to\" and spoke disparingly of rehab and said they were not going to do anything for her. Patient appeared defensive at times, but then appeared to warm up as I recognized her as a  and her time at [employer] 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 Affirmed patient's ministry. Form completed and witnessed by myself and Dr. Jarrett Fairchild. Copies made. Copy and original given to patient for her medical records. Copy placed in patient's medical chart for scanning. Assured pt of pastoral prayers and care. Pastoral care is available to follow as needed; though patient is preparing for discharge at 3:00 pm. Today. Visit by: Judah Grace. Bernardo Jones.  Donna Cervantes MA, [facility]    Lead  Profession Development & Advancement

## 2017-12-16 NOTE — PROGRESS NOTES
Follow up visit with Ms. Catrachito Drake and her son Brant West who was present. Patient and family had completed most of the Advance Directive prior to my visit; it was left by previous . I desired to confirm patient's desires as listed on AMD so family and I entered the room. Son was in garza speaking with Dr. Jacinta Landis upon my arrival.      We all entered patient's room together and Ms. Catrachito Drake was quietly moaning as if in pain. Upon addressing her she quickly perked up and became very clear. She affirmed her wishes for Mr. Rajat Woods to be her medical power of  and that she continues to want everything done. She also affirmed her ability to \"run up and down the garza if she needed to\" and spoke disparingly of rehab and said they were not going to do anything for her. Patient appeared defensive at times, but then appeared to warm up as I recognized her as a  and her time at [employer] 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 Affirmed patient's ministry. Form completed and witnessed by myself and Dr. Jacinta Landis. Copies made. Copy and original given to patient for her medical records. Copy placed in patient's medical chart for scanning. Assured pt of pastoral prayers and care. Pastoral care is available to follow as needed; though patient is preparing for discharge at 3:00 pm. Today. Visit by: Miles Miller. Raiza Cooper.  Donna Cervantes MA, [facility]    Lead  Profession Development & Advancement

## 2017-12-16 NOTE — PROGRESS NOTES
General Surgery End of Shift Nursing Note    Bedside shift change report given to Keith SIFUENTES (oncoming nurse) by Han Alford RN (offgoing nurse). Report included the following information SBAR, Kardex, ED Summary, Procedure Summary, Intake/Output, MAR, Accordion, Recent Results and Med Rec Status. Shift worked:   7a-7p   Summary of shift:    Pt with c/o burning with void, order for clean catch UA. 1 unit RBC for Hgb 7.1. Nystatin ordered for yeast under pannis. Plan for d/c to rehab [facility]. Issues for physician to address:   none     Number times ambulated in hallway past shift: 0    Number of times OOB to chair past shift: 0    Pain Management:  Current medication: dilaudid, morphine, oxycodone, tylenol  Patient states pain is manageable on current pain medication: YES    GI:    Current diet:  DIET CARDIAC Regular    Tolerating current diet: YES  Passing flatus: YES  Last Bowel Movement: yesterday       Respiratory:    Incentive Spirometer at bedside: NO  Patient instructed on use: NO    Patient Safety:    Falls Score: 3  Bed Alarm On? No  Sitter?  No    Maren Inman RN

## 2017-12-16 NOTE — PROGRESS NOTES
Pt and family state right leg (knee) is \"getting bigger. Knee seems the same size as this morning,  remains warm with a pedal pulse palbable. Dr. Keenan espinoza.

## 2017-12-16 NOTE — PROGRESS NOTES
Medicated with Hydromorphine IV 2mg per Dr. Jacinta Landis order and STAT US duplex of right LE ordered. Attempting to notify US now.

## 2017-12-17 LAB
ABO + RH BLD: NORMAL
BACTERIA SPEC CULT: NORMAL
BLD PROD TYP BPU: NORMAL
BLD PROD TYP BPU: NORMAL
BLOOD GROUP ANTIBODIES SERPL: NORMAL
BPU ID: NORMAL
BPU ID: NORMAL
CROSSMATCH RESULT,%XM: NORMAL
CROSSMATCH RESULT,%XM: NORMAL
SERVICE CMNT-IMP: NORMAL
SPECIMEN EXP DATE BLD: NORMAL
STATUS OF UNIT,%ST: NORMAL
STATUS OF UNIT,%ST: NORMAL
UNIT DIVISION, %UDIV: 0
UNIT DIVISION, %UDIV: 0

## 2017-12-18 ENCOUNTER — PATIENT OUTREACH (OUTPATIENT)
Dept: FAMILY MEDICINE CLINIC | Age: 77
End: 2017-12-18

## 2017-12-18 LAB
BACTERIA SPEC CULT: ABNORMAL
CC UR VC: ABNORMAL
SERVICE CMNT-IMP: ABNORMAL

## 2017-12-18 NOTE — PROGRESS NOTES
Spoke with Wild Sanon (On Permission to Release information/10-25-16). He states Ms. Ad Marin currently at [facility] receiving some therapy and he is expecting her to be there until at least Friday. He actually would like to speak with Dr. Nasir Ivan. I let him know I would make contact with Dr. Tuyet Lindquist staff to relay the message as patient had a scheduled appt on Wed 12-20-17. I have spoken with Eda/Dr. Sebastian's nurse and appt for Wed has been cancelled. She is also aware that Mr. Trish Brothers would like to speak with Dr. Nasir Ivan and she will give message to him. Will continue to follow. [employer] attempting to complete medication Reconciliation, transferred x2 and still no one able to do med rec with me. Yung Whaley indicated she could call me back later to do med recon. I provided my name/number, await call back. Still no call back from SNF to do Medication Reconciliation. I attempted to call Nurses' station once again and there was no answer. 12-19-17:  I was able to speak with Yung Whaley from [facility] today. She completed medication reconciliation today. There were several meds on med list that SNF is not currently giving patient and I advised her to check with her MD to clarify orders. Arimedex--which is listed as being able to resume IF okay with oncologist  Relistor  Ascorbate calcium  Klonipin  ducolax  Percocet (patient on other pain medications)    Will go ahead and forward information to Dr. Alem Morin staff so they are aware of medications patient on and not currently taking in SNF as noted. Current Outpatient Prescriptions   Medication Sig    multivitamin with iron tablet Take 1 Tab by mouth daily. Indications: Per Yung Whaley at [facility] during medication Reconciliation, med added on 12-18-17, medication is chewable.  senna-docusate (PERICOLACE) 8.6-50 mg per tablet Take 2 Tabs by mouth two (2) times a day.     polyethylene glycol (MIRALAX) 17 gram packet Take 1 Packet by mouth two (2) times a day.  gabapentin (NEURONTIN) 100 mg capsule Take 1 Cap by mouth three (3) times daily.  morphine CR (MS CONTIN) 60 mg CR tablet Take 1 Tab by mouth every twelve (12) hours. Max Daily Amount: 120 mg. Indications: Chronic Pain, Severe Pain    KLOR-CON M20 20 mEq tablet TAKE 1 TABLET BY MOUTH TWICE A DAY    cyanocobalamin (VITAMIN B-12) 1,000 mcg tablet Take 1 Tab by mouth daily.  metOLazone (ZAROXOLYN) 5 mg tablet Take 5 mg by mouth.  cholecalciferol (VITAMIN D3) 1,000 unit cap Take  by mouth.  oxyCODONE IR (ROXICODONE) 20 mg immediate release tablet Take 1 Tab by mouth every four (4) hours as needed for Pain. Max Daily Amount: 120 mg. Indications: Pain (Patient taking differently: Take 20 mg by mouth every four (4) hours as needed for Pain. Indications: Pain, SNF orders are to give every 4-6 hours prn for moderate to severe pain)    naloxegol (MOVANTIK) 25 mg tab tablet Take 1 Tab by mouth Daily (before breakfast).  pantoprazole (PROTONIX) 40 mg tablet Take 1 Tab by mouth two (2) times a day.  ondansetron (ZOFRAN ODT) 4 mg disintegrating tablet Take 1 Tab by mouth every eight (8) hours as needed for Nausea.  furosemide (LASIX) 80 mg tablet TAKE 1 TABLET BY MOUTH EVERY DAY    hydrALAZINE (APRESOLINE) 25 mg tablet Take 1 Tab by mouth daily.  OTHER MORPHINE CREAM APPLIES TO KNEES 3-4X DAILY   Indications: Andrez Kothari Junior during med reconciliation:  SNF is giving aspercream with lidocaine 4% apply to knees q shift.  metoprolol succinate (TOPROL-XL) 100 mg tablet 100 mg daily.  magnesium 250 mg tab Take  by mouth daily.  cholecalciferol, vitamin D3, (VITAMIN D3) 2,000 unit tab Take  by mouth daily.  bisacodyl (DULCOLAX) 10 mg suppository Insert 10 mg into rectum daily.     anastrozole (ARIMIDEX) 1 mg tablet Resume if ok with your oncologist    oxyCODONE-acetaminophen (PERCOCET) 5-325 mg per tablet Take 1 Tab by mouth every four (4) hours as needed for Pain. Max Daily Amount: 6 Tabs.  ascorbate calcium 500 mg tab Take 500 mg by mouth.  clonazePAM (KLONOPIN) 1 mg tablet Take 1 mg by mouth.  methylnaltrexone (RELISTOR) 150 mg tab tablet Take 3 Tabs by mouth Daily (before breakfast).  morphine CR (MS CONTIN) 60 mg CR tablet Take 1 Tab by mouth every twelve (12) hours. Max Daily Amount: 120 mg. Indications: Chronic Pain, Severe Pain (Patient taking differently: Take 60 mg by mouth every twelve (12) hours. Indications: Chronic Pain, Severe Pain, Possible duplicate, noted patient receiving med at SNF, only difference in order is the amount of med dispenced)     No current facility-administered medications for this visit.

## 2017-12-18 NOTE — Clinical Note
Patient currently in [employer], completed med reconciliation. Noted multiple meds not given, please see notes, just wanted Dr. Adelaide Morin to be aware of it. Thanks.

## 2017-12-19 RX ORDER — MULTIVITAMIN WITH IRON
1 TABLET ORAL DAILY
COMMUNITY
End: 2018-01-09

## 2017-12-20 ENCOUNTER — PATIENT OUTREACH (OUTPATIENT)
Dept: CASE MANAGEMENT | Age: 77
End: 2017-12-20

## 2017-12-20 NOTE — PROGRESS NOTES
Community Care Team Documentation for Patient in [facility]  Initial Follow Up       Patient was admitted to [facility] from 12/9 to 12/16. Patient was discharged to [facility], [facility], on 12/16 (date). See previous [facility] Care Team documentation under Patient Outreach. Hospital Discharge diagnosis:  Right leg swelling and pain from Right thigh DVT POA     RRAT score:  33    Advance Medical Directive on file in EMR? Advance Directive on file    Total Hospitalizations/ED visits last 6 months? IP - 1; ED - 1    PCP : Hiral Curry MD  Nurse Navigator in PCP office: Karoline Sandifer  Note routed to Nurse Navigator team.    Per Kassidy Pisano at [facility], Reviewed [place] questions with SNF to ensure patient arrived with admission packet in order. Discussed upcoming follow up appointments. PCP 1/8 at 3:00PM. Pt currently open to PT/OT. Currently supervision with bed mobility. Contact guard with transfers. Contact guard when ambulating 30ft. Therapy anticipating to discharge home alone 1/4. HH: TBD. Barrier: Chronic right leg pain. Medications were not reconciled and general patient assessment was not completed during this skilled nursing facility outreach.      THA LeeW

## 2017-12-21 ENCOUNTER — PATIENT OUTREACH (OUTPATIENT)
Dept: FAMILY MEDICINE CLINIC | Age: 77
End: 2017-12-21

## 2017-12-21 NOTE — PROGRESS NOTES
Received inbasket message from Rachel Alvarado indicating anticipated discharge will be home alone on 1-4-17. Appointment with PCP for 1-8-17 at 3pm.  Will continue to follow case.

## 2017-12-27 ENCOUNTER — PATIENT OUTREACH (OUTPATIENT)
Dept: FAMILY MEDICINE CLINIC | Age: 77
End: 2017-12-27

## 2018-01-03 ENCOUNTER — PATIENT OUTREACH (OUTPATIENT)
Dept: CASE MANAGEMENT | Age: 78
End: 2018-01-03

## 2018-01-03 NOTE — PROGRESS NOTES
Community Care Team Documentation for Patient in [facility]  Subsequent Follow up     Patient remains at [facility] ([facility]). See previous [facility] Team notes. PCP : Daniel Medrano MD  Nurse Navigator in PCP office: Jerrica Kaiser at [facility], Plan to discharge tomorrow 1/4 to home with family and [facility]. PCP follow up 1/8 at 3:00 PM.    Medications were not reconciled and general patient assessment was not completed during this skilled nursing facility outreach.      THA GiordanoW

## 2018-01-04 RX ORDER — MORPHINE SULFATE 60 MG/1
60 TABLET, FILM COATED, EXTENDED RELEASE ORAL EVERY 12 HOURS
Qty: 60 TAB | Refills: 0 | Status: SHIPPED | OUTPATIENT
Start: 2018-01-04 | End: 2018-03-19 | Stop reason: SDUPTHER

## 2018-01-04 RX ORDER — OXYCODONE HYDROCHLORIDE 20 MG/1
20 TABLET ORAL
Qty: 180 TAB | Refills: 0 | Status: SHIPPED | OUTPATIENT
Start: 2018-01-04 | End: 2018-02-05 | Stop reason: SDUPTHER

## 2018-01-05 ENCOUNTER — HOME HEALTH ADMISSION (OUTPATIENT)
Dept: HOME HEALTH SERVICES | Facility: HOME HEALTH | Age: 78
End: 2018-01-05
Payer: MEDICARE

## 2018-01-05 ENCOUNTER — TELEPHONE (OUTPATIENT)
Dept: FAMILY MEDICINE CLINIC | Age: 78
End: 2018-01-05

## 2018-01-05 ENCOUNTER — PATIENT OUTREACH (OUTPATIENT)
Dept: FAMILY MEDICINE CLINIC | Age: 78
End: 2018-01-05

## 2018-01-05 NOTE — PROGRESS NOTES
I attempted to talk with Ms. Gabo Vinson today by phone call today. She answered the phone this afternoon and I introduced myself and explained who I was and that I was Dr. Isaac Gonzales nurse navigator and my role. She would not identify her birthdate with me and asked that I send her a letter in the mail. Will forward office letter this afternoon. Also of note, she is to come into PCP office on 1-8-18 at 3pm for SELMA visit. Will arrange to meet patient to introduce self in person while she is in office setting.

## 2018-01-05 NOTE — TELEPHONE ENCOUNTER
Advised son Mr. Cho Gary that his name is not currently on the HIPPA information so we can not talk to him, advised him to complete a new HIPPA form next time he is in office. He agreed.

## 2018-01-05 NOTE — Clinical Note
CHAMP:  This patient would not speak to me on phone, not sure maybe she doesn't trust who I am.  I introduced myself and explained why I was calling and that I worked with Dr. Jasen Suarez etc but she wouldn't talk to me and told me to send her a letter, which I have this afternoon in the outgoing mail. However, I will need to work with her. She is coming in Monday 1-8 at 3pm.  If you could let me know when she has arrived, maybe she will let me speak to her, introduce myself then if she sees me in person and knows I really do work here?   Thanks, [address] [facility]

## 2018-01-05 NOTE — TELEPHONE ENCOUNTER
----- Message from Bryant Gonzalez sent at 1/4/2018  1:51 PM EST -----  Regarding: Dr. Henri Lemon: [phone]  . Dalia Valderrama, pt. Son, requesting to speak with Dr. Makenna Carroll in regards to his mother.

## 2018-01-05 NOTE — LETTER
[place] [id number] My name is Je Astorga. I am the care manager on Dr. Gogo Shore team. I call patients who were recently discharged from the hospital or emergency department. We are committed to providing you excellent care. Please contact me at [phone] regarding your recent Hospital stay at [facility] from 12-9-17 to 12-16-17 and then your stay at [facility] from 12-16-17 to 1-4-18. We appreciate the confidence youve shown by selecting us to provide your healthcare needs and look forward to hearing from you soon. Sincerely,  
 
 
 
Chelsea Suggs, RN, BSN Ambulatory Nurse Navigator [address] [place] [address] [address] 
[phone]

## 2018-01-08 ENCOUNTER — OFFICE VISIT (OUTPATIENT)
Dept: FAMILY MEDICINE CLINIC | Age: 78
End: 2018-01-08

## 2018-01-08 ENCOUNTER — HOME CARE VISIT (OUTPATIENT)
Dept: HOME HEALTH SERVICES | Facility: HOME HEALTH | Age: 78
End: 2018-01-08

## 2018-01-08 ENCOUNTER — PATIENT OUTREACH (OUTPATIENT)
Dept: FAMILY MEDICINE CLINIC | Age: 78
End: 2018-01-08

## 2018-01-08 DIAGNOSIS — Z92.3 PERSONAL HISTORY OF RADIATION THERAPY: ICD-10-CM

## 2018-01-08 DIAGNOSIS — I26.99 OTHER PULMONARY EMBOLISM WITHOUT ACUTE COR PULMONALE, UNSPECIFIED CHRONICITY (HCC): ICD-10-CM

## 2018-01-08 DIAGNOSIS — G89.29 CHRONIC LEFT SHOULDER PAIN: ICD-10-CM

## 2018-01-08 DIAGNOSIS — M79.642 PAIN OF LEFT HAND: ICD-10-CM

## 2018-01-08 DIAGNOSIS — M79.10 MYALGIA: ICD-10-CM

## 2018-01-08 DIAGNOSIS — M79.602 PAIN OF LEFT UPPER EXTREMITY: ICD-10-CM

## 2018-01-08 DIAGNOSIS — C54.1 ENDOMETRIAL CANCER (HCC): ICD-10-CM

## 2018-01-08 DIAGNOSIS — R10.30 LOWER ABDOMINAL PAIN: ICD-10-CM

## 2018-01-08 DIAGNOSIS — D25.9 UTERINE LEIOMYOMA, UNSPECIFIED LOCATION: ICD-10-CM

## 2018-01-08 DIAGNOSIS — M25.562 PAIN IN BOTH KNEES, UNSPECIFIED CHRONICITY: ICD-10-CM

## 2018-01-08 DIAGNOSIS — D47.2 MONOCLONAL GAMMOPATHIES: ICD-10-CM

## 2018-01-08 DIAGNOSIS — E11.9 DIABETES MELLITUS WITHOUT COMPLICATION (HCC): ICD-10-CM

## 2018-01-08 DIAGNOSIS — M35.1 MCTD (MIXED CONNECTIVE TISSUE DISEASE) (HCC): ICD-10-CM

## 2018-01-08 DIAGNOSIS — M25.561 PAIN IN BOTH KNEES, UNSPECIFIED CHRONICITY: ICD-10-CM

## 2018-01-08 DIAGNOSIS — M79.601 ARM PAIN, RIGHT: ICD-10-CM

## 2018-01-08 DIAGNOSIS — M25.512 CHRONIC LEFT SHOULDER PAIN: ICD-10-CM

## 2018-01-08 DIAGNOSIS — G89.29 CHRONIC PAIN OF RIGHT ANKLE: ICD-10-CM

## 2018-01-08 DIAGNOSIS — G89.4 CHRONIC PAIN DISORDER: Primary | ICD-10-CM

## 2018-01-08 DIAGNOSIS — E11.22 CONTROLLED TYPE 2 DIABETES MELLITUS WITH CHRONIC KIDNEY DISEASE, UNSPECIFIED CKD STAGE, UNSPECIFIED LONG TERM INSULIN USE STATUS: ICD-10-CM

## 2018-01-08 DIAGNOSIS — N93.9 VAGINAL BLEEDING, ABNORMAL: ICD-10-CM

## 2018-01-08 DIAGNOSIS — M79.7 FIBROMYALGIA: ICD-10-CM

## 2018-01-08 DIAGNOSIS — M25.571 CHRONIC PAIN OF RIGHT ANKLE: ICD-10-CM

## 2018-01-08 DIAGNOSIS — D47.2 MGUS (MONOCLONAL GAMMOPATHY OF UNKNOWN SIGNIFICANCE): ICD-10-CM

## 2018-01-08 DIAGNOSIS — M25.532 LEFT WRIST PAIN: ICD-10-CM

## 2018-01-08 DIAGNOSIS — M25.521 ELBOW PAIN, RIGHT: ICD-10-CM

## 2018-01-08 DIAGNOSIS — M79.605 LEG PAIN, BILATERAL: ICD-10-CM

## 2018-01-08 DIAGNOSIS — M79.604 LEG PAIN, BILATERAL: ICD-10-CM

## 2018-01-08 DIAGNOSIS — I73.9 CLAUDICATION OF BOTH LOWER EXTREMITIES (HCC): ICD-10-CM

## 2018-01-08 NOTE — PROGRESS NOTES
Attempted to make f/u with patient today to introduce myself in person and provide my business card to her as she was to have a f/u appt with PCP today. However when I went over to PCP staff, I was notified patient had already cancelled her appt for today. As from my previous note, patient did not want to communicate with me by phone and requested I send her a letter in the mail which I have already done. The letter does identify who I am and my role with [facility] and how she may reach me here.

## 2018-01-09 ENCOUNTER — HOME CARE VISIT (OUTPATIENT)
Dept: SCHEDULING | Facility: HOME HEALTH | Age: 78
End: 2018-01-09
Payer: MEDICARE

## 2018-01-09 VITALS
TEMPERATURE: 98.4 F | DIASTOLIC BLOOD PRESSURE: 80 MMHG | RESPIRATION RATE: 16 BRPM | HEART RATE: 82 BPM | OXYGEN SATURATION: 99 % | SYSTOLIC BLOOD PRESSURE: 132 MMHG

## 2018-01-09 PROCEDURE — G0299 HHS/HOSPICE OF RN EA 15 MIN: HCPCS

## 2018-01-09 PROCEDURE — 400013 HH SOC

## 2018-01-09 PROCEDURE — 3331090002 HH PPS REVENUE DEBIT

## 2018-01-09 PROCEDURE — 3331090001 HH PPS REVENUE CREDIT

## 2018-01-10 ENCOUNTER — HOME CARE VISIT (OUTPATIENT)
Dept: SCHEDULING | Facility: HOME HEALTH | Age: 78
End: 2018-01-10
Payer: MEDICARE

## 2018-01-10 ENCOUNTER — PATIENT OUTREACH (OUTPATIENT)
Dept: FAMILY MEDICINE CLINIC | Age: 78
End: 2018-01-10

## 2018-01-10 VITALS
DIASTOLIC BLOOD PRESSURE: 82 MMHG | OXYGEN SATURATION: 96 % | HEART RATE: 81 BPM | SYSTOLIC BLOOD PRESSURE: 136 MMHG | RESPIRATION RATE: 15 BRPM | TEMPERATURE: 98.2 F

## 2018-01-10 PROCEDURE — 3331090002 HH PPS REVENUE DEBIT

## 2018-01-10 PROCEDURE — 3331090001 HH PPS REVENUE CREDIT

## 2018-01-10 PROCEDURE — G0151 HHCP-SERV OF PT,EA 15 MIN: HCPCS

## 2018-01-10 NOTE — PROGRESS NOTES
Received a call back from Denilson Gannon, MARIA at [facility]. I let her know that patient was not willing to talk with me on phone last week when I tried to call her and would not consent to discussion and asked that I send a letter to her in the mail which I did. I was planning to try to meet with her when she arrived at her [facility] appt on 1-8-18 to introduce myself to see if she would be willing to talk with me then. However, patient cancelled her SELMA appt. Therefore, I wanted to let [facility] know what happened and that I wanted to see if they could assist since she has allowed them into the home for care. I asked Lexii Jean if she could let patient know that we need for her to f/u for SELMA appt at PCP office after hospitalization and since her discharge was 1-4-18, I let Lexii Jean know that she would really need to have appt arranged by 1-17-18. Lexii Jean will send message out to staff as she has PT going out next. I have given Lexii Jean my number here at the office should Ms. Tipton change her mind and be willing to talk with me.

## 2018-01-11 PROCEDURE — 3331090002 HH PPS REVENUE DEBIT

## 2018-01-11 PROCEDURE — 3331090001 HH PPS REVENUE CREDIT

## 2018-01-12 ENCOUNTER — HOME CARE VISIT (OUTPATIENT)
Dept: SCHEDULING | Facility: HOME HEALTH | Age: 78
End: 2018-01-12
Payer: MEDICARE

## 2018-01-12 ENCOUNTER — HOME CARE VISIT (OUTPATIENT)
Dept: HOME HEALTH SERVICES | Facility: HOME HEALTH | Age: 78
End: 2018-01-12
Payer: MEDICARE

## 2018-01-12 VITALS
RESPIRATION RATE: 15 BRPM | TEMPERATURE: 97.2 F | OXYGEN SATURATION: 98 % | SYSTOLIC BLOOD PRESSURE: 145 MMHG | DIASTOLIC BLOOD PRESSURE: 80 MMHG | HEART RATE: 86 BPM

## 2018-01-12 PROCEDURE — 3331090001 HH PPS REVENUE CREDIT

## 2018-01-12 PROCEDURE — G0151 HHCP-SERV OF PT,EA 15 MIN: HCPCS

## 2018-01-12 PROCEDURE — 3331090002 HH PPS REVENUE DEBIT

## 2018-01-12 RX ORDER — METOLAZONE 5 MG/1
5 TABLET ORAL DAILY
Qty: 30 TAB | Refills: 0 | Status: SHIPPED | OUTPATIENT
Start: 2018-01-12 | End: 2018-03-19 | Stop reason: SDUPTHER

## 2018-01-13 PROCEDURE — 3331090002 HH PPS REVENUE DEBIT

## 2018-01-13 PROCEDURE — 3331090001 HH PPS REVENUE CREDIT

## 2018-01-14 PROCEDURE — 3331090001 HH PPS REVENUE CREDIT

## 2018-01-14 PROCEDURE — 3331090002 HH PPS REVENUE DEBIT

## 2018-01-15 PROCEDURE — 3331090001 HH PPS REVENUE CREDIT

## 2018-01-15 PROCEDURE — 3331090002 HH PPS REVENUE DEBIT

## 2018-01-16 ENCOUNTER — HOME CARE VISIT (OUTPATIENT)
Dept: SCHEDULING | Facility: HOME HEALTH | Age: 78
End: 2018-01-16
Payer: MEDICARE

## 2018-01-16 ENCOUNTER — PATIENT OUTREACH (OUTPATIENT)
Dept: FAMILY MEDICINE CLINIC | Age: 78
End: 2018-01-16

## 2018-01-16 VITALS
TEMPERATURE: 97.1 F | OXYGEN SATURATION: 7 % | HEART RATE: 89 BPM | DIASTOLIC BLOOD PRESSURE: 92 MMHG | SYSTOLIC BLOOD PRESSURE: 148 MMHG

## 2018-01-16 PROCEDURE — G0300 HHS/HOSPICE OF LPN EA 15 MIN: HCPCS

## 2018-01-16 PROCEDURE — 3331090001 HH PPS REVENUE CREDIT

## 2018-01-16 PROCEDURE — 3331090002 HH PPS REVENUE DEBIT

## 2018-01-16 PROCEDURE — G0152 HHCP-SERV OF OT,EA 15 MIN: HCPCS

## 2018-01-16 RX ORDER — METOLAZONE 5 MG/1
TABLET ORAL
Qty: 30 TAB | Refills: 1 | Status: SHIPPED | OUTPATIENT
Start: 2018-01-16 | End: 2018-06-21 | Stop reason: SDUPTHER

## 2018-01-16 NOTE — PROGRESS NOTES
I made contact with Ms. Kirsten Anand today to f/u with her since she had called me and left me a vm requesting my assistance. When I called her back today, she stated she had gotten assistance from her [facility] nurse and so she no longer needed my assistance. I explained my role as nurse navigator for Dr. Harper Shanks and she stated she did received my letter in the mail. I asked if she would allow me to make contact with her on a weekly basis and again, explained my role and she declined. She stated she felt [facility] was all the services she needed right now. I let her know that she could contact me again should she change her mind. Of note, I spoke with PCP staff today and it was communicated to me that Ms. Kirsten Anand was seen by her PCP in her home upon her discharge from the SNF on 1-4-18. [facility] continues to follow.

## 2018-01-17 ENCOUNTER — HOME CARE VISIT (OUTPATIENT)
Dept: SCHEDULING | Facility: HOME HEALTH | Age: 78
End: 2018-01-17
Payer: MEDICARE

## 2018-01-17 PROCEDURE — 3331090001 HH PPS REVENUE CREDIT

## 2018-01-17 PROCEDURE — 3331090002 HH PPS REVENUE DEBIT

## 2018-01-18 ENCOUNTER — HOME CARE VISIT (OUTPATIENT)
Dept: HOME HEALTH SERVICES | Facility: HOME HEALTH | Age: 78
End: 2018-01-18
Payer: MEDICARE

## 2018-01-18 PROCEDURE — 3331090001 HH PPS REVENUE CREDIT

## 2018-01-18 PROCEDURE — 3331090002 HH PPS REVENUE DEBIT

## 2018-01-19 ENCOUNTER — HOME CARE VISIT (OUTPATIENT)
Dept: SCHEDULING | Facility: HOME HEALTH | Age: 78
End: 2018-01-19
Payer: MEDICARE

## 2018-01-19 ENCOUNTER — HOME CARE VISIT (OUTPATIENT)
Dept: HOME HEALTH SERVICES | Facility: HOME HEALTH | Age: 78
End: 2018-01-19
Payer: MEDICARE

## 2018-01-19 PROCEDURE — 3331090001 HH PPS REVENUE CREDIT

## 2018-01-19 PROCEDURE — 3331090002 HH PPS REVENUE DEBIT

## 2018-01-20 PROCEDURE — 3331090002 HH PPS REVENUE DEBIT

## 2018-01-20 PROCEDURE — 3331090001 HH PPS REVENUE CREDIT

## 2018-01-21 PROCEDURE — 3331090001 HH PPS REVENUE CREDIT

## 2018-01-21 PROCEDURE — 3331090002 HH PPS REVENUE DEBIT

## 2018-01-22 PROCEDURE — 3331090002 HH PPS REVENUE DEBIT

## 2018-01-22 PROCEDURE — 3331090001 HH PPS REVENUE CREDIT

## 2018-01-23 ENCOUNTER — HOME CARE VISIT (OUTPATIENT)
Dept: HOME HEALTH SERVICES | Facility: HOME HEALTH | Age: 78
End: 2018-01-23
Payer: MEDICARE

## 2018-01-23 PROCEDURE — 3331090002 HH PPS REVENUE DEBIT

## 2018-01-23 PROCEDURE — 3331090001 HH PPS REVENUE CREDIT

## 2018-01-24 ENCOUNTER — HOME CARE VISIT (OUTPATIENT)
Dept: SCHEDULING | Facility: HOME HEALTH | Age: 78
End: 2018-01-24
Payer: MEDICARE

## 2018-01-24 VITALS
SYSTOLIC BLOOD PRESSURE: 118 MMHG | TEMPERATURE: 97.7 F | HEART RATE: 83 BPM | RESPIRATION RATE: 14 BRPM | OXYGEN SATURATION: 96 % | DIASTOLIC BLOOD PRESSURE: 78 MMHG

## 2018-01-24 PROCEDURE — 3331090002 HH PPS REVENUE DEBIT

## 2018-01-24 PROCEDURE — G0151 HHCP-SERV OF PT,EA 15 MIN: HCPCS

## 2018-01-24 PROCEDURE — 3331090001 HH PPS REVENUE CREDIT

## 2018-01-25 PROCEDURE — 3331090001 HH PPS REVENUE CREDIT

## 2018-01-25 PROCEDURE — 3331090002 HH PPS REVENUE DEBIT

## 2018-01-26 ENCOUNTER — HOME CARE VISIT (OUTPATIENT)
Dept: SCHEDULING | Facility: HOME HEALTH | Age: 78
End: 2018-01-26
Payer: MEDICARE

## 2018-01-26 VITALS
TEMPERATURE: 97.2 F | HEART RATE: 78 BPM | OXYGEN SATURATION: 99 % | DIASTOLIC BLOOD PRESSURE: 75 MMHG | RESPIRATION RATE: 16 BRPM | SYSTOLIC BLOOD PRESSURE: 116 MMHG

## 2018-01-26 PROCEDURE — 3331090003 HH PPS REVENUE ADJ

## 2018-01-26 PROCEDURE — 3331090001 HH PPS REVENUE CREDIT

## 2018-01-26 PROCEDURE — 3331090002 HH PPS REVENUE DEBIT

## 2018-01-26 PROCEDURE — G0299 HHS/HOSPICE OF RN EA 15 MIN: HCPCS

## 2018-01-26 PROCEDURE — G0151 HHCP-SERV OF PT,EA 15 MIN: HCPCS

## 2018-01-27 VITALS
TEMPERATURE: 98.6 F | BODY MASS INDEX: 46.93 KG/M2 | SYSTOLIC BLOOD PRESSURE: 124 MMHG | DIASTOLIC BLOOD PRESSURE: 60 MMHG | RESPIRATION RATE: 20 BRPM | HEIGHT: 66 IN | WEIGHT: 292 LBS | HEART RATE: 80 BPM | OXYGEN SATURATION: 94 %

## 2018-01-27 PROCEDURE — 3331090002 HH PPS REVENUE DEBIT

## 2018-01-27 PROCEDURE — 3331090001 HH PPS REVENUE CREDIT

## 2018-01-28 PROCEDURE — 3331090001 HH PPS REVENUE CREDIT

## 2018-01-28 PROCEDURE — 3331090002 HH PPS REVENUE DEBIT

## 2018-01-29 ENCOUNTER — PATIENT OUTREACH (OUTPATIENT)
Dept: FAMILY MEDICINE CLINIC | Age: 78
End: 2018-01-29

## 2018-01-29 PROCEDURE — 3331090002 HH PPS REVENUE DEBIT

## 2018-01-29 PROCEDURE — 3331090001 HH PPS REVENUE CREDIT

## 2018-01-30 VITALS
HEART RATE: 88 BPM | SYSTOLIC BLOOD PRESSURE: 132 MMHG | OXYGEN SATURATION: 95 % | DIASTOLIC BLOOD PRESSURE: 70 MMHG | TEMPERATURE: 98.8 F

## 2018-01-30 PROCEDURE — 3331090001 HH PPS REVENUE CREDIT

## 2018-01-30 PROCEDURE — 3331090002 HH PPS REVENUE DEBIT

## 2018-01-31 NOTE — PROGRESS NOTES
HISTORY OF PRESENT ILLNESS  Carly Izaguirre is a 68 y.o. female. HPI   This was a home visit for medication refill patient able to ambulate with a walker for minor activity get shortness of breath and great amount of multiple joint pain in knees shoulder lower back hips unfortunately patient is with 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 myeloma endometrial cancer now with metastasis patient was told to need radiation and chemotherapy for which at this time she is not opting to continue on with the oncology's recommendation the pain is 10 out of 10 without current pain medication she was also in the rehab center for a couple of months was not able to attend the office stiffness and the tingling sensation is today extreme patient is not doctor shopping she is on a contract she is aware of random urine analysis aware of the medication side effect stating that the constipation has been fixed with the current medication and some laxative requesting a refill patient was evaluated and accordingly was treated today her pain medication was refilled at her house patient was told that she may be able to come to the office on her next visit she was told to continue on with the current physical therapy and range of motion strengthening for better outcome in addition she was told not to drive or operate any machinery with the current medication sign of overdose was informed patient was also prescribed anti-overdose medication she is aware of its use    Overall she is happy with the progress without the current medication she will be to do any of her ADL nor of any instrumental ADL stating that despite these medication side effects she rather continue with the medication therapy done any surgical repair she has tried many other medical pain medication not has been able to help her out  Current Outpatient Prescriptions   Medication Sig Dispense Refill    morphine CR (MS CONTIN) 60 mg CR tablet Take 1 Tab by mouth every twelve (12) hours.  Max Daily Amount: 120 mg. Indications: Chronic Pain, Severe Pain, Possible duplicate, noted patient receiving med at SNF, only difference in order is the amount of med dispenced 60 Tab 0    oxyCODONE IR (ROXICODONE) 20 mg immediate release tablet Take 1 Tab by mouth every four (4) hours as needed for Pain. Max Daily Amount: 120 mg. Indications: Pain, SNF orders are to give every 4-6 hours prn for moderate to severe pain 180 Tab 0    metOLazone (ZAROXOLYN) 5 mg tablet TAKE 1 TAB BY MOUTH DAILY. 30 Tab 1    metOLazone (ZAROXOLYN) 5 mg tablet Take 1 Tab by mouth daily. 30 Tab 0    levalbuterol tartrate (XOPENEX) 45 mcg/actuation inhaler Take 1 Puff by inhalation every six (6) hours as needed for Wheezing.  senna-docusate (PERICOLACE) 8.6-50 mg per tablet Take 2 Tabs by mouth two (2) times a day. (Patient not taking: Reported on 1/9/2018) 10 Tab 0    polyethylene glycol (MIRALAX) 17 gram packet Take 1 Packet by mouth two (2) times a day. (Patient not taking: Reported on 1/9/2018) 20 Packet 0    gabapentin (NEURONTIN) 100 mg capsule Take 1 Cap by mouth three (3) times daily. (Patient not taking: Reported on 1/9/2018) 10 Cap 0    bisacodyl (DULCOLAX) 10 mg suppository Insert 10 mg into rectum daily. (Patient not taking: Reported on 1/9/2018) 5 Suppository 0    anastrozole (ARIMIDEX) 1 mg tablet Resume if ok with your oncologist (Patient not taking: Reported on 1/9/2018) 1 Tab 0    morphine CR (MS CONTIN) 60 mg CR tablet Take 1 Tab by mouth every twelve (12) hours. Max Daily Amount: 120 mg. Indications: Chronic Pain, Severe Pain (Patient not taking: Reported on 1/9/2018) 10 Tab 0    oxyCODONE-acetaminophen (PERCOCET) 5-325 mg per tablet Take 1 Tab by mouth every four (4) hours as needed for Pain. Max Daily Amount: 6 Tabs.  (Patient not taking: Reported on 1/9/2018) 10 Tab 0    KLOR-CON M20 20 mEq tablet TAKE 1 TABLET BY MOUTH TWICE A DAY 30 Tab 1    cyanocobalamin (VITAMIN B-12) 1,000 mcg tablet Take 1 Tab by mouth daily. 90 Tab 1    clonazePAM (KLONOPIN) 1 mg tablet Take 1 mg by mouth two (2) times daily as needed (Anxiety).  cholecalciferol (VITAMIN D3) 1,000 unit cap Take 1,000 Units by mouth daily. Take 1 tab daily      methylnaltrexone (RELISTOR) 150 mg tab tablet Take 3 Tabs by mouth Daily (before breakfast). (Patient not taking: Reported on 1/9/2018) 90 Tab 4    naloxegol (MOVANTIK) 25 mg tab tablet Take 1 Tab by mouth Daily (before breakfast). (Patient not taking: Reported on 1/9/2018) 30 Tab 6    pantoprazole (PROTONIX) 40 mg tablet Take 1 Tab by mouth two (2) times a day. 60 Tab 2    ondansetron (ZOFRAN ODT) 4 mg disintegrating tablet Take 1 Tab by mouth every eight (8) hours as needed for Nausea. (Patient not taking: Reported on 1/9/2018) 16 Tab 0    hydrALAZINE (APRESOLINE) 25 mg tablet Take 1 Tab by mouth daily. 90 Tab 6    OTHER MORPHINE CREAM APPLIES TO KNEES 3-4X DAILY   Indications: Per Rick during med reconciliation:  SNF is giving aspercream with lidocaine 4% apply to knees q shift.  metoprolol succinate (TOPROL-XL) 100 mg tablet Take 100 mg by mouth daily.  magnesium 250 mg tab Take 1 Tab by mouth daily.        Allergies   Allergen Reactions    Latex Rash and Itching    Advair Diskus [Fluticasone-Salmeterol] Other (comments)     \"breathing problems\"    Bactrim [Sulfamethoprim] Shortness of Breath, Itching and Swelling     Swelling of tongue and throat    Eliquis [Apixaban] Other (comments)    Iodinated Contrast- Oral And Iv Dye Hives    Ivp [Fd And C Blue No.1] Hives and Shortness of Breath    Lovenox [Enoxaparin] Other (comments)     DIC    Nsaids (Non-Steroidal Anti-Inflammatory Drug) Other (comments)     Heartburn    Sulfa (Sulfonamide Antibiotics) Shortness of Breath    Xarelto [Rivaroxaban] Myalgia     Paresthesia, spasmic muscle     Past Medical History:   Diagnosis Date    Acute kidney failure ([facility] [address] 11/19/2015    Acute sinusitis 2/11/2011    Adverse effect of anesthesia     SLOW WAKING PAST ANESTHESIA    Arthritis     RA    At risk for side effect of medication 9/30/2016    Atrial fibrillation ([address] 10/19/2011    Autoimmune disease ([address]     Idelia Ang Cancer ([address] 2016    ENDOMETRIAL     Cholelithiasis 11/19/2015    Chronic pain     Claudication of both lower extremities ([address] 8/25/2015    Diabetes mellitus type 2, controlled ([address] 1/19/2016    Diabetes mellitus, type II ([address] 10/10/2014    Fall against object 10/10/2014    Fatty liver 10/20/2015    Hypertension     Ill-defined condition     SPINAL STENOSIS    Left shoulder pain 10/10/2014    Leiomyoma of body of uterus 4/7/2016    Morbid obesity ([address]     Myalgia 9/30/2016    Nausea & vomiting     Personal history of radiation therapy 4/20/2017    Pneumonia     Preop examination 6/15/2015    Preoperative clearance 6/15/2015    Rash 2/11/2011    Rash of hands 10/10/2014    Screening for colon cancer 2/22/2016    Screening for glaucoma 2/22/2016    Sleep apnea     SOB (shortness of breath)     hospitalized 5/2012.  angina, SOB    Thromboembolus ([address] 2011    LEFT LOWER LEG AND PE    Tinea pedis, recurrent 7/20/2015    Vaginal bleeding, abnormal 4/20/2017     Past Surgical History:   Procedure Laterality Date    COLONOSCOPY N/A 12/21/2016    COLONOSCOPY performed by Migdalia Finn MD at [address] [place] [address]  12/21/2016         HX CATARACT REMOVAL Bilateral 2015    [address]    HX DILATION AND CURETTAGE  07/29/2016    HX DILATION AND CURETTAGE  2016    HX GYN  1960    etopic    HX HEART CATHETERIZATION  2001    NEGATIVE PER PATIENT    HX HYSTERECTOMY  2016    HX KNEE ARTHROSCOPY Left 1998    HX ORTHOPAEDIC Right 1960'S    BUNIONECTOMY    HX OTHER SURGICAL      BIOPSY OF VEIN IN FOREHEAD    HX TUBAL LIGATION  1963    LA COLSC FLX W/RMVL OF TUMOR POLYP LESION SNARE TQ  12/12/2011          Family History   Problem Relation Age of Onset    Other Mother      ANEURYSM    Obesity Mother     Heart Disease Father     Other Father      VASCULAR DISEASE    Hypertension Sister     Heart Disease Brother     Heart Attack Brother     Kidney Disease Sister     Seizures Brother     Heart Attack Brother     Anesth Problems Neg Hx     Alcohol abuse Neg Hx      Social History   Substance Use Topics    Smoking status: Former Smoker     Packs/day: 0.25     Years: 15.00     Quit date: 8/23/1996    Smokeless tobacco: Never Used    Alcohol use No      Lab Results  Component Value Date/Time   WBC 5.6 12/15/2017 03:38 AM   HGB 7.9 12/16/2017 06:25 AM   HCT 24.8 12/16/2017 06:25 AM   PLATELET 184 38/51/0120 03:38 AM   MCV 91.1 12/15/2017 03:38 AM     Lab Results  Component Value Date/Time   Hemoglobin A1c 6.1 06/22/2017 12:20 PM   Hemoglobin A1c 6.7 08/25/2015 05:01 PM   Hemoglobin A1c 6.7 03/20/2015 12:32 PM   Glucose 87 12/16/2017 06:25 AM   Glucose (POC) 115 12/10/2017 08:09 AM   Microalb/Creat ratio (ug/mg creat.) 7.0 02/23/2017 11:44 AM   LDL, calculated 46 03/17/2016 12:19 PM   Creatinine (POC) 0.9 08/16/2016 08:18 AM   Creatinine 0.82 12/16/2017 06:25 AM      Lab Results  Component Value Date/Time   Cholesterol, total 104 03/17/2016 12:19 PM   HDL Cholesterol 39 03/17/2016 12:19 PM   LDL, calculated 46 03/17/2016 12:19 PM   Triglyceride 94 03/17/2016 12:19 PM     Lab Results  Component Value Date/Time   ALT (SGPT) 19 12/15/2017 03:38 AM   AST (SGOT) 25 12/15/2017 03:38 AM   Alk.  phosphatase 53 12/15/2017 03:38 AM   Bilirubin, direct 0.14 02/21/2014 12:27 PM   Bilirubin, total 0.7 12/15/2017 03:38 AM   Albumin 1.8 12/15/2017 03:38 AM   Protein, total 8.0 12/15/2017 03:38 AM   INR 1.6 12/09/2017 05:35 PM   Prothrombin time 16.4 12/09/2017 05:35 PM   PLATELET 652 47/89/3471 03:38 AM   AFP, Serum, Tumor Marker 1.6 02/21/2014 12:27 PM       Lab Results  Component Value Date/Time   GFR est non-AA >60 12/16/2017 06:25 AM   GFRNA, POC >60 08/16/2016 08:18 AM   GFR est AA >60 12/16/2017 06:25 AM   GFRAA, POC >60 08/16/2016 08:18 AM   Creatinine 0.82 12/16/2017 06:25 AM   Creatinine (POC) 0.9 08/16/2016 08:18 AM   BUN 16 12/16/2017 06:25 AM   Sodium 138 12/16/2017 06:25 AM   Potassium 3.5 12/16/2017 06:25 AM   Chloride 104 12/16/2017 06:25 AM   CO2 29 12/16/2017 06:25 AM   Magnesium 1.4 12/15/2017 03:38 AM        Review of Systems   Constitutional: Positive for malaise/fatigue. Negative for chills and fever. HENT: Negative for ear pain and nosebleeds. Eyes: Negative for blurred vision, pain and discharge. Respiratory: Negative for shortness of breath. Cardiovascular: Negative for chest pain and leg swelling. Gastrointestinal: Negative for constipation, diarrhea, nausea and vomiting. Genitourinary: Negative for frequency. Musculoskeletal: Positive for back pain, falls, joint pain, myalgias and neck pain. Skin: Negative for itching and rash. Neurological: Negative for headaches. Psychiatric/Behavioral: Negative for depression. The patient is nervous/anxious. Physical Exam   Constitutional: She is oriented to person, place, and time. She appears well-developed and well-nourished. HENT:   Head: Normocephalic and atraumatic. Eyes: Conjunctivae and EOM are normal.   Neck: Normal range of motion. Neck supple. No JVD present. Cardiovascular: Normal rate, regular rhythm and normal heart sounds. No murmur heard. Pulmonary/Chest: Effort normal and breath sounds normal. No stridor. Abdominal: Soft. Bowel sounds are normal. She exhibits no distension and no mass. There is no tenderness. Musculoskeletal: She exhibits tenderness and deformity. She exhibits no edema. Right hip: She exhibits decreased range of motion and decreased strength. Left hip: She exhibits decreased range of motion and decreased strength. Right knee: She exhibits decreased range of motion and swelling. Tenderness found.         Left knee: She exhibits decreased range of motion and swelling. Tenderness found. Cervical back: She exhibits decreased range of motion and tenderness. Lumbar back: She exhibits decreased range of motion, tenderness, bony tenderness, pain and spasm. Nl pulses, nl visual inspection nl monoF, +dystrophy and elongated Nails   Lymphadenopathy:     She has no cervical adenopathy. Neurological: She is alert and oriented to person, place, and time. Skin: No erythema. Psychiatric: Her behavior is normal.   Nursing note and vitals reviewed. ASSESSMENT and PLAN  Diagnoses and all orders for this visit:    1. Chronic pain disorder  -     morphine CR (MS CONTIN) 60 mg CR tablet; Take 1 Tab by mouth every twelve (12) hours. Max Daily Amount: 120 mg. Indications: Chronic Pain, Severe Pain, Possible duplicate, noted patient receiving med at [facility], only difference in order is the amount of med dispenced  -     oxyCODONE IR (ROXICODONE) 20 mg immediate release tablet; Take 1 Tab by mouth every four (4) hours as needed for Pain. Max Daily Amount: 120 mg. Indications: Pain, SNF orders are to give every 4-6 hours prn for moderate to severe pain    2. Lower abdominal pain  -     morphine CR (MS CONTIN) 60 mg CR tablet; Take 1 Tab by mouth every twelve (12) hours. Max Daily Amount: 120 mg. Indications: Chronic Pain, Severe Pain, Possible duplicate, noted patient receiving med at [facility], only difference in order is the amount of med dispenced  -     oxyCODONE IR (ROXICODONE) 20 mg immediate release tablet; Take 1 Tab by mouth every four (4) hours as needed for Pain. Max Daily Amount: 120 mg. Indications: Pain, SNF orders are to give every 4-6 hours prn for moderate to severe pain    3. MCTD (mixed connective tissue disease) ([facility])  -     morphine CR (MS CONTIN) 60 mg CR tablet; Take 1 Tab by mouth every twelve (12) hours. Max Daily Amount: 120 mg.  Indications: Chronic Pain, Severe Pain, Possible duplicate, noted patient receiving med at [facility], only difference in order is the amount of med dispenced  -     oxyCODONE IR (ROXICODONE) 20 mg immediate release tablet; Take 1 Tab by mouth every four (4) hours as needed for Pain. Max Daily Amount: 120 mg. Indications: Pain, SNF orders are to give every 4-6 hours prn for moderate to severe pain    4. Claudication of both lower extremities ([facility])  -     morphine CR (MS CONTIN) 60 mg CR tablet; Take 1 Tab by mouth every twelve (12) hours. Max Daily Amount: 120 mg. Indications: Chronic Pain, Severe Pain, Possible duplicate, noted patient receiving med at [facility], only difference in order is the amount of med dispenced  -     oxyCODONE IR (ROXICODONE) 20 mg immediate release tablet; Take 1 Tab by mouth every four (4) hours as needed for Pain. Max Daily Amount: 120 mg. Indications: Pain, SNF orders are to give every 4-6 hours prn for moderate to severe pain    5. Controlled type 2 diabetes mellitus with chronic kidney disease, unspecified CKD stage, unspecified long term insulin use status ([facility])  -     morphine CR (MS CONTIN) 60 mg CR tablet; Take 1 Tab by mouth every twelve (12) hours. Max Daily Amount: 120 mg. Indications: Chronic Pain, Severe Pain, Possible duplicate, noted patient receiving med at [facility], only difference in order is the amount of med dispenced  -     oxyCODONE IR (ROXICODONE) 20 mg immediate release tablet; Take 1 Tab by mouth every four (4) hours as needed for Pain. Max Daily Amount: 120 mg. Indications: Pain, SNF orders are to give every 4-6 hours prn for moderate to severe pain    6. Endometrial cancer ([facility])  -     morphine CR (MS CONTIN) 60 mg CR tablet; Take 1 Tab by mouth every twelve (12) hours. Max Daily Amount: 120 mg. Indications: Chronic Pain, Severe Pain, Possible duplicate, noted patient receiving med at [facility], only difference in order is the amount of med dispenced  -     oxyCODONE IR (ROXICODONE) 20 mg immediate release tablet;  Take 1 Tab by mouth every four (4) hours as needed for Pain. Max Daily Amount: 120 mg. Indications: Pain, SNF orders are to give every 4-6 hours prn for moderate to severe pain    7. Fibromyalgia  -     morphine CR (MS CONTIN) 60 mg CR tablet; Take 1 Tab by mouth every twelve (12) hours. Max Daily Amount: 120 mg. Indications: Chronic Pain, Severe Pain, Possible duplicate, noted patient receiving med at SNF, only difference in order is the amount of med dispenced  -     oxyCODONE IR (ROXICODONE) 20 mg immediate release tablet; Take 1 Tab by mouth every four (4) hours as needed for Pain. Max Daily Amount: 120 mg. Indications: Pain, SNF orders are to give every 4-6 hours prn for moderate to severe pain    8. Monoclonal gammopathies  -     morphine CR (MS CONTIN) 60 mg CR tablet; Take 1 Tab by mouth every twelve (12) hours. Max Daily Amount: 120 mg. Indications: Chronic Pain, Severe Pain, Possible duplicate, noted patient receiving med at SNF, only difference in order is the amount of med dispenced  -     oxyCODONE IR (ROXICODONE) 20 mg immediate release tablet; Take 1 Tab by mouth every four (4) hours as needed for Pain. Max Daily Amount: 120 mg. Indications: Pain, SNF orders are to give every 4-6 hours prn for moderate to severe pain    9. Chronic pain of right ankle  -     morphine CR (MS CONTIN) 60 mg CR tablet; Take 1 Tab by mouth every twelve (12) hours. Max Daily Amount: 120 mg. Indications: Chronic Pain, Severe Pain, Possible duplicate, noted patient receiving med at SNF, only difference in order is the amount of med dispenced  -     oxyCODONE IR (ROXICODONE) 20 mg immediate release tablet; Take 1 Tab by mouth every four (4) hours as needed for Pain. Max Daily Amount: 120 mg. Indications: Pain, SNF orders are to give every 4-6 hours prn for moderate to severe pain    10. Arm pain, right  -     morphine CR (MS CONTIN) 60 mg CR tablet; Take 1 Tab by mouth every twelve (12) hours. Max Daily Amount: 120 mg.  Indications: Chronic Pain, Severe Pain, Possible duplicate, noted patient receiving med at SNF, only difference in order is the amount of med dispenced  -     oxyCODONE IR (ROXICODONE) 20 mg immediate release tablet; Take 1 Tab by mouth every four (4) hours as needed for Pain. Max Daily Amount: 120 mg. Indications: Pain, SNF orders are to give every 4-6 hours prn for moderate to severe pain    11. Elbow pain, right  -     morphine CR (MS CONTIN) 60 mg CR tablet; Take 1 Tab by mouth every twelve (12) hours. Max Daily Amount: 120 mg. Indications: Chronic Pain, Severe Pain, Possible duplicate, noted patient receiving med at SNF, only difference in order is the amount of med dispenced  -     oxyCODONE IR (ROXICODONE) 20 mg immediate release tablet; Take 1 Tab by mouth every four (4) hours as needed for Pain. Max Daily Amount: 120 mg. Indications: Pain, SNF orders are to give every 4-6 hours prn for moderate to severe pain    12. Pain in both knees, unspecified chronicity  -     morphine CR (MS CONTIN) 60 mg CR tablet; Take 1 Tab by mouth every twelve (12) hours. Max Daily Amount: 120 mg. Indications: Chronic Pain, Severe Pain, Possible duplicate, noted patient receiving med at SNF, only difference in order is the amount of med dispenced  -     oxyCODONE IR (ROXICODONE) 20 mg immediate release tablet; Take 1 Tab by mouth every four (4) hours as needed for Pain. Max Daily Amount: 120 mg. Indications: Pain, SNF orders are to give every 4-6 hours prn for moderate to severe pain    13. MGUS (monoclonal gammopathy of unknown significance)  -     morphine CR (MS CONTIN) 60 mg CR tablet; Take 1 Tab by mouth every twelve (12) hours. Max Daily Amount: 120 mg. Indications: Chronic Pain, Severe Pain, Possible duplicate, noted patient receiving med at [facility], only difference in order is the amount of med dispenced  -     oxyCODONE IR (ROXICODONE) 20 mg immediate release tablet; Take 1 Tab by mouth every four (4) hours as needed for Pain. Max Daily Amount: 120 mg.  Indications: Pain, SNF orders are to give every 4-6 hours prn for moderate to severe pain    14. Chronic left shoulder pain  -     morphine CR (MS CONTIN) 60 mg CR tablet; Take 1 Tab by mouth every twelve (12) hours. Max Daily Amount: 120 mg. Indications: Chronic Pain, Severe Pain, Possible duplicate, noted patient receiving med at [facility], only difference in order is the amount of med dispenced  -     oxyCODONE IR (ROXICODONE) 20 mg immediate release tablet; Take 1 Tab by mouth every four (4) hours as needed for Pain. Max Daily Amount: 120 mg. Indications: Pain, SNF orders are to give every 4-6 hours prn for moderate to severe pain    15. Myalgia  -     morphine CR (MS CONTIN) 60 mg CR tablet; Take 1 Tab by mouth every twelve (12) hours. Max Daily Amount: 120 mg. Indications: Chronic Pain, Severe Pain, Possible duplicate, noted patient receiving med at [facility], only difference in order is the amount of med dispenced  -     oxyCODONE IR (ROXICODONE) 20 mg immediate release tablet; Take 1 Tab by mouth every four (4) hours as needed for Pain. Max Daily Amount: 120 mg. Indications: Pain, SNF orders are to give every 4-6 hours prn for moderate to severe pain    16. Pain of left upper extremity  -     morphine CR (MS CONTIN) 60 mg CR tablet; Take 1 Tab by mouth every twelve (12) hours. Max Daily Amount: 120 mg. Indications: Chronic Pain, Severe Pain, Possible duplicate, noted patient receiving med at [facility], only difference in order is the amount of med dispenced  -     oxyCODONE IR (ROXICODONE) 20 mg immediate release tablet; Take 1 Tab by mouth every four (4) hours as needed for Pain. Max Daily Amount: 120 mg. Indications: Pain, SNF orders are to give every 4-6 hours prn for moderate to severe pain    17. Vaginal bleeding, abnormal  -     morphine CR (MS CONTIN) 60 mg CR tablet; Take 1 Tab by mouth every twelve (12) hours. Max Daily Amount: 120 mg.  Indications: Chronic Pain, Severe Pain, Possible duplicate, noted patient receiving med at [facility], only difference in order is the amount of med dispenced  -     oxyCODONE IR (ROXICODONE) 20 mg immediate release tablet; Take 1 Tab by mouth every four (4) hours as needed for Pain. Max Daily Amount: 120 mg. Indications: Pain, SNF orders are to give every 4-6 hours prn for moderate to severe pain    18. Diabetes mellitus without complication (HCC)  -     morphine CR (MS CONTIN) 60 mg CR tablet; Take 1 Tab by mouth every twelve (12) hours. Max Daily Amount: 120 mg. Indications: Chronic Pain, Severe Pain, Possible duplicate, noted patient receiving med at SNF, only difference in order is the amount of med dispenced  -     oxyCODONE IR (ROXICODONE) 20 mg immediate release tablet; Take 1 Tab by mouth every four (4) hours as needed for Pain. Max Daily Amount: 120 mg. Indications: Pain, SNF orders are to give every 4-6 hours prn for moderate to severe pain    19. Other pulmonary embolism without acute cor pulmonale, unspecified chronicity (HCC)  -     morphine CR (MS CONTIN) 60 mg CR tablet; Take 1 Tab by mouth every twelve (12) hours. Max Daily Amount: 120 mg. Indications: Chronic Pain, Severe Pain, Possible duplicate, noted patient receiving med at [facility], only difference in order is the amount of med dispenced  -     oxyCODONE IR (ROXICODONE) 20 mg immediate release tablet; Take 1 Tab by mouth every four (4) hours as needed for Pain. Max Daily Amount: 120 mg. Indications: Pain, SNF orders are to give every 4-6 hours prn for moderate to severe pain    20. Personal history of radiation therapy  -     morphine CR (MS CONTIN) 60 mg CR tablet; Take 1 Tab by mouth every twelve (12) hours. Max Daily Amount: 120 mg. Indications: Chronic Pain, Severe Pain, Possible duplicate, noted patient receiving med at [facility], only difference in order is the amount of med dispenced  -     oxyCODONE IR (ROXICODONE) 20 mg immediate release tablet; Take 1 Tab by mouth every four (4) hours as needed for Pain. Max Daily Amount: 120 mg.  Indications: Pain, SNF orders are to give every 4-6 hours prn for moderate to severe pain    21. Pain of left hand  -     morphine CR (MS CONTIN) 60 mg CR tablet; Take 1 Tab by mouth every twelve (12) hours. Max Daily Amount: 120 mg. Indications: Chronic Pain, Severe Pain, Possible duplicate, noted patient receiving med at [facility], only difference in order is the amount of med dispenced  -     oxyCODONE IR (ROXICODONE) 20 mg immediate release tablet; Take 1 Tab by mouth every four (4) hours as needed for Pain. Max Daily Amount: 120 mg. Indications: Pain, SNF orders are to give every 4-6 hours prn for moderate to severe pain    22. Left wrist pain  -     morphine CR (MS CONTIN) 60 mg CR tablet; Take 1 Tab by mouth every twelve (12) hours. Max Daily Amount: 120 mg. Indications: Chronic Pain, Severe Pain, Possible duplicate, noted patient receiving med at [facility], only difference in order is the amount of med dispenced  -     oxyCODONE IR (ROXICODONE) 20 mg immediate release tablet; Take 1 Tab by mouth every four (4) hours as needed for Pain. Max Daily Amount: 120 mg. Indications: Pain, SNF orders are to give every 4-6 hours prn for moderate to severe pain    23. Uterine leiomyoma, unspecified location  -     morphine CR (MS CONTIN) 60 mg CR tablet; Take 1 Tab by mouth every twelve (12) hours. Max Daily Amount: 120 mg. Indications: Chronic Pain, Severe Pain, Possible duplicate, noted patient receiving med at [facility], only difference in order is the amount of med dispenced  -     oxyCODONE IR (ROXICODONE) 20 mg immediate release tablet; Take 1 Tab by mouth every four (4) hours as needed for Pain. Max Daily Amount: 120 mg. Indications: Pain, SNF orders are to give every 4-6 hours prn for moderate to severe pain    24. Leg pain, bilateral  -     morphine CR (MS CONTIN) 60 mg CR tablet; Take 1 Tab by mouth every twelve (12) hours. Max Daily Amount: 120 mg.  Indications: Chronic Pain, Severe Pain, Possible duplicate, noted patient receiving med at [facility], only difference in order is the amount of med dispenced  -     oxyCODONE IR (ROXICODONE) 20 mg immediate release tablet; Take 1 Tab by mouth every four (4) hours as needed for Pain. Max Daily Amount: 120 mg.  Indications: Pain, SNF orders are to give every 4-6 hours prn for moderate to severe pain

## 2018-02-05 ENCOUNTER — OFFICE VISIT (OUTPATIENT)
Dept: FAMILY MEDICINE CLINIC | Age: 78
End: 2018-02-05

## 2018-02-05 DIAGNOSIS — M25.532 LEFT WRIST PAIN: ICD-10-CM

## 2018-02-05 DIAGNOSIS — C54.1 ENDOMETRIAL CANCER (HCC): ICD-10-CM

## 2018-02-05 DIAGNOSIS — M79.7 FIBROMYALGIA: ICD-10-CM

## 2018-02-05 DIAGNOSIS — E11.9 DIABETES MELLITUS WITHOUT COMPLICATION (HCC): ICD-10-CM

## 2018-02-05 DIAGNOSIS — N93.9 VAGINAL BLEEDING, ABNORMAL: ICD-10-CM

## 2018-02-05 DIAGNOSIS — M79.10 MYALGIA: ICD-10-CM

## 2018-02-05 DIAGNOSIS — M25.521 ELBOW PAIN, RIGHT: ICD-10-CM

## 2018-02-05 DIAGNOSIS — G89.29 CHRONIC PAIN OF RIGHT ANKLE: ICD-10-CM

## 2018-02-05 DIAGNOSIS — D47.2 MONOCLONAL GAMMOPATHIES: ICD-10-CM

## 2018-02-05 DIAGNOSIS — D25.9 UTERINE LEIOMYOMA, UNSPECIFIED LOCATION: ICD-10-CM

## 2018-02-05 DIAGNOSIS — R10.30 LOWER ABDOMINAL PAIN: ICD-10-CM

## 2018-02-05 DIAGNOSIS — M25.571 CHRONIC PAIN OF RIGHT ANKLE: ICD-10-CM

## 2018-02-05 DIAGNOSIS — D47.2 MGUS (MONOCLONAL GAMMOPATHY OF UNKNOWN SIGNIFICANCE): ICD-10-CM

## 2018-02-05 DIAGNOSIS — Z92.3 PERSONAL HISTORY OF RADIATION THERAPY: ICD-10-CM

## 2018-02-05 DIAGNOSIS — E11.22 CONTROLLED TYPE 2 DIABETES MELLITUS WITH CHRONIC KIDNEY DISEASE, UNSPECIFIED CKD STAGE, UNSPECIFIED LONG TERM INSULIN USE STATUS: ICD-10-CM

## 2018-02-05 DIAGNOSIS — G89.4 CHRONIC PAIN DISORDER: ICD-10-CM

## 2018-02-05 DIAGNOSIS — M79.602 PAIN OF LEFT UPPER EXTREMITY: ICD-10-CM

## 2018-02-05 DIAGNOSIS — M79.601 ARM PAIN, RIGHT: ICD-10-CM

## 2018-02-05 DIAGNOSIS — M25.512 CHRONIC LEFT SHOULDER PAIN: ICD-10-CM

## 2018-02-05 DIAGNOSIS — G89.29 CHRONIC LEFT SHOULDER PAIN: ICD-10-CM

## 2018-02-05 DIAGNOSIS — M25.561 PAIN IN BOTH KNEES, UNSPECIFIED CHRONICITY: ICD-10-CM

## 2018-02-05 DIAGNOSIS — M79.604 LEG PAIN, BILATERAL: ICD-10-CM

## 2018-02-05 DIAGNOSIS — M79.642 PAIN OF LEFT HAND: ICD-10-CM

## 2018-02-05 DIAGNOSIS — M25.562 PAIN IN BOTH KNEES, UNSPECIFIED CHRONICITY: ICD-10-CM

## 2018-02-05 DIAGNOSIS — M35.1 MCTD (MIXED CONNECTIVE TISSUE DISEASE) (HCC): ICD-10-CM

## 2018-02-05 DIAGNOSIS — I73.9 CLAUDICATION OF BOTH LOWER EXTREMITIES (HCC): ICD-10-CM

## 2018-02-05 DIAGNOSIS — M79.605 LEG PAIN, BILATERAL: ICD-10-CM

## 2018-02-05 DIAGNOSIS — I26.99 OTHER PULMONARY EMBOLISM WITHOUT ACUTE COR PULMONALE, UNSPECIFIED CHRONICITY (HCC): ICD-10-CM

## 2018-02-05 RX ORDER — OXYCODONE HYDROCHLORIDE 20 MG/1
20 TABLET ORAL
Qty: 180 TAB | Refills: 0 | Status: SHIPPED | OUTPATIENT
Start: 2018-02-05 | End: 2018-02-14 | Stop reason: SDUPTHER

## 2018-02-05 RX ORDER — MORPHINE SULFATE 60 MG/1
60 TABLET, FILM COATED, EXTENDED RELEASE ORAL EVERY 12 HOURS
Qty: 60 TAB | Refills: 0 | Status: SHIPPED | OUTPATIENT
Start: 2018-02-05 | End: 2018-02-14 | Stop reason: SDUPTHER

## 2018-02-05 NOTE — MR AVS SNAPSHOT
[address] [facility] [address] 
[phone] Patient: Vee Mittal MRN: [id number] Visit Information Date & Time Provider Department Dept. Phone Encounter #  
 2/5/2018 12:45 PM Abril Hdez MD [address] [place] [address] OFFICE-ANNEX [phone] [id number] Upcoming Health Maintenance Date Due  
 MEDICARE YEARLY EXAM 7/20/2016 EYE EXAM RETINAL OR DILATED Q1 4/22/2017 Influenza Age 5 to Adult 8/1/2017 MICROALBUMIN Q1 2/23/2018 LIPID PANEL Q1 4/20/2018 GLAUCOMA SCREENING Q2Y 4/22/2018 HEMOGLOBIN A1C Q6M 4/24/2018 FOOT EXAM Q1 6/22/2018 COLONOSCOPY 12/21/2019 DTaP/Tdap/Td series (3 - Td) 10/20/2026 Allergies as of 2/5/2018  Review Complete On: 1/31/2018 By: Abril Hdez MD  
  
 Severity Noted Reaction Type Reactions Latex  08/29/2013    Rash, Itching Advair Diskus [Fluticasone-salmeterol]  05/29/2012    Other (comments) \"breathing problems\" Bactrim [Sulfamethoprim]  08/29/2013   Side Effect Shortness of Breath, Itching, Swelling Swelling of tongue and throat Eliquis [Apixaban]  11/29/2017    Other (comments) Iodinated Contrast- Oral And Iv Dye  10/04/2016    Hives Ivp [Fd And C Blue No.1]  12/21/2010    Hives, Shortness of Breath Lovenox [Enoxaparin]  12/21/2010    Other (comments) DIC Nsaids (Non-steroidal Anti-inflammatory Drug)  12/21/2010    Other (comments) Heartburn Sulfa (Sulfonamide Antibiotics)  12/23/2013    Shortness of Breath Xarelto [Rivaroxaban]  12/12/2017    Myalgia Paresthesia, spasmic muscle Current Immunizations  Reviewed on 11/29/2017 Name Date Influenza Vaccine (Quad) PF  Deferred (Medication not available) Tdap 5/17/2006 ZZZ-RETIRED (DO NOT USE) Pneumococcal Vaccine (Unspecified Type) 1/1/2010 Not reviewed this visit You Were Diagnosed With   
  
 Codes Comments Chronic pain disorder     ICD-10-CM: G89.4 ICD-9-CM: 338.4 Lower abdominal pain     ICD-10-CM: R10.30 ICD-9-CM: 789.09   
 MCTD (mixed connective tissue disease) ([place] [address].)     ICD-10-CM: M35.1 ICD-9-CM: 710.8 Claudication of both lower extremities ([place] [address].)     ICD-10-CM: I73.9 ICD-9-CM: 443.9 Controlled type 2 diabetes mellitus with chronic kidney disease, unspecified CKD stage, unspecified long term insulin use status ([place] [address].)     ICD-10-CM: E11.22 
ICD-9-CM: 250.40, 585.9 Endometrial cancer ([place] [address].)     ICD-10-CM: C54.1 ICD-9-CM: 182.0 Fibromyalgia     ICD-10-CM: M79.7 ICD-9-CM: 729.1 Monoclonal gammopathies     ICD-10-CM: D47.2 ICD-9-CM: 273.1 Chronic pain of right ankle     ICD-10-CM: M25.571, G89.29 ICD-9-CM: 719.47, 338.29 Arm pain, right     ICD-10-CM: M79.601 ICD-9-CM: 729.5 Elbow pain, right     ICD-10-CM: M25.521 ICD-9-CM: 719.42 Pain in both knees, unspecified chronicity     ICD-10-CM: M25.561, M25.562 ICD-9-CM: 719.46   
 MGUS (monoclonal gammopathy of unknown significance)     ICD-10-CM: G94.2 ICD-9-CM: 273.1 Chronic left shoulder pain     ICD-10-CM: M25.512, G89.29 ICD-9-CM: 719.41, 338.29 Myalgia     ICD-10-CM: M79.1 ICD-9-CM: 729.1 Pain of left upper extremity     ICD-10-CM: U29.373 ICD-9-CM: 729.5 Vaginal bleeding, abnormal     ICD-10-CM: N93.9 ICD-9-CM: 623.8 Diabetes mellitus without complication ([address]     SANTOS-09-DV: E11.9 ICD-9-CM: 250.00 Other pulmonary embolism without acute cor pulmonale, unspecified chronicity (HCC)     ICD-10-CM: I26.99 
ICD-9-CM: 415.19 Personal history of radiation therapy     ICD-10-CM: Z92.3 ICD-9-CM: V15.3 Pain of left hand     ICD-10-CM: R06.355 ICD-9-CM: 729.5 Left wrist pain     ICD-10-CM: M25.532 ICD-9-CM: 719.43 Uterine leiomyoma, unspecified location     ICD-10-CM: D25.9 ICD-9-CM: 218.9 Leg pain, bilateral     ICD-10-CM: M79.604, M79.605 ICD-9-CM: 729.5 Vitals OB Status Smoking Status Hysterectomy Former Smoker Preferred Pharmacy Pharmacy Name Phone [id number] [place] - [address] [phone] Your Updated Medication List  
  
   
This list is accurate as of: 2/5/18  1:18 PM.  Always use your most recent med list.  
  
  
  
  
 anastrozole 1 mg tablet Commonly known as:  ARIMIDEX Resume if ok with your oncologist  
  
 bisacodyl 10 mg suppository Commonly known as:  DULCOLAX Insert 10 mg into rectum daily. cholecalciferol 1,000 unit Cap Commonly known as:  VITAMIN D3 Take 1,000 Units by mouth daily. Take 1 tab daily  
  
 clonazePAM 1 mg tablet Commonly known as:  Melburn End Take 1 mg by mouth two (2) times daily as needed (Anxiety). cyanocobalamin 1,000 mcg tablet Commonly known as:  VITAMIN B-12 Take 1 Tab by mouth daily. gabapentin 100 mg capsule Commonly known as:  NEURONTIN Take 1 Cap by mouth three (3) times daily. hydrALAZINE 25 mg tablet Commonly known as:  APRESOLINE Take 1 Tab by mouth daily. KLOR-CON M20 20 mEq tablet Generic drug:  potassium chloride TAKE 1 TABLET BY MOUTH TWICE A DAY  
  
 levalbuterol tartrate 45 mcg/actuation inhaler Commonly known as:  Aristeo Lang Take 1 Puff by inhalation every six (6) hours as needed for Wheezing.  
  
 magnesium 250 mg Tab Take 1 Tab by mouth daily. methylnaltrexone 150 mg Tab tablet Commonly known as:  RELISTOR Take 3 Tabs by mouth Daily (before breakfast). * metOLazone 5 mg tablet Commonly known as:  Gricelda Maryan Take 1 Tab by mouth daily. * metOLazone 5 mg tablet Commonly known as:  ZAROXOLYN  
TAKE 1 TAB BY MOUTH DAILY. metoprolol succinate 100 mg tablet Commonly known as:  TOPROL-XL Take 100 mg by mouth daily. * morphine CR 60 mg CR tablet Commonly known as:  MS CONTIN  
 Take 1 Tab by mouth every twelve (12) hours. Max Daily Amount: 120 mg. Indications: Chronic Pain, Severe Pain, Possible duplicate, noted patient receiving med at SNF, only difference in order is the amount of med dispenced * morphine CR 60 mg CR tablet Commonly known as:  MS CONTIN Take 1 Tab by mouth every twelve (12) hours. Max Daily Amount: 120 mg. Indications: Chronic Pain, Severe Pain  
  
 naloxegol 25 mg Tab tablet Commonly known as:  Duong Tania Take 1 Tab by mouth Daily (before breakfast). ondansetron 4 mg disintegrating tablet Commonly known as:  ZOFRAN ODT Take 1 Tab by mouth every eight (8) hours as needed for Nausea. OTHER  
MORPHINE CREAM APPLIES TO KNEES 3-4X DAILY   Indications: Per Yung Whaley during med reconciliation:   is giving aspercream with lidocaine 4% apply to knees q shift. oxyCODONE IR 20 mg immediate release tablet Commonly known as:  Mabeline Dickson Take 1 Tab by mouth every four (4) hours as needed for Pain. Max Daily Amount: 120 mg. Indications: Pain, SNF orders are to give every 4-6 hours prn for moderate to severe pain  
  
 pantoprazole 40 mg tablet Commonly known as:  PROTONIX Take 1 Tab by mouth two (2) times a day. polyethylene glycol 17 gram packet Commonly known as:  Sheria Bud Take 1 Packet by mouth two (2) times a day. senna-docusate 8.6-50 mg per tablet Commonly known as:  Gar Darrin Take 2 Tabs by mouth two (2) times a day. * Notice: This list has 4 medication(s) that are the same as other medications prescribed for you. Read the directions carefully, and ask your doctor or other care provider to review them with you. Prescriptions Printed Refills  
 oxyCODONE IR (ROXICODONE) 20 mg immediate release tablet 0 Sig: Take 1 Tab by mouth every four (4) hours as needed for Pain. Max Daily Amount: 120 mg. Indications: Pain, SNF orders are to give every 4-6 hours prn for moderate to severe pain Class: Print Route: Oral  
 morphine CR (MS CONTIN) 60 mg CR tablet 0 Sig: Take 1 Tab by mouth every twelve (12) hours. Max Daily Amount: 120 mg. Indications: Chronic Pain, Severe Pain Class: Print Route: Oral  
  
Introducing [facility]! Dear Shira Camacho: Thank you for requesting a [employer] account. Our records indicate that you have previously registered for a [employer] account but its currently inactive. Please call our [employer] support line at [phone]. Additional Information If you have questions, please visit the Frequently Asked Questions section of the [employer] website at [email or web address] Remember, [employer] is NOT to be used for urgent needs. For medical emergencies, dial 911. Now available from your iPhone and Android! Please provide this summary of care documentation to your next provider. Your primary care clinician is listed as Jacqueline Gordon. If you have any questions after today's visit, please call [phone].

## 2018-02-06 ENCOUNTER — PATIENT OUTREACH (OUTPATIENT)
Dept: FAMILY MEDICINE CLINIC | Age: 78
End: 2018-02-06

## 2018-02-06 NOTE — PROGRESS NOTES
Patient is greater than 30 days SELMA. Patient had declined to have nurse navigator follow her post episode. Will close this episode.

## 2018-02-08 ENCOUNTER — TELEPHONE (OUTPATIENT)
Dept: FAMILY MEDICINE CLINIC | Age: 78
End: 2018-02-08

## 2018-02-12 DIAGNOSIS — K57.33 DIVERTICULITIS OF LARGE INTESTINE WITH BLEEDING, UNSPECIFIED COMPLICATION STATUS: Primary | ICD-10-CM

## 2018-02-12 DIAGNOSIS — Z87.19 HISTORY OF DIVERTICULITIS: ICD-10-CM

## 2018-02-12 PROBLEM — K57.92 DIVERTICULITIS: Status: ACTIVE | Noted: 2018-02-12

## 2018-02-12 RX ORDER — HYDROCORTISONE ACETATE 25 MG/1
25 SUPPOSITORY RECTAL 2 TIMES DAILY
Qty: 60 SUPPOSITORY | Refills: 3 | Status: SHIPPED | OUTPATIENT
Start: 2018-02-12 | End: 2018-03-19 | Stop reason: SDUPTHER

## 2018-02-12 RX ORDER — METRONIDAZOLE 500 MG/1
500 TABLET ORAL 3 TIMES DAILY
Qty: 30 TAB | Refills: 0 | Status: SHIPPED | OUTPATIENT
Start: 2018-02-12 | End: 2018-02-22

## 2018-02-12 RX ORDER — CIPROFLOXACIN 500 MG/1
500 TABLET ORAL 2 TIMES DAILY
Qty: 20 TAB | Refills: 0 | Status: SHIPPED | OUTPATIENT
Start: 2018-02-12 | End: 2018-02-22

## 2018-02-14 ENCOUNTER — HOSPITAL ENCOUNTER (OUTPATIENT)
Dept: LAB | Age: 78
Discharge: HOME | End: 2018-02-14
Payer: MEDICARE

## 2018-02-14 ENCOUNTER — OFFICE VISIT (OUTPATIENT)
Dept: FAMILY MEDICINE CLINIC | Age: 78
End: 2018-02-14

## 2018-02-14 DIAGNOSIS — G89.4 CHRONIC PAIN DISORDER: ICD-10-CM

## 2018-02-14 DIAGNOSIS — M25.561 PAIN IN BOTH KNEES, UNSPECIFIED CHRONICITY: ICD-10-CM

## 2018-02-14 DIAGNOSIS — K57.33 DIVERTICULITIS OF LARGE INTESTINE WITH BLEEDING, UNSPECIFIED COMPLICATION STATUS: Primary | ICD-10-CM

## 2018-02-14 DIAGNOSIS — M79.10 MYALGIA: ICD-10-CM

## 2018-02-14 DIAGNOSIS — M25.521 ELBOW PAIN, RIGHT: ICD-10-CM

## 2018-02-14 DIAGNOSIS — M25.562 PAIN IN BOTH KNEES, UNSPECIFIED CHRONICITY: ICD-10-CM

## 2018-02-14 DIAGNOSIS — N93.9 VAGINAL BLEEDING, ABNORMAL: ICD-10-CM

## 2018-02-14 DIAGNOSIS — G89.29 CHRONIC LEFT SHOULDER PAIN: ICD-10-CM

## 2018-02-14 DIAGNOSIS — M25.532 LEFT WRIST PAIN: ICD-10-CM

## 2018-02-14 DIAGNOSIS — D47.2 MONOCLONAL GAMMOPATHIES: ICD-10-CM

## 2018-02-14 DIAGNOSIS — D47.2 MGUS (MONOCLONAL GAMMOPATHY OF UNKNOWN SIGNIFICANCE): ICD-10-CM

## 2018-02-14 DIAGNOSIS — M79.601 ARM PAIN, RIGHT: ICD-10-CM

## 2018-02-14 DIAGNOSIS — E11.22 CONTROLLED TYPE 2 DIABETES MELLITUS WITH CHRONIC KIDNEY DISEASE, UNSPECIFIED CKD STAGE, UNSPECIFIED LONG TERM INSULIN USE STATUS: ICD-10-CM

## 2018-02-14 DIAGNOSIS — M79.7 FIBROMYALGIA: ICD-10-CM

## 2018-02-14 DIAGNOSIS — M25.512 CHRONIC LEFT SHOULDER PAIN: ICD-10-CM

## 2018-02-14 DIAGNOSIS — G89.29 CHRONIC PAIN OF RIGHT ANKLE: ICD-10-CM

## 2018-02-14 DIAGNOSIS — C54.1 ENDOMETRIAL CANCER (HCC): ICD-10-CM

## 2018-02-14 DIAGNOSIS — I26.99 OTHER PULMONARY EMBOLISM WITHOUT ACUTE COR PULMONALE, UNSPECIFIED CHRONICITY (HCC): ICD-10-CM

## 2018-02-14 DIAGNOSIS — D25.9 UTERINE LEIOMYOMA, UNSPECIFIED LOCATION: ICD-10-CM

## 2018-02-14 DIAGNOSIS — M25.571 CHRONIC PAIN OF RIGHT ANKLE: ICD-10-CM

## 2018-02-14 DIAGNOSIS — E11.9 DIABETES MELLITUS WITHOUT COMPLICATION (HCC): ICD-10-CM

## 2018-02-14 DIAGNOSIS — M79.642 PAIN OF LEFT HAND: ICD-10-CM

## 2018-02-14 DIAGNOSIS — M35.1 MCTD (MIXED CONNECTIVE TISSUE DISEASE) (HCC): ICD-10-CM

## 2018-02-14 DIAGNOSIS — M79.605 LEG PAIN, BILATERAL: ICD-10-CM

## 2018-02-14 DIAGNOSIS — I73.9 CLAUDICATION OF BOTH LOWER EXTREMITIES (HCC): ICD-10-CM

## 2018-02-14 DIAGNOSIS — M79.604 LEG PAIN, BILATERAL: ICD-10-CM

## 2018-02-14 DIAGNOSIS — R10.30 LOWER ABDOMINAL PAIN: ICD-10-CM

## 2018-02-14 DIAGNOSIS — Z92.3 PERSONAL HISTORY OF RADIATION THERAPY: ICD-10-CM

## 2018-02-14 DIAGNOSIS — M79.602 PAIN OF LEFT UPPER EXTREMITY: ICD-10-CM

## 2018-02-14 PROCEDURE — 82270 OCCULT BLOOD FECES: CPT

## 2018-02-14 RX ORDER — NYSTATIN 100000 U/G
CREAM TOPICAL 2 TIMES DAILY
Qty: 30 G | Refills: 2 | Status: SHIPPED | OUTPATIENT
Start: 2018-02-14

## 2018-02-14 RX ORDER — HYDROCORTISONE 25 MG/G
CREAM TOPICAL 4 TIMES DAILY
Qty: 30 G | Refills: 3 | Status: SHIPPED | OUTPATIENT
Start: 2018-02-14 | End: 2018-10-11 | Stop reason: ALTCHOICE

## 2018-02-14 RX ORDER — MORPHINE SULFATE 60 MG/1
60 TABLET, FILM COATED, EXTENDED RELEASE ORAL EVERY 12 HOURS
Qty: 60 TAB | Refills: 0 | Status: SHIPPED | OUTPATIENT
Start: 2018-03-05 | End: 2018-03-19 | Stop reason: SDUPTHER

## 2018-02-14 RX ORDER — OXYCODONE HYDROCHLORIDE 20 MG/1
20 TABLET ORAL
Qty: 180 TAB | Refills: 0 | Status: SHIPPED | OUTPATIENT
Start: 2018-03-05 | End: 2018-03-19 | Stop reason: SDUPTHER

## 2018-02-14 RX ORDER — CLINDAMYCIN HYDROCHLORIDE 300 MG/1
300 CAPSULE ORAL 3 TIMES DAILY
Qty: 30 CAP | Refills: 0 | Status: SHIPPED | OUTPATIENT
Start: 2018-02-14 | End: 2018-02-24

## 2018-02-15 NOTE — PROGRESS NOTES
Chief Complaint   Patient presents with    Rectal Bleeding     1. Have you been to the ER, urgent care clinic since your last visit? Hospitalized since your last visit? No    2. Have you seen or consulted any other health care providers outside of the [address] [place] [address] since your last visit? Include any pap smears or colon screening.  No       Order placed for FIT testing , per Verbal Order from Dr. Rudy Lord on 2/15/2018 due to c/o rectal bleeding

## 2018-02-16 ENCOUNTER — HOSPITAL ENCOUNTER (EMERGENCY)
Age: 78
Discharge: HOME | End: 2018-02-17
Attending: EMERGENCY MEDICINE
Payer: MEDICARE

## 2018-02-16 VITALS
RESPIRATION RATE: 16 BRPM | DIASTOLIC BLOOD PRESSURE: 80 MMHG | HEIGHT: 66 IN | OXYGEN SATURATION: 98 % | TEMPERATURE: 98.4 F | WEIGHT: 292 LBS | HEART RATE: 84 BPM | SYSTOLIC BLOOD PRESSURE: 140 MMHG | BODY MASS INDEX: 46.93 KG/M2

## 2018-02-16 DIAGNOSIS — R10.9 FLANK PAIN: Primary | ICD-10-CM

## 2018-02-16 DIAGNOSIS — R10.9 ABDOMINAL PAIN, UNSPECIFIED ABDOMINAL LOCATION: ICD-10-CM

## 2018-02-16 PROCEDURE — 96374 THER/PROPH/DIAG INJ IV PUSH: CPT

## 2018-02-16 PROCEDURE — 99285 EMERGENCY DEPT VISIT HI MDM: CPT

## 2018-02-16 PROCEDURE — 96375 TX/PRO/DX INJ NEW DRUG ADDON: CPT

## 2018-02-16 NOTE — PATIENT INSTRUCTIONS
Fecal Immunochemical Test (FIT): About This Test  What is it? A fecal immunochemical test, or FIT, checks for hidden blood in the stool. Your doctor gives you a kit that contains everything you need. At home, you follow simple steps to collect a small amount of stool. You return the kit to the doctor or to a lab. Why is this test done? This test is done to check for colorectal cancer and other types of gastrointestinal problems, such as hemorrhoids, anal fissures, and colon polyps, that can cause blood in the stools. How can you prepare for the test?  · Don't do the test during your menstrual period or if you are having bleeding from hemorrhoids. What happens during the test?  There are different types of home tests available. It is important to follow the instructions provided with any test.  Here are some general instructions:  · Check the expiration date on the package. Don't use a test kit after its expiration date. · Follow the instructions exactly. Do all the steps, in order, without skipping any of them. · After you finish your test, follow the instructions that you were given for returning the test.  There is a FIT test that shows the results right away. If your test shows that blood was found in your stool sample, call your doctor as soon as possible. Follow-up care is a key part of your treatment and safety. Be sure to make and go to all appointments, and call your doctor if you are having problems. It's also a good idea to keep a list of the medicines you take. Ask your doctor when you can expect to have your test results. Where can you learn more? Go to http://kiara-olesya.info/. Enter D960 in the search box to learn more about \"Fecal Immunochemical Test (FIT): About This Test.\"  Current as of: May 12, 2017  Content Version: 11.4  © 3205-8892 [employer].  Care instructions adapted under license by [employer] (which disclaims liability or warranty for this information). If you have questions about a medical condition or this instruction, always ask your healthcare professional. [place] [address] any warranty or liability for your use of this information.

## 2018-02-17 ENCOUNTER — APPOINTMENT (OUTPATIENT)
Dept: ULTRASOUND IMAGING | Age: 78
End: 2018-02-17
Attending: EMERGENCY MEDICINE
Payer: MEDICARE

## 2018-02-17 ENCOUNTER — APPOINTMENT (OUTPATIENT)
Dept: CT IMAGING | Age: 78
End: 2018-02-17
Attending: EMERGENCY MEDICINE
Payer: MEDICARE

## 2018-02-17 ENCOUNTER — HOSPITAL ENCOUNTER (EMERGENCY)
Age: 78
Discharge: HOME | End: 2018-02-18
Attending: EMERGENCY MEDICINE
Payer: MEDICARE

## 2018-02-17 VITALS
OXYGEN SATURATION: 97 % | WEIGHT: 292 LBS | TEMPERATURE: 98.9 F | BODY MASS INDEX: 46.93 KG/M2 | HEART RATE: 74 BPM | HEIGHT: 66 IN | RESPIRATION RATE: 16 BRPM | DIASTOLIC BLOOD PRESSURE: 78 MMHG | SYSTOLIC BLOOD PRESSURE: 153 MMHG

## 2018-02-17 DIAGNOSIS — M25.561 ARTHRALGIA OF RIGHT LOWER LEG: Primary | ICD-10-CM

## 2018-02-17 LAB
ABO + RH BLD: NORMAL
ALBUMIN SERPL-MCNC: 2.7 G/DL (ref 3.5–5)
ALBUMIN/GLOB SERPL: 0.4 {RATIO} (ref 1.1–2.2)
ALP SERPL-CCNC: 72 U/L (ref 45–117)
ALT SERPL-CCNC: 10 U/L (ref 12–78)
ANION GAP SERPL CALC-SCNC: 5 MMOL/L (ref 5–15)
APPEARANCE UR: CLEAR
AST SERPL-CCNC: 11 U/L (ref 15–37)
ATRIAL RATE: 76 BPM
BACTERIA URNS QL MICRO: NEGATIVE /HPF
BASOPHILS # BLD: 0 K/UL (ref 0–0.1)
BASOPHILS NFR BLD: 0 % (ref 0–1)
BILIRUB SERPL-MCNC: 0.5 MG/DL (ref 0.2–1)
BILIRUB UR QL: NEGATIVE
BLOOD GROUP ANTIBODIES SERPL: NORMAL
BUN SERPL-MCNC: 19 MG/DL (ref 6–20)
BUN/CREAT SERPL: 19 (ref 12–20)
CALCIUM SERPL-MCNC: 10.2 MG/DL (ref 8.5–10.1)
CALCULATED P AXIS, ECG09: 36 DEGREES
CALCULATED R AXIS, ECG10: 17 DEGREES
CALCULATED T AXIS, ECG11: 22 DEGREES
CHLORIDE SERPL-SCNC: 102 MMOL/L (ref 97–108)
CO2 SERPL-SCNC: 29 MMOL/L (ref 21–32)
COLOR UR: ABNORMAL
CREAT SERPL-MCNC: 1.01 MG/DL (ref 0.55–1.02)
DIAGNOSIS, 93000: NORMAL
DIFFERENTIAL METHOD BLD: ABNORMAL
EOSINOPHIL # BLD: 0.1 K/UL (ref 0–0.4)
EOSINOPHIL NFR BLD: 2 % (ref 0–7)
EPITH CASTS URNS QL MICRO: ABNORMAL /LPF
ERYTHROCYTE [DISTWIDTH] IN BLOOD BY AUTOMATED COUNT: 17.1 % (ref 11.5–14.5)
GLOBULIN SER CALC-MCNC: 6.6 G/DL (ref 2–4)
GLUCOSE SERPL-MCNC: 113 MG/DL (ref 65–100)
GLUCOSE UR STRIP.AUTO-MCNC: NEGATIVE MG/DL
HCT VFR BLD AUTO: 25.2 % (ref 35–47)
HEMOCCULT STL QL IA: POSITIVE
HGB BLD-MCNC: 7.9 G/DL (ref 11.5–16)
HGB UR QL STRIP: ABNORMAL
IMM GRANULOCYTES # BLD: 0 K/UL (ref 0–0.04)
IMM GRANULOCYTES NFR BLD AUTO: 1 % (ref 0–0.5)
INR PPP: 1.3 (ref 0.9–1.1)
KETONES UR QL STRIP.AUTO: NEGATIVE MG/DL
LEUKOCYTE ESTERASE UR QL STRIP.AUTO: NEGATIVE
LYMPHOCYTES # BLD: 0.5 K/UL (ref 0.8–3.5)
LYMPHOCYTES NFR BLD: 12 % (ref 12–49)
MCH RBC QN AUTO: 30.4 PG (ref 26–34)
MCHC RBC AUTO-ENTMCNC: 31.3 G/DL (ref 30–36.5)
MCV RBC AUTO: 96.9 FL (ref 80–99)
MONOCYTES # BLD: 0.4 K/UL (ref 0–1)
MONOCYTES NFR BLD: 10 % (ref 5–13)
NEUTS SEG # BLD: 3.2 K/UL (ref 1.8–8)
NEUTS SEG NFR BLD: 75 % (ref 32–75)
NITRITE UR QL STRIP.AUTO: NEGATIVE
NRBC # BLD: 0 K/UL (ref 0–0.01)
NRBC BLD-RTO: 0 PER 100 WBC
P-R INTERVAL, ECG05: 162 MS
PH UR STRIP: 6 [PH] (ref 5–8)
PLATELET # BLD AUTO: 229 K/UL (ref 150–400)
PMV BLD AUTO: 9.9 FL (ref 8.9–12.9)
POTASSIUM SERPL-SCNC: 4.3 MMOL/L (ref 3.5–5.1)
PROT SERPL-MCNC: 9.3 G/DL (ref 6.4–8.2)
PROT UR STRIP-MCNC: NEGATIVE MG/DL
PROTHROMBIN TIME: 13.4 SEC (ref 9–11.1)
Q-T INTERVAL, ECG07: 384 MS
QRS DURATION, ECG06: 80 MS
QTC CALCULATION (BEZET), ECG08: 432 MS
RBC # BLD AUTO: 2.6 M/UL (ref 3.8–5.2)
RBC #/AREA URNS HPF: ABNORMAL /HPF (ref 0–5)
RBC MORPH BLD: ABNORMAL
SODIUM SERPL-SCNC: 136 MMOL/L (ref 136–145)
SP GR UR REFRACTOMETRY: 1.01 (ref 1–1.03)
SPECIMEN EXP DATE BLD: NORMAL
UROBILINOGEN UR QL STRIP.AUTO: 0.2 EU/DL (ref 0.2–1)
VENTRICULAR RATE, ECG03: 76 BPM
WBC # BLD AUTO: 4.2 K/UL (ref 3.6–11)
WBC URNS QL MICRO: ABNORMAL /HPF (ref 0–4)

## 2018-02-17 PROCEDURE — 74011250637 HC RX REV CODE- 250/637: Performed by: EMERGENCY MEDICINE

## 2018-02-17 PROCEDURE — 85610 PROTHROMBIN TIME: CPT | Performed by: EMERGENCY MEDICINE

## 2018-02-17 PROCEDURE — 96374 THER/PROPH/DIAG INJ IV PUSH: CPT

## 2018-02-17 PROCEDURE — 85025 COMPLETE CBC W/AUTO DIFF WBC: CPT | Performed by: EMERGENCY MEDICINE

## 2018-02-17 PROCEDURE — 93005 ELECTROCARDIOGRAM TRACING: CPT

## 2018-02-17 PROCEDURE — 93926 LOWER EXTREMITY STUDY: CPT

## 2018-02-17 PROCEDURE — 93971 EXTREMITY STUDY: CPT

## 2018-02-17 PROCEDURE — 99284 EMERGENCY DEPT VISIT MOD MDM: CPT

## 2018-02-17 PROCEDURE — 80053 COMPREHEN METABOLIC PANEL: CPT | Performed by: EMERGENCY MEDICINE

## 2018-02-17 PROCEDURE — 36415 COLL VENOUS BLD VENIPUNCTURE: CPT | Performed by: EMERGENCY MEDICINE

## 2018-02-17 PROCEDURE — 74176 CT ABD & PELVIS W/O CONTRAST: CPT

## 2018-02-17 PROCEDURE — 74011250636 HC RX REV CODE- 250/636: Performed by: EMERGENCY MEDICINE

## 2018-02-17 PROCEDURE — 96375 TX/PRO/DX INJ NEW DRUG ADDON: CPT

## 2018-02-17 PROCEDURE — 86900 BLOOD TYPING SEROLOGIC ABO: CPT | Performed by: EMERGENCY MEDICINE

## 2018-02-17 PROCEDURE — 81001 URINALYSIS AUTO W/SCOPE: CPT | Performed by: EMERGENCY MEDICINE

## 2018-02-17 PROCEDURE — 86850 RBC ANTIBODY SCREEN: CPT | Performed by: EMERGENCY MEDICINE

## 2018-02-17 RX ORDER — ONDANSETRON 2 MG/ML
8 INJECTION INTRAMUSCULAR; INTRAVENOUS
Status: COMPLETED | OUTPATIENT
Start: 2018-02-17 | End: 2018-02-17

## 2018-02-17 RX ORDER — MORPHINE SULFATE 2 MG/ML
4 INJECTION, SOLUTION INTRAMUSCULAR; INTRAVENOUS ONCE
Status: COMPLETED | OUTPATIENT
Start: 2018-02-17 | End: 2018-02-17

## 2018-02-17 RX ORDER — MORPHINE SULFATE 2 MG/ML
8 INJECTION, SOLUTION INTRAMUSCULAR; INTRAVENOUS ONCE
Status: COMPLETED | OUTPATIENT
Start: 2018-02-17 | End: 2018-02-17

## 2018-02-17 RX ORDER — MORPHINE SULFATE 15 MG/1
60 TABLET, FILM COATED, EXTENDED RELEASE ORAL EVERY 12 HOURS
Status: DISCONTINUED | OUTPATIENT
Start: 2018-02-17 | End: 2018-02-18 | Stop reason: HOSPADM

## 2018-02-17 RX ORDER — SODIUM CHLORIDE 0.9 % (FLUSH) 0.9 %
5-10 SYRINGE (ML) INJECTION AS NEEDED
Status: DISCONTINUED | OUTPATIENT
Start: 2018-02-17 | End: 2018-02-17 | Stop reason: HOSPADM

## 2018-02-17 RX ORDER — PROMETHAZINE HYDROCHLORIDE 25 MG/1
25 TABLET ORAL
Qty: 12 TAB | Refills: 0 | Status: SHIPPED | OUTPATIENT
Start: 2018-02-17 | End: 2018-10-23 | Stop reason: ALTCHOICE

## 2018-02-17 RX ADMIN — ONDANSETRON HYDROCHLORIDE 8 MG: 2 INJECTION, SOLUTION INTRAMUSCULAR; INTRAVENOUS at 22:35

## 2018-02-17 RX ADMIN — ONDANSETRON HYDROCHLORIDE 8 MG: 2 INJECTION, SOLUTION INTRAMUSCULAR; INTRAVENOUS at 02:10

## 2018-02-17 RX ADMIN — MORPHINE SULFATE 4 MG: 2 INJECTION, SOLUTION INTRAMUSCULAR; INTRAVENOUS at 02:12

## 2018-02-17 RX ADMIN — MORPHINE SULFATE 8 MG: 2 INJECTION, SOLUTION INTRAMUSCULAR; INTRAVENOUS at 22:17

## 2018-02-17 RX ADMIN — MORPHINE SULFATE 60 MG: 15 TABLET, FILM COATED, EXTENDED RELEASE ORAL at 22:36

## 2018-02-17 NOTE — ED NOTES
Dr. [place] 89 at bedside to provide discharge paperwork. Vital signs stable. Pt in no apparent distress at this time. Mental status at baseline. Patient wheeled to waiting room with discharge paperwork in hand. Accompanied by RN and son.

## 2018-02-17 NOTE — ED NOTES
Patient assisted with bedside commode at this time; patient voided. Urine sample obtained and sent to lab at this time. Patient assisted back in bed; on monitor x3; call bell within reach.

## 2018-02-17 NOTE — DISCHARGE INSTRUCTIONS
Flank Pain: Care Instructions  Your Care Instructions  Flank pain is pain on the side of the back just below the rib cage and above the waist. It can be on one or both sides. Flank pain has many possible causes, including a kidney stone, a urinary tract infection, or back strain. Flank pain may get better on its own. But don't ignore new symptoms, such as fever, nausea and vomiting, urination problems, pain that gets worse, and dizziness. These may be signs of a more serious problem. You may have to have tests or other treatment. Follow-up care is a key part of your treatment and safety. Be sure to make and go to all appointments, and call your doctor if you are having problems. It's also a good idea to know your test results and keep a list of the medicines you take. How can you care for yourself at home? · Rest until you feel better. · Take pain medicines exactly as directed. ¨ If the doctor gave you a prescription medicine for pain, take it as prescribed. ¨ If you are not taking a prescription pain medicine, ask your doctor if you can take an over-the-counter pain medicine, such as acetaminophen (Tylenol), ibuprofen (Advil, Motrin), or naproxen (Aleve). Read and follow all instructions on the label. · If your doctor prescribed antibiotics, take them as directed. Do not stop taking them just because you feel better. You need to take the full course of antibiotics. · To apply heat, put a warm water bottle, a heating pad set on low, or a warm cloth on the painful area. Do not go to sleep with a heating pad on your skin. · To prevent dehydration, drink plenty of fluids, enough so that your urine is light yellow or clear like water. Choose water and other caffeine-free clear liquids until you feel better. If you have kidney, heart, or liver disease and have to limit fluids, talk with your doctor before you increase the amount of fluids you drink. When should you call for help?   Call your doctor now or seek immediate medical care if:  ? · Your flank pain gets worse. ? · You have new symptoms, such as fever, nausea, or vomiting. ? · You have symptoms of a urinary problem. For example:  ¨ You have blood or pus in your urine. ¨ You have chills or body aches. ¨ It hurts to urinate. ¨ You have groin or belly pain. ? Watch closely for changes in your health, and be sure to contact your doctor if you do not get better as expected. Where can you learn more? Go to http://kiara-olesya.info/. Enter S191 in the search box to learn more about \"Flank Pain: Care Instructions. \"  Current as of: March 20, 2017  Content Version: 11.4  © 4769-1108 [employer]. Care instructions adapted under license by [employer] (which disclaims liability or warranty for this information). If you have questions about a medical condition or this instruction, always ask your healthcare professional. [place] [address] any warranty or liability for your use of this information. Abdominal Pain: Care Instructions  Your Care Instructions  Abdominal pain has many possible causes. Some aren't serious and get better on their own in a few days. Others need more testing and treatment. If your pain continues or gets worse, you need to be rechecked and may need more tests to find out what is wrong. You may need surgery to correct the problem. Don't ignore new symptoms, such as fever, nausea and vomiting, urination problems, pain that gets worse, and dizziness. These may be signs of a more serious problem. Your doctor may have recommended a follow-up visit in the next 8 to 12 hours. If you are not getting better, you may need more tests or treatment. The doctor has checked you carefully, but problems can develop later. If you notice any problems or new symptoms, get medical treatment right away. Follow-up care is a key part of your treatment and safety.  Be sure to make and go to all appointments, and call your doctor if you are having problems. It's also a good idea to know your test results and keep a list of the medicines you take. How can you care for yourself at home? · Rest until you feel better. · To prevent dehydration, drink plenty of fluids, enough so that your urine is light yellow or clear like water. Choose water and other caffeine-free clear liquids until you feel better. If you have kidney, heart, or liver disease and have to limit fluids, talk with your doctor before you increase the amount of fluids you drink. · If your stomach is upset, eat mild foods, such as rice, dry toast or crackers, bananas, and applesauce. Try eating several small meals instead of two or three large ones. · Wait until 48 hours after all symptoms have gone away before you have spicy foods, alcohol, and drinks that contain caffeine. · Do not eat foods that are high in fat. · Avoid anti-inflammatory medicines such as aspirin, ibuprofen (Advil, Motrin), and naproxen (Aleve). These can cause stomach upset. Talk to your doctor if you take daily aspirin for another health problem. When should you call for help? Call 911 anytime you think you may need emergency care. For example, call if:  ? · You passed out (lost consciousness). ? · You pass maroon or very bloody stools. ? · You vomit blood or what looks like coffee grounds. ? · You have new, severe belly pain. ?Call your doctor now or seek immediate medical care if:  ? · Your pain gets worse, especially if it becomes focused in one area of your belly. ? · You have a new or higher fever. ? · Your stools are black and look like tar, or they have streaks of blood. ? · You have unexpected vaginal bleeding. ? · You have symptoms of a urinary tract infection. These may include:  ¨ Pain when you urinate. ¨ Urinating more often than usual.  ¨ Blood in your urine. ? · You are dizzy or lightheaded, or you feel like you may faint. ? Watch closely for changes in your health, and be sure to contact your doctor if:  ? · You are not getting better after 1 day (24 hours). Where can you learn more? Go to http://kiara-olesya.info/. Enter H416 in the search box to learn more about \"Abdominal Pain: Care Instructions. \"  Current as of: March 20, 2017  Content Version: 11.4  © 7177-9896 [employer]. Care instructions adapted under license by [employer] (which disclaims liability or warranty for this information). If you have questions about a medical condition or this instruction, always ask your healthcare professional. [address] any warranty or liability for your use of this information.

## 2018-02-17 NOTE — ED TRIAGE NOTES
Patient arrives to ED via EMS with a difficulty breathing, rapid heart beat, and right hip pain that radiates to her groin and back. Patient has history of a blood clot somewhere about right knee to right groin. Patient reportshaving a filter, no blood thinners. Patient reports history of diverticulitis and slight rectal bleeding. Dr. Mali Lauren performed a rectal exam and found blood clot. Patient has an abscess to right buttock.

## 2018-02-17 NOTE — ED PROVIDER NOTES
EMERGENCY DEPARTMENT HISTORY AND PHYSICAL EXAM      Date: 2/16/2018  Patient Name: Phil Stein    History of Presenting Illness     Chief Complaint   Patient presents with    Abdominal Pain       History Provided By: Patient    HPI: Phil Stein, 68 y.o. female with PMHx significant for HTN, DM, and a fib, presents via EMS to the ED with cc of right hip pain for a couple days. Pt notes that the pain radiates to her abdomen, and back. Per pt, she has tried morphine cream for the pain with no relief. She reports additional symptoms of nausea, intermittent rectal bleeding for months, boil to her rectal area, right leg swelling, numbness of bilateral feet, numbness of bilateral toes, numbness of right fingers, SOB that resolved, and palpitations earlier today for 30 minutes that resolved. Pt notes that she feels like something is \"stuck\" in her chest. She reports that she has hx of blood clot in her right leg, and is currently not on blood thinners due to allergies. Pt notes that she takes oxycodone and morphine for chronic pain but the medications are not helping her pain today. She denies fever, or recent falls. PCP: Shira Lyn MD    There are no other complaints, changes, or physical findings at this time. Current Facility-Administered Medications   Medication Dose Route Frequency Provider Last Rate Last Dose    sodium chloride (NS) flush 5-10 mL  5-10 mL IntraVENous PRN Andreia Yo MD         Current Outpatient Prescriptions   Medication Sig Dispense Refill    promethazine (PHENERGAN) 25 mg tablet Take 1 Tab by mouth every six (6) hours as needed. 12 Tab 0    [START ON 3/5/2018] morphine CR (MS CONTIN) 60 mg CR tablet Take 1 Tab by mouth every twelve (12) hours. Max Daily Amount: 120 mg. Indications: Chronic Pain, Severe Pain 60 Tab 0    [START ON 3/5/2018] oxyCODONE IR (ROXICODONE) 20 mg immediate release tablet Take 1 Tab by mouth every four (4) hours as needed for Pain.  Max Daily Amount: 120 mg. Indications: Pain, SNF orders are to give every 4-6 hours prn for moderate to severe pain 180 Tab 0    clindamycin (CLEOCIN) 300 mg capsule Take 1 Cap by mouth three (3) times daily for 10 days. 30 Cap 0    nystatin (MYCOSTATIN) topical cream Apply  to affected area two (2) times a day. Apply to and around the rectal area twice daily 30 g 2    hydrocortisone (ANUSOL-HC) 2.5 % rectal cream Insert  into rectum four (4) times daily. 30 g 3    ciprofloxacin HCl (CIPRO) 500 mg tablet Take 1 Tab by mouth two (2) times a day for 10 days. 20 Tab 0    metroNIDAZOLE (FLAGYL) 500 mg tablet Take 1 Tab by mouth three (3) times daily for 10 days. 30 Tab 0    hydrocortisone (ANUSOL-HC) 25 mg supp Insert 1 Suppository into rectum two (2) times a day. 60 Suppository 3    metOLazone (ZAROXOLYN) 5 mg tablet TAKE 1 TAB BY MOUTH DAILY. 30 Tab 1    metOLazone (ZAROXOLYN) 5 mg tablet Take 1 Tab by mouth daily. 30 Tab 0    levalbuterol tartrate (XOPENEX) 45 mcg/actuation inhaler Take 1 Puff by inhalation every six (6) hours as needed for Wheezing.  morphine CR (MS CONTIN) 60 mg CR tablet Take 1 Tab by mouth every twelve (12) hours. Max Daily Amount: 120 mg. Indications: Chronic Pain, Severe Pain, Possible duplicate, noted patient receiving med at [facility], only difference in order is the amount of med dispenced 60 Tab 0    senna-docusate (PERICOLACE) 8.6-50 mg per tablet Take 2 Tabs by mouth two (2) times a day. (Patient not taking: Reported on 1/9/2018) 10 Tab 0    polyethylene glycol (MIRALAX) 17 gram packet Take 1 Packet by mouth two (2) times a day. (Patient not taking: Reported on 1/9/2018) 20 Packet 0    gabapentin (NEURONTIN) 100 mg capsule Take 1 Cap by mouth three (3) times daily. (Patient not taking: Reported on 1/9/2018) 10 Cap 0    bisacodyl (DULCOLAX) 10 mg suppository Insert 10 mg into rectum daily.  (Patient not taking: Reported on 1/9/2018) 5 Suppository 0    anastrozole (ARIMIDEX) 1 mg tablet Resume if ok with your oncologist (Patient not taking: Reported on 1/9/2018) 1 Tab 0    KLOR-CON M20 20 mEq tablet TAKE 1 TABLET BY MOUTH TWICE A DAY 30 Tab 1    cyanocobalamin (VITAMIN B-12) 1,000 mcg tablet Take 1 Tab by mouth daily. 90 Tab 1    clonazePAM (KLONOPIN) 1 mg tablet Take 1 mg by mouth two (2) times daily as needed (Anxiety).  cholecalciferol (VITAMIN D3) 1,000 unit cap Take 1,000 Units by mouth daily. Take 1 tab daily      methylnaltrexone (RELISTOR) 150 mg tab tablet Take 3 Tabs by mouth Daily (before breakfast). (Patient not taking: Reported on 1/9/2018) 90 Tab 4    naloxegol (MOVANTIK) 25 mg tab tablet Take 1 Tab by mouth Daily (before breakfast). (Patient not taking: Reported on 1/9/2018) 30 Tab 6    pantoprazole (PROTONIX) 40 mg tablet Take 1 Tab by mouth two (2) times a day. 60 Tab 2    ondansetron (ZOFRAN ODT) 4 mg disintegrating tablet Take 1 Tab by mouth every eight (8) hours as needed for Nausea. (Patient not taking: Reported on 1/9/2018) 16 Tab 0    hydrALAZINE (APRESOLINE) 25 mg tablet Take 1 Tab by mouth daily. 90 Tab 6    OTHER MORPHINE CREAM APPLIES TO KNEES 3-4X DAILY   Indications: Per Rick during med reconciliation:  SNF is giving aspercream with lidocaine 4% apply to knees q shift.  metoprolol succinate (TOPROL-XL) 100 mg tablet Take 100 mg by mouth daily.  magnesium 250 mg tab Take 1 Tab by mouth daily.          Past History     Past Medical History:  Past Medical History:   Diagnosis Date    Acute kidney failure ([address] 11/19/2015    Acute sinusitis 2/11/2011    Adverse effect of anesthesia     SLOW WAKING PAST ANESTHESIA    Arthritis     RA    At risk for side effect of medication 9/30/2016    Atrial fibrillation ([address] 10/19/2011    Autoimmune disease ([address]     FIBROMYLGIA    Cancer ([address] 2016    ENDOMETRIAL     Cholelithiasis 11/19/2015    Chronic pain     Claudication of both lower extremities ([address] 8/25/2015    Diabetes mellitus type 2, controlled ([address] 1/19/2016    Diabetes mellitus, type II ([address] 10/10/2014    Diverticulitis     DVT (deep venous thrombosis) ([address]     Fall against object 10/10/2014    Fatty liver 10/20/2015    Hypertension     Ill-defined condition     SPINAL STENOSIS    Left shoulder pain 10/10/2014    Leiomyoma of body of uterus 4/7/2016    Morbid obesity ([address]     Myalgia 9/30/2016    Nausea & vomiting     Personal history of radiation therapy 4/20/2017    Pneumonia     Preop examination 6/15/2015    Preoperative clearance 6/15/2015    Rash 2/11/2011    Rash of hands 10/10/2014    Screening for colon cancer 2/22/2016    Screening for glaucoma 2/22/2016    Sleep apnea     SOB (shortness of breath)     hospitalized 5/2012.  angina, SOB    Thromboembolus ([address] 2011    LEFT LOWER LEG AND PE    Tinea pedis, recurrent 7/20/2015    Vaginal bleeding, abnormal 4/20/2017       Past Surgical History:  Past Surgical History:   Procedure Laterality Date    COLONOSCOPY N/A 12/21/2016    COLONOSCOPY performed by Rosalee Holter, MD at [address]  12/21/2016         HX CATARACT REMOVAL Bilateral 2015     [facility]    HX DILATION AND CURETTAGE  07/29/2016    HX DILATION AND CURETTAGE  2016    HX GYN  1960    etopic    HX HEART CATHETERIZATION  2001    NEGATIVE PER PATIENT    HX HYSTERECTOMY  2016    HX KNEE ARTHROSCOPY Left 1998    HX ORTHOPAEDIC Right 1960'S    BUNIONECTOMY    HX OTHER SURGICAL      BIOPSY OF VEIN IN FOREHEAD    HX TUBAL LIGATION  1963    GA COLSC FLX W/RMVL OF TUMOR POLYP LESION SNARE TQ  12/12/2011            Family History:  Family History   Problem Relation Age of Onset    Other Mother      ANEURYSM    Obesity Mother     Heart Disease Father     Other Father      VASCULAR DISEASE    Hypertension Sister     Heart Disease Brother     Heart Attack Brother     Kidney Disease Sister     Seizures Brother     Heart Attack Brother  Anesth Problems Neg Hx     Alcohol abuse Neg Hx        Social History:  Social History   Substance Use Topics    Smoking status: Former Smoker     Packs/day: 0.25     Years: 15.00     Quit date: 8/23/1996    Smokeless tobacco: Never Used    Alcohol use No       Allergies: Allergies   Allergen Reactions    Latex Rash and Itching    Advair Diskus [Fluticasone-Salmeterol] Other (comments)     \"breathing problems\"    Bactrim [Sulfamethoprim] Shortness of Breath, Itching and Swelling     Swelling of tongue and throat    Ciprofloxacin Hives    Eliquis [Apixaban] Other (comments)    Flagyl [Metronidazole] Hoarseness    Iodinated Contrast- Oral And Iv Dye Hives    Ivp [Fd And C Blue No.1] Hives and Shortness of Breath    Lovenox [Enoxaparin] Other (comments)     DIC    Nsaids (Non-Steroidal Anti-Inflammatory Drug) Other (comments)     Heartburn    Sulfa (Sulfonamide Antibiotics) Shortness of Breath    Xarelto [Rivaroxaban] Myalgia     Paresthesia, spasmic muscle         Review of Systems   Review of Systems   Constitutional: Negative for activity change, appetite change, chills, fever and unexpected weight change. HENT: Negative for congestion. Eyes: Negative for pain and visual disturbance. Respiratory: Negative for cough and shortness of breath. Cardiovascular: Positive for leg swelling (right leg). Negative for chest pain. Gastrointestinal: Positive for abdominal pain, anal bleeding and nausea. Negative for blood in stool, diarrhea and vomiting. Genitourinary: Negative for dysuria. Musculoskeletal: Positive for arthralgias (right hip) and back pain. Skin: Negative for rash. Neurological: Positive for numbness (feet, toes, right fingers). Negative for headaches. Physical Exam   Physical Exam   Constitutional: She is oriented to person, place, and time. She appears well-developed and well-nourished.    Morbidly obese elderly female in mild distress due to pain   HENT:   Head: Normocephalic and atraumatic. Mouth/Throat: Oropharynx is clear and moist.   Eyes: Conjunctivae and EOM are normal. Pupils are equal, round, and reactive to light. Right eye exhibits no discharge. Left eye exhibits no discharge. Neck: Normal range of motion. Neck supple. Cardiovascular: Normal rate, regular rhythm, normal heart sounds and intact distal pulses. Exam reveals no gallop and no friction rub. No murmur heard. Intact distal pulses of the bilateral lower extremities    Pulmonary/Chest: Effort normal and breath sounds normal. No respiratory distress. She has no wheezes. She has no rales. Abdominal: Soft. Bowel sounds are normal. She exhibits no distension. There is no tenderness. Genitourinary:   Genitourinary Comments: BRB from hemorrhoid rectum   Musculoskeletal: Normal range of motion. She exhibits no edema. Neurological: She is alert and oriented to person, place, and time. No cranial nerve deficit. She exhibits normal muscle tone. Skin: Skin is warm and dry. No rash noted. She is not diaphoretic. Both legs warm, not pale, with normal color   Nursing note and vitals reviewed. Diagnostic Study Results     Labs -     Recent Results (from the past 12 hour(s))   EKG, 12 LEAD, INITIAL    Collection Time: 02/17/18 12:03 AM   Result Value Ref Range    Ventricular Rate 76 BPM    Atrial Rate 76 BPM    P-R Interval 162 ms    QRS Duration 80 ms    Q-T Interval 384 ms    QTC Calculation (Bezet) 432 ms    Calculated P Axis 36 degrees    Calculated R Axis 17 degrees    Calculated T Axis 22 degrees    Diagnosis       Sinus rhythm with premature atrial complexes  Otherwise normal ECG  When compared with ECG of 09-DEC-2017 18:05,  Vent.  rate has decreased BY  39 BPM     CBC WITH AUTOMATED DIFF    Collection Time: 02/17/18  2:07 AM   Result Value Ref Range    WBC 4.2 3.6 - 11.0 K/uL    RBC 2.60 (L) 3.80 - 5.20 M/uL    HGB 7.9 (L) 11.5 - 16.0 g/dL    HCT 25.2 (L) 35.0 - 47.0 %    MCV 96.9 80.0 - 99.0 FL    MCH 30.4 26.0 - 34.0 PG    MCHC 31.3 30.0 - 36.5 g/dL    RDW 17.1 (H) 11.5 - 14.5 %    PLATELET 354 529 - 734 K/uL    MPV 9.9 8.9 - 12.9 FL    NRBC 0.0 0  WBC    ABSOLUTE NRBC 0.00 0.00 - 0.01 K/uL    NEUTROPHILS 75 32 - 75 %    LYMPHOCYTES 12 12 - 49 %    MONOCYTES 10 5 - 13 %    EOSINOPHILS 2 0 - 7 %    BASOPHILS 0 0 - 1 %    IMMATURE GRANULOCYTES 1 (H) 0.0 - 0.5 %    ABS. NEUTROPHILS 3.2 1.8 - 8.0 K/UL    ABS. LYMPHOCYTES 0.5 (L) 0.8 - 3.5 K/UL    ABS. MONOCYTES 0.4 0.0 - 1.0 K/UL    ABS. EOSINOPHILS 0.1 0.0 - 0.4 K/UL    ABS. BASOPHILS 0.0 0.0 - 0.1 K/UL    ABS. IMM. GRANS. 0.0 0.00 - 0.04 K/UL    DF AUTOMATED      RBC COMMENTS ANISOCYTOSIS  1+       METABOLIC PANEL, COMPREHENSIVE    Collection Time: 02/17/18  2:07 AM   Result Value Ref Range    Sodium 136 136 - 145 mmol/L    Potassium 4.3 3.5 - 5.1 mmol/L    Chloride 102 97 - 108 mmol/L    CO2 29 21 - 32 mmol/L    Anion gap 5 5 - 15 mmol/L    Glucose 113 (H) 65 - 100 mg/dL    BUN 19 6 - 20 MG/DL    Creatinine 1.01 0.55 - 1.02 MG/DL    BUN/Creatinine ratio 19 12 - 20      GFR est AA >60 >60 ml/min/1.73m2    GFR est non-AA 53 (L) >60 ml/min/1.73m2    Calcium 10.2 (H) 8.5 - 10.1 MG/DL    Bilirubin, total 0.5 0.2 - 1.0 MG/DL    ALT (SGPT) 10 (L) 12 - 78 U/L    AST (SGOT) 11 (L) 15 - 37 U/L    Alk.  phosphatase 72 45 - 117 U/L    Protein, total 9.3 (H) 6.4 - 8.2 g/dL    Albumin 2.7 (L) 3.5 - 5.0 g/dL    Globulin 6.6 (H) 2.0 - 4.0 g/dL    A-G Ratio 0.4 (L) 1.1 - 2.2     TYPE & SCREEN    Collection Time: 02/17/18  2:07 AM   Result Value Ref Range    Crossmatch Expiration 02/20/2018     ABO/Rh(D) B POSITIVE     Antibody screen NEG    URINALYSIS W/ RFLX MICROSCOPIC    Collection Time: 02/17/18  2:07 AM   Result Value Ref Range    Color YELLOW/STRAW      Appearance CLEAR CLEAR      Specific gravity 1.014 1.003 - 1.030      pH (UA) 6.0 5.0 - 8.0      Protein NEGATIVE  NEG mg/dL    Glucose NEGATIVE  NEG mg/dL    Ketone NEGATIVE  NEG mg/dL    Bilirubin NEGATIVE  NEG      Blood LARGE (A) NEG      Urobilinogen 0.2 0.2 - 1.0 EU/dL    Nitrites NEGATIVE  NEG      Leukocyte Esterase NEGATIVE  NEG      WBC 0-4 0 - 4 /hpf    RBC 0-5 0 - 5 /hpf    Epithelial cells FEW FEW /lpf    Bacteria NEGATIVE  NEG /hpf   PROTHROMBIN TIME + INR    Collection Time: 02/17/18  3:33 AM   Result Value Ref Range    INR 1.3 (H) 0.9 - 1.1      Prothrombin time 13.4 (H) 9.0 - 11.1 sec       Radiologic Studies -     CT Results  (Last 48 hours)               02/17/18 0239  CT ABD PELV WO CONT Final result    Impression:  IMPRESSION:    1. Sigmoid diverticulosis without diverticulitis. Normal appendix. 2. Mildly enlarged right pericardial and bilateral inguinal lymph nodes are   nonspecific. 3. Trace right pleural effusion. Narrative:  EXAM:  CT ABD PELV WO CONT       INDICATION: Right lower abdomen pain. COMPARISON: CT 9/19/2017. TECHNIQUE: Helical CT of the abdomen  and pelvis  without contrast. Coronal and   sagittal reformats are performed. CT dose reduction was achieved through use of   a standardized protocol tailored for this examination and automatic exposure   control for dose modulation. FINDINGS:    Solid organ evaluation is limited without contrast.        The visualized lung bases demonstrate no mass or consolidation. There is a trace   right pleural effusion. The heart size is normal. There is no pericardial   effusion. There is no renal, ureteral, or bladder calculus. The kidneys are symmetric   without hydronephrosis. There is no perinephric fluid or fat stranding. The liver, spleen, pancreas, and adrenal glands are normal.  The gall bladder is   present  without intra- or extra-hepatic biliary dilatation. Several right pericardial lymph nodes are overall increased in size, the largest   measuring 1.2 x 1.4 cm (series 2, image 7).  There is an increased size of a   bilateral inguinal lymph nodes with the right inguinal lymph node measuring 1.9   x 3.0 cm. There are no dilated bowel loops. The appendix is normal.  There is sigmoid   diverticulosis without focal adjacent inflammation. There is no free fluid or   free air. The aorta tapers without aneurysm. There is aortoiliac   atherosclerosis. An IVC filter is present. There is a small fat-containing umbilical hernia. There are small fat-containing   bilateral inguinal hernias. There is increase in a fluid density collection in   the anterior abdominal wall/hernia measuring 2.9 x 4.1 cm, previously 2.0 x 2.1   cm. There is no pelvic mass. The uterus is surgically absent. There are   degenerative changes in the spine without aggressive bony lesion. Medical Decision Making   I am the first provider for this patient. I reviewed the vital signs, available nursing notes, past medical history, past surgical history, family history and social history. Vital Signs-Reviewed the patient's vital signs. Patient Vitals for the past 12 hrs:   Temp Pulse Resp BP SpO2   02/17/18 0430 - 74 16 153/78 97 %   02/17/18 0415 - 71 12 137/70 98 %   02/17/18 0400 - 73 14 129/63 94 %   02/17/18 0345 - 72 12 136/71 98 %   02/17/18 0330 - 74 15 - 94 %   02/17/18 0300 - 77 14 115/58 94 %   02/17/18 0219 - 82 13 157/61 -   02/17/18 0020 - 78 18 - 98 %   02/17/18 0019 - - - 140/66 -   02/17/18 0013 98.9 °F (37.2 °C) 82 15 140/66 97 %       Pulse Oximetry Analysis - 98% on room air    Cardiac Monitor:   Rate: 78 bpm  Rhythm: Normal Sinus Rhythm     EKG interpretation: (Preliminary)  0003  Rhythm: normal sinus rhythm and PVC's; and regular . Rate (approx.): 76; Axis: normal; ID interval: normal; QRS interval: normal ; ST/T wave: normal.  Written by VIRGIE Stiles, as dictated by Ashok Merrill MD.    Records Reviewed:  Old Medical Records    Provider Notes (Medical Decision Making):   Right flank and lower quadrant pain in female with rectal bleeding concern for diverticulitis. Currently no concern for sepsis or hemodynamic instability as well as arterial insufficiency of the leg. ED Course:   Initial assessment performed. The patients presenting problems have been discussed, and they are in agreement with the care plan formulated and outlined with them. I have encouraged them to ask questions as they arise throughout their visit. Procedure Note - Rectal Exam:   12:13 AM  Performed by: Johnie Park MD  Chaperoned by: Winona Fabry, RN  Rectal exam performed. Gross blood clot stool was collected. The procedure took 1-15 minutes, and pt tolerated well.    02:00AM pain improved after 4mg Morphine. Await full results. Son at bedside. 04:00AM long discussion with patient regarding pain medication regimen. Recommend recheck with PCP/pain management this week. Disposition:  DISCHARGE NOTE:  4:17 AM  The patient is ready for discharge. The patient's signs, symptoms, diagnosis, and discharge instructions have been discussed and the patient has conveyed their understanding. The patient is to follow up as recommended or return to the ER should their symptoms worsen. Plan has been discussed and the patient is in agreement. PLAN:  1. Discharge Medication List as of 2/17/2018  4:13 AM      CONTINUE these medications which have NOT CHANGED    Details   !! morphine CR (MS CONTIN) 60 mg CR tablet Take 1 Tab by mouth every twelve (12) hours. Max Daily Amount: 120 mg. Indications: Chronic Pain, Severe Pain, Print, Disp-60 Tab, R-0      oxyCODONE IR (ROXICODONE) 20 mg immediate release tablet Take 1 Tab by mouth every four (4) hours as needed for Pain. Max Daily Amount: 120 mg. Indications: Pain, SNF orders are to give every 4-6 hours prn for moderate to severe pain, Print, Disp-180 Tab, R-0      clindamycin (CLEOCIN) 300 mg capsule Take 1 Cap by mouth three (3) times daily for 10 days. , Print, Disp-30 Cap, R-0      nystatin (MYCOSTATIN) topical cream Apply  to affected area two (2) times a day. Apply to and around the rectal area twice daily, Normal, Disp-30 g, R-2      hydrocortisone (ANUSOL-HC) 2.5 % rectal cream Insert  into rectum four (4) times daily. , Normal, Disp-30 g, R-3      ciprofloxacin HCl (CIPRO) 500 mg tablet Take 1 Tab by mouth two (2) times a day for 10 days. , Normal, Disp-20 Tab, R-0      metroNIDAZOLE (FLAGYL) 500 mg tablet Take 1 Tab by mouth three (3) times daily for 10 days. , Normal, Disp-30 Tab, R-0      hydrocortisone (ANUSOL-HC) 25 mg supp Insert 1 Suppository into rectum two (2) times a day., Normal, Disp-60 Suppository, R-3      !! metOLazone (ZAROXOLYN) 5 mg tablet TAKE 1 TAB BY MOUTH DAILY., Normal, Disp-30 Tab, R-1      !! metOLazone (ZAROXOLYN) 5 mg tablet Take 1 Tab by mouth daily. , Normal, Disp-30 Tab, R-0      levalbuterol tartrate (XOPENEX) 45 mcg/actuation inhaler Take 1 Puff by inhalation every six (6) hours as needed for Wheezing., Historical Med      !! morphine CR (MS CONTIN) 60 mg CR tablet Take 1 Tab by mouth every twelve (12) hours. Max Daily Amount: 120 mg. Indications: Chronic Pain, Severe Pain, Possible duplicate, noted patient receiving med at [facility], only difference in order is the amount of med dispenced, Print, Disp-60 Tab, R-0      senna-docusate (PERICOLACE) 8.6-50 mg per tablet Take 2 Tabs by mouth two (2) times a day., Print, Disp-10 Tab, R-0      polyethylene glycol (MIRALAX) 17 gram packet Take 1 Packet by mouth two (2) times a day., Print, Disp-20 Packet, R-0      gabapentin (NEURONTIN) 100 mg capsule Take 1 Cap by mouth three (3) times daily. , Print, Disp-10 Cap, R-0      bisacodyl (DULCOLAX) 10 mg suppository Insert 10 mg into rectum daily. , Print, Disp-5 Suppository, R-0      anastrozole (ARIMIDEX) 1 mg tablet Resume if ok with your oncologist, Print, Disp-1 Tab, R-0      KLOR-CON M20 20 mEq tablet TAKE 1 TABLET BY MOUTH TWICE A DAY, Normal, Disp-30 Tab, R-1      cyanocobalamin (VITAMIN B-12) 1,000 mcg tablet Take 1 Tab by mouth daily. , Normal, Disp-90 Tab, R-1      clonazePAM (KLONOPIN) 1 mg tablet Take 1 mg by mouth two (2) times daily as needed (Anxiety). , Historical Med      cholecalciferol (VITAMIN D3) 1,000 unit cap Take 1,000 Units by mouth daily. Take 1 tab daily, Historical Med      methylnaltrexone (RELISTOR) 150 mg tab tablet Take 3 Tabs by mouth Daily (before breakfast). , Normal, Disp-90 Tab, R-4      naloxegol (MOVANTIK) 25 mg tab tablet Take 1 Tab by mouth Daily (before breakfast). , Print, Disp-30 Tab, R-6      pantoprazole (PROTONIX) 40 mg tablet Take 1 Tab by mouth two (2) times a day., Normal, Disp-60 Tab, R-2      ondansetron (ZOFRAN ODT) 4 mg disintegrating tablet Take 1 Tab by mouth every eight (8) hours as needed for Nausea., Normal, Disp-16 Tab, R-0      hydrALAZINE (APRESOLINE) 25 mg tablet Take 1 Tab by mouth daily. , Normal, Disp-90 Tab, R-6      OTHER MORPHINE CREAM APPLIES TO KNEES 3-4X DAILY   Indications: Per Rick during med reconciliation:  SNF is giving aspercream with lidocaine 4% apply to knees q shift., Historical Med      metoprolol succinate (TOPROL-XL) 100 mg tablet Take 100 mg by mouth daily. , Historical Med      magnesium 250 mg tab Take 1 Tab by mouth daily. , Historical Med       !! - Potential duplicate medications found. Please discuss with provider. 2.   Follow-up Information     Follow up With Details Comments Contact Info    [facility] EMERGENCY DEPT  If symptoms worsen [address] [facility] [address] [id number] [phone]        Return to ED if worse     Diagnosis     Clinical Impression:   1. Flank pain    2. Abdominal pain, unspecified abdominal location        Attestations: This note is prepared by Maria Elena Toussaint, acting as Scribe for MD Beti Oliva MD: The scribe's documentation has been prepared under my direction and personally reviewed by me in its entirety.  I confirm that the note above accurately reflects all work, treatment, procedures, and medical decision making performed by me.

## 2018-02-18 VITALS
OXYGEN SATURATION: 99 % | TEMPERATURE: 98.8 F | HEIGHT: 66 IN | DIASTOLIC BLOOD PRESSURE: 90 MMHG | HEART RATE: 81 BPM | SYSTOLIC BLOOD PRESSURE: 112 MMHG | BODY MASS INDEX: 46.95 KG/M2 | RESPIRATION RATE: 23 BRPM | WEIGHT: 292.11 LBS

## 2018-02-18 LAB
ATRIAL RATE: 98 BPM
CALCULATED P AXIS, ECG09: 86 DEGREES
CALCULATED R AXIS, ECG10: 45 DEGREES
CALCULATED T AXIS, ECG11: 34 DEGREES
DIAGNOSIS, 93000: NORMAL
P-R INTERVAL, ECG05: 160 MS
Q-T INTERVAL, ECG07: 356 MS
QRS DURATION, ECG06: 86 MS
QTC CALCULATION (BEZET), ECG08: 454 MS
VENTRICULAR RATE, ECG03: 98 BPM

## 2018-02-18 PROCEDURE — 96376 TX/PRO/DX INJ SAME DRUG ADON: CPT

## 2018-02-18 PROCEDURE — 74011250636 HC RX REV CODE- 250/636: Performed by: EMERGENCY MEDICINE

## 2018-02-18 RX ORDER — MORPHINE SULFATE 10 MG/ML
8 INJECTION, SOLUTION INTRAMUSCULAR; INTRAVENOUS ONCE
Status: COMPLETED | OUTPATIENT
Start: 2018-02-18 | End: 2018-02-18

## 2018-02-18 RX ADMIN — MORPHINE SULFATE 8 MG: 10 INJECTION, SOLUTION INTRAVENOUS at 02:33

## 2018-02-18 NOTE — DISCHARGE INSTRUCTIONS
Joint Pain: Care Instructions  Your Care Instructions  Many people have small aches and pains from overuse or injury to muscles and joints. Joint injuries often happen during sports or recreation, work tasks, or projects around the home. An overuse injury can happen when you put too much stress on a joint or when you do an activity that stresses the joint over and over, such as using the computer or rowing a boat. You can take action at home to help your muscles and joints get better. You should feel better in 1 to 2 weeks, but it can take 3 months or more to heal completely. Follow-up care is a key part of your treatment and safety. Be sure to make and go to all appointments, and call your doctor if you are having problems. It's also a good idea to know your test results and keep a list of the medicines you take. How can you care for yourself at home? · Do not put weight on the injured joint for at least a day or two. · For the first day or two after an injury, do not take hot showers or baths, and do not use hot packs. The heat could make swelling worse. · Put ice or a cold pack on the sore joint for 10 to 20 minutes at a time. Try to do this every 1 to 2 hours for the next 3 days (when you are awake) or until the swelling goes down. Put a thin cloth between the ice and your skin. · Wrap the injury in an elastic bandage. Do not wrap it too tightly because this can cause more swelling. · Prop up the sore joint on a pillow when you ice it or anytime you sit or lie down during the next 3 days. Try to keep it above the level of your heart. This will help reduce swelling. · Take an over-the-counter pain medicine, such as acetaminophen (Tylenol), ibuprofen (Advil, Motrin), or naproxen (Aleve). Read and follow all instructions on the label. · After 1 or 2 days of rest, begin moving the joint gently.  While the joint is still healing, you can begin to exercise using activities that do not strain or hurt the painful joint. When should you call for help? Call your doctor now or seek immediate medical care if:  ? · You have signs of infection, such as:  ¨ Increased pain, swelling, warmth, and redness. ¨ Red streaks leading from the joint. ¨ A fever. ? Watch closely for changes in your health, and be sure to contact your doctor if:  ? · Your movement or symptoms are not getting better after 1 to 2 weeks of home treatment. Where can you learn more? Go to http://kiara-olesya.info/. Enter P205 in the search box to learn more about \"Joint Pain: Care Instructions. \"  Current as of: March 21, 2017  Content Version: 11.4  © 9389-8575 [employer]. Care instructions adapted under license by [employer] (which disclaims liability or warranty for this information). If you have questions about a medical condition or this instruction, always ask your healthcare professional. [place] [address] any warranty or liability for your use of this information. Musculoskeletal Pain: Care Instructions  Your Care Instructions  Different problems with the bones, muscles, nerves, ligaments, and tendons in the body can cause pain. One or more areas of your body may ache or burn. Or they may feel tired, stiff, or sore. The medical term for this type of pain is musculoskeletal pain. It can have many different causes. Sometimes the pain is caused by an injury such as a strain or sprain. Or you might have pain from using one part of your body in the same way over and over again. This is called overuse. In some cases, the cause of the pain is another health problem such as arthritis or fibromyalgia. The doctor will examine you and ask you questions about your health to help find the cause of your pain. Blood tests or imaging tests like an X-ray may also be helpful. But sometimes doctors can't find a cause of the pain.   Treatment depends on your symptoms and the cause of the pain, if known. The doctor has checked you carefully, but problems can develop later. If you notice any problems or new symptoms, get medical treatment right away. Follow-up care is a key part of your treatment and safety. Be sure to make and go to all appointments, and call your doctor if you are having problems. It's also a good idea to know your test results and keep a list of the medicines you take. How can you care for yourself at home? · Rest until you feel better. · Do not do anything that makes the pain worse. Return to exercise gradually if you feel better and your doctor says it's okay. · Be safe with medicines. Read and follow all instructions on the label. ¨ If the doctor gave you a prescription medicine for pain, take it as prescribed. ¨ If you are not taking a prescription pain medicine, ask your doctor if you can take an over-the-counter medicine. · Put ice or a cold pack on the area for 10 to 20 minutes at a time to ease pain. Put a thin cloth between the ice and your skin. When should you call for help? Call your doctor now or seek immediate medical care if:  ? · You have new pain, or your pain gets worse. ? · You have new symptoms such as a fever, a rash, or chills. ? Watch closely for changes in your health, and be sure to contact your doctor if:  ? · You do not get better as expected. Where can you learn more? Go to http://kiara-olesya.info/. Enter H942 in the search box to learn more about \"Musculoskeletal Pain: Care Instructions. \"  Current as of: October 14, 2016  Content Version: 11.4  © 2420-2213 [employer]. Care instructions adapted under license by [employer] (which disclaims liability or warranty for this information). If you have questions about a medical condition or this instruction, always ask your healthcare professional. [address] any warranty or liability for your use of this information.

## 2018-02-18 NOTE — ED NOTES
Discharge instructions given to patient by Kel Redmond. MD Srinath. Patient verbalized understanding of discharge instructions. Pt discharged without difficulty. Pt discharged in stable condition via transport by [facility].

## 2018-02-18 NOTE — ED NOTES
Pt arrived via EMS and bedside report obtained. Assumed care of pt at this time. Patient arrived with c/o of right groin and leg pain, bilateral flank pain, bilateral shoulder pain. Patient states that she was seen in ED last night for same problem. Patient received a CT scan and then sent home on usual pain medications. Patient stated that her pain began to return around 0800 this morning and steadily became worse. Patient is screaming in pain stating that \"there is a clot in my leg, it is getting bigger. \"  Pt resting comfortably in bed with side rails up and call bell with within reach. Pt aware of plan to await for MD/PA-C assessment, and pt/family verbalizes understanding. Placed on Monitor x 3 with alarms on.

## 2018-02-18 NOTE — ED NOTES
Son at bedside. Patient has been in ED 10 minutes and IV has been placed, EKG has been performed and assessment has been completed. RN has provided patient with blankets to prop under hurting leg and has repositioned the patient numerous times. Upon entering the room, patient's son stated that \"this is ridiculous my mother has not been seen yet. She needs some damn pain medication. \" RN explained to son that there are only 2 MD's at night and that they were both tied up but she would be seen as soon as possible. Patient's son sits in the room stating \"this is bullshit. They do not even care about you. \" RN has informed the MD's about the upset and in pain patient but they can only see so many people at one time. RN constantly enters room to calm patient down and reposition. Son has stepped out of the room at this time.  and charge nurse made aware at this time.

## 2018-02-18 NOTE — ED PROVIDER NOTES
EMERGENCY DEPARTMENT HISTORY AND PHYSICAL EXAM      Date: 2/17/2018  Patient Name: Raven Michele    History of Presenting Illness     Chief Complaint   Patient presents with    Leg Pain     right leg    Groin Pain     right groin pain    Shoulder Pain     bilateral shoulder pain    Flank Pain       History Provided By: Patient    HPI: Raven Michele, 68 y.o. female with PMHx significant for HTN, chronic pain, pneumonia, DM, cancer, DVT, and diverticulitis, presents via EMS to the ED with cc of 10/10 constant, and worsening R knee pain, along with associated swelling to the R knee x ~8:00AM this morning. Pt was seen earlier in the morning at [address] [facility] ED for flank pain and abdominal pain to which she was prescribed pain medication for; she notes taking her morning dosage at ~8:00AM, however has not taken her night dosage. She reports severe pain to her L lower extremity, and notes, \"it is the worst pain I have ever felt in my life\". She denies any other medical concerns. Social Hx:  ETOH: no  Tobacco: former  Illicit drug use: no    PCP: Alexa Santana MD    There are no other complaints, changes, or physical findings at this time. Current Facility-Administered Medications   Medication Dose Route Frequency Provider Last Rate Last Dose    morphine CR (MS CONTIN) tablet 60 mg  60 mg Oral Q12H Andreia Yo MD   60 mg at 02/17/18 3698     Current Outpatient Prescriptions   Medication Sig Dispense Refill    promethazine (PHENERGAN) 25 mg tablet Take 1 Tab by mouth every six (6) hours as needed. 12 Tab 0    [START ON 3/5/2018] morphine CR (MS CONTIN) 60 mg CR tablet Take 1 Tab by mouth every twelve (12) hours. Max Daily Amount: 120 mg. Indications: Chronic Pain, Severe Pain 60 Tab 0    [START ON 3/5/2018] oxyCODONE IR (ROXICODONE) 20 mg immediate release tablet Take 1 Tab by mouth every four (4) hours as needed for Pain. Max Daily Amount: 120 mg.  Indications: Pain, SNF orders are to give every 4-6 hours prn for moderate to severe pain 180 Tab 0    clindamycin (CLEOCIN) 300 mg capsule Take 1 Cap by mouth three (3) times daily for 10 days. 30 Cap 0    nystatin (MYCOSTATIN) topical cream Apply  to affected area two (2) times a day. Apply to and around the rectal area twice daily 30 g 2    hydrocortisone (ANUSOL-HC) 2.5 % rectal cream Insert  into rectum four (4) times daily. 30 g 3    ciprofloxacin HCl (CIPRO) 500 mg tablet Take 1 Tab by mouth two (2) times a day for 10 days. 20 Tab 0    metroNIDAZOLE (FLAGYL) 500 mg tablet Take 1 Tab by mouth three (3) times daily for 10 days. 30 Tab 0    hydrocortisone (ANUSOL-HC) 25 mg supp Insert 1 Suppository into rectum two (2) times a day. 60 Suppository 3    metOLazone (ZAROXOLYN) 5 mg tablet TAKE 1 TAB BY MOUTH DAILY. 30 Tab 1    metOLazone (ZAROXOLYN) 5 mg tablet Take 1 Tab by mouth daily. 30 Tab 0    levalbuterol tartrate (XOPENEX) 45 mcg/actuation inhaler Take 1 Puff by inhalation every six (6) hours as needed for Wheezing.  morphine CR (MS CONTIN) 60 mg CR tablet Take 1 Tab by mouth every twelve (12) hours. Max Daily Amount: 120 mg. Indications: Chronic Pain, Severe Pain, Possible duplicate, noted patient receiving med at [facility], only difference in order is the amount of med dispenced 60 Tab 0    senna-docusate (PERICOLACE) 8.6-50 mg per tablet Take 2 Tabs by mouth two (2) times a day. 10 Tab 0    polyethylene glycol (MIRALAX) 17 gram packet Take 1 Packet by mouth two (2) times a day. 20 Packet 0    gabapentin (NEURONTIN) 100 mg capsule Take 1 Cap by mouth three (3) times daily. 10 Cap 0    bisacodyl (DULCOLAX) 10 mg suppository Insert 10 mg into rectum daily. 5 Suppository 0    anastrozole (ARIMIDEX) 1 mg tablet Resume if ok with your oncologist 1 Tab 0    KLOR-CON M20 20 mEq tablet TAKE 1 TABLET BY MOUTH TWICE A DAY 30 Tab 1    cyanocobalamin (VITAMIN B-12) 1,000 mcg tablet Take 1 Tab by mouth daily.  90 Tab 1    clonazePAM (KLONOPIN) 1 mg tablet Take 1 mg by mouth two (2) times daily as needed (Anxiety).  cholecalciferol (VITAMIN D3) 1,000 unit cap Take 1,000 Units by mouth daily. Take 1 tab daily      methylnaltrexone (RELISTOR) 150 mg tab tablet Take 3 Tabs by mouth Daily (before breakfast). 90 Tab 4    naloxegol (MOVANTIK) 25 mg tab tablet Take 1 Tab by mouth Daily (before breakfast). 30 Tab 6    pantoprazole (PROTONIX) 40 mg tablet Take 1 Tab by mouth two (2) times a day. 60 Tab 2    ondansetron (ZOFRAN ODT) 4 mg disintegrating tablet Take 1 Tab by mouth every eight (8) hours as needed for Nausea. 16 Tab 0    hydrALAZINE (APRESOLINE) 25 mg tablet Take 1 Tab by mouth daily. 90 Tab 6    OTHER MORPHINE CREAM APPLIES TO KNEES 3-4X DAILY   Indications: Per Rick during med reconciliation:  SNF is giving aspercream with lidocaine 4% apply to knees q shift.  metoprolol succinate (TOPROL-XL) 100 mg tablet Take 100 mg by mouth daily.  magnesium 250 mg tab Take 1 Tab by mouth daily.          Past History     Past Medical History:  Past Medical History:   Diagnosis Date    Acute kidney failure ([address] 11/19/2015    Acute sinusitis 2/11/2011    Adverse effect of anesthesia     SLOW WAKING PAST ANESTHESIA    Arthritis     RA    At risk for side effect of medication 9/30/2016    Atrial fibrillation ([address] 10/19/2011    Autoimmune disease ([address]     FIBROMYLGIA    Cancer ([address] 2016    ENDOMETRIAL     Cholelithiasis 11/19/2015    Chronic pain     Claudication of both lower extremities ([address] 8/25/2015    Diabetes mellitus type 2, controlled ([address] 1/19/2016    Diabetes mellitus, type II ([address] 10/10/2014    Diverticulitis     DVT (deep venous thrombosis) ([address]     Fall against object 10/10/2014    Fatty liver 10/20/2015    Hypertension     Ill-defined condition     SPINAL STENOSIS    Left shoulder pain 10/10/2014    Leiomyoma of body of uterus 4/7/2016    Morbid obesity ([address]     Myalgia 9/30/2016    Nausea & vomiting     Personal history of radiation therapy 4/20/2017    Pneumonia     Preop examination 6/15/2015    Preoperative clearance 6/15/2015    Rash 2/11/2011    Rash of hands 10/10/2014    Screening for colon cancer 2/22/2016    Screening for glaucoma 2/22/2016    Sleep apnea     SOB (shortness of breath)     hospitalized 5/2012. angina, SOB    Thromboembolus ([address] 2011    LEFT LOWER LEG AND PE    Tinea pedis, recurrent 7/20/2015    Vaginal bleeding, abnormal 4/20/2017       Past Surgical History:  Past Surgical History:   Procedure Laterality Date    COLONOSCOPY N/A 12/21/2016    COLONOSCOPY performed by Anupam Shanks MD at [address] [place] [address]  12/21/2016         HX CATARACT REMOVAL Bilateral 2015    [address]    HX DILATION AND CURETTAGE  07/29/2016    HX DILATION AND CURETTAGE  2016    HX GYN  1960    etopic    HX HEART CATHETERIZATION  2001    NEGATIVE PER PATIENT    HX HYSTERECTOMY  2016    HX KNEE ARTHROSCOPY Left 1998    HX ORTHOPAEDIC Right 1960'S    BUNIONECTOMY    HX OTHER SURGICAL      BIOPSY OF VEIN IN FOREHEAD    HX TUBAL LIGATION  1963    [place] COLSC FLX W/RMVL OF TUMOR POLYP LESION SNARE TQ  12/12/2011            Family History:  Family History   Problem Relation Age of Onset    Other Mother      ANEURYSM    Obesity Mother     Heart Disease Father     Other Father      VASCULAR DISEASE    Hypertension Sister     Heart Disease Brother     Heart Attack Brother     Kidney Disease Sister     Seizures Brother     Heart Attack Brother     Anesth Problems Neg Hx     Alcohol abuse Neg Hx        Social History:  Social History   Substance Use Topics    Smoking status: Former Smoker     Packs/day: 0.25     Years: 15.00     Quit date: 8/23/1996    Smokeless tobacco: Never Used    Alcohol use No       Allergies:   Allergies   Allergen Reactions    Latex Rash and Itching    Advair Diskus [Fluticasone-Salmeterol] Other (comments)     \"breathing problems\"    Bactrim [Sulfamethoprim] Shortness of Breath, Itching and Swelling     Swelling of tongue and throat    Ciprofloxacin Hives    Eliquis [Apixaban] Other (comments)    Flagyl [Metronidazole] Hoarseness    Iodinated Contrast- Oral And Iv Dye Hives    Ivp [Fd And C Blue No.1] Hives and Shortness of Breath    Lovenox [Enoxaparin] Other (comments)     DIC    Nsaids (Non-Steroidal Anti-Inflammatory Drug) Other (comments)     Heartburn    Sulfa (Sulfonamide Antibiotics) Shortness of Breath    Xarelto [Rivaroxaban] Myalgia     Paresthesia, spasmic muscle         Review of Systems   Review of Systems   Constitutional: Negative for activity change, appetite change, chills, fever and unexpected weight change. HENT: Negative for congestion. Eyes: Negative for pain and visual disturbance. Respiratory: Negative for cough, chest tightness, shortness of breath and wheezing. Cardiovascular: Positive for leg swelling. Negative for chest pain and palpitations. Gastrointestinal: Negative for abdominal pain, diarrhea, nausea and vomiting. Genitourinary: Negative for dysuria. Musculoskeletal: Positive for arthralgias, gait problem, joint swelling and myalgias. Negative for back pain. Skin: Negative for rash. Neurological: Negative for weakness, light-headedness, numbness and headaches. Physical Exam   Physical Exam   Constitutional: She is oriented to person, place, and time. She appears well-developed and well-nourished. Morbidly obese female screaming in pain   HENT:   Head: Normocephalic and atraumatic. Mouth/Throat: Oropharynx is clear and moist.   Eyes: Conjunctivae and EOM are normal. Pupils are equal, round, and reactive to light. Right eye exhibits no discharge. Left eye exhibits no discharge. Neck: Normal range of motion. Neck supple. No JVD present. Cardiovascular: Normal rate, regular rhythm, normal heart sounds and intact distal pulses. Exam reveals no friction rub.     No murmur heard.  Pulmonary/Chest: Effort normal and breath sounds normal. No respiratory distress. She has no wheezes. She has no rales. Tachypnea   Abdominal: Soft. Bowel sounds are normal. She exhibits no distension. There is no tenderness. There is no rebound and no guarding. Musculoskeletal: Normal range of motion. She exhibits no edema or deformity. Patient with tenderness on palpation everywhere in the body even with light touch; RLE-groin and knee without obvious edema in comparison to left leg. Neurological: She is alert and oriented to person, place, and time. No cranial nerve deficit. She exhibits normal muscle tone. Skin: Skin is warm and dry. No rash noted. Psychiatric: Her mood appears anxious. Her affect is labile. Nursing note and vitals reviewed. Diagnostic Study Results     Labs -     Recent Results (from the past 12 hour(s))   EKG, 12 LEAD, INITIAL    Collection Time: 02/17/18  9:39 PM   Result Value Ref Range    Ventricular Rate 98 BPM    Atrial Rate 98 BPM    P-R Interval 160 ms    QRS Duration 86 ms    Q-T Interval 356 ms    QTC Calculation (Bezet) 454 ms    Calculated P Axis 86 degrees    Calculated R Axis 45 degrees    Calculated T Axis 34 degrees    Diagnosis       Sinus rhythm with premature supraventricular complexes  Otherwise normal ECG  When compared with ECG of 17-FEB-2018 00:03,  No significant change was found         Radiologic Studies -   DUPLEX LOWER EXT VENOUS RIGHT   Final Result   INDICATION:  groin pain right     COMPARISON: 12/16/2017     FINDINGS: Duplex Doppler sonography of the right lower extremity was performed  from the groin to the calf. The right distal common femoral vein and femoral  vein into the mid thigh demonstrates thrombus. This has the appearance of acute  thrombus. The distal portion of the femoral vein is difficult to visualize. There is also some thrombus in the region of the popliteal vein.  Also noted is  an enlarged lymph node in the right inguinal region measuring 3.2 x 2.2 x 1.5  cm.     IMPRESSION  IMPRESSION:  Deep venous thrombosis is again noted in the superficial femoral  vein into the popliteal vein similar in distribution to the previous  examination. Enlarged lymph node in the right groin. DUPLEX LOW EXT ARTERY RIGHT    (Results Pending)     CT Results  (Last 48 hours)               02/17/18 0239  CT ABD PELV WO CONT Final result    Impression:  IMPRESSION:    1. Sigmoid diverticulosis without diverticulitis. Normal appendix. 2. Mildly enlarged right pericardial and bilateral inguinal lymph nodes are   nonspecific. 3. Trace right pleural effusion. Narrative:  EXAM:  CT ABD PELV WO CONT       INDICATION: Right lower abdomen pain. COMPARISON: CT 9/19/2017. TECHNIQUE: Helical CT of the abdomen  and pelvis  without contrast. Coronal and   sagittal reformats are performed. CT dose reduction was achieved through use of   a standardized protocol tailored for this examination and automatic exposure   control for dose modulation. FINDINGS:    Solid organ evaluation is limited without contrast.        The visualized lung bases demonstrate no mass or consolidation. There is a trace   right pleural effusion. The heart size is normal. There is no pericardial   effusion. There is no renal, ureteral, or bladder calculus. The kidneys are symmetric   without hydronephrosis. There is no perinephric fluid or fat stranding. The liver, spleen, pancreas, and adrenal glands are normal.  The gall bladder is   present  without intra- or extra-hepatic biliary dilatation. Several right pericardial lymph nodes are overall increased in size, the largest   measuring 1.2 x 1.4 cm (series 2, image 7). There is an increased size of a   bilateral inguinal lymph nodes with the right inguinal lymph node measuring 1.9   x 3.0 cm. There are no dilated bowel loops.   The appendix is normal.  There is sigmoid diverticulosis without focal adjacent inflammation. There is no free fluid or   free air. The aorta tapers without aneurysm. There is aortoiliac   atherosclerosis. An IVC filter is present. There is a small fat-containing umbilical hernia. There are small fat-containing   bilateral inguinal hernias. There is increase in a fluid density collection in   the anterior abdominal wall/hernia measuring 2.9 x 4.1 cm, previously 2.0 x 2.1   cm. There is no pelvic mass. The uterus is surgically absent. There are   degenerative changes in the spine without aggressive bony lesion. Medical Decision Making   I am the first provider for this patient. I reviewed the vital signs, available nursing notes, past medical history, past surgical history, family history and social history. Vital Signs-Reviewed the patient's vital signs. Patient Vitals for the past 12 hrs:   Temp Pulse Resp BP SpO2   02/18/18 0315 - 81 23 112/90 99 %   02/18/18 0230 - 76 19 104/74 99 %   02/18/18 0145 - 81 14 151/58 99 %   02/18/18 0100 - 76 13 148/69 99 %   02/18/18 0015 - 76 18 165/68 97 %   02/17/18 2306 98.8 °F (37.1 °C) - - - -   02/17/18 2245 - 86 13 180/74 95 %   02/17/18 2213 - 96 14 174/81 (!) 85 %   02/17/18 2140 - 96 24 (!) 169/120 96 %       EKG interpretation: (Preliminary) 21:39  Rhythm: sinus rhythm with premature supraventricular complexes; and irregular. Rate (approx.): 98 bpm; Axis: normal; AR interval: normal; QRS interval: normal ; ST/T wave: normal; Other findings: normal.    Provider Notes (Medical Decision Making):   Recurrent trip to ED with diffuse right leg pain. Exam without evidence of acute claudication, however given DVT history and current level of pain, will send patient for arterial evaluation. AP  From earlier in the week seems to have resolved. VS without evidence of instability or sepsis. ED Course:   Initial assessment performed.  The patients presenting problems have been discussed, and they are in agreement with the care plan formulated and outlined with them. I have encouraged them to ask questions as they arise throughout their visit. 23:00 Continue to await ultrasound, vs stable and pain controlled. 03:00AM Repeat morphine for continued pain. Discussed care at home as well as return precautions. Patient to follow up with PCP this week. Continue home morphine and Roxicodone. Disposition:  DISCHARGE NOTE  4:06 AM  The patient has been re-evaluated and is ready for discharge. Reviewed available results with patient. Counseled pt on diagnosis and care plan. Pt has expressed understanding, and all questions have been answered. Pt agrees with plan and agrees to F/U as recommended, or return to the ED if their sxs worsen. Discharge instructions have been provided and explained to the pt, along with reasons to return to the ED. PLAN:  1. Discharge Medication List as of 2/18/2018  1:37 AM        2. Follow-up Information     Follow up With Details Comments Contact Info    [facility] EMERGENCY DEPT  If symptoms worsen [address] [address]  [phone]        Return to ED if worse     Diagnosis     Clinical Impression:   1. Arthralgia of right lower leg        Attestations: This note is prepared by Joe Seay, acting as Scribe for Mor Costello MD.      The scribe's documentation has been prepared under my direction and personally reviewed by me in its entirety. I confirm that the note above accurately reflects all work, treatment, procedures, and medical decision making performed by me.   Mor Costello MD

## 2018-02-18 NOTE — PROGRESS NOTES
Vascular Lab   RT CFA   161/9    MONOPHASIC  RT SFA unable to assess  RT POP  37/1 MONOPHASIC  RT PT 48/10 MONOPHASIC  RT ANT TIB 59/0  MONOPHASIC  FINAL REPORT TO FOLLOw  Herbie Ying RVT RDCS

## 2018-02-19 DIAGNOSIS — M35.1 MCTD (MIXED CONNECTIVE TISSUE DISEASE) (HCC): Primary | ICD-10-CM

## 2018-02-19 DIAGNOSIS — K62.5 RECTAL BLEEDING: ICD-10-CM

## 2018-02-19 DIAGNOSIS — D47.2 MONOCLONAL GAMMOPATHIES: ICD-10-CM

## 2018-02-19 DIAGNOSIS — M79.10 MYALGIA: ICD-10-CM

## 2018-02-19 DIAGNOSIS — M79.7 FIBROMYALGIA: ICD-10-CM

## 2018-02-19 RX ORDER — METHYLPREDNISOLONE 4 MG/1
TABLET ORAL
Qty: 1 DOSE PACK | Refills: 0 | Status: SHIPPED | OUTPATIENT
Start: 2018-02-19 | End: 2018-03-19 | Stop reason: ALTCHOICE

## 2018-02-19 RX ORDER — FERROUS SULFATE 15 MG/ML
15 DROPS ORAL 3 TIMES DAILY
Qty: 90 ML | Refills: 3
Start: 2018-02-19 | End: 2018-05-24 | Stop reason: SDUPTHER

## 2018-02-19 NOTE — PROCEDURES
[facility]  *** FINAL REPORT ***    Name: Ancil Peabody  MRN: [id number]  : 02 Dec 1940  HIS Order #: [id number]  [id number] [facility] Visit #: [id number]  Date: 2018    TYPE OF TEST: Extremity Arterial Duplex    REASON FOR TEST  Concern for arterial disease. Right                     Left  Artery               PSV   Finding             PSV   Finding  ------------------  -----  ---------------    -----  ---------------  External iliac:  Common femoral:     161.0  Profunda femoris:  Proximal SFA:  Mid SFA:  Distal SFA:  Popliteal:  Anterior tibial:     59.0  Posterior tibial:    48.0    Pressures               Right     Left               -----     -----     Brachial:           DP:           PT:            RONNA:            Toe: INTERPRETATION/FINDINGS  PROCEDURE:  Arterial duplex examination using B-mode, color flow and  spectral Doppler of the lower extremity arteries. Right leg :  1. Technically limited exam due to body habitus. Unable to assess the  femoral artery in the mid to distal thigh. 2. A monophasic signal is demonstrated in the common femoral,  popliteal (bk), anterior tibial and posterior tibial arteries,  suggests significant inflow disease. ADDITIONAL COMMENTS    I have personally reviewed the data relevant to the interpretation of  this  study.     TECHNOLOGIST: Barry Elias RVT, RDCS  Signed: 2018 07:47 AM    PHYSICIAN: Agustina Riggs MD  Signed: 2018 12:40 AM

## 2018-02-19 NOTE — ED NOTES
2/19/18 email to PCP regarding final results of vascular study. Will attempt to contact patient as well.

## 2018-02-20 ENCOUNTER — TELEPHONE (OUTPATIENT)
Dept: ONCOLOGY | Age: 78
End: 2018-02-20

## 2018-02-20 NOTE — TELEPHONE ENCOUNTER
Call returned to patient. HIPAA verified. Reviewed last office visit note from provider on 11/29/17 as patient did not remember reviewing lab results. States all questions answered & she will schedule follow up office visit for May. She agrees to check with PCP regarding questions about recent labs & hgb.

## 2018-02-20 NOTE — TELEPHONE ENCOUNTER
Robbie Forman (pt) called in stated she never received a call or letter about her lab results. Pt stated she thinks she had them done in Nov but she had to have more done last week.  Pt contact # [phone]

## 2018-02-26 PROBLEM — E11.21 TYPE 2 DIABETES WITH NEPHROPATHY (HCC): Status: ACTIVE | Noted: 2018-02-26

## 2018-02-26 NOTE — PROGRESS NOTES
HISTORY OF PRESENT ILLNESS  Samantha Gentile is a 68 y.o. female. HPI   This was a home visit for medication refill patient able to ambulate with a walker for minor activity get shortness of breath and great amount of multiple joint pain in knees shoulder lower back hips unfortunately patient is with 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 myeloma endometrial cancer now with metastasis patient was told to need radiation and chemotherapy for which at this time she is not opting to continue on with the oncology's recommendation the pain is 10 out of 10 without current pain medication she was also in the rehab center for a couple of months was not able to attend the office stiffness and, pt  done, on the contract, the pill count done only 2 and then 1 left of long acting,  patient is not doctor shopping she is on a contract she is aware of random urine analysis aware of the medication side effect stating that the constipation has been fixed with the current medication and some laxative requesting a refill patient was evaluated and accordingly was treated today her pain medication was refilled at her house patient was told that she may be able to come to the office on her next visit she was told to continue on with the current physical therapy and range of motion strengthening for better outcome in addition she was told not to drive or operate any machinery with the current medication sign of overdose was informed patient was also prescribed anti-overdose medication she is aware of its use    Current Outpatient Prescriptions   Medication Sig Dispense Refill    methylPREDNISolone (MEDROL DOSEPACK) 4 mg tablet As directed for 6 days one package 1 Dose Pack 0    ferrous sulfate 15 mg iron, 75 mg,/mL (KAILA-IN-SOL) 15 mg iron (75 mg)/mL drop drops Take 1 mL by mouth three (3) times daily. 90 mL 3    promethazine (PHENERGAN) 25 mg tablet Take 1 Tab by mouth every six (6) hours as needed.  12 Tab 0    [START ON 3/5/2018] morphine CR (MS CONTIN) 60 mg CR tablet Take 1 Tab by mouth every twelve (12) hours. Max Daily Amount: 120 mg. Indications: Chronic Pain, Severe Pain 60 Tab 0    [START ON 3/5/2018] oxyCODONE IR (ROXICODONE) 20 mg immediate release tablet Take 1 Tab by mouth every four (4) hours as needed for Pain. Max Daily Amount: 120 mg. Indications: Pain, [facility] orders are to give every 4-6 hours prn for moderate to severe pain 180 Tab 0    nystatin (MYCOSTATIN) topical cream Apply  to affected area two (2) times a day. Apply to and around the rectal area twice daily 30 g 2    hydrocortisone (ANUSOL-HC) 2.5 % rectal cream Insert  into rectum four (4) times daily. 30 g 3    hydrocortisone (ANUSOL-HC) 25 mg supp Insert 1 Suppository into rectum two (2) times a day. 60 Suppository 3    metOLazone (ZAROXOLYN) 5 mg tablet TAKE 1 TAB BY MOUTH DAILY. 30 Tab 1    metOLazone (ZAROXOLYN) 5 mg tablet Take 1 Tab by mouth daily. 30 Tab 0    levalbuterol tartrate (XOPENEX) 45 mcg/actuation inhaler Take 1 Puff by inhalation every six (6) hours as needed for Wheezing.  morphine CR (MS CONTIN) 60 mg CR tablet Take 1 Tab by mouth every twelve (12) hours. Max Daily Amount: 120 mg. Indications: Chronic Pain, Severe Pain, Possible duplicate, noted patient receiving med at [facility], only difference in order is the amount of med dispenced 60 Tab 0    senna-docusate (PERICOLACE) 8.6-50 mg per tablet Take 2 Tabs by mouth two (2) times a day. 10 Tab 0    polyethylene glycol (MIRALAX) 17 gram packet Take 1 Packet by mouth two (2) times a day. 20 Packet 0    gabapentin (NEURONTIN) 100 mg capsule Take 1 Cap by mouth three (3) times daily. 10 Cap 0    bisacodyl (DULCOLAX) 10 mg suppository Insert 10 mg into rectum daily. 5 Suppository 0    anastrozole (ARIMIDEX) 1 mg tablet Resume if ok with your oncologist 1 Tab 0    KLOR-CON M20 20 mEq tablet TAKE 1 TABLET BY MOUTH TWICE A DAY 30 Tab 1    cyanocobalamin (VITAMIN B-12) 1,000 mcg tablet Take 1 Tab by mouth daily. 90 Tab 1    clonazePAM (KLONOPIN) 1 mg tablet Take 1 mg by mouth two (2) times daily as needed (Anxiety).  cholecalciferol (VITAMIN D3) 1,000 unit cap Take 1,000 Units by mouth daily. Take 1 tab daily      methylnaltrexone (RELISTOR) 150 mg tab tablet Take 3 Tabs by mouth Daily (before breakfast). 90 Tab 4    naloxegol (MOVANTIK) 25 mg tab tablet Take 1 Tab by mouth Daily (before breakfast). 30 Tab 6    pantoprazole (PROTONIX) 40 mg tablet Take 1 Tab by mouth two (2) times a day. 60 Tab 2    ondansetron (ZOFRAN ODT) 4 mg disintegrating tablet Take 1 Tab by mouth every eight (8) hours as needed for Nausea. 16 Tab 0    hydrALAZINE (APRESOLINE) 25 mg tablet Take 1 Tab by mouth daily. 90 Tab 6    OTHER MORPHINE CREAM APPLIES TO KNEES 3-4X DAILY   Indications: Per Rick during med reconciliation:  SNF is giving aspercream with lidocaine 4% apply to knees q shift.  metoprolol succinate (TOPROL-XL) 100 mg tablet Take 100 mg by mouth daily.  magnesium 250 mg tab Take 1 Tab by mouth daily.        Allergies   Allergen Reactions    Latex Rash and Itching    Advair Diskus [Fluticasone-Salmeterol] Other (comments)     \"breathing problems\"    Bactrim [Sulfamethoprim] Shortness of Breath, Itching and Swelling     Swelling of tongue and throat    Ciprofloxacin Hives    Eliquis [Apixaban] Other (comments)    Flagyl [Metronidazole] Hoarseness    Iodinated Contrast- Oral And Iv Dye Hives    Ivp [Fd And C Blue No.1] Hives and Shortness of Breath    Lovenox [Enoxaparin] Other (comments)     DIC    Nsaids (Non-Steroidal Anti-Inflammatory Drug) Other (comments)     Heartburn    Sulfa (Sulfonamide Antibiotics) Shortness of Breath    Xarelto [Rivaroxaban] Myalgia     Paresthesia, spasmic muscle     Past Medical History:   Diagnosis Date    Acute kidney failure ([facility] [address] 11/19/2015    Acute sinusitis 2/11/2011    Adverse effect of anesthesia     SLOW WAKING PAST ANESTHESIA    Arthritis     RA    At risk for side effect of medication 9/30/2016    Atrial fibrillation ([address] 10/19/2011    Autoimmune disease ([address]     FIBROMYLGIA    Cancer ([address] 2016    ENDOMETRIAL     Cholelithiasis 11/19/2015    Chronic pain     Claudication of both lower extremities ([address] 8/25/2015    Diabetes mellitus type 2, controlled ([address] 1/19/2016    Diabetes mellitus, type II ([address] 10/10/2014    Diverticulitis     DVT (deep venous thrombosis) ([address]     Fall against object 10/10/2014    Fatty liver 10/20/2015    Hypertension     Ill-defined condition     SPINAL STENOSIS    Left shoulder pain 10/10/2014    Leiomyoma of body of uterus 4/7/2016    Morbid obesity ([address]     Myalgia 9/30/2016    Nausea & vomiting     Personal history of radiation therapy 4/20/2017    Pneumonia     Preop examination 6/15/2015    Preoperative clearance 6/15/2015    Rash 2/11/2011    Rash of hands 10/10/2014    Rectal bleeding 2/19/2018    Screening for colon cancer 2/22/2016    Screening for glaucoma 2/22/2016    Sleep apnea     SOB (shortness of breath)     hospitalized 5/2012.  angina, SOB    Thromboembolus ([address] 2011    LEFT LOWER LEG AND PE    Tinea pedis, recurrent 7/20/2015    Vaginal bleeding, abnormal 4/20/2017     Past Surgical History:   Procedure Laterality Date    COLONOSCOPY N/A 12/21/2016    COLONOSCOPY performed by Delores Bacon MD at [address] [place] [address]  12/21/2016         HX CATARACT REMOVAL Bilateral 2015    [address]    HX DILATION AND CURETTAGE  07/29/2016    HX DILATION AND CURETTAGE  2016    HX GYN  1960    etopic    HX HEART CATHETERIZATION  2001    NEGATIVE PER PATIENT    HX HYSTERECTOMY  2016    HX KNEE ARTHROSCOPY Left 1998    HX ORTHOPAEDIC Right 1960'S    BUNIONECTOMY    HX OTHER SURGICAL      BIOPSY OF VEIN IN FOREHEAD    HX TUBAL LIGATION  1963     Brando Haddad FLX W/RMVL OF TUMOR POLYP LESION SNARE TQ  12/12/2011          Family History   Problem Relation Age of Onset    Other Mother      ANEURYSM    Obesity Mother     Heart Disease Father     Other Father      VASCULAR DISEASE    Hypertension Sister     Heart Disease Brother     Heart Attack Brother     Kidney Disease Sister     Seizures Brother     Heart Attack Brother     Anesth Problems Neg Hx     Alcohol abuse Neg Hx      Social History   Substance Use Topics    Smoking status: Former Smoker     Packs/day: 0.25     Years: 15.00     Quit date: 8/23/1996    Smokeless tobacco: Never Used    Alcohol use No      Lab Results  Component Value Date/Time   WBC 4.2 02/17/2018 02:07 AM   HGB 7.9 (L) 02/17/2018 02:07 AM   HCT 25.2 (L) 02/17/2018 02:07 AM   PLATELET 202 27/50/8257 02:07 AM   MCV 96.9 02/17/2018 02:07 AM     Lab Results  Component Value Date/Time   GFR est non-AA 53 (L) 02/17/2018 02:07 AM   GFRNA, POC >60 08/16/2016 08:18 AM   GFR est AA >60 02/17/2018 02:07 AM   GFRAA, POC >60 08/16/2016 08:18 AM   Creatinine 1.01 02/17/2018 02:07 AM   Creatinine (POC) 0.9 08/16/2016 08:18 AM   BUN 19 02/17/2018 02:07 AM   Sodium 136 02/17/2018 02:07 AM   Potassium 4.3 02/17/2018 02:07 AM   Chloride 102 02/17/2018 02:07 AM   CO2 29 02/17/2018 02:07 AM   Magnesium 1.4 (L) 12/15/2017 03:38 AM     Lab Results  Component Value Date/Time   TSH 5.340 (H) 10/24/2017 09:56 AM         Review of Systems   Constitutional: Positive for malaise/fatigue. Negative for chills and fever. HENT: Negative for congestion and nosebleeds. Eyes: Negative for blurred vision and pain. Respiratory: Negative for shortness of breath and wheezing. Cardiovascular: Negative for chest pain, palpitations and leg swelling. Gastrointestinal: Negative for constipation, diarrhea, nausea and vomiting. Genitourinary: Negative for dysuria and frequency. Musculoskeletal: Positive for back pain, joint pain, myalgias and neck pain. Skin: Negative for itching and rash.    Neurological: Negative for dizziness, loss of consciousness and headaches. Endo/Heme/Allergies: Does not bruise/bleed easily. Psychiatric/Behavioral: Negative for depression. The patient is not nervous/anxious and does not have insomnia. All other systems reviewed and are negative. Physical Exam   Constitutional: She is oriented to person, place, and time. She appears well-developed and well-nourished. HENT:   Head: Normocephalic and atraumatic. Eyes: Conjunctivae and EOM are normal.   Neck: Normal range of motion. Neck supple. No JVD present. Cardiovascular: Normal rate, regular rhythm and normal heart sounds. No murmur heard. Pulmonary/Chest: Effort normal and breath sounds normal. No stridor. Abdominal: Soft. Bowel sounds are normal. She exhibits no distension and no mass. There is no tenderness. Musculoskeletal: She exhibits tenderness and deformity. She exhibits no edema. Right knee: She exhibits decreased range of motion. Tenderness found. Left knee: She exhibits decreased range of motion. Tenderness found. Cervical back: She exhibits decreased range of motion, pain and spasm. Lumbar back: She exhibits decreased range of motion and tenderness. Nl pulses, nl visual inspection nl monoF, +dystrophy and elongated Nails   Lymphadenopathy:     She has no cervical adenopathy. Neurological: She is alert and oriented to person, place, and time. Skin: No erythema. Psychiatric: Her behavior is normal.   Nursing note and vitals reviewed. ASSESSMENT and PLAN  Diagnoses and all orders for this visit:    1. Chronic pain disorder    2. Lower abdominal pain    3. MCTD (mixed connective tissue disease) ([address]    4. Claudication of both lower extremities ([address]    5. Controlled type 2 diabetes mellitus with chronic kidney disease, unspecified CKD stage, unspecified long term insulin use status ([address]    6. Endometrial cancer ([address]    7. Fibromyalgia    8. Monoclonal gammopathies    9. Chronic pain of right ankle    10.  Arm pain, right    11. Elbow pain, right    12. Pain in both knees, unspecified chronicity    13. MGUS (monoclonal gammopathy of unknown significance)    14. Chronic left shoulder pain    15. Myalgia    16. Pain of left upper extremity    17. Vaginal bleeding, abnormal    18. Diabetes mellitus without complication ([facility]    19. Other pulmonary embolism without acute cor pulmonale, unspecified chronicity ([facility] [address]    20. Personal history of radiation therapy    21. Pain of left hand    22. Left wrist pain    23. Uterine leiomyoma, unspecified location    24. Leg pain, bilateral        Refill given, pt readvised on all the dependency, Patient medications were refilled , has a signed contract consent and no harm documentations and again was told to lessen the amount of opioid-based medication continue with weight reduction do some massage therapy chiropractor exercise therapy such as resistance banding avoid heavy lifting heavy pushing at this time include ibuprofen and Tylenol over-the-counter topical cream for  day views of concerns patient was told to take some Tums or over-the-counter PPI if abdominal upset ice therapy she therapy side effect of current opioid-based medication significantly explained in detail patient acknowledged understanding and agreed with recommendation  in addition, pt was told to avoid machinary operation and driving while on any opioid based medications that will cause dizziness, drowsiness, and sleepiness. Dependency and tolerancy were also addressed,  meds side effects and compliancy advised,  Call or rtc if worsens, radiology results and schedule of future radiology studies reviewed with patient. Pt agreed with today's recommendations.

## 2018-03-05 ENCOUNTER — HOSPITAL ENCOUNTER (OUTPATIENT)
Dept: ULTRASOUND IMAGING | Age: 78
Discharge: HOME | End: 2018-03-05
Attending: FAMILY MEDICINE
Payer: MEDICARE

## 2018-03-05 ENCOUNTER — TELEPHONE (OUTPATIENT)
Dept: FAMILY MEDICINE CLINIC | Age: 78
End: 2018-03-05

## 2018-03-05 DIAGNOSIS — K57.33 DIVERTICULITIS OF LARGE INTESTINE WITH BLEEDING, UNSPECIFIED COMPLICATION STATUS: ICD-10-CM

## 2018-03-05 DIAGNOSIS — Z87.19 HISTORY OF DIVERTICULITIS: ICD-10-CM

## 2018-03-05 PROCEDURE — 76705 ECHO EXAM OF ABDOMEN: CPT

## 2018-03-05 NOTE — TELEPHONE ENCOUNTER
Patient phoned requesting status of prior authorization for Morphine CR (MS Contin) informed received PA for 30 mg tablets will phone pharmacy for clarification she acknowledged understanding. Pharmacy ([facility]) phoned informed tablets are 60 mg entered and stated PA required and will refax corrections.

## 2018-03-19 ENCOUNTER — OFFICE VISIT (OUTPATIENT)
Dept: FAMILY MEDICINE CLINIC | Age: 78
End: 2018-03-19

## 2018-03-19 VITALS
RESPIRATION RATE: 18 BRPM | BODY MASS INDEX: 45.16 KG/M2 | OXYGEN SATURATION: 95 % | SYSTOLIC BLOOD PRESSURE: 146 MMHG | TEMPERATURE: 97.7 F | WEIGHT: 281 LBS | HEART RATE: 80 BPM | HEIGHT: 66 IN | DIASTOLIC BLOOD PRESSURE: 75 MMHG

## 2018-03-19 DIAGNOSIS — R10.30 LOWER ABDOMINAL PAIN: ICD-10-CM

## 2018-03-19 DIAGNOSIS — M79.10 MYALGIA: ICD-10-CM

## 2018-03-19 DIAGNOSIS — I73.9 CLAUDICATION OF BOTH LOWER EXTREMITIES (HCC): ICD-10-CM

## 2018-03-19 DIAGNOSIS — M25.561 PAIN IN BOTH KNEES, UNSPECIFIED CHRONICITY: ICD-10-CM

## 2018-03-19 DIAGNOSIS — M79.604 LEG PAIN, BILATERAL: ICD-10-CM

## 2018-03-19 DIAGNOSIS — K62.5 RECTAL BLEEDING: Primary | ICD-10-CM

## 2018-03-19 DIAGNOSIS — M79.642 PAIN OF LEFT HAND: ICD-10-CM

## 2018-03-19 DIAGNOSIS — M25.562 PAIN IN BOTH KNEES, UNSPECIFIED CHRONICITY: ICD-10-CM

## 2018-03-19 DIAGNOSIS — M79.602 PAIN OF LEFT UPPER EXTREMITY: ICD-10-CM

## 2018-03-19 DIAGNOSIS — D47.2 MGUS (MONOCLONAL GAMMOPATHY OF UNKNOWN SIGNIFICANCE): ICD-10-CM

## 2018-03-19 DIAGNOSIS — M79.605 LEG PAIN, BILATERAL: ICD-10-CM

## 2018-03-19 DIAGNOSIS — M35.1 MCTD (MIXED CONNECTIVE TISSUE DISEASE) (HCC): ICD-10-CM

## 2018-03-19 DIAGNOSIS — E11.22 CONTROLLED TYPE 2 DIABETES MELLITUS WITH CHRONIC KIDNEY DISEASE, UNSPECIFIED CKD STAGE, UNSPECIFIED LONG TERM INSULIN USE STATUS: ICD-10-CM

## 2018-03-19 DIAGNOSIS — M25.571 CHRONIC PAIN OF RIGHT ANKLE: ICD-10-CM

## 2018-03-19 DIAGNOSIS — M25.512 CHRONIC LEFT SHOULDER PAIN: ICD-10-CM

## 2018-03-19 DIAGNOSIS — Z92.3 PERSONAL HISTORY OF RADIATION THERAPY: ICD-10-CM

## 2018-03-19 DIAGNOSIS — D25.9 UTERINE LEIOMYOMA, UNSPECIFIED LOCATION: ICD-10-CM

## 2018-03-19 DIAGNOSIS — M79.601 ARM PAIN, RIGHT: ICD-10-CM

## 2018-03-19 DIAGNOSIS — M25.532 LEFT WRIST PAIN: ICD-10-CM

## 2018-03-19 DIAGNOSIS — G89.29 CHRONIC LEFT SHOULDER PAIN: ICD-10-CM

## 2018-03-19 DIAGNOSIS — M79.7 FIBROMYALGIA: ICD-10-CM

## 2018-03-19 DIAGNOSIS — M25.521 ELBOW PAIN, RIGHT: ICD-10-CM

## 2018-03-19 DIAGNOSIS — C54.1 ENDOMETRIAL CANCER (HCC): ICD-10-CM

## 2018-03-19 DIAGNOSIS — D47.2 MONOCLONAL GAMMOPATHIES: ICD-10-CM

## 2018-03-19 DIAGNOSIS — G89.4 CHRONIC PAIN DISORDER: ICD-10-CM

## 2018-03-19 DIAGNOSIS — G89.29 CHRONIC PAIN OF RIGHT ANKLE: ICD-10-CM

## 2018-03-19 LAB
GRAN# POC: NORMAL K/UL
GRAN% POC: NORMAL %
HCT VFR BLD CALC: NORMAL %
HGB BLD-MCNC: NORMAL G/DL
LY# POC: NORMAL K/UL
LY% POC: NORMAL %
MCH RBC QN: NORMAL PG
MCHC RBC-ENTMCNC: NORMAL G/DL
MCV RBC: NORMAL FL
MID #, POC: NORMAL 10^3/UL
MID% POC: NORMAL %
PLATELET # BLD: NORMAL K/UL
RBC # BLD: NORMAL M/UL
WBC # BLD: NORMAL K/UL

## 2018-03-19 RX ORDER — MORPHINE SULFATE 60 MG/1
60 TABLET, FILM COATED, EXTENDED RELEASE ORAL
Qty: 90 TAB | Refills: 0 | Status: SHIPPED | OUTPATIENT
Start: 2018-03-19 | End: 2018-04-05

## 2018-03-19 RX ORDER — OXYCODONE HYDROCHLORIDE 20 MG/1
20 TABLET ORAL
Qty: 180 TAB | Refills: 0 | Status: SHIPPED | OUTPATIENT
Start: 2018-04-05 | End: 2018-04-05 | Stop reason: SDUPTHER

## 2018-03-19 RX ORDER — AZITHROMYCIN 250 MG/1
TABLET, FILM COATED ORAL
Refills: 0 | COMMUNITY
Start: 2018-01-04 | End: 2018-03-19 | Stop reason: ALTCHOICE

## 2018-03-19 RX ORDER — POLYETHYLENE GLYCOL-3350 AND ELECTROLYTES 236; 6.74; 5.86; 2.97; 22.74 G/274.31G; G/274.31G; G/274.31G; G/274.31G; G/274.31G
POWDER, FOR SOLUTION ORAL
COMMUNITY
Start: 2018-03-12 | End: 2018-06-18 | Stop reason: SDUPTHER

## 2018-03-19 NOTE — MR AVS SNAPSHOT
[address] [facility] [id number] 
[phone] Patient: Lily Osborn MRN: [id number] Visit Information Date & Time Provider Department Dept. Phone Encounter #  
 3/19/2018  3:30 PM Constantin Sanchez MD [address] OFFICE-[facility] [phone] [id number] Upcoming Health Maintenance Date Due  
 EYE EXAM RETINAL OR DILATED Q1 4/22/2017 MICROALBUMIN Q1 2/23/2018 MEDICARE YEARLY EXAM 3/14/2018 GLAUCOMA SCREENING Q2Y 4/22/2018 LIPID PANEL Q1 4/20/2018 HEMOGLOBIN A1C Q6M 4/24/2018 FOOT EXAM Q1 6/22/2018 COLONOSCOPY 12/21/2019 DTaP/Tdap/Td series (3 - Td) 10/20/2026 Allergies as of 3/19/2018  Review Complete On: 3/19/2018 By: Sophia Scott LPN Severity Noted Reaction Type Reactions Latex  08/29/2013    Rash, Itching Advair Diskus [Fluticasone-salmeterol]  05/29/2012    Other (comments) \"breathing problems\" Bactrim [Sulfamethoprim]  08/29/2013   Side Effect Shortness of Breath, Itching, Swelling Swelling of tongue and throat Ciprofloxacin  02/14/2018    Hives Eliquis [Apixaban]  11/29/2017    Other (comments) Flagyl [Metronidazole]  02/14/2018    Hoarseness Iodinated Contrast- Oral And Iv Dye  10/04/2016    Hives Ivp [Fd And C Blue No.1]  12/21/2010    Hives, Shortness of Breath Lovenox [Enoxaparin]  12/21/2010    Other (comments) DIC Nsaids (Non-steroidal Anti-inflammatory Drug)  12/21/2010    Other (comments) Heartburn Sulfa (Sulfonamide Antibiotics)  12/23/2013    Shortness of Breath Xarelto [Rivaroxaban]  12/12/2017    Myalgia Paresthesia, spasmic muscle Current Immunizations  Reviewed on 11/29/2017 Name Date Influenza Vaccine (Quad) PF  Deferred (Medication not available) Tdap 5/17/2006 ZZZ-RETIRED (DO NOT USE) Pneumococcal Vaccine (Unspecified Type) 1/1/2010 Not reviewed this visit You Were Diagnosed With   
  
 Codes Comments Rectal bleeding    -  Primary ICD-10-CM: K62.5 ICD-9-CM: 396. 3 Chronic pain disorder     ICD-10-CM: G89.4 ICD-9-CM: 338.4 Lower abdominal pain     ICD-10-CM: R10.30 ICD-9-CM: 789.09   
 MCTD (mixed connective tissue disease) ([place] [address]     ICD-10-CM: M35.1 ICD-9-CM: 710.8 Claudication of both lower extremities ([facility]     ICD-10-CM: I73.9 ICD-9-CM: 443.9 Controlled type 2 diabetes mellitus with chronic kidney disease, unspecified CKD stage, unspecified long term insulin use status ([place] [address]     ICD-10-CM: E11.22 
ICD-9-CM: 250.40, 585.9 Endometrial cancer ([place] [address].)     ICD-10-CM: C54.1 ICD-9-CM: 182.0 Fibromyalgia     ICD-10-CM: M79.7 ICD-9-CM: 729.1 Monoclonal gammopathies     ICD-10-CM: D47.2 ICD-9-CM: 273.1 Chronic pain of right ankle     ICD-10-CM: M25.571, G89.29 ICD-9-CM: 719.47, 338.29 Arm pain, right     ICD-10-CM: M79.601 ICD-9-CM: 729.5 Elbow pain, right     ICD-10-CM: M25.521 ICD-9-CM: 719.42 Pain in both knees, unspecified chronicity     ICD-10-CM: M25.561, M25.562 ICD-9-CM: 719.46   
 MGUS (monoclonal gammopathy of unknown significance)     ICD-10-CM: V18.7 ICD-9-CM: 273.1 Chronic left shoulder pain     ICD-10-CM: M25.512, G89.29 ICD-9-CM: 719.41, 338.29 Myalgia     ICD-10-CM: M79.1 ICD-9-CM: 729.1 Pain of left upper extremity     ICD-10-CM: B32.169 ICD-9-CM: 729.5 Vaginal bleeding, abnormal     ICD-10-CM: N93.9 ICD-9-CM: 623.8 Diabetes mellitus without complication ([facility]     ABW-66-EW: E11.9 ICD-9-CM: 250.00 Other pulmonary embolism without acute cor pulmonale, unspecified chronicity (HCC)     ICD-10-CM: I26.99 
ICD-9-CM: 415.19 Personal history of radiation therapy     ICD-10-CM: Z92.3 ICD-9-CM: V15.3 Pain of left hand     ICD-10-CM: U10.374 ICD-9-CM: 729.5 Left wrist pain     ICD-10-CM: M25.532 ICD-9-CM: 719.43   
 Uterine leiomyoma, unspecified location     ICD-10-CM: D25.9 ICD-9-CM: 218.9 Leg pain, bilateral     ICD-10-CM: M79.604, M79.605 ICD-9-CM: 729.5 Vitals BP Pulse Temp Resp Height(growth percentile) Weight(growth percentile) 146/75 (BP 1 Location: Left arm, BP Patient Position: Sitting) 80 97.7 °F (36.5 °C) (Oral) 18 5' 6\" (1.676 m) 281 lb (127.5 kg) SpO2 BMI OB Status Smoking Status 95% 45.35 kg/m2 Hysterectomy Former Smoker Vitals History BMI and BSA Data Body Mass Index Body Surface Area  
 45.35 kg/m 2 2.44 m 2 Preferred Pharmacy Pharmacy Name Phone [id number]- [place], [place] - [address] [phone] Your Updated Medication List  
  
   
This list is accurate as of 3/19/18  4:46 PM.  Always use your most recent med list.  
  
  
  
  
 anastrozole 1 mg tablet Commonly known as:  ARIMIDEX Resume if ok with your oncologist  
  
 bisacodyl 10 mg suppository Commonly known as:  DULCOLAX Insert 10 mg into rectum daily. cholecalciferol 1,000 unit Cap Commonly known as:  VITAMIN D3 Take 1,000 Units by mouth daily. Take 1 tab daily  
  
 clonazePAM 1 mg tablet Commonly known as:  Dusty Patricia Take 1 mg by mouth two (2) times daily as needed (Anxiety). cyanocobalamin 1,000 mcg tablet Commonly known as:  VITAMIN B-12 Take 1 Tab by mouth daily. ferrous sulfate 15 mg iron (75 mg)/mL 15 mg iron (75 mg)/mL Drop drops Commonly known as:  KAILA-IN-SOL Take 1 mL by mouth three (3) times daily. gabapentin 100 mg capsule Commonly known as:  NEURONTIN Take 1 Cap by mouth three (3) times daily. GAVILYTE-G 236-22.74-6.74 -5.86 gram suspension Generic drug:  PEG 3350-Electrolytes  
  
 hydrALAZINE 25 mg tablet Commonly known as:  APRESOLINE Take 1 Tab by mouth daily.   
  
 hydrocortisone 2.5 % rectal cream  
Commonly known as:  ANUSOL-HC  
 Insert  into rectum four (4) times daily. KLOR-CON M20 20 mEq tablet Generic drug:  potassium chloride TAKE 1 TABLET BY MOUTH TWICE A DAY  
  
 levalbuterol tartrate 45 mcg/actuation inhaler Commonly known as:  Sunitha Rodrigez Take 1 Puff by inhalation every six (6) hours as needed for Wheezing.  
  
 magnesium 250 mg Tab Take 1 Tab by mouth daily. methylnaltrexone 150 mg Tab tablet Commonly known as:  RELISTOR Take 3 Tabs by mouth Daily (before breakfast). metOLazone 5 mg tablet Commonly known as:  ZAROXOLYN  
TAKE 1 TAB BY MOUTH DAILY. metoprolol succinate 100 mg tablet Commonly known as:  TOPROL-XL Take 100 mg by mouth daily. morphine CR 60 mg CR tablet Commonly known as:  MS CONTIN Take 1 Tab by mouth three (3) times daily as needed. Max Daily Amount: 180 mg. Indications: Chronic Pain, Severe Pain, Possible duplicate, noted patient receiving med at [facility], only difference in order is the amount of med dispenced  
  
 naloxegol 25 mg Tab tablet Commonly known as:  Edson Crews Take 1 Tab by mouth Daily (before breakfast). naloxone 2 mg/actuation Spry Use 1 spray intranasally into 1 nostril. Use a new Narcan nasal spray for subsequent doses and administer into alternating nostrils. May repeat every 2 to 3 minutes as needed. nystatin topical cream  
Commonly known as:  MYCOSTATIN Apply  to affected area two (2) times a day. Apply to and around the rectal area twice daily  
  
 ondansetron 4 mg disintegrating tablet Commonly known as:  ZOFRAN ODT Take 1 Tab by mouth every eight (8) hours as needed for Nausea. OTHER  
MORPHINE CREAM APPLIES TO KNEES 3-4X DAILY   Indications: Per Charles Garner during med reconciliation:  [facility] is giving aspercream with lidocaine 4% apply to knees q shift. oxyCODONE IR 20 mg immediate release tablet Commonly known as:  Bennie Dion Take 1 Tab by mouth every four (4) hours as needed for Pain.  Max Daily Amount: 120 mg. Indications: Pain, SNF orders are to give every 4-6 hours prn for moderate to severe pain Start taking on:  4/5/2018  
  
 pantoprazole 40 mg tablet Commonly known as:  PROTONIX Take 1 Tab by mouth two (2) times a day. polyethylene glycol 17 gram packet Commonly known as:  Lelan Situ Take 1 Packet by mouth two (2) times a day. promethazine 25 mg tablet Commonly known as:  PHENERGAN Take 1 Tab by mouth every six (6) hours as needed. senna-docusate 8.6-50 mg per tablet Commonly known as:  Carmen  Take 2 Tabs by mouth two (2) times a day. Prescriptions Printed Refills  
 oxyCODONE IR (ROXICODONE) 20 mg immediate release tablet 0 Starting on: 4/5/2018 Sig: Take 1 Tab by mouth every four (4) hours as needed for Pain. Max Daily Amount: 120 mg. Indications: Pain, SNF orders are to give every 4-6 hours prn for moderate to severe pain  
 Class: Print Route: Oral  
 morphine CR (MS CONTIN) 60 mg CR tablet 0 Sig: Take 1 Tab by mouth three (3) times daily as needed. Max Daily Amount: 180 mg. Indications: Chronic Pain, Severe Pain, Possible duplicate, noted patient receiving med at SNF, only difference in order is the amount of med dispenced Class: Print Route: Oral  
 naloxone 2 mg/actuation spry 11 Sig: Use 1 spray intranasally into 1 nostril. Use a new Narcan nasal spray for subsequent doses and administer into alternating nostrils. May repeat every 2 to 3 minutes as needed. Class: Print We Performed the Following AMB POC COMPLETE CBC,AUTOMATED ENTER [id number] CPT(R)] Introducing [facility]! Dear Helga Qunitanilla: Thank you for requesting a [employer] account. Our records indicate that you have previously registered for a [employer] account but its currently inactive. Please call our [employer] support line at [phone]. Additional Information If you have questions, please visit the Frequently Asked Questions section of the [employer] website at [email or web address] Remember, [employer] is NOT to be used for urgent needs. For medical emergencies, dial 911. Now available from your iPhone and Android! Please provide this summary of care documentation to your next provider. Your primary care clinician is listed as Colby Cortez. If you have any questions after today's visit, please call [phone].

## 2018-03-19 NOTE — PROGRESS NOTES
Chief Complaint   Patient presents with    Rectal Bleeding     1. Have you been to the ER, urgent care clinic since your last visit? Hospitalized since your last visit? Yes , 2/17/18 & 2/18/18  Extreme pain in right side, traveling pain, [facility]    2. Have you seen or consulted any other health care providers outside of the [facility] since your last visit? Include any pap smears or colon screening. Yes, Dr. Danuta Mckeon, pulmonary specialist      Scheduled for colonoscopy and endoscopy for rectal bleeding. Eye doctor: located at [address] [place] [address] and [facility], doesn't know his name, had cataracts.

## 2018-03-30 ENCOUNTER — TELEPHONE (OUTPATIENT)
Dept: ONCOLOGY | Age: 78
End: 2018-03-30

## 2018-03-30 NOTE — TELEPHONE ENCOUNTER
Reviewed chart. Patient is followed by Dr. Lsia Galan, notes not available. 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 Will need to obtain notes from Dr. Levar Rosen office. Please let patient know that carboplatin would likely have no effect on her MGUS. She has been anemic, and blood counts would be monitored with the chemotherapy per protocol. Would defer treatment for vaginal malignancy to gyn-onc.

## 2018-03-30 NOTE — TELEPHONE ENCOUNTER
Pt called and has questions about her cancer and treatment, she would not give me any details just stated it was urgent

## 2018-03-30 NOTE — TELEPHONE ENCOUNTER
Returned call to patient. HIPAA verified. States Dr. Lawrence Garrison would like to start Carboplatin. Does not have start date. States she is still thinking about it & obtaining information. She would like to know what effects if any, will Carboplatin have on her MGUS. Advised will forward to provider & we will contact her with recommendations.

## 2018-04-02 ENCOUNTER — TELEPHONE (OUTPATIENT)
Dept: FAMILY MEDICINE CLINIC | Age: 78
End: 2018-04-02

## 2018-04-02 NOTE — TELEPHONE ENCOUNTER
Call returned to patient. No answer. Fax for records faxed to Dr. Kalli Gil office at [phone]/complete.

## 2018-04-05 DIAGNOSIS — M25.571 CHRONIC PAIN OF RIGHT ANKLE: ICD-10-CM

## 2018-04-05 DIAGNOSIS — C55 METASTASIS FROM CANCER OF UTERUS (HCC): Primary | ICD-10-CM

## 2018-04-05 DIAGNOSIS — M25.562 PAIN IN BOTH KNEES, UNSPECIFIED CHRONICITY: ICD-10-CM

## 2018-04-05 DIAGNOSIS — E11.9 DIABETES MELLITUS WITHOUT COMPLICATION (HCC): ICD-10-CM

## 2018-04-05 DIAGNOSIS — D47.2 MGUS (MONOCLONAL GAMMOPATHY OF UNKNOWN SIGNIFICANCE): ICD-10-CM

## 2018-04-05 DIAGNOSIS — M79.10 MYALGIA: ICD-10-CM

## 2018-04-05 DIAGNOSIS — C79.9 METASTASIS FROM CANCER OF UTERUS (HCC): Primary | ICD-10-CM

## 2018-04-05 DIAGNOSIS — G89.29 CHRONIC LEFT SHOULDER PAIN: ICD-10-CM

## 2018-04-05 DIAGNOSIS — R10.30 LOWER ABDOMINAL PAIN: ICD-10-CM

## 2018-04-05 DIAGNOSIS — M79.605 LEG PAIN, BILATERAL: ICD-10-CM

## 2018-04-05 DIAGNOSIS — M79.604 LEG PAIN, BILATERAL: ICD-10-CM

## 2018-04-05 DIAGNOSIS — G89.4 CHRONIC PAIN DISORDER: ICD-10-CM

## 2018-04-05 DIAGNOSIS — M25.521 ELBOW PAIN, RIGHT: ICD-10-CM

## 2018-04-05 DIAGNOSIS — C54.1 ENDOMETRIAL CANCER (HCC): ICD-10-CM

## 2018-04-05 DIAGNOSIS — M79.642 PAIN OF LEFT HAND: ICD-10-CM

## 2018-04-05 DIAGNOSIS — M25.532 LEFT WRIST PAIN: ICD-10-CM

## 2018-04-05 DIAGNOSIS — M35.1 MCTD (MIXED CONNECTIVE TISSUE DISEASE) (HCC): ICD-10-CM

## 2018-04-05 DIAGNOSIS — N93.9 VAGINAL BLEEDING, ABNORMAL: ICD-10-CM

## 2018-04-05 DIAGNOSIS — M25.512 CHRONIC LEFT SHOULDER PAIN: ICD-10-CM

## 2018-04-05 DIAGNOSIS — Z92.3 PERSONAL HISTORY OF RADIATION THERAPY: ICD-10-CM

## 2018-04-05 DIAGNOSIS — I26.99 OTHER PULMONARY EMBOLISM WITHOUT ACUTE COR PULMONALE, UNSPECIFIED CHRONICITY (HCC): ICD-10-CM

## 2018-04-05 DIAGNOSIS — D47.2 MONOCLONAL GAMMOPATHIES: ICD-10-CM

## 2018-04-05 DIAGNOSIS — I73.9 CLAUDICATION OF BOTH LOWER EXTREMITIES (HCC): ICD-10-CM

## 2018-04-05 DIAGNOSIS — D47.2 SMOLDERING MULTIPLE MYELOMA: ICD-10-CM

## 2018-04-05 DIAGNOSIS — M25.561 PAIN IN BOTH KNEES, UNSPECIFIED CHRONICITY: ICD-10-CM

## 2018-04-05 DIAGNOSIS — M79.601 ARM PAIN, RIGHT: ICD-10-CM

## 2018-04-05 DIAGNOSIS — D25.9 UTERINE LEIOMYOMA, UNSPECIFIED LOCATION: ICD-10-CM

## 2018-04-05 DIAGNOSIS — M79.602 PAIN OF LEFT UPPER EXTREMITY: ICD-10-CM

## 2018-04-05 DIAGNOSIS — G89.29 CHRONIC PAIN OF RIGHT ANKLE: ICD-10-CM

## 2018-04-05 DIAGNOSIS — M79.7 FIBROMYALGIA: ICD-10-CM

## 2018-04-05 RX ORDER — MORPHINE SULFATE 100 MG/1
100 TABLET, FILM COATED, EXTENDED RELEASE ORAL EVERY 12 HOURS
Qty: 60 TAB | Refills: 0 | Status: SHIPPED | OUTPATIENT
Start: 2018-04-05 | End: 2018-05-07 | Stop reason: SDUPTHER

## 2018-04-05 RX ORDER — OXYCODONE HYDROCHLORIDE 20 MG/1
20 TABLET ORAL
Qty: 180 TAB | Refills: 0 | Status: SHIPPED | OUTPATIENT
Start: 2018-04-05 | End: 2018-05-07 | Stop reason: SDUPTHER

## 2018-04-06 ENCOUNTER — PATIENT OUTREACH (OUTPATIENT)
Dept: FAMILY MEDICINE CLINIC | Age: 78
End: 2018-04-06

## 2018-04-06 NOTE — Clinical Note
Attempted to work w/ her. She stayed on phone a little while & I got thru some meds but then she got frustrated with me & didn't want to work with me anymore. She has a number of meds she doesn't take anymore/or says she doesn't &  a number I didn't get to ask her about. [facility] added a couple of meds I didn't get to ask her about. Says Dr. Alejandro Morris knows all her meds & so forth so  didn't know why I was worrying about [facility]. She had said something to me about med you wanted her to have in case of OD for pain meds, to send it to the RX. I thought it would be R/T to naloxone so I asked Di Coffey to check in Friday afternoon about this b/c it was the only RX on the med rec that would match to something of that nature & nothing on [facility] list on that. She said you had mentioned it when you saw her Thursday. I had [facility] d/c summary printed for you & gave to [facility]. My note is in computer.   Thanks, [employer]

## 2018-04-06 NOTE — PROGRESS NOTES
I spoke with Ms. Dorothy Chapman (identified with 2 identifiers) by phone today. I explained my role as NN and attempted to complete Medication reconciliation with Ms. Tipton. She was agreeable, at first, to go over medications but as I continued on to clarify and review medications she said she needed to do other things and seemed to be frustrated with the call. She said she would call me if she decided and took my number but would not agree/give me permission to continue calling her on a weekly basis. I was unable to complete all the medication list with her as there were a number of medications on her list that were not on the d/c medications lists from [facility] (Ms. 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 Patient said I shouldn't be looking at what [facility] said about her meds as Dr. Cristiano Trujillo knew about her meds. Of note, PCP staff did indicate that Dr. Cristiano Trujillo did visit with patient yesterday. In terms of medications, the following meds were noted to be different: patient not taking arimidex as this would be up to oncology. She is not taking dulcolax. She indicated she was not taking neurontin but would like this addressed with Dr. Cristiano Trujillo as it never helped before but she wanted to consider a different dose and see on it. She indicated she no longer took the following meds: relistor, zaroxolyn and movantik. She takes iron tabs but not iron drops. There were other meds we didn't get to on the list. In terms of the MS contin, changes were made to this dosage at d/c from the hospital and it was noted she was to take 90mg bid. Patient became very upset about this and said Dr. Cristiano Trujillo was following her pain mgt and HE gave her a prescription she hadn't yet filled for MS contin 100 mg bid and that I needed to \"pay attention and not worry about what [facility] says. \" Additional new meds listed on [facility] d/c list included: elavil 25 mg po daily at bedtime and loperamide 2mg po q4h prn for loose stool--I was unable to verify if she was taking these meds or not. Also of note, she indicated that Dr. Boyd Dee was to have a medicine for her in case she had problems with pain med overdose. There was no med on [facility] list for this but her med rec list includes naloxone spray. Will forward re: naloxone spray to PCP staff covering this afternoon. Will forward notes to PCP and PCP staff.

## 2018-05-03 NOTE — ACP (ADVANCE CARE PLANNING)
Pt called back stating that she started bleedng again and was taken to ER will be discharged again today or in am requesting refill for her pain meds with home visit for the aki

## 2018-05-09 RX ORDER — HYDRALAZINE HYDROCHLORIDE 25 MG/1
25 TABLET, FILM COATED ORAL DAILY
Qty: 90 TAB | Refills: 6 | Status: SHIPPED | OUTPATIENT
Start: 2018-05-09

## 2018-05-09 RX ORDER — HYDRALAZINE HYDROCHLORIDE 25 MG/1
25 TABLET, FILM COATED ORAL DAILY
Qty: 90 TAB | Refills: 6 | Status: SHIPPED | OUTPATIENT
Start: 2018-05-09 | End: 2018-05-09 | Stop reason: SDUPTHER

## 2018-05-21 DIAGNOSIS — N93.9 VAGINAL BLEEDING, ABNORMAL: ICD-10-CM

## 2018-05-21 DIAGNOSIS — D47.2 MGUS (MONOCLONAL GAMMOPATHY OF UNKNOWN SIGNIFICANCE): ICD-10-CM

## 2018-05-21 DIAGNOSIS — G89.29 CHRONIC PAIN OF RIGHT ANKLE: ICD-10-CM

## 2018-05-21 DIAGNOSIS — I26.99 OTHER PULMONARY EMBOLISM WITHOUT ACUTE COR PULMONALE, UNSPECIFIED CHRONICITY (HCC): ICD-10-CM

## 2018-05-21 DIAGNOSIS — M25.561 PAIN IN BOTH KNEES, UNSPECIFIED CHRONICITY: ICD-10-CM

## 2018-05-21 DIAGNOSIS — M25.512 CHRONIC LEFT SHOULDER PAIN: ICD-10-CM

## 2018-05-21 DIAGNOSIS — M25.571 CHRONIC PAIN OF RIGHT ANKLE: ICD-10-CM

## 2018-05-21 DIAGNOSIS — M25.521 ELBOW PAIN, RIGHT: ICD-10-CM

## 2018-05-21 DIAGNOSIS — E11.22 CONTROLLED TYPE 2 DIABETES MELLITUS WITH CHRONIC KIDNEY DISEASE (HCC): ICD-10-CM

## 2018-05-21 DIAGNOSIS — Z92.3 PERSONAL HISTORY OF RADIATION THERAPY: ICD-10-CM

## 2018-05-21 DIAGNOSIS — M25.562 PAIN IN BOTH KNEES, UNSPECIFIED CHRONICITY: ICD-10-CM

## 2018-05-21 DIAGNOSIS — E11.9 DIABETES MELLITUS WITHOUT COMPLICATION (HCC): ICD-10-CM

## 2018-05-21 DIAGNOSIS — R31.9 BLOOD IN URINE: ICD-10-CM

## 2018-05-21 DIAGNOSIS — R10.30 LOWER ABDOMINAL PAIN: ICD-10-CM

## 2018-05-21 DIAGNOSIS — D47.2 MONOCLONAL GAMMOPATHIES: ICD-10-CM

## 2018-05-21 DIAGNOSIS — G89.4 CHRONIC PAIN DISORDER: ICD-10-CM

## 2018-05-21 DIAGNOSIS — C54.1 ENDOMETRIAL CANCER (HCC): ICD-10-CM

## 2018-05-21 DIAGNOSIS — M35.1 MCTD (MIXED CONNECTIVE TISSUE DISEASE) (HCC): ICD-10-CM

## 2018-05-21 DIAGNOSIS — M79.602 PAIN OF LEFT UPPER EXTREMITY: ICD-10-CM

## 2018-05-21 DIAGNOSIS — I73.9 CLAUDICATION OF BOTH LOWER EXTREMITIES (HCC): ICD-10-CM

## 2018-05-21 DIAGNOSIS — M79.10 MYALGIA: ICD-10-CM

## 2018-05-21 DIAGNOSIS — M79.601 ARM PAIN, RIGHT: ICD-10-CM

## 2018-05-21 DIAGNOSIS — M79.7 FIBROMYALGIA: ICD-10-CM

## 2018-05-21 DIAGNOSIS — G89.29 CHRONIC LEFT SHOULDER PAIN: ICD-10-CM

## 2018-05-21 RX ORDER — POTASSIUM CHLORIDE 1500 MG/1
TABLET, EXTENDED RELEASE ORAL
Qty: 30 TAB | Refills: 1 | Status: SHIPPED | OUTPATIENT
Start: 2018-05-21

## 2018-05-24 DIAGNOSIS — M35.1 MCTD (MIXED CONNECTIVE TISSUE DISEASE) (HCC): ICD-10-CM

## 2018-05-24 DIAGNOSIS — M79.7 FIBROMYALGIA: ICD-10-CM

## 2018-05-24 DIAGNOSIS — K62.5 RECTAL BLEEDING: ICD-10-CM

## 2018-05-24 DIAGNOSIS — D47.2 MONOCLONAL GAMMOPATHIES: ICD-10-CM

## 2018-05-24 DIAGNOSIS — M79.10 MYALGIA: ICD-10-CM

## 2018-05-29 RX ORDER — FERROUS SULFATE 15 MG/ML
15 DROPS ORAL 3 TIMES DAILY
Qty: 90 ML | Refills: 3 | Status: SHIPPED | OUTPATIENT
Start: 2018-05-29 | End: 2018-10-11 | Stop reason: ALTCHOICE

## 2018-06-13 ENCOUNTER — TELEPHONE (OUTPATIENT)
Dept: ONCOLOGY | Age: 78
End: 2018-06-13

## 2018-06-13 RX ORDER — GABAPENTIN 400 MG/1
400 CAPSULE ORAL 3 TIMES DAILY
Qty: 90 CAP | Refills: 1 | Status: SHIPPED | OUTPATIENT
Start: 2018-06-13

## 2018-06-13 NOTE — TELEPHONE ENCOUNTER
Patient called requesting an order for a bone density scan. Patient stated she hasn't had a chance to get it done in a while Please place the order. If any questions please return call to discuss [phone].   avni

## 2018-06-14 NOTE — TELEPHONE ENCOUNTER
Primary Oncologist is Dr. Brooke Scott - recommend she discuss this with and have their office order.

## 2018-06-15 NOTE — TELEPHONE ENCOUNTER
HIPAA verified. Stated referred to see Dr. Cary Almazan for for low WBC per Dr. Emili Duron. Patient stated chemo has been held per Dr. Emili Duron. Advised plan will be reviewed by Dr. Cary Almazan. Thanked for call.

## 2018-06-18 ENCOUNTER — OFFICE VISIT (OUTPATIENT)
Dept: ONCOLOGY | Age: 78
End: 2018-06-18

## 2018-06-18 ENCOUNTER — TELEPHONE (OUTPATIENT)
Dept: ONCOLOGY | Age: 78
End: 2018-06-18

## 2018-06-18 VITALS
BODY MASS INDEX: 45 KG/M2 | TEMPERATURE: 98.7 F | HEIGHT: 66 IN | HEART RATE: 83 BPM | SYSTOLIC BLOOD PRESSURE: 121 MMHG | WEIGHT: 280 LBS | OXYGEN SATURATION: 96 % | DIASTOLIC BLOOD PRESSURE: 69 MMHG | RESPIRATION RATE: 16 BRPM

## 2018-06-18 DIAGNOSIS — C54.1 ENDOMETRIAL CANCER (HCC): ICD-10-CM

## 2018-06-18 DIAGNOSIS — D47.2 MGUS (MONOCLONAL GAMMOPATHY OF UNKNOWN SIGNIFICANCE): ICD-10-CM

## 2018-06-18 DIAGNOSIS — G89.4 CHRONIC PAIN DISORDER: ICD-10-CM

## 2018-06-18 DIAGNOSIS — D47.2 SMOLDERING MULTIPLE MYELOMA: Primary | ICD-10-CM

## 2018-06-18 DIAGNOSIS — M79.7 FIBROMYALGIA: ICD-10-CM

## 2018-06-18 RX ORDER — PROCHLORPERAZINE MALEATE 10 MG
TABLET ORAL
Refills: 5 | COMMUNITY
Start: 2018-04-16 | End: 2018-10-23 | Stop reason: ALTCHOICE

## 2018-06-18 RX ORDER — HYDROCORTISONE 100 MG/60ML
SUSPENSION RECTAL
Refills: 2 | COMMUNITY
Start: 2018-06-13 | End: 2018-10-11 | Stop reason: ALTCHOICE

## 2018-06-18 RX ORDER — ONDANSETRON HYDROCHLORIDE 8 MG/1
TABLET, FILM COATED ORAL
Refills: 0 | COMMUNITY
Start: 2018-04-19 | End: 2018-10-23 | Stop reason: SDUPTHER

## 2018-06-18 RX ORDER — LOPERAMIDE HYDROCHLORIDE 2 MG/1
CAPSULE ORAL
Refills: 1 | COMMUNITY
Start: 2018-04-05

## 2018-06-18 RX ORDER — NALOXONE HYDROCHLORIDE 4 MG/.1ML
SPRAY NASAL
Refills: 11 | COMMUNITY
Start: 2018-04-09 | End: 2018-06-18 | Stop reason: SDUPTHER

## 2018-06-18 RX ORDER — DEXAMETHASONE 4 MG/1
TABLET ORAL
COMMUNITY
Start: 2018-04-27 | End: 2018-10-11 | Stop reason: ALTCHOICE

## 2018-06-18 RX ORDER — SUCRALFATE 1 G/10ML
SUSPENSION ORAL
Refills: 2 | COMMUNITY
Start: 2018-05-18 | End: 2018-10-11 | Stop reason: ALTCHOICE

## 2018-06-18 RX ORDER — TALC
POWDER (GRAM) TOPICAL
Refills: 0 | COMMUNITY
Start: 2018-04-06 | End: 2018-06-18 | Stop reason: SDUPTHER

## 2018-06-18 NOTE — MR AVS SNAPSHOT
[address] [facility] [address] 
[phone] Patient: Ale Gruber MRN: [id number] FNO:[id number] Visit Information Date & Time Provider Department Dept. Phone Encounter #  
 6/18/2018  3:30 PM [address]  Oncology at [address] [facility] [phone] Follow-up Instructions Return in about 6 months (around 12/18/2018). Follow-up and Disposition History Your Appointments 12/18/2018 10:00 AM  
Follow Up with Derek Santizo  [facility] at [facility] Appt Note: 6mo fu (MGUS) [address] [facility] 209 [facility] 7 [id number]  
[phone]  
  
   
 [address] [facility] [address] Upcoming Health Maintenance Date Due  
 EYE EXAM RETINAL OR DILATED Q1 4/22/2017 MICROALBUMIN Q1 2/23/2018 MEDICARE YEARLY EXAM 3/14/2018 LIPID PANEL Q1 4/20/2018 GLAUCOMA SCREENING Q2Y 4/22/2018 HEMOGLOBIN A1C Q6M 4/24/2018 FOOT EXAM Q1 6/22/2018 Influenza Age 5 to Adult 8/1/2018 COLONOSCOPY 12/21/2019 DTaP/Tdap/Td series (3 - Td) 10/20/2026 Allergies as of 6/18/2018  Review Complete On: 6/18/2018 By: Derek Santizo DO Severity Noted Reaction Type Reactions Latex  08/29/2013    Rash, Itching Advair Diskus [Fluticasone-salmeterol]  05/29/2012    Other (comments) \"breathing problems\" Bactrim [Sulfamethoprim]  08/29/2013   Side Effect Shortness of Breath, Itching, Swelling Swelling of tongue and throat Ciprofloxacin  02/14/2018    Hives Eliquis [Apixaban]  11/29/2017    Other (comments) Flagyl [Metronidazole]  02/14/2018    Hoarseness Iodinated Contrast- Oral And Iv Dye  10/04/2016    Hives Ivp [Fd And C Blue No.1]  12/21/2010    Hives, Shortness of Breath Lovenox [Enoxaparin]  12/21/2010    Other (comments)  DIC  
 Nsaids (Non-steroidal Anti-inflammatory Drug)  12/21/2010    Other (comments) Heartburn Sulfa (Sulfonamide Antibiotics)  12/23/2013    Shortness of Breath Xarelto [Rivaroxaban]  12/12/2017    Myalgia Paresthesia, spasmic muscle Current Immunizations  Reviewed on 6/18/2018 Name Date Influenza Vaccine (Quad) PF  Deferred (Medication not available) Tdap 5/17/2006 ZZZ-RETIRED (DO NOT USE) Pneumococcal Vaccine (Unspecified Type) 1/1/2010 Reviewed by Halle Blanco LPN on 3/68/5509 at  3:21 PM  
 Reviewed by Halle Blanco LPN on 1/30/0656 at  3:24 PM  
 Reviewed by Halle Blanco LPN on 4/14/5115 at  3:43 PM  
You Were Diagnosed With   
  
 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   
 MGUS (monoclonal gammopathy of unknown significance)     ICD-10-CM: A46.9 ICD-9-CM: 273.1 Fibromyalgia     ICD-10-CM: M79.7 ICD-9-CM: 729.1 Chronic pain disorder     ICD-10-CM: G89.4 ICD-9-CM: 338.4 Endometrial cancer (RUST 75.)     ICD-10-CM: C54.1 ICD-9-CM: 182. 0 Vitals BP Pulse Temp Resp Height(growth percentile) Weight(growth percentile) 121/69 83 98.7 °F (37.1 °C) (Oral) 16 5' 6\" (1.676 m) 280 lb (127 kg) SpO2 BMI OB Status Smoking Status 96% 45.19 kg/m2 Hysterectomy Former Smoker Vitals History BMI and BSA Data Body Mass Index Body Surface Area  
 45.19 kg/m 2 2.43 m 2 Preferred Pharmacy Pharmacy Name Phone [id number]- [place], [place] - [address] [phone] Your Updated Medication List  
  
   
This list is accurate as of 6/18/18  4:38 PM.  Always use your most recent med list.  
  
  
  
  
 anastrozole 1 mg tablet Commonly known as:  ARIMIDEX Resume if ok with your oncologist  
  
 apixaban 5 mg (74 tabs) starter pack Commonly known as:  Arbutus Bon Take 5 mg by mouth.  
  
 bisacodyl 10 mg suppository Commonly known as:  DULCOLAX Insert 10 mg into rectum daily. CARAFATE 100 mg/mL suspension Generic drug:  sucralfate 20 MILLILITERS OF CARAFATE MIXED WITH 20 MILLILITERS OF WATER, TO BE ADMINISTERED RECTALLY AS ENEMA NIGHTLY, UNTIL PATIENT SEES DR Janice Acosta  
  
 cholecalciferol 1,000 unit Cap Commonly known as:  VITAMIN D3 Take 1,000 Units by mouth daily. Take 1 tab daily  
  
 clonazePAM 1 mg tablet Commonly known as:  David Primer Take 1 mg by mouth two (2) times daily as needed (Anxiety). cyanocobalamin 1,000 mcg tablet Commonly known as:  VITAMIN B-12 Take 1 Tab by mouth daily. dexamethasone 4 mg tablet Commonly known as:  DECADRON  
  
 ferrous sulfate 15 mg iron (75 mg)/mL 15 mg iron (75 mg)/mL Drop drops Commonly known as:  KAILA-IN-SOL Take 1 mL by mouth three (3) times daily. gabapentin 400 mg capsule Commonly known as:  NEURONTIN Take 1 Cap by mouth three (3) times daily. hydrALAZINE 25 mg tablet Commonly known as:  APRESOLINE Take 1 Tab by mouth daily. * hydrocortisone 2.5 % rectal cream  
Commonly known as:  ANUSOL-HC Insert  into rectum four (4) times daily. * hydrocortisone 100 mg/60 mL enema Commonly known as:  CORTENEMA  
USE AS DIRECTED PER RECTUM TWICE A DAY  
  
 KLOR-CON M20 20 mEq tablet Generic drug:  potassium chloride TAKE 1 TABLET BY MOUTH TWICE A DAY  
  
 levalbuterol tartrate 45 mcg/actuation inhaler Commonly known as:  Casimer Simper Take 1 Puff by inhalation every six (6) hours as needed for Wheezing. loperamide 2 mg capsule Commonly known as:  IMODIUM  
TAKE 1 CAPSULE BY MOUTH EVERY 4 HOURS AS NEEDED FOR LOOSE STOOL  
  
 magnesium 250 mg Tab Take 1 Tab by mouth daily. methylnaltrexone 150 mg Tab tablet Commonly known as:  RELISTOR Take 3 Tabs by mouth Daily (before breakfast). metOLazone 5 mg tablet Commonly known as:  ZAROXOLYN  
TAKE 1 TAB BY MOUTH DAILY. metoprolol succinate 100 mg tablet Commonly known as:  TOPROL-XL Take 100 mg by mouth daily. morphine  mg CR tablet Commonly known as:  MS CONTIN Take 1 Tab by mouth every twelve (12) hours. Max Daily Amount: 200 mg. Indications: SEVERE PAIN WITH OPIOID TOLERANCE  
  
 naloxegol 25 mg Tab tablet Commonly known as:  Michele Arn Take 1 Tab by mouth Daily (before breakfast). naloxone 2 mg/actuation Spry Use 1 spray intranasally into 1 nostril. Use a new Narcan nasal spray for subsequent doses and administer into alternating nostrils. May repeat every 2 to 3 minutes as needed. nystatin topical cream  
Commonly known as:  MYCOSTATIN Apply  to affected area two (2) times a day. Apply to and around the rectal area twice daily  
  
 ondansetron 4 mg disintegrating tablet Commonly known as:  ZOFRAN ODT Take 1 Tab by mouth every eight (8) hours as needed for Nausea. ondansetron hcl 8 mg tablet Commonly known as:  ZOFRAN  
TAKE 1 TAB(S) TWICE A DAY X 3 DAYS AFTER CHEMO,THEN EVERY 12HRS AS NEEDED NAUSEA  
  
 OTHER  
MORPHINE CREAM APPLIES TO KNEES 3-4X DAILY   Indications: Per Rick during med reconciliation:  SNF is giving aspercream with lidocaine 4% apply to knees q shift. oxyCODONE IR 20 mg immediate release tablet Commonly known as:  Beti Granados Take 1 Tab by mouth every four (4) hours as needed for Pain. Max Daily Amount: 120 mg. Indications: Pain, SNF orders are to give every 4-6 hours prn for moderate to severe pain  
  
 pantoprazole 40 mg tablet Commonly known as:  PROTONIX Take 1 Tab by mouth two (2) times a day. polyethylene glycol 17 gram packet Commonly known as:  [employer] Wolfgang Take 1 Packet by mouth two (2) times a day. prochlorperazine 10 mg tablet Commonly known as:  COMPAZINE  
TAKE 1 TABLET BY MOUTH EVERY 6 HOURS AS NEEDED FOR RESCUE NAUSEA  
  
 promethazine 25 mg tablet Commonly known as:  PHENERGAN  
 Take 1 Tab by mouth every six (6) hours as needed. senna-docusate 8.6-50 mg per tablet Commonly known as:  Fernando Crabtree Take 2 Tabs by mouth two (2) times a day. * Notice: This list has 2 medication(s) that are the same as other medications prescribed for you. Read the directions carefully, and ask your doctor or other care provider to review them with you. Follow-up Instructions Return in about 6 months (around 12/18/2018). To-Do List   
 06/18/2018 Imaging:  CT [facility] Introducing [facility]! Dear Friday harbor: Thank you for requesting a [employer]My[employer] account. Our records indicate that you have previously registered for a D[employer]MyApp account but its currently inactive. Please call our DialMyApp support line at [phone]. Additional Information If you have questions, please visit the Frequently Asked Questions section of the D[employer]My[employer] website at [email or web address]. [employer]/. Remember, DialMyApp is NOT to be used for urgent needs. For medical emergencies, dial 911. Now available from your iPhone and Android! Please provide this summary of care documentation to your next provider. Your primary care clinician is listed as Darolyn Severance. If you have any questions after today's visit, please call [phone].

## 2018-06-18 NOTE — PROGRESS NOTES
Domo Felton is a 68 y.o. 1940 female and presents with Follow-up (MGUS)    CC .MGUS since 2007      INTERIM HISTORY:   Pt seen today for 7 month f/u for MGUS. 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 Pt still dealing with recurrrent endometrial cancer with Dr Meseret Li. pt has chronic pain/ bad knees/ FM. Here with family. Walks with a walker chronically. Past Medical History:   Diagnosis Date    Acute kidney failure ([address] 11/19/2015    Acute sinusitis 2/11/2011    Adverse effect of anesthesia     SLOW WAKING PAST ANESTHESIA    Arthritis     RA    At risk for side effect of medication 9/30/2016    Atrial fibrillation ([address] 10/19/2011    Autoimmune disease ([address]     FIBROMYLGIA    Cancer ([address] 2016    ENDOMETRIAL     Cholelithiasis 11/19/2015    Chronic pain     Claudication of both lower extremities ([address] 8/25/2015    Diabetes mellitus type 2, controlled ([address] 1/19/2016    Diabetes mellitus, type II ([address] 10/10/2014    Diverticulitis     DVT (deep venous thrombosis) ([address]     Fall against object 10/10/2014    Fatty liver 10/20/2015    Hypertension     Ill-defined condition     SPINAL STENOSIS    Left shoulder pain 10/10/2014    Leiomyoma of body of uterus 4/7/2016    Metastasis from cancer of uterus ([address] 4/5/2018    Morbid obesity ([address]     Myalgia 9/30/2016    Nausea & vomiting     Personal history of radiation therapy 4/20/2017    Pneumonia     Preop examination 6/15/2015    Preoperative clearance 6/15/2015    Rash 2/11/2011    Rash of hands 10/10/2014    Rectal bleeding 2/19/2018    Screening for colon cancer 2/22/2016    Screening for glaucoma 2/22/2016    Sleep apnea     SOB (shortness of breath)     hospitalized 5/2012.  angina, SOB    Thromboembolus ([address] 2011    LEFT LOWER LEG AND PE    Tinea pedis, recurrent 7/20/2015    Vaginal bleeding, abnormal 4/20/2017     Past Surgical History:   Procedure Laterality Date    COLONOSCOPY N/A 12/21/2016    COLONOSCOPY performed by Danii Capps MD at [address]  12/21/2016         HX CATARACT REMOVAL Bilateral 2015    [address]    HX DILATION AND CURETTAGE  07/29/2016    HX DILATION AND CURETTAGE  2016    HX GYN  1960    etopic    HX HEART CATHETERIZATION  2001    NEGATIVE PER PATIENT    HX HYSTERECTOMY  2016    HX KNEE ARTHROSCOPY Left 1998    HX ORTHOPAEDIC Right 1960'S    BUNIONECTOMY    HX OTHER SURGICAL      BIOPSY OF VEIN IN FOREHEAD    HX TUBAL LIGATION  1963    FL COLSC FLX W/RMVL OF TUMOR POLYP LESION SNARE TQ  12/12/2011          Social History     Social History    Marital status: SINGLE     Spouse name: N/A    Number of children: N/A    Years of education: N/A     Social History Main Topics    Smoking status: Former Smoker     Packs/day: 0.25     Years: 15.00     Quit date: 8/23/1996    Smokeless tobacco: Never Used    Alcohol use No    Drug use: No    Sexual activity: No     Other Topics Concern    None     Social History Narrative     Family History   Problem Relation Age of Onset    Other Mother      ANEURYSM    Obesity Mother     Heart Disease Father     Other Father      VASCULAR DISEASE    Hypertension Sister     Heart Disease Brother     Heart Attack Brother     Kidney Disease Sister     Seizures Brother     Heart Attack Brother     Anesth Problems Neg Hx     Alcohol abuse Neg Hx        Current Outpatient Prescriptions   Medication Sig Dispense Refill    apixaban (ELIQUIS) 5 mg (74 tabs) starter pack Take 5 mg by mouth.  hydrocortisone (CORTENEMA) 100 mg/60 mL enema USE AS DIRECTED PER RECTUM TWICE A DAY  2    gabapentin (NEURONTIN) 400 mg capsule Take 1 Cap by mouth three (3) times daily. 90 Cap 1    morphine CR (MS CONTIN) 100 mg CR tablet Take 1 Tab by mouth every twelve (12) hours. Max Daily Amount: 200 mg.  Indications: SEVERE PAIN WITH OPIOID TOLERANCE 60 Tab 0    oxyCODONE IR (ROXICODONE) 20 mg immediate release tablet Take 1 Tab by mouth every four (4) hours as needed for Pain. Max Daily Amount: 120 mg. Indications: Pain, SNF orders are to give every 4-6 hours prn for moderate to severe pain 180 Tab 0    ferrous sulfate 15 mg iron, 75 mg,/mL (KAILA-IN-SOL) 15 mg iron (75 mg)/mL drop drops Take 1 mL by mouth three (3) times daily. 90 mL 3    KLOR-CON M20 20 mEq tablet TAKE 1 TABLET BY MOUTH TWICE A DAY 30 Tab 1    hydrALAZINE (APRESOLINE) 25 mg tablet Take 1 Tab by mouth daily. 90 Tab 6    nystatin (MYCOSTATIN) topical cream Apply  to affected area two (2) times a day. Apply to and around the rectal area twice daily 30 g 2    senna-docusate (PERICOLACE) 8.6-50 mg per tablet Take 2 Tabs by mouth two (2) times a day. 10 Tab 0    cyanocobalamin (VITAMIN B-12) 1,000 mcg tablet Take 1 Tab by mouth daily. 90 Tab 1    cholecalciferol (VITAMIN D3) 1,000 unit cap Take 1,000 Units by mouth daily. Take 1 tab daily      OTHER MORPHINE CREAM APPLIES TO KNEES 3-4X DAILY   Indications: Per Rick during med reconciliation:  SNF is giving aspercream with lidocaine 4% apply to knees q shift.  metoprolol succinate (TOPROL-XL) 100 mg tablet Take 100 mg by mouth daily.  magnesium 250 mg tab Take 1 Tab by mouth daily.       dexamethasone (DECADRON) 4 mg tablet       loperamide (IMODIUM) 2 mg capsule TAKE 1 CAPSULE BY MOUTH EVERY 4 HOURS AS NEEDED FOR LOOSE STOOL  1    ondansetron hcl (ZOFRAN) 8 mg tablet TAKE 1 TAB(S) TWICE A DAY X 3 DAYS AFTER CHEMO,THEN EVERY 12HRS AS NEEDED NAUSEA  0    prochlorperazine (COMPAZINE) 10 mg tablet TAKE 1 TABLET BY MOUTH EVERY 6 HOURS AS NEEDED FOR RESCUE NAUSEA  5    CARAFATE 100 mg/mL suspension 20 MILLILITERS OF CARAFATE MIXED WITH 20 MILLILITERS OF WATER, TO BE ADMINISTERED RECTALLY AS ENEMA NIGHTLY, UNTIL PATIENT SEES DR Caitlin Arellano  2    naloxone 2 mg/actuation spry Use 1 spray intranasally into 1 nostril. Use a new Narcan nasal spray for subsequent doses and administer into alternating nostrils. May repeat every 2 to 3 minutes as needed. 4 Blister 11    promethazine (PHENERGAN) 25 mg tablet Take 1 Tab by mouth every six (6) hours as needed. 12 Tab 0    hydrocortisone (ANUSOL-HC) 2.5 % rectal cream Insert  into rectum four (4) times daily. (Patient taking differently: Insert  into rectum four (4) times daily. 4-6-18:  Discharge instructions from [facility] indicate apply twice daily as needed for soreness.) 30 g 3    metOLazone (ZAROXOLYN) 5 mg tablet TAKE 1 TAB BY MOUTH DAILY. 30 Tab 1    levalbuterol tartrate (XOPENEX) 45 mcg/actuation inhaler Take 1 Puff by inhalation every six (6) hours as needed for Wheezing.  polyethylene glycol (MIRALAX) 17 gram packet Take 1 Packet by mouth two (2) times a day. 20 Packet 0    bisacodyl (DULCOLAX) 10 mg suppository Insert 10 mg into rectum daily. 5 Suppository 0    anastrozole (ARIMIDEX) 1 mg tablet Resume if ok with your oncologist 1 Tab 0    clonazePAM (KLONOPIN) 1 mg tablet Take 1 mg by mouth two (2) times daily as needed (Anxiety).  methylnaltrexone (RELISTOR) 150 mg tab tablet Take 3 Tabs by mouth Daily (before breakfast). 90 Tab 4    naloxegol (MOVANTIK) 25 mg tab tablet Take 1 Tab by mouth Daily (before breakfast). 30 Tab 6    pantoprazole (PROTONIX) 40 mg tablet Take 1 Tab by mouth two (2) times a day. 60 Tab 2    ondansetron (ZOFRAN ODT) 4 mg disintegrating tablet Take 1 Tab by mouth every eight (8) hours as needed for Nausea.  16 Tab 0       Allergies   Allergen Reactions    Latex Rash and Itching    Advair Diskus [Fluticasone-Salmeterol] Other (comments)     \"breathing problems\"    Bactrim [Sulfamethoprim] Shortness of Breath, Itching and Swelling     Swelling of tongue and throat    Ciprofloxacin Hives    Eliquis [Apixaban] Other (comments)    Flagyl [Metronidazole] Hoarseness    Iodinated Contrast- Oral And Iv Dye Hives    Ivp [Fd And C Blue No.1] Hives and Shortness of Breath    Lovenox [Enoxaparin] Other (comments)     DIC    Nsaids (Non-Steroidal Anti-Inflammatory Drug) Other (comments)     Heartburn    Sulfa (Sulfonamide Antibiotics) Shortness of Breath    Xarelto [Rivaroxaban] Myalgia     Paresthesia, spasmic muscle       Review of Systems    A comprehensive review of systems was negative except for: per HPI   Chronic pain, fibromyalgia, RA    Objective:  Visit Vitals    /69    Pulse 83    Temp 98.7 °F (37.1 °C) (Oral)    Resp 16    Ht 5' 6\" (1.676 m)    Wt 280 lb (127 kg)    SpO2 96%    BMI 45.19 kg/m2         Physical Exam:   General appearance - alert, well appearing, and in no distress  Mental status - alert, and conversant  EYE-conj clear  Neck supple  CV regular  resp clear  GI soft NT  Ext chronic edema  Skin-Warm and dry. Neuro -nonfocal walking with a walker      Diagnostic Imaging   reviewed    Results for orders placed during the hospital encounter of 12/09/17   [facility] [place] IVC FILTER    Narrative INDICATION:  DVT, and GI bleeding    Informed consent obtained. The veins the neck were evaluated with ultrasound. The right IJ is patent. A permanent image was stored. Access gained via right IJ  under ultrasound guidance without difficulty. A guidewire was placed in the IVC  under fluoroscopic guidance. An introducer sheath for a removable Option IVC  filter was placed to the IVC. Inferior venacavogram performed. This demonstrates  a normal IVC with inflow from single renal veins. Subsequently, the Option  IVC  filter was deployed in the infrarenal IVC without difficulty. Final cavogram  demonstrates appropriate position. Catheter and sheath removed. Hemostasis obtained with compression. The patient  tolerated the procedure well. There is no immediate complication. Contrast: 30 cc gadolinium.  History of iodinated contrast allergy    Radiation dose: 381 mGy      Impression IMPRESSION: Successful removable IVC filter as detailed above. Results for orders placed during the hospital encounter of 12/09/17   XR CHEST PORT    Narrative EXAM:  XR CHEST PORT    INDICATION:  Fever. Right leg pain. COMPARISON: 5/30/2017    TECHNIQUE: Portable AP upright chest view at 1749 hours    FINDINGS: Cardiac monitoring wires overlie the thorax. The cardiomediastinal  contours are stable. The pulmonary vasculature is within normal limits. The lungs and pleural spaces are clear. There is no pneumothorax. The bones and  upper abdomen are stable. Impression IMPRESSION:    No acute process. Results for orders placed during the hospital encounter of 02/16/18   CT ABD PELV WO CONT    Narrative EXAM:  CT ABD PELV WO CONT    INDICATION: Right lower abdomen pain. COMPARISON: CT 9/19/2017. TECHNIQUE: Helical CT of the abdomen  and pelvis  without contrast. Coronal and  sagittal reformats are performed. CT dose reduction was achieved through use of  a standardized protocol tailored for this examination and automatic exposure  control for dose modulation. FINDINGS:   Solid organ evaluation is limited without contrast.     The visualized lung bases demonstrate no mass or consolidation. There is a trace  right pleural effusion. The heart size is normal. There is no pericardial  effusion. There is no renal, ureteral, or bladder calculus. The kidneys are symmetric  without hydronephrosis. There is no perinephric fluid or fat stranding. The liver, spleen, pancreas, and adrenal glands are normal.  The gall bladder is  present  without intra- or extra-hepatic biliary dilatation. Several right pericardial lymph nodes are overall increased in size, the largest  measuring 1.2 x 1.4 cm (series 2, image 7). There is an increased size of a  bilateral inguinal lymph nodes with the right inguinal lymph node measuring 1.9  x 3.0 cm. There are no dilated bowel loops.   The appendix is normal.  There is sigmoid  diverticulosis without focal adjacent inflammation. There is no free fluid or  free air. The aorta tapers without aneurysm. There is aortoiliac  atherosclerosis. An IVC filter is present. There is a small fat-containing umbilical hernia. There are small fat-containing  bilateral inguinal hernias. There is increase in a fluid density collection in  the anterior abdominal wall/hernia measuring 2.9 x 4.1 cm, previously 2.0 x 2.1  cm. There is no pelvic mass. The uterus is surgically absent. There are  degenerative changes in the spine without aggressive bony lesion. Impression IMPRESSION:   1. Sigmoid diverticulosis without diverticulitis. Normal appendix. 2. Mildly enlarged right pericardial and bilateral inguinal lymph nodes are  nonspecific. 3. Trace right pleural effusion. Lab Results  reviewed  Lab Results   Component Value Date/Time    WBC 4.2 02/17/2018 02:07 AM    HGB 7.9 (L) 02/17/2018 02:07 AM    HCT 25.2 (L) 02/17/2018 02:07 AM    PLATELET 193 39/87/8115 02:07 AM    MCV 96.9 02/17/2018 02:07 AM       Lab Results   Component Value Date/Time    Sodium 136 02/17/2018 02:07 AM    Potassium 4.3 02/17/2018 02:07 AM    Chloride 102 02/17/2018 02:07 AM    CO2 29 02/17/2018 02:07 AM    Anion gap 5 02/17/2018 02:07 AM    Glucose 113 (H) 02/17/2018 02:07 AM    BUN 19 02/17/2018 02:07 AM    Creatinine 1.01 02/17/2018 02:07 AM    BUN/Creatinine ratio 19 02/17/2018 02:07 AM    GFR est AA >60 02/17/2018 02:07 AM    GFR est non-AA 53 (L) 02/17/2018 02:07 AM    Calcium 10.2 (H) 02/17/2018 02:07 AM    AST (SGOT) 11 (L) 02/17/2018 02:07 AM    Alk. phosphatase 72 02/17/2018 02:07 AM    Protein, total 9.3 (H) 02/17/2018 02:07 AM    Albumin 2.7 (L) 02/17/2018 02:07 AM    Globulin 6.6 (H) 02/17/2018 02:07 AM    A-G Ratio 0.4 (L) 02/17/2018 02:07 AM    ALT (SGPT) 10 (L) 02/17/2018 02:07 AM       .    Assessment/Plan:     Page Broom is a 70 y.o. 1940 female and presents with Follow-up  .     INTERIM HISTORY:   f/u for MGUS and pt is same overall. Multiple medical problems. Has RA, fibromyalgia, chronic pain. Obese in a w/c chronically. Pt clinically stable. 1.  MGUS chronic since 2007. Smoldering MM. Labs basically stable for years. IGG 3800 and has been up and down overall. Pt has elevated beta microglobulin and serum free lights chains. Relatively stable over years. Pt now has cytopenias on chemo and here for eval.   Pt has chronic mild anemia and  Now worse on chemo for endometrial cancer. Had leukopenia and this is resolving now. Reviewed doing BMB with pt and family today. Pt wants to think about it but hopefully will agree. This will tell us if pt has MM and if this is effecting CBC/ counts now. Once BMB back, will review with pt/family and GYN/ONC. 2.  Endometrial cancer post surgery. Per GYN/onc     2. Multiple chronic medical problems  Per PCP. Call if questions. Return in 6 months with labs. ICD-10-CM ICD-9-CM    1. 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 MGUS (monoclonal gammopathy of unknown significance) D47.2 273.1 CT [facility]   3. Fibromyalgia M79.7 729.1 CT [facility]   4. Chronic pain disorder G89.4 338.4 CT [facility]   5. Endometrial cancer (HCC) C54.1 182.0 CT [facility]     Orders Placed This Encounter    CT [facility]     Standing Status:   Future     Standing Expiration Date:   7/18/2019     Order Specific Question:   Is Patient Allergic to Contrast Dye? Answer:   Unknown     Order Specific Question:   Reason for Exam     Answer:   MGUS/ ? smoldering MM/ endometrial cancer on chemo eval for MM with sedation    apixaban (ELIQUIS) 5 mg (74 tabs) starter pack     Sig: Take 5 mg by mouth.     dexamethasone (DECADRON) 4 mg tablet    hydrocortisone (CORTENEMA) 100 mg/60 mL enema     Sig: USE AS DIRECTED PER RECTUM TWICE A DAY Refill:  2    loperamide (IMODIUM) 2 mg capsule     Sig: TAKE 1 CAPSULE BY MOUTH EVERY 4 HOURS AS NEEDED FOR LOOSE STOOL     Refill:  1    DISCONTD: magnesium oxide 250 mg tablet     Sig: TAKE 1 TABLET BY MOUTH EVERY DAY     Refill:  0    DISCONTD: NARCAN 4 mg/actuation nasal spray     Si SPRAY INTO ONE NOSTRIL MAY REPEAT IN 2-3 MIN. IF NEEDED     Refill:  11    ondansetron hcl (ZOFRAN) 8 mg tablet     Sig: TAKE 1 TAB(S) TWICE A DAY X 3 DAYS AFTER CHEMO,THEN EVERY 12HRS AS NEEDED NAUSEA     Refill:  0    prochlorperazine (COMPAZINE) 10 mg tablet     Sig: TAKE 1 TABLET BY MOUTH EVERY 6 HOURS AS NEEDED FOR RESCUE NAUSEA     Refill:  5    CARAFATE 100 mg/mL suspension     Si MILLILITERS OF CARAFATE MIXED WITH 20 MILLILITERS OF WATER, TO BE ADMINISTERED RECTALLY AS ENEMA NIGHTLY, UNTIL PATIENT SEES DR Kg Darby     Refill:  2       continue present plan, call if any problems  There are no Patient Instructions on file for this visit. Follow-up Disposition:  Return in about 6 months (around 2018).     Isaiah Loyola DO

## 2018-06-18 NOTE — TELEPHONE ENCOUNTER
Hoda with GYN Oncology called on patient's behalf. Patient is scheduled to see Dr. Janine Rosario today at 3:30pm. Dois Her is requesting the office note from today and clearance to start the patient on chemo. Please fax to [phone].   avni

## 2018-06-19 ENCOUNTER — TELEPHONE (OUTPATIENT)
Dept: ONCOLOGY | Age: 78
End: 2018-06-19

## 2018-06-20 NOTE — TELEPHONE ENCOUNTER
Ok case d/w Dr Moisés Fierro  Let her know pt is not having BMB and pt will continue chemo with Dr Meseret Li for her met endometrial cancer

## 2018-06-21 RX ORDER — METOLAZONE 5 MG/1
TABLET ORAL
Qty: 30 TAB | Refills: 1 | Status: SHIPPED | OUTPATIENT
Start: 2018-06-21

## 2018-06-28 DIAGNOSIS — M79.601 ARM PAIN, RIGHT: ICD-10-CM

## 2018-06-28 DIAGNOSIS — M79.10 MYALGIA: ICD-10-CM

## 2018-06-28 DIAGNOSIS — G89.29 CHRONIC LEFT SHOULDER PAIN: ICD-10-CM

## 2018-06-28 DIAGNOSIS — E11.9 DIABETES MELLITUS WITHOUT COMPLICATION (HCC): ICD-10-CM

## 2018-06-28 DIAGNOSIS — M25.521 ELBOW PAIN, RIGHT: ICD-10-CM

## 2018-06-28 DIAGNOSIS — R10.30 LOWER ABDOMINAL PAIN: ICD-10-CM

## 2018-06-28 DIAGNOSIS — D47.2 MONOCLONAL GAMMOPATHIES: ICD-10-CM

## 2018-06-28 DIAGNOSIS — I26.99 OTHER PULMONARY EMBOLISM WITHOUT ACUTE COR PULMONALE, UNSPECIFIED CHRONICITY (HCC): ICD-10-CM

## 2018-06-28 DIAGNOSIS — M25.571 CHRONIC PAIN OF RIGHT ANKLE: ICD-10-CM

## 2018-06-28 DIAGNOSIS — M35.1 MCTD (MIXED CONNECTIVE TISSUE DISEASE) (HCC): ICD-10-CM

## 2018-06-28 DIAGNOSIS — M79.604 LEG PAIN, BILATERAL: ICD-10-CM

## 2018-06-28 DIAGNOSIS — D47.2 SMOLDERING MULTIPLE MYELOMA: ICD-10-CM

## 2018-06-28 DIAGNOSIS — M25.562 PAIN IN BOTH KNEES, UNSPECIFIED CHRONICITY: ICD-10-CM

## 2018-06-28 DIAGNOSIS — M25.561 PAIN IN BOTH KNEES, UNSPECIFIED CHRONICITY: ICD-10-CM

## 2018-06-28 DIAGNOSIS — I73.9 CLAUDICATION OF BOTH LOWER EXTREMITIES (HCC): ICD-10-CM

## 2018-06-28 DIAGNOSIS — M79.605 LEG PAIN, BILATERAL: ICD-10-CM

## 2018-06-28 DIAGNOSIS — M79.7 FIBROMYALGIA: ICD-10-CM

## 2018-06-28 DIAGNOSIS — M25.532 LEFT WRIST PAIN: ICD-10-CM

## 2018-06-28 DIAGNOSIS — D25.9 UTERINE LEIOMYOMA, UNSPECIFIED LOCATION: ICD-10-CM

## 2018-06-28 DIAGNOSIS — M79.642 PAIN OF LEFT HAND: ICD-10-CM

## 2018-06-28 DIAGNOSIS — C54.1 ENDOMETRIAL CANCER (HCC): ICD-10-CM

## 2018-06-28 DIAGNOSIS — D47.2 MGUS (MONOCLONAL GAMMOPATHY OF UNKNOWN SIGNIFICANCE): ICD-10-CM

## 2018-06-28 DIAGNOSIS — C79.9 METASTASIS FROM CANCER OF UTERUS (HCC): ICD-10-CM

## 2018-06-28 DIAGNOSIS — M25.512 CHRONIC LEFT SHOULDER PAIN: ICD-10-CM

## 2018-06-28 DIAGNOSIS — N93.9 VAGINAL BLEEDING, ABNORMAL: ICD-10-CM

## 2018-06-28 DIAGNOSIS — M79.602 PAIN OF LEFT UPPER EXTREMITY: ICD-10-CM

## 2018-06-28 DIAGNOSIS — G89.4 CHRONIC PAIN DISORDER: ICD-10-CM

## 2018-06-28 DIAGNOSIS — Z92.3 PERSONAL HISTORY OF RADIATION THERAPY: ICD-10-CM

## 2018-06-28 DIAGNOSIS — C55 METASTASIS FROM CANCER OF UTERUS (HCC): ICD-10-CM

## 2018-06-28 DIAGNOSIS — G89.29 CHRONIC PAIN OF RIGHT ANKLE: ICD-10-CM

## 2018-06-28 RX ORDER — MORPHINE SULFATE 100 MG/1
100 TABLET, FILM COATED, EXTENDED RELEASE ORAL EVERY 12 HOURS
Qty: 60 TAB | Refills: 0 | Status: SHIPPED | OUTPATIENT
Start: 2018-07-05 | End: 2018-08-01 | Stop reason: SDUPTHER

## 2018-06-28 RX ORDER — OXYCODONE HYDROCHLORIDE 20 MG/1
20 TABLET ORAL
Qty: 180 TAB | Refills: 0 | Status: SHIPPED | OUTPATIENT
Start: 2018-07-05 | End: 2018-08-01 | Stop reason: SDUPTHER

## 2018-07-02 VITALS
TEMPERATURE: 97.4 F | HEART RATE: 75 BPM | OXYGEN SATURATION: 97 % | SYSTOLIC BLOOD PRESSURE: 125 MMHG | DIASTOLIC BLOOD PRESSURE: 75 MMHG

## 2018-07-02 NOTE — PROGRESS NOTES
HISTORY OF PRESENT ILLNESS  Rosette Cortes is a 68 y.o. female. HPI     This was another home visit pt with uterine cancer now with mets and rectal pain with bleeding got transfused the pain not controlled the dosage not helping, requesting it to be increase the pain is mostly on low-mid upper back, knees pain The patient's pain has been an ongoing struggling concerning problem, present for refills, never abused any prescribed meds, no hx of illicit nor etoh abuse, the pain has been bothering the pt for many yrs, pt aware that there are no other alternative approach for the current pain that exist except for the available opioid based meds with their huge addictive properties,  they all Started few yrs ago with hx of worsening months after months and not responding to other offered therapy by the specialists. the pt's wt started and the current BMI is not a helpfull contributor to the pt current joints pain,    Patient states that the current dosage of the meds given, not strong enough, but it is helping,      with the current medications,  the pt capable of doing things specially the activity of the daily living, and also they are improving the quality of the pt's life,   this pt has tried all the other Non- Narcotic meds including surgical repairs and procedures, stating that none have been able to ease down the pain,  Based on the [place] PM report, the pt is not DOC shopping, and the pt is aware of random UA drug screen,  if foul finding pt would be terminated, for which the pt agreed    The intensity of the pain is at10/10 w/out med,  Pt's pain persist without medication, is a chronic condition,  compliant with medication takes it as prescribed, keeps meds at a safe place, on stool softener, 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     As per her gi she was given a solution which needed to be inserted into the rectum with syringe pt wanted to md do the first insertion and teaching pt was inserted with Carafate solution mixed with topical steroid as per GI, for the recurrent rectal bleeding    Current Outpatient Prescriptions   Medication Sig Dispense Refill    cyanocobalamin (VITAMIN B-12) 1,000 mcg tablet Take 1 Tab by mouth daily. 90 Tab 1    cholecalciferol (VITAMIN D3) 1,000 unit cap Take 1,000 Units by mouth daily. Take 1 tab daily      OTHER MORPHINE CREAM APPLIES TO KNEES 3-4X DAILY   Indications: Per Rick during med reconciliation:  SNF is giving aspercream with lidocaine 4% apply to knees q shift.  metoprolol succinate (TOPROL-XL) 100 mg tablet Take 100 mg by mouth daily.  magnesium 250 mg tab Take 1 Tab by mouth daily.  [START ON 7/5/2018] morphine CR (MS CONTIN) 100 mg CR tablet Take 1 Tab by mouth every twelve (12) hours. Max Daily Amount: 200 mg. Indications: SEVERE PAIN WITH OPIOID TOLERANCE 60 Tab 0    [START ON 7/5/2018] oxyCODONE IR (ROXICODONE) 20 mg immediate release tablet Take 1 Tab by mouth every four (4) hours as needed for Pain. Max Daily Amount: 120 mg. Indications: Pain, SNF orders are to give every 4-6 hours prn for moderate to severe pain 180 Tab 0    metOLazone (ZAROXOLYN) 5 mg tablet TAKE 1 TAB BY MOUTH DAILY. 30 Tab 1    apixaban (ELIQUIS) 5 mg (74 tabs) starter pack Take 5 mg by mouth.       dexamethasone (DECADRON) 4 mg tablet       hydrocortisone (CORTENEMA) 100 mg/60 mL enema USE AS DIRECTED PER RECTUM TWICE A DAY  2    loperamide (IMODIUM) 2 mg capsule TAKE 1 CAPSULE BY MOUTH EVERY 4 HOURS AS NEEDED FOR LOOSE STOOL  1    ondansetron hcl (ZOFRAN) 8 mg tablet TAKE 1 TAB(S) TWICE A DAY X 3 DAYS AFTER CHEMO,THEN EVERY 12HRS AS NEEDED NAUSEA  0    prochlorperazine (COMPAZINE) 10 mg tablet TAKE 1 TABLET BY MOUTH EVERY 6 HOURS AS NEEDED FOR RESCUE NAUSEA  5    CARAFATE 100 mg/mL suspension 20 MILLILITERS OF CARAFATE MIXED WITH 20 MILLILITERS OF WATER, TO BE ADMINISTERED RECTALLY AS ENEMA NIGHTLY, UNTIL PATIENT SEES DR Ramez Lee  2    gabapentin (NEURONTIN) 400 mg capsule Take 1 Cap by mouth three (3) times daily. 90 Cap 1    ferrous sulfate 15 mg iron, 75 mg,/mL (KAILA-IN-SOL) 15 mg iron (75 mg)/mL drop drops Take 1 mL by mouth three (3) times daily. 90 mL 3    KLOR-CON M20 20 mEq tablet TAKE 1 TABLET BY MOUTH TWICE A DAY 30 Tab 1    hydrALAZINE (APRESOLINE) 25 mg tablet Take 1 Tab by mouth daily. 90 Tab 6    naloxone 2 mg/actuation spry Use 1 spray intranasally into 1 nostril. Use a new Narcan nasal spray for subsequent doses and administer into alternating nostrils. May repeat every 2 to 3 minutes as needed. 4 Blister 11    promethazine (PHENERGAN) 25 mg tablet Take 1 Tab by mouth every six (6) hours as needed. 12 Tab 0    nystatin (MYCOSTATIN) topical cream Apply  to affected area two (2) times a day. Apply to and around the rectal area twice daily 30 g 2    hydrocortisone (ANUSOL-HC) 2.5 % rectal cream Insert  into rectum four (4) times daily. (Patient taking differently: Insert  into rectum four (4) times daily. 4-6-18:  Discharge instructions from [address] [place] [address] indicate apply twice daily as needed for soreness.) 30 g 3    levalbuterol tartrate (XOPENEX) 45 mcg/actuation inhaler Take 1 Puff by inhalation every six (6) hours as needed for Wheezing.  senna-docusate (PERICOLACE) 8.6-50 mg per tablet Take 2 Tabs by mouth two (2) times a day. 10 Tab 0    polyethylene glycol (MIRALAX) 17 gram packet Take 1 Packet by mouth two (2) times a day. 20 Packet 0    bisacodyl (DULCOLAX) 10 mg suppository Insert 10 mg into rectum daily. 5 Suppository 0    anastrozole (ARIMIDEX) 1 mg tablet Resume if ok with your oncologist 1 Tab 0    clonazePAM (KLONOPIN) 1 mg tablet Take 1 mg by mouth two (2) times daily as needed (Anxiety).  methylnaltrexone (RELISTOR) 150 mg tab tablet Take 3 Tabs by mouth Daily (before breakfast). 90 Tab 4    naloxegol (MOVANTIK) 25 mg tab tablet Take 1 Tab by mouth Daily (before breakfast).  30 Tab 6    pantoprazole (PROTONIX) 40 mg tablet Take 1 Tab by mouth two (2) times a day. 60 Tab 2    ondansetron (ZOFRAN ODT) 4 mg disintegrating tablet Take 1 Tab by mouth every eight (8) hours as needed for Nausea.  16 Tab 0     Allergies   Allergen Reactions    Latex Rash and Itching    Advair Diskus [Fluticasone-Salmeterol] Other (comments)     \"breathing problems\"    Bactrim [Sulfamethoprim] Shortness of Breath, Itching and Swelling     Swelling of tongue and throat    Ciprofloxacin Hives    Eliquis [Apixaban] Other (comments)    Flagyl [Metronidazole] Hoarseness    Iodinated Contrast- Oral And Iv Dye Hives    Ivp [Fd And C Blue No.1] Hives and Shortness of Breath    Lovenox [Enoxaparin] Other (comments)     DIC    Nsaids (Non-Steroidal Anti-Inflammatory Drug) Other (comments)     Heartburn    Sulfa (Sulfonamide Antibiotics) Shortness of Breath    Xarelto [Rivaroxaban] Myalgia     Paresthesia, spasmic muscle     Past Medical History:   Diagnosis Date    Acute kidney failure ([address] 11/19/2015    Acute sinusitis 2/11/2011    Adverse effect of anesthesia     SLOW WAKING PAST ANESTHESIA    Arthritis     RA    At risk for side effect of medication 9/30/2016    Atrial fibrillation ([address] 10/19/2011    Autoimmune disease ([address]     FIBROMYLGIA    Cancer ([address] 2016    ENDOMETRIAL     Cholelithiasis 11/19/2015    Chronic pain     Claudication of both lower extremities ([address] 8/25/2015    Diabetes mellitus type 2, controlled ([address] 1/19/2016    Diabetes mellitus, type II ([address] 10/10/2014    Diverticulitis     DVT (deep venous thrombosis) ([address]     Fall against object 10/10/2014    Fatty liver 10/20/2015    Hypertension     Ill-defined condition     SPINAL STENOSIS    Left shoulder pain 10/10/2014    Leiomyoma of body of uterus 4/7/2016    Metastasis from cancer of uterus ([address] 4/5/2018    Morbid obesity ([address]     Myalgia 9/30/2016    Nausea & vomiting     Personal history of radiation therapy 4/20/2017    Pneumonia     Preop examination 6/15/2015    Preoperative clearance 6/15/2015    Rash 2/11/2011    Rash of hands 10/10/2014    Rectal bleeding 2/19/2018    Screening for colon cancer 2/22/2016    Screening for glaucoma 2/22/2016    Sleep apnea     SOB (shortness of breath)     hospitalized 5/2012.  angina, SOB    Thromboembolus ([address] 2011    LEFT LOWER LEG AND PE    Tinea pedis, recurrent 7/20/2015    Vaginal bleeding, abnormal 4/20/2017     Past Surgical History:   Procedure Laterality Date    COLONOSCOPY N/A 12/21/2016    COLONOSCOPY performed by Kendrick Ríos MD at [address] [place] [address]  12/21/2016         HX CATARACT REMOVAL Bilateral 2015    [address]    HX DILATION AND CURETTAGE  07/29/2016    HX DILATION AND CURETTAGE  2016    HX GYN  1960    etopic    HX HEART CATHETERIZATION  2001    NEGATIVE PER PATIENT    HX HYSTERECTOMY  2016    HX KNEE ARTHROSCOPY Left 1998    HX ORTHOPAEDIC Right 1960'S    BUNIONECTOMY    HX OTHER SURGICAL      BIOPSY OF VEIN IN FOREHEAD    HX TUBAL LIGATION  1963    NC COLSC FLX W/RMVL OF TUMOR POLYP LESION SNARE TQ  12/12/2011          Family History   Problem Relation Age of Onset    Other Mother      ANEURYSM    Obesity Mother     Heart Disease Father     Other Father      VASCULAR DISEASE    Hypertension Sister     Heart Disease Brother     Heart Attack Brother     Kidney Disease Sister     Seizures Brother     Heart Attack Brother     Anesth Problems Neg Hx     Alcohol abuse Neg Hx      Social History   Substance Use Topics    Smoking status: Former Smoker     Packs/day: 0.25     Years: 15.00     Quit date: 8/23/1996    Smokeless tobacco: Never Used    Alcohol use No      Lab Results  Component Value Date/Time   WBC 4.2 02/17/2018 02:07 AM   WBC (POC)  03/19/2018 03:25 PM   Comment:   scanned in system   HGB 7.9 (L) 02/17/2018 02:07 AM   HCT 25.2 (L) 02/17/2018 02:07 AM   PLATELET 999 82/22/6343 02:07 AM   MCV 96.9 02/17/2018 02:07 AM     Lab Results  Component Value Date/Time   GFR est non-AA 53 (L) 02/17/2018 02:07 AM   GFRNA, POC >60 08/16/2016 08:18 AM   GFR est AA >60 02/17/2018 02:07 AM   GFRAA, POC >60 08/16/2016 08:18 AM   Creatinine 1.01 02/17/2018 02:07 AM   Creatinine (POC) 0.9 08/16/2016 08:18 AM   BUN 19 02/17/2018 02:07 AM   Sodium 136 02/17/2018 02:07 AM   Potassium 4.3 02/17/2018 02:07 AM   Chloride 102 02/17/2018 02:07 AM   CO2 29 02/17/2018 02:07 AM   Magnesium 1.4 (L) 12/15/2017 03:38 AM        Review of Systems   Constitutional: Negative for chills and fever. HENT: Negative for ear pain and nosebleeds. Eyes: Negative for blurred vision, pain and discharge. Respiratory: Negative for shortness of breath. Cardiovascular: Negative for chest pain and leg swelling. Gastrointestinal: Negative for constipation, diarrhea, nausea and vomiting. Genitourinary: Negative for frequency. Musculoskeletal: Positive for back pain, joint pain, myalgias and neck pain. Skin: Negative for itching and rash. Neurological: Negative for headaches. Psychiatric/Behavioral: Negative for depression. The patient is not nervous/anxious. Physical Exam   Constitutional: She is oriented to person, place, and time. She appears well-developed and well-nourished. HENT:   Head: Normocephalic and atraumatic. Eyes: Conjunctivae and EOM are normal. Pupils are equal, round, and reactive to light. Neck: No JVD present. No thyromegaly present. Cardiovascular: Normal rate, regular rhythm, normal heart sounds and intact distal pulses. Exam reveals no gallop and no friction rub. No murmur heard. Pulmonary/Chest: Effort normal and breath sounds normal. No stridor. No respiratory distress. She has no wheezes. She has no rales. Abdominal: Soft. Bowel sounds are normal. She exhibits no distension and no mass. There is no tenderness. Musculoskeletal: She exhibits tenderness and deformity. She exhibits no edema. Right knee: She exhibits decreased range of motion. Tenderness found. Left knee: She exhibits decreased range of motion. Tenderness found. Thoracic back: She exhibits decreased range of motion, tenderness, pain and spasm. Lumbar back: She exhibits decreased range of motion, tenderness, bony tenderness, deformity, pain and spasm. Lymphadenopathy:     She has no cervical adenopathy. Neurological: She is alert and oriented to person, place, and time. She has normal reflexes. No cranial nerve deficit. Skin: No rash noted. No erythema. Psychiatric: She has a normal mood and affect. Her behavior is normal.   Nursing note and vitals reviewed. ASSESSMENT and PLAN  Diagnoses and all orders for this visit:    1. Rectal bleeding  -     AMB POC COMPLETE CBC,AUTOMATED ENTER    2. Chronic pain disorder  -     AMB POC COMPLETE CBC,AUTOMATED ENTER    3. Lower abdominal pain  -     AMB POC COMPLETE CBC,AUTOMATED ENTER    4. MCTD (mixed connective tissue disease) ([facility]  -     AMB POC COMPLETE CBC,AUTOMATED ENTER    5. Claudication of both lower extremities (HCC)  -     AMB POC COMPLETE CBC,AUTOMATED ENTER    6. Controlled type 2 diabetes mellitus with chronic kidney disease, unspecified CKD stage, unspecified long term insulin use status ([facility])    7. Endometrial cancer ([facility]  -     AMB POC COMPLETE CBC,AUTOMATED ENTER    8. Fibromyalgia  -     AMB POC COMPLETE CBC,AUTOMATED ENTER    9. Monoclonal gammopathies  -     AMB POC COMPLETE CBC,AUTOMATED ENTER    10. Chronic pain of right ankle  -     AMB POC COMPLETE CBC,AUTOMATED ENTER    11. Arm pain, right  -     AMB POC COMPLETE CBC,AUTOMATED ENTER    12. Elbow pain, right  -     AMB POC COMPLETE CBC,AUTOMATED ENTER    13. Pain in both knees, unspecified chronicity  -     AMB POC COMPLETE CBC,AUTOMATED ENTER    14. MGUS (monoclonal gammopathy of unknown significance)  -     AMB POC COMPLETE CBC,AUTOMATED ENTER    15.  Chronic left shoulder pain  - AMB POC COMPLETE CBC,AUTOMATED ENTER    16. Myalgia  -     AMB POC COMPLETE CBC,AUTOMATED ENTER    17. Pain of left upper extremity  -     AMB POC COMPLETE CBC,AUTOMATED ENTER    18. Personal history of radiation therapy  -     AMB POC COMPLETE CBC,AUTOMATED ENTER    19. Pain of left hand  -     AMB POC COMPLETE CBC,AUTOMATED ENTER    20. Left wrist pain  -     AMB POC COMPLETE CBC,AUTOMATED ENTER    21. Uterine leiomyoma, unspecified location  -     AMB POC COMPLETE CBC,AUTOMATED ENTER    22.  Leg pain, bilateral  -     AMB POC COMPLETE CBC,AUTOMATED ENTER      Her pain med refilled for a better outcome,  Pill count done 3 left of short acting and 1of long acting  Pt tolerated the insertion of the solution no complication no bleeding noted   Consent signed  done, no harm consent done, dependency and side effects done as well,

## 2018-07-06 ENCOUNTER — PATIENT OUTREACH (OUTPATIENT)
Dept: FAMILY MEDICINE CLINIC | Age: 78
End: 2018-07-06

## 2018-07-06 NOTE — PROGRESS NOTES
This NN has attempted in the past to follow patient and she has declined prior outreaches before. NN attempted to outreach to Ms. Jayden Sandoval today and introduced myself. She indicated she did not want to talk with me and when I asked if she wanted me to call back she indicated that she did not want me to call back. Called Sammie from [facility] as care management notes indicated Ms. Jayden Sandoval would have resumption of care orders with [facility]. Per Kathia Tipton, son called this morning and declined services. Notified PCP staff.

## 2018-07-20 ENCOUNTER — PATIENT OUTREACH (OUTPATIENT)
Dept: FAMILY MEDICINE CLINIC | Age: 78
End: 2018-07-20

## 2018-07-20 NOTE — PROGRESS NOTES
This NN has attempted again to make contact with Ms. Tipton to do post hospitalization outreach. I attempted to explain my role of NN and she indicated she did \"not see the purpose. \"  I asked if another NN could contact her and she agreed that a letter could be sent to her with a 's name/number, that way she could reach out from the letter if she wanted to. Will send a GIT letter today.

## 2018-07-20 NOTE — LETTER
[phone] My name is Karoline Sandifer. I am the care manager on Dr. Belkis Arias team. I call patients who were recently discharged from the hospital or emergency department. We are committed to providing you excellent care. Please contact our office and ask for Winston Gutierrez, Nurse Navigator at [phone] regarding your recent [address] [place] [address]  visit on 7-18-18 to 7-19-18. We appreciate the confidence youve shown by selecting us to provide your healthcare needs and look forward to hearing from you soon. Sincerely,  
 
 
 
Chelsea Silva, RN, BSN Ambulatory Nurse Navigator [address]

## 2018-07-25 RX ORDER — LANOLIN ALCOHOL/MO/W.PET/CERES
CREAM (GRAM) TOPICAL
Qty: 90 TAB | Refills: 1 | Status: SHIPPED | OUTPATIENT
Start: 2018-07-25 | End: 2018-07-30 | Stop reason: SDUPTHER

## 2018-07-27 DIAGNOSIS — G89.4 CHRONIC PAIN SYNDROME: Primary | ICD-10-CM

## 2018-07-27 NOTE — TELEPHONE ENCOUNTER
Received call from pts sonBeti stating pt was changed from gabapentin to Lyrica during last visit. Needs rx sent to pharmacy.   Message sent to Dr Nasir Ivan

## 2018-07-30 RX ORDER — LANOLIN ALCOHOL/MO/W.PET/CERES
CREAM (GRAM) TOPICAL
Qty: 90 TAB | Refills: 1 | Status: SHIPPED | OUTPATIENT
Start: 2018-07-30

## 2018-07-30 RX ORDER — LIDOCAINE 40 MG/G
CREAM TOPICAL
Qty: 45 G | Refills: 5 | Status: SHIPPED | OUTPATIENT
Start: 2018-07-30

## 2018-07-30 RX ORDER — PREGABALIN 75 MG/1
75 CAPSULE ORAL 2 TIMES DAILY
Qty: 60 CAP | Refills: 6 | Status: SHIPPED | OUTPATIENT
Start: 2018-07-30

## 2018-08-07 DIAGNOSIS — C79.9 METASTASIS FROM CANCER OF UTERUS (HCC): Primary | ICD-10-CM

## 2018-08-07 DIAGNOSIS — C55 METASTASIS FROM CANCER OF UTERUS (HCC): Primary | ICD-10-CM

## 2018-08-07 RX ORDER — DICLOFENAC SODIUM 10 MG/G
2 GEL TOPICAL 4 TIMES DAILY
Qty: 100 G | Refills: 4 | Status: SHIPPED | OUTPATIENT
Start: 2018-08-07 | End: 2018-10-11 | Stop reason: ALTCHOICE

## 2018-09-04 PROBLEM — R19.09 CENTRAL ABDOMINAL MASS OR SWELLING: Status: ACTIVE | Noted: 2018-09-04

## 2018-09-13 PROBLEM — D63.8 ANEMIA OF CHRONIC DISEASE: Status: ACTIVE | Noted: 2018-09-13

## 2018-09-19 ENCOUNTER — LAB ONLY (OUTPATIENT)
Dept: FAMILY MEDICINE CLINIC | Age: 78
End: 2018-09-19

## 2018-09-19 ENCOUNTER — HOSPITAL ENCOUNTER (OUTPATIENT)
Dept: LAB | Age: 78
Discharge: HOME | End: 2018-09-19
Payer: MEDICARE

## 2018-09-19 DIAGNOSIS — D47.2 SMOLDERING MULTIPLE MYELOMA: ICD-10-CM

## 2018-09-19 DIAGNOSIS — D63.8 ANEMIA, CHRONIC DISEASE: ICD-10-CM

## 2018-09-19 DIAGNOSIS — C79.9 METASTASIS FROM CANCER OF UTERUS (HCC): Primary | ICD-10-CM

## 2018-09-19 DIAGNOSIS — E11.21 TYPE 2 DIABETES WITH NEPHROPATHY (HCC): ICD-10-CM

## 2018-09-19 DIAGNOSIS — D51.8 OTHER VITAMIN B12 DEFICIENCY ANEMIA: ICD-10-CM

## 2018-09-19 DIAGNOSIS — C55 METASTASIS FROM CANCER OF UTERUS (HCC): Primary | ICD-10-CM

## 2018-09-19 DIAGNOSIS — C54.1 ENDOMETRIAL CANCER (HCC): ICD-10-CM

## 2018-09-19 PROBLEM — D51.9 ANEMIA, B12 DEFICIENCY: Status: ACTIVE | Noted: 2018-09-19

## 2018-09-19 LAB — HBA1C MFR BLD HPLC: 5.1 %

## 2018-09-19 PROCEDURE — 82728 ASSAY OF FERRITIN: CPT

## 2018-09-19 PROCEDURE — 82607 VITAMIN B-12: CPT

## 2018-09-19 PROCEDURE — 84207 ASSAY OF VITAMIN B-6: CPT

## 2018-09-19 PROCEDURE — 80053 COMPREHEN METABOLIC PANEL: CPT

## 2018-09-19 PROCEDURE — 36415 COLL VENOUS BLD VENIPUNCTURE: CPT

## 2018-09-19 PROCEDURE — 84443 ASSAY THYROID STIM HORMONE: CPT

## 2018-09-19 PROCEDURE — 84550 ASSAY OF BLOOD/URIC ACID: CPT

## 2018-09-22 LAB
ALBUMIN SERPL-MCNC: 3.2 G/DL (ref 3.5–4.8)
ALBUMIN/GLOB SERPL: 0.5 {RATIO} (ref 1.2–2.2)
ALP SERPL-CCNC: 74 IU/L (ref 39–117)
ALT SERPL-CCNC: 5 IU/L (ref 0–32)
AST SERPL-CCNC: 14 IU/L (ref 0–40)
BILIRUB SERPL-MCNC: 0.3 MG/DL (ref 0–1.2)
BUN SERPL-MCNC: 19 MG/DL (ref 8–27)
BUN/CREAT SERPL: 15 (ref 12–28)
CALCIUM SERPL-MCNC: 10.7 MG/DL (ref 8.7–10.3)
CHLORIDE SERPL-SCNC: 103 MMOL/L (ref 96–106)
CO2 SERPL-SCNC: 20 MMOL/L (ref 20–29)
CREAT SERPL-MCNC: 1.26 MG/DL (ref 0.57–1)
FERRITIN SERPL-MCNC: 667 NG/ML (ref 15–150)
FOLATE SERPL-MCNC: 11.9 NG/ML
GLOBULIN SER CALC-MCNC: 6.1 G/DL (ref 1.5–4.5)
GLUCOSE SERPL-MCNC: 90 MG/DL (ref 65–99)
INTERPRETATION: NORMAL
Lab: NORMAL
POTASSIUM SERPL-SCNC: 4.5 MMOL/L (ref 3.5–5.2)
PROT SERPL-MCNC: 9.3 G/DL (ref 6–8.5)
SODIUM SERPL-SCNC: 138 MMOL/L (ref 134–144)
TSH SERPL DL<=0.005 MIU/L-ACNC: 10.47 UIU/ML (ref 0.45–4.5)
URATE SERPL-MCNC: 10.2 MG/DL (ref 2.5–7.1)
VIT B12 SERPL-MCNC: 1315 PG/ML (ref 232–1245)
VIT B6 SERPL-MCNC: 10.3 UG/L (ref 2–32.8)

## 2018-09-27 ENCOUNTER — TELEPHONE (OUTPATIENT)
Dept: SURGERY | Age: 78
End: 2018-09-27

## 2018-09-27 NOTE — TELEPHONE ENCOUNTER
I received a jami from Dr. Gemma Johnston asking if Dr. Tricia Rose will review patients case to see if cystic vs solid mass around the umbilical area can be biopsied. He states it is palpable. He states a recent CT was done at [facility] as well as an older one 2/17/18 that is in our system. I told him I wound give this information to Dr. Tricia Rose. He asks that Dr. Tricia Rose call him back on his cell phone with his thoughts #[phone]. (See Dr. Lex Noriega office note dated 9/12/18 as well).

## 2018-10-01 ENCOUNTER — DOCUMENTATION ONLY (OUTPATIENT)
Dept: SURGERY | Age: 78
End: 2018-10-01

## 2018-10-01 NOTE — PROGRESS NOTES
Dr. Elías Neal said he spoke with Dr. Natividad Maldonado and patient will make an appointment to see Dr. Elías Neal to discuss biopsy of umbilical area mass.

## 2018-10-11 ENCOUNTER — HOSPITAL ENCOUNTER (OUTPATIENT)
Dept: LAB | Age: 78
Discharge: HOME | End: 2018-10-11
Payer: MEDICARE

## 2018-10-11 ENCOUNTER — OFFICE VISIT (OUTPATIENT)
Dept: FAMILY MEDICINE CLINIC | Age: 78
End: 2018-10-11

## 2018-10-11 VITALS — TEMPERATURE: 98.9 F | HEART RATE: 89 BPM | DIASTOLIC BLOOD PRESSURE: 75 MMHG | SYSTOLIC BLOOD PRESSURE: 132 MMHG

## 2018-10-11 DIAGNOSIS — L03.311 CELLULITIS OF ABDOMINAL WALL: ICD-10-CM

## 2018-10-11 DIAGNOSIS — L03.316 CELLULITIS OF UMBILICUS: Primary | ICD-10-CM

## 2018-10-11 DIAGNOSIS — T14.8XXA DISCHARGE FROM WOUND: ICD-10-CM

## 2018-10-11 DIAGNOSIS — L98.9 ENLARGING SKIN LESION: ICD-10-CM

## 2018-10-11 DIAGNOSIS — L53.9 ACUTE ERYTHEMATOUS ERUPTION OF SKIN: ICD-10-CM

## 2018-10-11 PROBLEM — L03.90 CELLULITIS: Status: ACTIVE | Noted: 2018-10-11

## 2018-10-11 PROCEDURE — 87070 CULTURE OTHR SPECIMN AEROBIC: CPT

## 2018-10-11 RX ORDER — MUPIROCIN 20 MG/G
OINTMENT TOPICAL
Qty: 30 G | Refills: 1 | Status: SHIPPED | OUTPATIENT
Start: 2018-10-11

## 2018-10-11 RX ORDER — DOXYCYCLINE 100 MG/1
100 CAPSULE ORAL 2 TIMES DAILY
Qty: 40 CAP | Refills: 0 | Status: SHIPPED | OUTPATIENT
Start: 2018-10-11 | End: 2018-10-31

## 2018-10-11 NOTE — MR AVS SNAPSHOT
[address] [facility] [address] [address] 
[phone] Patient: Sandy Vences MRN: [id number] Visit Information Date & Time Provider Department Dept. Phone Encounter #  
 10/11/2018  5:00 PM MD Luis Nick [facility] [phone] [id number] Follow-up Instructions Return if symptoms worsen or fail to improve. Your Appointments 10/16/2018  1:30 PM  
ROUTINE CARE with MD Luis Nick [facility] ([facility]) Appt Note: per Dr Miguel Vora [address] [phone] [phone]  
  
    
 12/18/2018 10:00 AM  
Follow Up with Lakhwinder Lord  [facility] Appt Note: 6mo fu (MGUS) [address] [facility] [address] [id number]  
[phone]  
  
   
 [address] [facility] [address] Upcoming Health Maintenance Date Due Shingrix Vaccine Age 50> (1 of 2) 12/2/1990 EYE EXAM RETINAL OR DILATED Q1 4/22/2017 MICROALBUMIN Q1 2/23/2018 MEDICARE YEARLY EXAM 3/14/2018 LIPID PANEL Q1 4/20/2018 GLAUCOMA SCREENING Q2Y 4/22/2018 FOOT EXAM Q1 6/22/2018 Influenza Age 5 to Adult 8/1/2018 HEMOGLOBIN A1C Q6M 3/19/2019 COLONOSCOPY 12/21/2019 DTaP/Tdap/Td series (3 - Td) 10/20/2026 Allergies as of 10/11/2018  Review Complete On: 10/11/2018 By: Fauzia Abdalla MD  
  
 Severity Noted Reaction Type Reactions Latex  08/29/2013    Rash, Itching Enoxaparin Sodium High 06/22/2018    Anaphylaxis Advair Diskus [Fluticasone-salmeterol]  05/29/2012    Other (comments) \"breathing problems\" Bactrim [Sulfamethoprim]  08/29/2013   Side Effect Shortness of Breath, Itching, Swelling Swelling of tongue and throat Ciprofloxacin  02/14/2018    Hives Eliquis [Apixaban]  11/29/2017    Other (comments) Flagyl [Metronidazole]  02/14/2018    Hoarseness Fluticasone Propionate  06/22/2018    Shortness of Breath Heparin Analogues  06/22/2018    Shortness of Breath Iodinated Contrast- Oral And Iv Dye  10/04/2016    Hives Other reaction(s): DYSPNEA HIVES Ivp [Fd And C Blue No.1]  12/21/2010    Hives, Shortness of Breath Lovenox [Enoxaparin]  12/21/2010    Other (comments) DIC Nsaids (Non-steroidal Anti-inflammatory Drug)  12/21/2010    Other (comments) Other reaction(s): UPSET STOMACH Heartburn Nausea Control  [Phosphorated Carbohydrate]  06/22/2018    Nausea and Vomiting Salmeterol Xinafoate  06/22/2018    Anxiety, Shortness of Breath Sulfa (Sulfonamide Antibiotics)  12/23/2013    Shortness of Breath Other reaction(s): RASH DYSPNEA Other reaction(s): RASH DYSPNEA Xarelto [Rivaroxaban]  12/12/2017    Myalgia Paresthesia, spasmic muscle Current Immunizations  Reviewed on 6/18/2018 Name Date Influenza Vaccine (Quad) PF  Deferred (Medication not available) Tdap 5/17/2006 ZZZ-RETIRED (DO NOT USE) Pneumococcal Vaccine (Unspecified Type) 1/1/2010 Not reviewed this visit You Were Diagnosed With   
  
 Codes Comments Cellulitis of umbilicus    -  Primary UWU-16-WO: L03.316 ICD-9-CM: 073. 2 Wound abscess     ICD-10-CM: T81.49XA ICD-9-CM: 879.9 Enlarging skin lesion     ICD-10-CM: L98.9 ICD-9-CM: 709.9 Acute erythematous eruption of skin     ICD-10-CM: L53.8 ICD-9-CM: 695.89 Discharge from wound     ICD-10-CM: T14. Ingram Salomón ICD-9-CM: 879.8 Cellulitis of abdominal wall     ICD-10-CM: L03.311 ICD-9-CM: 567. 2 Vitals BP Pulse Temp OB Status Smoking Status 132/75 89 98.9 °F (37.2 °C) Hysterectomy Former Smoker Preferred Pharmacy Pharmacy Name Phone [id number]- [place], [place] - [address] [phone] Your Updated Medication List  
  
   
This list is accurate as of 10/11/18  6:05 PM.  Always use your most recent med list.  
  
  
  
  
 allopurinol 100 mg tablet Commonly known as:  Dock Falter Take 1 Tab by mouth daily. anastrozole 1 mg tablet Commonly known as:  ARIMIDEX Resume if ok with your oncologist  
  
 apixaban 5 mg (74 tabs) starter pack Commonly known as:  Lenoria Samm Take 5 mg by mouth.  
  
 bisacodyl 10 mg suppository Commonly known as:  DULCOLAX Insert 10 mg into rectum daily. cholecalciferol 1,000 unit Cap Commonly known as:  VITAMIN D3 Take 1,000 Units by mouth daily. Take 1 tab daily  
  
 clonazePAM 1 mg tablet Commonly known as:  Mal Rather Take 1 mg by mouth two (2) times daily as needed (Anxiety). cyanocobalamin 1,000 mcg tablet TAKE 1 TAB BY MOUTH DAILY. doxycycline 100 mg capsule Commonly known as:  VIBRAMYCIN Take 1 Cap by mouth two (2) times a day for 20 days. Indications: Skin and Skin Structure Infection  
  
 furosemide 40 mg tablet Commonly known as:  LASIX TAKE 1 TABLET TWICE A DAY  
  
 gabapentin 400 mg capsule Commonly known as:  NEURONTIN Take 1 Cap by mouth three (3) times daily. hydrALAZINE 25 mg tablet Commonly known as:  APRESOLINE Take 1 Tab by mouth daily. KLOR-CON M20 20 mEq tablet Generic drug:  potassium chloride TAKE 1 TABLET BY MOUTH TWICE A DAY  
  
 lidocaine 4 % topical cream  
Commonly known as:  XYLOCAINE Apply  to affected area four (4) times daily as needed for Pain. loperamide 2 mg capsule Commonly known as:  IMODIUM  
TAKE 1 CAPSULE BY MOUTH EVERY 4 HOURS AS NEEDED FOR LOOSE STOOL  
  
 methylnaltrexone 150 mg Tab tablet Commonly known as:  RELISTOR Take 3 Tabs by mouth Daily (before breakfast). metOLazone 5 mg tablet Commonly known as:  ZAROXOLYN  
TAKE 1 TAB BY MOUTH DAILY. metoprolol succinate 100 mg tablet Commonly known as:  TOPROL-XL  
 Take 100 mg by mouth daily. morphine  mg CR tablet Commonly known as:  MS CONTIN Take 1 Tab by mouth every twelve (12) hours. Max Daily Amount: 200 mg. Indications: SEVERE PAIN WITH OPIOID TOLERANCE  
  
 mupirocin 2 % ointment Commonly known as:  [employer] Please apply to the affected area bid for 10 days  
  
 naloxegol 25 mg Tab tablet Commonly known as:  Jojo Baxter Take 1 Tab by mouth Daily (before breakfast). naloxone 2 mg/actuation Spry Use 1 spray intranasally into 1 nostril. Use a new Narcan nasal spray for subsequent doses and administer into alternating nostrils. May repeat every 2 to 3 minutes as needed. nystatin topical cream  
Commonly known as:  MYCOSTATIN Apply  to affected area two (2) times a day. Apply to and around the rectal area twice daily  
  
 ondansetron 4 mg disintegrating tablet Commonly known as:  ZOFRAN ODT Take 1 Tab by mouth every eight (8) hours as needed for Nausea. ondansetron hcl 8 mg tablet Commonly known as:  ZOFRAN  
TAKE 1 TAB(S) TWICE A DAY X 3 DAYS AFTER CHEMO,THEN EVERY 12HRS AS NEEDED NAUSEA  
  
 OTHER  
MORPHINE CREAM APPLIES TO KNEES 3-4X DAILY   Indications: Per Rick during med reconciliation:  SNF is giving aspercream with lidocaine 4% apply to knees q shift. oxyCODONE IR 30 mg immediate release tablet Commonly known as:  Kingsland Born Take 1 Tab by mouth every four (4) hours as needed for Pain. Max Daily Amount: 180 mg.  
  
 pantoprazole 40 mg tablet Commonly known as:  PROTONIX Take 1 Tab by mouth two (2) times a day. polyethylene glycol 17 gram packet Commonly known as:  [place] Take 1 Packet by mouth two (2) times a day. pregabalin 75 mg capsule Commonly known as:  Paz Mahmood Take 1 Cap by mouth two (2) times a day. Max Daily Amount: 150 mg.  
  
 prochlorperazine 10 mg tablet Commonly known as:  COMPAZINE  
TAKE 1 TABLET BY MOUTH EVERY 6 HOURS AS NEEDED FOR RESCUE NAUSEA  
  
 promethazine 25 mg tablet Commonly known as:  PHENERGAN Take 1 Tab by mouth every six (6) hours as needed. senna-docusate 8.6-50 mg per tablet Commonly known as:  Sherald Bunkers Take 2 Tabs by mouth two (2) times a day. Prescriptions Sent to Pharmacy Refills  
 mupirocin (BACTROBAN) 2 % ointment 1 Sig: Please apply to the affected area bid for 10 days Class: Normal  
 Pharmacy: [facility]/pharmacy [address] [place] [address] [address] Ph #: [phone]  
 doxycycline (VIBRAMYCIN) 100 mg capsule 0 Sig: Take 1 Cap by mouth two (2) times a day for 20 days. Indications: Skin and Skin Structure Infection Class: Normal  
 Pharmacy: [facility]/pharmacy [address] [place] [address] [address] Ph #: [phone] Route: Oral  
  
We Performed the Following AEROBIC BACTERIAL CULTURE [id number] CPT(R)] Follow-up Instructions Return if symptoms worsen or fail to improve. Patient Instructions Skin Abscess: Care Instructions Your Care Instructions A skin abscess is a bacterial infection that forms a pocket of pus. A boil is a kind of skin abscess. The doctor may have cut an opening in the abscess so that the pus can drain out. You may have gauze in the cut so that the abscess will stay open and keep draining. You may need antibiotics. You will need to follow up with your doctor to make sure the infection has gone away. The doctor has checked you carefully, but problems can develop later. If you notice any problems or new symptoms, get medical treatment right away. Follow-up care is a key part of your treatment and safety. Be sure to make and go to all appointments, and call your doctor if you are having problems. It's also a good idea to know your test results and keep a list of the medicines you take. How can you care for yourself at home? · Apply warm and dry compresses, a heating pad set on low, or a hot water bottle 3 or 4 times a day for pain. Keep a cloth between the heat source and your skin. · If your doctor prescribed antibiotics, take them as directed. Do not stop taking them just because you feel better. You need to take the full course of antibiotics. · Take pain medicines exactly as directed. ¨ If the doctor gave you a prescription medicine for pain, take it as prescribed. ¨ If you are not taking a prescription pain medicine, ask your doctor if you can take an over-the-counter medicine. · Keep your bandage clean and dry. Change the bandage whenever it gets wet or dirty, or at least one time a day. · If the abscess was packed with gauze: 
¨ Keep follow-up appointments to have the gauze changed or removed. If the doctor instructed you to remove the gauze, follow the instructions you were given for how to remove it. ¨ After the gauze is removed, soak the area in warm water for 15 to 20 minutes 2 times a day, until the wound closes. When should you call for help? Call your doctor now or seek immediate medical care if: 
  · You have signs of worsening infection, such as: 
¨ Increased pain, swelling, warmth, or redness. ¨ Red streaks leading from the infected skin. ¨ Pus draining from the wound. ¨ A fever.  
 Watch closely for changes in your health, and be sure to contact your doctor if: 
  · You do not get better as expected. Where can you learn more? Go to http://kiara-olesya.info/. Enter U169 in the search box to learn more about \"Skin Abscess: Care Instructions. \" Current as of: April 18, 2018 Content Version: 11.8 © 0321-5108 [employer]. Care instructions adapted under license by [employer] (which disclaims liability or warranty for this information).  If you have questions about a medical condition or this instruction, always ask your healthcare professional. [employer] disclaims any warranty or liability for your use of this information. Introducing [facility]! Queenie Casas introduces [employer] patient portal. Now you can access parts of your medical record, email your doctor's office, and request medication refills online. 1. In your internet browser, go to [email or web address] 2. Click on the First Time User? Click Here link in the Sign In box. You will see the New Member Sign Up page. 3. Enter your [employer] Access Code exactly as it appears below. You will not need to use this code after youve completed the sign-up process. If you do not sign up before the expiration date, you must request a new code. · [employer] Access Code: [email or web address] Expires: 12/26/2018 12:57 PM 
 
4. Enter the last four digits of your Social Security Number (xxxx) and Date of Birth (mm/dd/yyyy) as indicated and click Submit. You will be taken to the next sign-up page. 5. Create a [employer] ID. This will be your [employer] login ID and cannot be changed, so think of one that is secure and easy to remember. 6. Create a [employer] password. You can change your password at any time. 7. Enter your Password Reset Question and Answer. This can be used at a later time if you forget your password. 8. Enter your e-mail address. You will receive e-mail notification when new information is available in [address] 9. Click Sign Up. You can now view and download portions of your medical record. 10. Click the Download Summary menu link to download a portable copy of your medical information. If you have questions, please visit the Frequently Asked Questions section of the [employer] website. Remember, [employer] is NOT to be used for urgent needs. For medical emergencies, dial 911. Now available from your iPhone and Android! Please provide this summary of care documentation to your next provider. Your primary care clinician is listed as Laura Singh. If you have any questions after today's visit, please call [phone].

## 2018-10-11 NOTE — PROGRESS NOTES
HISTORY OF PRESENT ILLNESS  Man Gutierrez is a 68 y.o. female. HPI   Pt called stating that theres is pain fluid rash coming on to her the on the rt sided abdominal area,  Pt afraid and very concern stating that she just got out of the hospital for hemoglobin of 6.6 for which she needed to get fluid hydration and 2 units of PRBCs, this skin trouble was just started today and since she scratched it, is getting worse warm, but pt also stated that it had tingling itchy sensations, and is not getting better, so that the patient wanted to be seen by she is not currently mobile and her skin DDx was either shingle attack or cellulitis with abscess specially with all the current stressful situation for which was brought up to her by the oncologist as that she doesnot have much more life to live!!!, for which pt was encouraged and told that only GOD can make such calls and noone knows the future so that she was encouraged by such statement and agreed, that the smile came on her face, regardless she was seen at her home, she was alone and on the PE the expanding rash did not look like varicella zoster shingle attacks,  the patient has tried alcohol washing and OTC antibiotic ointments,  no family member have the same problem, it does tingles and it is pain full, states that is expanding red, and is swelled up, like a lump    Current Outpatient Prescriptions   Medication Sig Dispense Refill    morphine CR (MS CONTIN) 100 mg CR tablet Take 1 Tab by mouth every twelve (12) hours. Max Daily Amount: 200 mg. Indications: SEVERE PAIN WITH OPIOID TOLERANCE 60 Tab 0    oxyCODONE IR (ROXICODONE) 30 mg immediate release tablet Take 1 Tab by mouth every four (4) hours as needed for Pain. Max Daily Amount: 180 mg. 180 Tab 0    allopurinol (ZYLOPRIM) 100 mg tablet Take 1 Tab by mouth daily.  30 Tab 7    furosemide (LASIX) 40 mg tablet TAKE 1 TABLET TWICE A DAY  0    trimethoprim-sulfamethoxazole (BACTRIM DS, SEPTRA DS) 160-800 mg per tablet TAKE 1 TABLET BY MOUTH TWICE A DAY FOR 12 DAYS  0    diclofenac (VOLTAREN) 1 % gel Apply 2 g to affected area four (4) times daily. 100 g 4    pregabalin (LYRICA) 75 mg capsule Take 1 Cap by mouth two (2) times a day. Max Daily Amount: 150 mg. 60 Cap 6    cyanocobalamin 1,000 mcg tablet TAKE 1 TAB BY MOUTH DAILY. 90 Tab 1    lidocaine (XYLOCAINE) 4 % topical cream Apply  to affected area four (4) times daily as needed for Pain. 45 g 5    metOLazone (ZAROXOLYN) 5 mg tablet TAKE 1 TAB BY MOUTH DAILY. 30 Tab 1    apixaban (ELIQUIS) 5 mg (74 tabs) starter pack Take 5 mg by mouth.  dexamethasone (DECADRON) 4 mg tablet       hydrocortisone (CORTENEMA) 100 mg/60 mL enema USE AS DIRECTED PER RECTUM TWICE A DAY  2    loperamide (IMODIUM) 2 mg capsule TAKE 1 CAPSULE BY MOUTH EVERY 4 HOURS AS NEEDED FOR LOOSE STOOL  1    ondansetron hcl (ZOFRAN) 8 mg tablet TAKE 1 TAB(S) TWICE A DAY X 3 DAYS AFTER CHEMO,THEN EVERY 12HRS AS NEEDED NAUSEA  0    prochlorperazine (COMPAZINE) 10 mg tablet TAKE 1 TABLET BY MOUTH EVERY 6 HOURS AS NEEDED FOR RESCUE NAUSEA  5    CARAFATE 100 mg/mL suspension 20 MILLILITERS OF CARAFATE MIXED WITH 20 MILLILITERS OF WATER, TO BE ADMINISTERED RECTALLY AS ENEMA NIGHTLY, UNTIL PATIENT SEES DR Goyo Barbosa  2    gabapentin (NEURONTIN) 400 mg capsule Take 1 Cap by mouth three (3) times daily. 90 Cap 1    ferrous sulfate 15 mg iron, 75 mg,/mL (KAILA-IN-SOL) 15 mg iron (75 mg)/mL drop drops Take 1 mL by mouth three (3) times daily. 90 mL 3    KLOR-CON M20 20 mEq tablet TAKE 1 TABLET BY MOUTH TWICE A DAY 30 Tab 1    hydrALAZINE (APRESOLINE) 25 mg tablet Take 1 Tab by mouth daily. 90 Tab 6    naloxone 2 mg/actuation spry Use 1 spray intranasally into 1 nostril. Use a new Narcan nasal spray for subsequent doses and administer into alternating nostrils. May repeat every 2 to 3 minutes as needed.  4 Blister 11    promethazine (PHENERGAN) 25 mg tablet Take 1 Tab by mouth every six (6) hours as needed. 12 Tab 0    nystatin (MYCOSTATIN) topical cream Apply  to affected area two (2) times a day. Apply to and around the rectal area twice daily 30 g 2    hydrocortisone (ANUSOL-HC) 2.5 % rectal cream Insert  into rectum four (4) times daily. (Patient taking differently: Insert  into rectum four (4) times daily. 4-6-18:  Dis[facility] instructions from [facility] indicate apply twice daily as needed for soreness.) 30 g 3    levalbuterol tartrate (XOPENEX) 45 mcg/actuation inhaler Take 1 Puff by inhalation every six (6) hours as needed for Wheezing.  senna-docusate (PERICOLACE) 8.6-50 mg per tablet Take 2 Tabs by mouth two (2) times a day. 10 Tab 0    polyethylene glycol (MIRALAX) 17 gram packet Take 1 Packet by mouth two (2) times a day. 20 Packet 0    bisacodyl (DULCOLAX) 10 mg suppository Insert 10 mg into rectum daily. 5 Suppository 0    anastrozole (ARIMIDEX) 1 mg tablet Resume if ok with your oncologist 1 Tab 0    clonazePAM (KLONOPIN) 1 mg tablet Take 1 mg by mouth two (2) times daily as needed (Anxiety).  cholecalciferol (VITAMIN D3) 1,000 unit cap Take 1,000 Units by mouth daily. Take 1 tab daily      methylnaltrexone (RELISTOR) 150 mg tab tablet Take 3 Tabs by mouth Daily (before breakfast). 90 Tab 4    naloxegol (MOVANTIK) 25 mg tab tablet Take 1 Tab by mouth Daily (before breakfast). 30 Tab 6    pantoprazole (PROTONIX) 40 mg tablet Take 1 Tab by mouth two (2) times a day. 60 Tab 2    ondansetron (ZOFRAN ODT) 4 mg disintegrating tablet Take 1 Tab by mouth every eight (8) hours as needed for Nausea. 16 Tab 0    OTHER MORPHINE CREAM APPLIES TO KNEES 3-4X DAILY   Indications: Per Rick during med reconciliation:  SNF is giving aspercream with lidocaine 4% apply to knees q shift.  metoprolol succinate (TOPROL-XL) 100 mg tablet Take 100 mg by mouth daily.  magnesium 250 mg tab Take 1 Tab by mouth daily.        Allergies   Allergen Reactions    Latex Rash and Itching    Advair Diskus [Fluticasone-Salmeterol] Other (comments)     \"breathing problems\"    Bactrim [Sulfamethoprim] Shortness of Breath, Itching and Swelling     Swelling of tongue and throat    Ciprofloxacin Hives    Eliquis [Apixaban] Other (comments)    Flagyl [Metronidazole] Hoarseness    Iodinated Contrast- Oral And Iv Dye Hives    Ivp [Fd And C Blue No.1] Hives and Shortness of Breath    Lovenox [Enoxaparin] Other (comments)     DIC    Nsaids (Non-Steroidal Anti-Inflammatory Drug) Other (comments)     Heartburn    Sulfa (Sulfonamide Antibiotics) Shortness of Breath    Xarelto [Rivaroxaban] Myalgia     Paresthesia, spasmic muscle     Past Medical History:   Diagnosis Date    Acute kidney failure ([address] 11/19/2015    Acute sinusitis 2/11/2011    Adverse effect of anesthesia     SLOW WAKING PAST ANESTHESIA    Arthritis     RA    At risk for side effect of medication 9/30/2016    Atrial fibrillation ([address] 10/19/2011    Autoimmune disease ([address]     FIBROMYLGIA    Cancer ([address] 2016    ENDOMETRIAL     Central abdominal mass or swelling 9/4/2018    Cholelithiasis 11/19/2015    Chronic pain     Claudication of both lower extremities ([address] 8/25/2015    Diabetes mellitus type 2, controlled ([address] 1/19/2016    Diabetes mellitus, type II ([address] 10/10/2014    Diverticulitis     DVT (deep venous thrombosis) ([address]     Fall against object 10/10/2014    Fatty liver 10/20/2015    Hypertension     Ill-defined condition     SPINAL STENOSIS    Left shoulder pain 10/10/2014    Leiomyoma of body of uterus 4/7/2016    Metastasis from cancer of uterus ([address] 4/5/2018    Morbid obesity ([address]     Myalgia 9/30/2016    Nausea & vomiting     Personal history of radiation therapy 4/20/2017    Pneumonia     Preop examination 6/15/2015    Preoperative clearance 6/15/2015    Rash 2/11/2011    Rash of hands 10/10/2014    Rectal bleeding 2/19/2018    Screening for colon cancer 2/22/2016    Screening for glaucoma 2/22/2016    Sleep apnea     SOB (shortness of breath)     hospitalized 5/2012. angina, SOB    Thromboembolus ([address] 2011    LEFT LOWER LEG AND PE    Tinea pedis, recurrent 7/20/2015    Vaginal bleeding, abnormal 4/20/2017     Past Surgical History:   Procedure Laterality Date    COLONOSCOPY N/A 12/21/2016    COLONOSCOPY performed [facility] Elizabeth Tinajero [facility]  12/21/2016         HX CATARACT REMOVAL Bilateral 2015    [address]    HX DILATION AND CURETTAGE  07/29/2016    HX DILATION AND CURETTAGE  2016    HX GYN  1960    etopic    HX HEART CATHETERIZATION  2001    NEGATIVE PER PATIENT    HX HYSTERECTOMY  2016    HX KNEE ARTHROSCOPY Left 1998    HX ORTHOPAEDIC Right 1960'S    BUNIONECTOMY    HX OTHER SURGICAL      BIOPSY OF VEIN IN FOREHEAD    HX TUBAL LIGATION  1963    PA COLSC FLX W/RMVL OF TUMOR POLYP LESION SNARE TQ  12/12/2011          Family History   Problem Relation Age of Onset    Other Mother      ANEURYSM    Obesity Mother     Heart Disease Father     Other Father      VASCULAR DISEASE    Hypertension Sister     Heart Disease Brother     Heart Attack Brother     Kidney Disease Sister     Seizures Brother     Heart Attack Brother     Anesth Problems Neg Hx     Alcohol abuse Neg Hx      Social History   Substance Use Topics    Smoking status: Former Smoker     Packs/day: 0.25     Years: 15.00     Quit date: 8/23/1996    Smokeless tobacco: Never Used    Alcohol use No      Lab Results  Component Value Date/Time   WBC 4.2 02/17/2018 02:07 AM   WBC (POC)  03/19/2018 03:25 PM   Comment:   scanned in system   HGB 7.9 (L) 02/17/2018 02:07 AM   HCT 25.2 (L) 02/17/2018 02:07 AM   PLATELET 114 61/29/2487 02:07 AM   MCV 96.9 02/17/2018 02:07 AM     Lab Results  Component Value Date/Time   ALT (SGPT) 5 09/19/2018 02:17 PM   AST (SGOT) 14 09/19/2018 02:17 PM   Alk.  phosphatase 74 09/19/2018 02:17 PM   Bilirubin, direct 0.14 02/21/2014 12:27 PM Bilirubin, total 0.3 09/19/2018 02:17 PM   Albumin 3.2 (L) 09/19/2018 02:17 PM   Protein, total 9.3 (H) 09/19/2018 02:17 PM   INR 1.3 (H) 02/17/2018 03:33 AM   Prothrombin time 13.4 (H) 02/17/2018 03:33 AM   PLATELET 822 36/24/7654 02:07 AM   AFP, Serum, Tumor Marker 1.6 02/21/2014 12:27 PM        Review of Systems   Constitutional: Negative for chills and fever. HENT: Negative for congestion and nosebleeds. Eyes: Negative for blurred vision and pain. Respiratory: Negative for cough, shortness of breath and wheezing. Cardiovascular: Negative for chest pain and leg swelling. Gastrointestinal: Negative for constipation, diarrhea, nausea and vomiting. Genitourinary: Negative for dysuria and frequency. Musculoskeletal: Positive for myalgias. Negative for joint pain. Skin: Positive for itching and rash. Neurological: Negative for dizziness, loss of consciousness and headaches. Psychiatric/Behavioral: Negative for depression. The patient is not nervous/anxious and does not have insomnia. Physical Exam   Constitutional: She is oriented to person, place, and time. She appears well-developed and well-nourished. HENT:   Head: Normocephalic and atraumatic. Eyes: Conjunctivae and EOM are normal. Pupils are equal, round, and reactive to light. Neck: No JVD present. No thyromegaly present. Cardiovascular: Normal rate, regular rhythm, normal heart sounds and intact distal pulses. Exam reveals no gallop and no friction rub. No murmur heard. Pulmonary/Chest: Effort normal and breath sounds normal. No stridor. No respiratory distress. She has no wheezes. She has no rales. Abdominal: Soft. Bowel sounds are normal. She exhibits no distension and no mass. There is no tenderness. Musculoskeletal: Normal range of motion. She exhibits no edema or tenderness. Lymphadenopathy:     She has no cervical adenopathy. Neurological: She is alert and oriented to person, place, and time.  She has normal reflexes. No cranial nerve deficit. Skin: Skin is warm. Rash noted. There is erythema. Rt side of paraumbilical area firey looking expanding erythematous large patch starting from the umblical area with yellowish pus coming out of the naval area for which it was cultured and then cleansed, ( it did not look like erythematous pappules of vesicular fluid filled multiple small vesicular pruritic painful Rash, no vesicle, no bullae= no shingle at this time) involving the Rt side of abdomen T9, T10, T12 dermatomes   Psychiatric: She has a normal mood and affect. Her behavior is normal.   Nursing note and vitals reviewed. ASSESSMENT and PLAN  Diagnoses and all orders for this visit:    1. Cellulitis of umbilicus  -     AEROBIC BACTERIAL CULTURE  -     mupirocin (BACTROBAN) 2 % ointment; Please apply to the affected area bid for 10 days  -     doxycycline (VIBRAMYCIN) 100 mg capsule; Take 1 Cap by mouth two (2) times a day for 20 days. Indications: Skin and Skin Structure Infection    2. Wound abscess  -     AEROBIC BACTERIAL CULTURE  -     mupirocin (BACTROBAN) 2 % ointment; Please apply to the affected area bid for 10 days  -     doxycycline (VIBRAMYCIN) 100 mg capsule; Take 1 Cap by mouth two (2) times a day for 20 days. Indications: Skin and Skin Structure Infection    3. Enlarging skin lesion  -     AEROBIC BACTERIAL CULTURE  -     mupirocin (BACTROBAN) 2 % ointment; Please apply to the affected area bid for 10 days  -     doxycycline (VIBRAMYCIN) 100 mg capsule; Take 1 Cap by mouth two (2) times a day for 20 days. Indications: Skin and Skin Structure Infection    4. Acute erythematous eruption of skin  -     AEROBIC BACTERIAL CULTURE  -     mupirocin (BACTROBAN) 2 % ointment; Please apply to the affected area bid for 10 days  -     doxycycline (VIBRAMYCIN) 100 mg capsule; Take 1 Cap by mouth two (2) times a day for 20 days. Indications: Skin and Skin Structure Infection    5.  Discharge from wound  - AEROBIC BACTERIAL CULTURE  -     mupirocin (BACTROBAN) 2 % ointment; Please apply to the affected area bid for 10 days  -     doxycycline (VIBRAMYCIN) 100 mg capsule; Take 1 Cap by mouth two (2) times a day for 20 days. Indications: Skin and Skin Structure Infection    6. Cellulitis of abdominal wall  -     AEROBIC BACTERIAL CULTURE  -     mupirocin (BACTROBAN) 2 % ointment; Please apply to the affected area bid for 10 days  -     doxycycline (VIBRAMYCIN) 100 mg capsule; Take 1 Cap by mouth two (2) times a day for 20 days.  Indications: Skin and Skin Structure Infection      At this time she willl be on doxycycline bid for 10 days, high dose for the next 10-14 days, depending on the culture results and pt progress to the current meds, pt and the family members were also advised to do wash hands and the area  bid with soaps and water, multiple hand washing, medications side effects was addressed, was told to RTC or call if not better

## 2018-10-11 NOTE — PATIENT INSTRUCTIONS
Skin Abscess: Care Instructions  Your Care Instructions    A skin abscess is a bacterial infection that forms a pocket of pus. A boil is a kind of skin abscess. The doctor may have cut an opening in the abscess so that the pus can drain out. You may have gauze in the cut so that the abscess will stay open and keep draining. You may need antibiotics. You will need to follow up with your doctor to make sure the infection has gone away. The doctor has checked you carefully, but problems can develop later. If you notice any problems or new symptoms, get medical treatment right away. Follow-up care is a key part of your treatment and safety. Be sure to make and go to all appointments, and call your doctor if you are having problems. It's also a good idea to know your test results and keep a list of the medicines you take. How can you care for yourself at home? · Apply warm and dry compresses, a heating pad set on low, or a hot water bottle 3 or 4 times a day for pain. Keep a cloth between the heat source and your skin. · If your doctor prescribed antibiotics, take them as directed. Do not stop taking them just because you feel better. You need to take the full course of antibiotics. · Take pain medicines exactly as directed. ¨ If the doctor gave you a prescription medicine for pain, take it as prescribed. ¨ If you are not taking a prescription pain medicine, ask your doctor if you can take an over-the-counter medicine. · Keep your bandage clean and dry. Change the bandage whenever it gets wet or dirty, or at least one time a day. · If the abscess was packed with gauze:  ¨ Keep follow-up appointments to have the gauze changed or removed. If the doctor instructed you to remove the gauze, follow the instructions you were given for how to remove it. ¨ After the gauze is removed, soak the area in warm water for 15 to 20 minutes 2 times a day, until the wound closes. When should you call for help?   Call your doctor now or seek immediate medical care if:    · You have signs of worsening infection, such as:  ¨ Increased pain, swelling, warmth, or redness. ¨ Red streaks leading from the infected skin. ¨ Pus draining from the wound. ¨ A fever.    Watch closely for changes in your health, and be sure to contact your doctor if:    · You do not get better as expected. Where can you learn more? Go to http://kiara-olesya.info/. Enter E318 in the search box to learn more about \"Skin Abscess: Care Instructions. \"  Current as of: April 18, 2018  Content Version: 11.8  © 5621-0961 [employer]. Care instructions adapted under license by [employer] (which disclaims liability or warranty for this information). If you have questions about a medical condition or this instruction, always ask your healthcare professional. [address] any warranty or liability for your use of this information.

## 2018-10-14 LAB — BACTERIA SPEC AEROBE CULT: NORMAL

## 2018-10-17 DIAGNOSIS — L98.9 ENLARGING SKIN LESION: ICD-10-CM

## 2018-10-17 DIAGNOSIS — T14.8XXA DISCHARGE FROM WOUND: ICD-10-CM

## 2018-10-17 DIAGNOSIS — L53.9 ACUTE ERYTHEMATOUS ERUPTION OF SKIN: ICD-10-CM

## 2018-10-17 DIAGNOSIS — L03.316 CELLULITIS OF UMBILICUS: ICD-10-CM

## 2018-10-17 DIAGNOSIS — L03.311 CELLULITIS OF ABDOMINAL WALL: ICD-10-CM

## 2018-10-18 RX ORDER — DOXYCYCLINE 100 MG/1
100 CAPSULE ORAL 2 TIMES DAILY
Qty: 180 CAP | Refills: 0 | OUTPATIENT
Start: 2018-10-18 | End: 2018-11-07

## 2018-10-23 RX ORDER — ONDANSETRON HYDROCHLORIDE 8 MG/1
TABLET, FILM COATED ORAL
Qty: 60 TAB | Refills: 3 | Status: SHIPPED | OUTPATIENT
Start: 2018-10-23

## 2018-10-24 RX ORDER — ALLOPURINOL 100 MG/1
100 TABLET ORAL DAILY
Qty: 90 TAB | Refills: 2 | Status: SHIPPED | OUTPATIENT
Start: 2018-10-24

## 2018-10-24 RX ORDER — DICLOFENAC SODIUM 75 MG/1
75 TABLET, DELAYED RELEASE ORAL 2 TIMES DAILY
Qty: 180 TAB | Refills: 0 | Status: SHIPPED | OUTPATIENT
Start: 2018-10-24

## 2018-10-31 ENCOUNTER — OFFICE VISIT (OUTPATIENT)
Dept: FAMILY MEDICINE CLINIC | Age: 78
End: 2018-10-31

## 2018-10-31 DIAGNOSIS — D47.2 SMOLDERING MULTIPLE MYELOMA: ICD-10-CM

## 2018-10-31 DIAGNOSIS — M25.512 CHRONIC LEFT SHOULDER PAIN: ICD-10-CM

## 2018-10-31 DIAGNOSIS — I73.9 CLAUDICATION OF BOTH LOWER EXTREMITIES (HCC): ICD-10-CM

## 2018-10-31 DIAGNOSIS — I26.99 OTHER PULMONARY EMBOLISM WITHOUT ACUTE COR PULMONALE, UNSPECIFIED CHRONICITY (HCC): ICD-10-CM

## 2018-10-31 DIAGNOSIS — C55 METASTASIS FROM CANCER OF UTERUS (HCC): ICD-10-CM

## 2018-10-31 DIAGNOSIS — Z92.3 PERSONAL HISTORY OF RADIATION THERAPY: ICD-10-CM

## 2018-10-31 DIAGNOSIS — M79.10 MYALGIA: ICD-10-CM

## 2018-10-31 DIAGNOSIS — M79.605 LEG PAIN, BILATERAL: ICD-10-CM

## 2018-10-31 DIAGNOSIS — G89.4 CHRONIC PAIN DISORDER: ICD-10-CM

## 2018-10-31 DIAGNOSIS — M25.562 CHRONIC PAIN OF BOTH KNEES: ICD-10-CM

## 2018-10-31 DIAGNOSIS — D47.2 MONOCLONAL GAMMOPATHIES: ICD-10-CM

## 2018-10-31 DIAGNOSIS — M79.642 PAIN OF LEFT HAND: ICD-10-CM

## 2018-10-31 DIAGNOSIS — C79.9 METASTASIS FROM CANCER OF UTERUS (HCC): ICD-10-CM

## 2018-10-31 DIAGNOSIS — N93.9 VAGINAL BLEEDING, ABNORMAL: ICD-10-CM

## 2018-10-31 DIAGNOSIS — M25.561 PAIN IN BOTH KNEES, UNSPECIFIED CHRONICITY: ICD-10-CM

## 2018-10-31 DIAGNOSIS — D47.2 MGUS (MONOCLONAL GAMMOPATHY OF UNKNOWN SIGNIFICANCE): ICD-10-CM

## 2018-10-31 DIAGNOSIS — M25.521 ELBOW PAIN, RIGHT: ICD-10-CM

## 2018-10-31 DIAGNOSIS — M35.1 MCTD (MIXED CONNECTIVE TISSUE DISEASE) (HCC): ICD-10-CM

## 2018-10-31 DIAGNOSIS — R10.30 LOWER ABDOMINAL PAIN: ICD-10-CM

## 2018-10-31 DIAGNOSIS — M25.562 PAIN IN BOTH KNEES, UNSPECIFIED CHRONICITY: ICD-10-CM

## 2018-10-31 DIAGNOSIS — M79.601 ARM PAIN, RIGHT: ICD-10-CM

## 2018-10-31 DIAGNOSIS — D61.2 APLASTIC ANEMIA DUE TO OTHER EXTERNAL AGENTS (HCC): ICD-10-CM

## 2018-10-31 DIAGNOSIS — E11.9 DIABETES MELLITUS WITHOUT COMPLICATION (HCC): ICD-10-CM

## 2018-10-31 DIAGNOSIS — G89.29 CHRONIC PAIN OF BOTH KNEES: ICD-10-CM

## 2018-10-31 DIAGNOSIS — M79.604 LEG PAIN, BILATERAL: ICD-10-CM

## 2018-10-31 DIAGNOSIS — D25.9 UTERINE LEIOMYOMA, UNSPECIFIED LOCATION: ICD-10-CM

## 2018-10-31 DIAGNOSIS — M79.7 FIBROMYALGIA: ICD-10-CM

## 2018-10-31 DIAGNOSIS — G89.29 CHRONIC LEFT SHOULDER PAIN: ICD-10-CM

## 2018-10-31 DIAGNOSIS — C54.1 ENDOMETRIAL CANCER (HCC): ICD-10-CM

## 2018-10-31 DIAGNOSIS — M25.571 CHRONIC PAIN OF RIGHT ANKLE: ICD-10-CM

## 2018-10-31 DIAGNOSIS — R60.0 EDEMA LEG: ICD-10-CM

## 2018-10-31 DIAGNOSIS — M25.532 LEFT WRIST PAIN: ICD-10-CM

## 2018-10-31 DIAGNOSIS — M25.561 CHRONIC PAIN OF BOTH KNEES: ICD-10-CM

## 2018-10-31 DIAGNOSIS — G89.29 CHRONIC PAIN OF RIGHT ANKLE: ICD-10-CM

## 2018-10-31 DIAGNOSIS — M79.602 PAIN OF LEFT UPPER EXTREMITY: ICD-10-CM

## 2018-10-31 PROBLEM — K62.7 RADIATION PROCTITIS: Status: ACTIVE | Noted: 2018-10-31

## 2018-10-31 PROBLEM — G89.3 CANCER RELATED PAIN: Status: ACTIVE | Noted: 2018-10-31

## 2018-10-31 PROBLEM — R41.82 ALTERED MENTAL STATUS: Status: ACTIVE | Noted: 2018-10-31

## 2018-10-31 PROBLEM — K92.2 LOWER GI BLEEDING: Status: ACTIVE | Noted: 2018-10-31

## 2018-10-31 PROBLEM — E86.0 DEHYDRATION: Status: ACTIVE | Noted: 2018-10-31

## 2018-10-31 PROBLEM — R07.9 CHEST PAIN: Status: ACTIVE | Noted: 2018-10-31

## 2018-10-31 PROBLEM — D64.9 SYMPTOMATIC ANEMIA: Status: ACTIVE | Noted: 2018-10-31

## 2018-10-31 PROBLEM — R53.1 GENERALIZED WEAKNESS: Status: ACTIVE | Noted: 2018-10-31

## 2018-10-31 RX ORDER — OXYCODONE HYDROCHLORIDE 30 MG/1
30 TABLET ORAL
Qty: 180 TAB | Refills: 0 | Status: SHIPPED | OUTPATIENT
Start: 2018-10-31

## 2018-10-31 RX ORDER — MORPHINE SULFATE 100 MG/1
100 TABLET, FILM COATED, EXTENDED RELEASE ORAL EVERY 12 HOURS
Qty: 60 TAB | Refills: 0 | Status: SHIPPED | OUTPATIENT
Start: 2018-10-31

## 2018-10-31 RX ORDER — LIDOCAINE 50 MG/G
PATCH TOPICAL
Qty: 1 EACH | Refills: 11 | Status: SHIPPED | OUTPATIENT
Start: 2018-10-31

## 2018-10-31 NOTE — PROGRESS NOTES
HISTORY OF PRESENT ILLNESS Olive Moeller is a 68 y.o. female. HPI This visit was a home visit for pt evaluation and meds refill, patient laying in bed recently was hospitalized was told to have 2 blood cloth on each leg crying at this time unfortunately lower abdomen with multiple lumps from uterine cancer metastasizes all over the pelvic area very tender to touch patient is stating that any movement causes severe pain she has had family member providing a lot of help for her unfortunately patient has been told that she needs to be under hospice care so for patient refusing to be on hospice care patient alert oriented and very competent at this time to make decision patient indicating that she does not want to be in a hospice and she like to stay at home and stay comfortable without any pain and she is putting everything and got sent stating that the  came by [employer] staff also has been in her house paying for her so far she is happy with the progress but she knows that cancer is spreading outside the abdominal and unfortunately she was told that she has no other option than going into hospice for which she is refusing This was a home visit for medication refill patient at this time is unable to ambulate with a walker for minor activity,  get shortness of breath, pt currently has great amount of multiple joint pain in knees shoulder lower back hips, unfortunately patient is with 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 myeloma endometrial cancer now with metastasis patient was told to need the chemotherapy for which at this time she has done one chemotx so far, but dropped her h/h and low wbc was told that she not candidate for repeat chemo unless her numbers better, at this time sh is not opting to continue on with the oncology's recommendation the pain is 10 out of 10 without current pain medication she was also in the rehab center for a couple of months was not able to attend the office stiffness and the tingling sensation is today extreme patient is not doctor shopping she is on a contract she is aware of random urine analysis aware of the medication side effect stating that the constipation has been fixed with the current medication and some laxative requesting a refill patient was evaluated and accordingly was treated today her pain medication was refilled at her house patient was told that she may be able to come to the office on her next visit she was told to continue on with the current physical therapy and range of motion strengthening for better outcome in addition she was told not to drive or operate any machinery with the current medication sign of overdose was informed patient was also prescribed anti-overdose medication she is aware of its use,the pill counts Is at zero and 2 for the long acting stating that the current meds not helping and she has to take 2 tabs at a time of the short acting and insisting to get it increased so that she can be comfortable stating that the cancer doc only gave her 6months to live and wanted her to go on hospice but so far she refused Current Outpatient Medications Medication Sig Dispense Refill  diclofenac EC (VOLTAREN) 75 mg EC tablet Take 1 Tab by mouth two (2) times a day. 180 Tab 0  
 allopurinol (ZYLOPRIM) 100 mg tablet Take 1 Tab by mouth daily. 90 Tab 2  
 ondansetron hcl (ZOFRAN) 8 mg tablet TAKE 1 TAB(S) TWICE A DAY X 3 DAYS AFTER CHEMO,THEN EVERY 12HRS AS NEEDED NAUSEA 60 Tab 3  
 mupirocin (BACTROBAN) 2 % ointment Please apply to the affected area bid for 10 days 30 g 1  
 doxycycline (VIBRAMYCIN) 100 mg capsule Take 1 Cap by mouth two (2) times a day for 20 days. Indications: Skin and Skin Structure Infection 40 Cap 0  
 morphine CR (MS CONTIN) 100 mg CR tablet Take 1 Tab by mouth every twelve (12) hours. Max Daily Amount: 200 mg.  Indications: SEVERE PAIN WITH OPIOID TOLERANCE 60 Tab 0  
  oxyCODONE IR (ROXICODONE) 30 mg immediate release tablet Take 1 Tab by mouth every four (4) hours as needed for Pain. Max Daily Amount: 180 mg. 180 Tab 0  
 furosemide (LASIX) 40 mg tablet TAKE 1 TABLET TWICE A DAY  0  
 pregabalin (LYRICA) 75 mg capsule Take 1 Cap by mouth two (2) times a day. Max Daily Amount: 150 mg. 60 Cap 6  cyanocobalamin 1,000 mcg tablet TAKE 1 TAB BY MOUTH DAILY. 90 Tab 1  
 lidocaine (XYLOCAINE) 4 % topical cream Apply  to affected area four (4) times daily as needed for Pain. 45 g 5  
 metOLazone (ZAROXOLYN) 5 mg tablet TAKE 1 TAB BY MOUTH DAILY. 30 Tab 1  
 loperamide (IMODIUM) 2 mg capsule TAKE 1 CAPSULE BY MOUTH EVERY 4 HOURS AS NEEDED FOR LOOSE STOOL  1  
 gabapentin (NEURONTIN) 400 mg capsule Take 1 Cap by mouth three (3) times daily. 90 Cap 1  KLOR-CON M20 20 mEq tablet TAKE 1 TABLET BY MOUTH TWICE A DAY 30 Tab 1  
 hydrALAZINE (APRESOLINE) 25 mg tablet Take 1 Tab by mouth daily. 90 Tab 6  
 naloxone 2 mg/actuation spry Use 1 spray intranasally into 1 nostril. Use a new Narcan nasal spray for subsequent doses and administer into alternating nostrils. May repeat every 2 to 3 minutes as needed. 4 Blister 11  
 nystatin (MYCOSTATIN) topical cream Apply  to affected area two (2) times a day. Apply to and around the rectal area twice daily 30 g 2  
 senna-docusate (PERICOLACE) 8.6-50 mg per tablet Take 2 Tabs by mouth two (2) times a day. 10 Tab 0  
 polyethylene glycol (MIRALAX) 17 gram packet Take 1 Packet by mouth two (2) times a day. 20 Packet 0  
 bisacodyl (DULCOLAX) 10 mg suppository Insert 10 mg into rectum daily. 5 Suppository 0  
 anastrozole (ARIMIDEX) 1 mg tablet Resume if ok with your oncologist 1 Tab 0  clonazePAM (KLONOPIN) 1 mg tablet Take 1 mg by mouth two (2) times daily as needed (Anxiety).  cholecalciferol (VITAMIN D3) 1,000 unit cap Take 1,000 Units by mouth daily. Take 1 tab daily  methylnaltrexone (RELISTOR) 150 mg tab tablet Take 3 Tabs by mouth Daily (before breakfast). 90 Tab 4  
 naloxegol (MOVANTIK) 25 mg tab tablet Take 1 Tab by mouth Daily (before breakfast). 30 Tab 6  pantoprazole (PROTONIX) 40 mg tablet Take 1 Tab by mouth two (2) times a day. 60 Tab 2  
 OTHER MORPHINE CREAM APPLIES TO KNEES 3-4X DAILY   Indications: Per Rick during med reconciliation:  SNF is giving aspercream with lidocaine 4% apply to knees q shift.  metoprolol succinate (TOPROL-XL) 100 mg tablet Take 100 mg by mouth daily. Allergies Allergen Reactions  Latex Rash and Itching  Enoxaparin Sodium Anaphylaxis  Advair Diskus [Fluticasone-Salmeterol] Other (comments) \"breathing problems\"  Bactrim [Sulfamethoprim] Shortness of Breath, Itching and Swelling Swelling of tongue and throat  Ciprofloxacin Hives  Eliquis [Apixaban] Other (comments)  Flagyl [Metronidazole] Hoarseness  Fluticasone Propionate Shortness of Breath  Heparin Analogues Shortness of Breath  Iodinated Contrast- Oral And Iv Dye Hives Other reaction(s): DYSPNEA HIVES  Ivp [Fd And C Blue No.1] Hives and Shortness of Breath  Lovenox [Enoxaparin] Other (comments) DIC  Nausea Control  [Phosphorated Carbohydrate] Nausea and Vomiting  Nsaids (Non-Steroidal Anti-Inflammatory Drug) Other (comments) Other reaction(s): UPSET STOMACH Heartburn  Salmeterol Xinafoate Anxiety and Shortness of Breath  Sulfa (Sulfonamide Antibiotics) Shortness of Breath Other reaction(s): RASH DYSPNEA Other reaction(s): RASH DYSPNEA  Xarelto [Rivaroxaban] Myalgia Paresthesia, spasmic muscle Past Medical History:  
Diagnosis Date  Acute erythematous eruption of skin 10/11/2018  Acute kidney failure ([facility] [address] 11/19/2015  Acute sinusitis 2/11/2011  Adverse effect of anesthesia SLOW WAKING PAST ANESTHESIA  Arthritis  RA  
  At risk for side effect of medication 9/30/2016  Atrial fibrillation ([address] 10/19/2011  Autoimmune disease ([address] Colie Aver  Cancer ([address] 2016 ENDOMETRIAL  Cellulitis 10/11/2018  Cellulitis of abdominal wall 10/11/2018  Central abdominal mass or swelling 9/4/2018  Cholelithiasis 11/19/2015  Chronic pain  Claudication of both lower extremities ([address] 8/25/2015  Diabetes mellitus type 2, controlled ([address] 1/19/2016  Diabetes mellitus, type II ([address] 10/10/2014  Discharge from wound 10/11/2018  Diverticulitis  DVT (deep venous thrombosis) ([address]  Fall against object 10/10/2014  Fatty liver 10/20/2015  Hypertension  Ill-defined condition SPINAL STENOSIS  Left shoulder pain 10/10/2014  Leiomyoma of body of uterus 4/7/2016  Metastasis from cancer of uterus ([address] 4/5/2018  Morbid obesity ([address]  Myalgia 9/30/2016  Nausea & vomiting  Personal history of radiation therapy 4/20/2017  Pneumonia  Preop examination 6/15/2015  Preoperative clearance 6/15/2015  Rash 2/11/2011  Rash of hands 10/10/2014  Rectal bleeding 2/19/2018  Screening for colon cancer 2/22/2016  Screening for glaucoma 2/22/2016  Sleep apnea  SOB (shortness of breath)   
 hospitalized 5/2012. angina, SOB  Thromboembolus [facility]) 2011 LEFT LOWER LEG AND PE  
 Tinea pedis, recurrent 7/20/2015  Vaginal bleeding, abnormal 4/20/2017 Past Surgical History:  
Procedure Laterality Date  COLONOSCOPY,REMV LESN,SNARE  12/21/2016  HX CATARACT REMOVAL Bilateral 2015 [address]  HX DILATION AND CURETTAGE  07/29/2016  HX DILATION AND CURETTAGE  2016 [id number] CATHETERIZATION  2001 NEGATIVE PER PATIENT  HX HYSTERECTOMY  2016  HX KNEE ARTHROSCOPY Left 1998  HX ORTHOPAEDIC Right 1960'S  
 BUNIONECTOMY  HX OTHER SURGICAL    
 BIOPSY OF VEIN IN FOREHEAD  
 [address]  OK COLSC FLX W/RMVL OF TUMOR POLYP LESION SNARE TQ  2011 Family History Problem Relation Age of Onset 24 [facility] Other Mother ANEURYSM  
 Obesity Mother  Heart Disease Father  Other Father VASCULAR DISEASE  Hypertension Sister  Heart Disease Brother  Heart Attack Brother  Kidney Disease Sister  Seizures Brother  Heart Attack Brother  Anesth Problems Neg Hx  Alcohol abuse Neg Hx Social History Tobacco Use  Smoking status: Former Smoker Packs/day: 0.25 Years: 15.00 Pack years: 3.75 Last attempt to quit: 1996 Years since quittin.2  Smokeless tobacco: Never Used Substance Use Topics  Alcohol use: No  
  
Lab Results Component Value Date/Time WBC 4.2 2018 02:07 AM  
 WBC (POC)  2018 03:25 PM  
   Comment:  
   scanned in system HGB 7.9 (L) 2018 02:07 AM  
 HCT 25.2 (L) 2018 02:07 AM  
 PLATELET 533  02:07 AM  
 MCV 96.9 2018 02:07 AM  
 
Lab Results Component Value Date/Time GFR est non-AA 41 (L) 2018 02:17 PM  
 GFRNA, POC >60 2016 08:18 AM  
 GFR est AA 47 (L) 2018 02:17 PM  
 GFRAA, POC >60 2016 08:18 AM  
 Creatinine 1.26 (H) 2018 02:17 PM  
 Creatinine (POC) 0.9 2016 08:18 AM  
 BUN 19 2018 02:17 PM  
 Sodium 138 2018 02:17 PM  
 Potassium 4.5 2018 02:17 PM  
 Chloride 103 2018 02:17 PM  
 CO2 20 2018 02:17 PM  
 Magnesium 1.4 (L) 12/15/2017 03:38 AM  
  
Review of Systems Constitutional: Positive for chills, diaphoresis, malaise/fatigue and weight loss. Negative for fever. HENT: Negative for congestion and nosebleeds. Eyes: Negative for blurred vision and pain. Respiratory: Negative for cough, shortness of breath and wheezing. Cardiovascular: Negative for chest pain and leg swelling. Gastrointestinal: Positive for abdominal pain, blood in stool, constipation, diarrhea, melena, nausea and vomiting. Genitourinary: Positive for flank pain. Negative for dysuria and frequency. Musculoskeletal: Positive for back pain, joint pain, myalgias and neck pain. Skin: Negative for itching and rash. Neurological: Positive for weakness. Negative for dizziness, loss of consciousness and headaches. Psychiatric/Behavioral: Negative for depression. The patient is not nervous/anxious and does not have insomnia. Physical Exam  
Constitutional: She is oriented to person, place, and time. She appears well-developed and well-nourished. HENT:  
Head: Normocephalic and atraumatic. Eyes: Conjunctivae and EOM are normal. Pupils are equal, round, and reactive to light. Neck: No JVD present. No thyromegaly present. Cardiovascular: Normal rate, regular rhythm, normal heart sounds and intact distal pulses. Exam reveals no gallop and no friction rub. No murmur heard. Pulmonary/Chest: Effort normal and breath sounds normal. No stridor. No respiratory distress. She has no wheezes. She has no rales. Abdominal: Soft. Bowel sounds are normal. She exhibits distension and mass. There is tenderness. There is guarding. Musculoskeletal: Normal range of motion. She exhibits edema, tenderness and deformity. Lymphadenopathy:  
  She has no cervical adenopathy. Neurological: She is alert and oriented to person, place, and time. She displays abnormal reflex. No cranial nerve deficit. She exhibits abnormal muscle tone. Coordination abnormal.  
Skin: Skin is warm. Rash noted. There is erythema. Psychiatric: She has a normal mood and affect. Her behavior is normal.  
Nursing note and vitals reviewed. ASSESSMENT and PLAN Diagnoses and all orders for this visit: 
 
1. Metastasis from cancer of uterus (HCC) 
-     morphine CR (MS CONTIN) 100 mg CR tablet;  Take 1 Tab by mouth every twelve (12) hours. Max Daily Amount: 200 mg. 
-     oxyCODONE IR (ROXICODONE) 30 mg immediate release tablet; Take 1 Tab by mouth every four (4) hours as needed for Pain (cancer related pain). Max Daily Amount: 180 mg. 
-     lidocaine (LIDODERM) 5 %; Apply patch to the affected area for 12 hours a day and remove for 12 hours a day. 2. Smoldering multiple myeloma (HCC) 
-     morphine CR (MS CONTIN) 100 mg CR tablet; Take 1 Tab by mouth every twelve (12) hours. Max Daily Amount: 200 mg. 
-     oxyCODONE IR (ROXICODONE) 30 mg immediate release tablet; Take 1 Tab by mouth every four (4) hours as needed for Pain (cancer related pain). Max Daily Amount: 180 mg. 
-     lidocaine (LIDODERM) 5 %; Apply patch to the affected area for 12 hours a day and remove for 12 hours a day. 3. Endometrial cancer (HCC) 
-     morphine CR (MS CONTIN) 100 mg CR tablet; Take 1 Tab by mouth every twelve (12) hours. Max Daily Amount: 200 mg. 
-     oxyCODONE IR (ROXICODONE) 30 mg immediate release tablet; Take 1 Tab by mouth every four (4) hours as needed for Pain (cancer related pain). Max Daily Amount: 180 mg. 
-     lidocaine (LIDODERM) 5 %; Apply patch to the affected area for 12 hours a day and remove for 12 hours a day. 4. MGUS (monoclonal gammopathy of unknown significance) 
-     morphine CR (MS CONTIN) 100 mg CR tablet; Take 1 Tab by mouth every twelve (12) hours. Max Daily Amount: 200 mg. 
-     oxyCODONE IR (ROXICODONE) 30 mg immediate release tablet; Take 1 Tab by mouth every four (4) hours as needed for Pain (cancer related pain). Max Daily Amount: 180 mg. 
-     lidocaine (LIDODERM) 5 %; Apply patch to the affected area for 12 hours a day and remove for 12 hours a day. 5. Chronic pain disorder 
-     morphine CR (MS CONTIN) 100 mg CR tablet; Take 1 Tab by mouth every twelve (12) hours. Max Daily Amount: 200 mg. 
-     oxyCODONE IR (ROXICODONE) 30 mg immediate release tablet;  Take 1 Tab by mouth every four (4) hours as needed for Pain (cancer related pain). Max Daily Amount: 180 mg. 
-     lidocaine (LIDODERM) 5 %; Apply patch to the affected area for 12 hours a day and remove for 12 hours a day. 6. Fibromyalgia 
-     morphine CR (MS CONTIN) 100 mg CR tablet; Take 1 Tab by mouth every twelve (12) hours. Max Daily Amount: 200 mg. 
-     oxyCODONE IR (ROXICODONE) 30 mg immediate release tablet; Take 1 Tab by mouth every four (4) hours as needed for Pain (cancer related pain). Max Daily Amount: 180 mg. 
-     lidocaine (LIDODERM) 5 %; Apply patch to the affected area for 12 hours a day and remove for 12 hours a day. 7. Lower abdominal pain 
-     morphine CR (MS CONTIN) 100 mg CR tablet; Take 1 Tab by mouth every twelve (12) hours. Max Daily Amount: 200 mg. 
-     oxyCODONE IR (ROXICODONE) 30 mg immediate release tablet; Take 1 Tab by mouth every four (4) hours as needed for Pain (cancer related pain). Max Daily Amount: 180 mg. 
-     lidocaine (LIDODERM) 5 %; Apply patch to the affected area for 12 hours a day and remove for 12 hours a day. 8. MCTD (mixed connective tissue disease) (HCC) 
-     morphine CR (MS CONTIN) 100 mg CR tablet; Take 1 Tab by mouth every twelve (12) hours. Max Daily Amount: 200 mg. 
-     oxyCODONE IR (ROXICODONE) 30 mg immediate release tablet; Take 1 Tab by mouth every four (4) hours as needed for Pain (cancer related pain). Max Daily Amount: 180 mg. 
-     lidocaine (LIDODERM) 5 %; Apply patch to the affected area for 12 hours a day and remove for 12 hours a day. 9. Claudication of both lower extremities (HCC) 
-     morphine CR (MS CONTIN) 100 mg CR tablet; Take 1 Tab by mouth every twelve (12) hours. Max Daily Amount: 200 mg. 
-     oxyCODONE IR (ROXICODONE) 30 mg immediate release tablet; Take 1 Tab by mouth every four (4) hours as needed for Pain (cancer related pain). Max Daily Amount: 180 mg. -     lidocaine (LIDODERM) 5 %; Apply patch to the affected area for 12 hours a day and remove for 12 hours a day. 10. Monoclonal gammopathies 
-     morphine CR (MS CONTIN) 100 mg CR tablet; Take 1 Tab by mouth every twelve (12) hours. Max Daily Amount: 200 mg. 
-     lidocaine (LIDODERM) 5 %; Apply patch to the affected area for 12 hours a day and remove for 12 hours a day. 11. Chronic pain of right ankle 
-     morphine CR (MS CONTIN) 100 mg CR tablet; Take 1 Tab by mouth every twelve (12) hours. Max Daily Amount: 200 mg. 
-     lidocaine (LIDODERM) 5 %; Apply patch to the affected area for 12 hours a day and remove for 12 hours a day. 12. Arm pain, right 
-     morphine CR (MS CONTIN) 100 mg CR tablet; Take 1 Tab by mouth every twelve (12) hours. Max Daily Amount: 200 mg. 
-     oxyCODONE IR (ROXICODONE) 30 mg immediate release tablet; Take 1 Tab by mouth every four (4) hours as needed for Pain (cancer related pain). Max Daily Amount: 180 mg. 
-     lidocaine (LIDODERM) 5 %; Apply patch to the affected area for 12 hours a day and remove for 12 hours a day. 13. Elbow pain, right 
-     morphine CR (MS CONTIN) 100 mg CR tablet; Take 1 Tab by mouth every twelve (12) hours. Max Daily Amount: 200 mg. 
-     oxyCODONE IR (ROXICODONE) 30 mg immediate release tablet; Take 1 Tab by mouth every four (4) hours as needed for Pain (cancer related pain). Max Daily Amount: 180 mg. 
-     lidocaine (LIDODERM) 5 %; Apply patch to the affected area for 12 hours a day and remove for 12 hours a day. 14. Pain in both knees, unspecified chronicity 
-     morphine CR (MS CONTIN) 100 mg CR tablet; Take 1 Tab by mouth every twelve (12) hours. Max Daily Amount: 200 mg. 
-     oxyCODONE IR (ROXICODONE) 30 mg immediate release tablet; Take 1 Tab by mouth every four (4) hours as needed for Pain (cancer related pain). Max Daily Amount: 180 mg. 
-     lidocaine (LIDODERM) 5 %;  Apply patch to the affected area for 12 hours a day and remove for 12 hours a day. 15. Chronic left shoulder pain 
-     morphine CR (MS CONTIN) 100 mg CR tablet; Take 1 Tab by mouth every twelve (12) hours. Max Daily Amount: 200 mg. 
-     oxyCODONE IR (ROXICODONE) 30 mg immediate release tablet; Take 1 Tab by mouth every four (4) hours as needed for Pain (cancer related pain). Max Daily Amount: 180 mg. 
-     lidocaine (LIDODERM) 5 %; Apply patch to the affected area for 12 hours a day and remove for 12 hours a day. 16. Myalgia 
-     morphine CR (MS CONTIN) 100 mg CR tablet; Take 1 Tab by mouth every twelve (12) hours. Max Daily Amount: 200 mg. 
-     oxyCODONE IR (ROXICODONE) 30 mg immediate release tablet; Take 1 Tab by mouth every four (4) hours as needed for Pain (cancer related pain). Max Daily Amount: 180 mg. 
-     lidocaine (LIDODERM) 5 %; Apply patch to the affected area for 12 hours a day and remove for 12 hours a day. 17. Pain of left upper extremity 
-     morphine CR (MS CONTIN) 100 mg CR tablet; Take 1 Tab by mouth every twelve (12) hours. Max Daily Amount: 200 mg. 
-     oxyCODONE IR (ROXICODONE) 30 mg immediate release tablet; Take 1 Tab by mouth every four (4) hours as needed for Pain (cancer related pain). Max Daily Amount: 180 mg. 
-     lidocaine (LIDODERM) 5 %; Apply patch to the affected area for 12 hours a day and remove for 12 hours a day. 18. Vaginal bleeding, abnormal 
-     morphine CR (MS CONTIN) 100 mg CR tablet; Take 1 Tab by mouth every twelve (12) hours. Max Daily Amount: 200 mg. 
-     lidocaine (LIDODERM) 5 %; Apply patch to the affected area for 12 hours a day and remove for 12 hours a day. 19. Diabetes mellitus without complication (HCC) 
-     morphine CR (MS CONTIN) 100 mg CR tablet; Take 1 Tab by mouth every twelve (12) hours. Max Daily Amount: 200 mg. 
-     oxyCODONE IR (ROXICODONE) 30 mg immediate release tablet; Take 1 Tab by mouth every four (4) hours as needed for Pain (cancer related pain). Max Daily Amount: 180 mg. 
-     lidocaine (LIDODERM) 5 %; Apply patch to the affected area for 12 hours a day and remove for 12 hours a day. 20. Other pulmonary embolism without acute cor pulmonale, unspecified chronicity (HCC) 
-     morphine CR (MS CONTIN) 100 mg CR tablet; Take 1 Tab by mouth every twelve (12) hours. Max Daily Amount: 200 mg. 
-     oxyCODONE IR (ROXICODONE) 30 mg immediate release tablet; Take 1 Tab by mouth every four (4) hours as needed for Pain (cancer related pain). Max Daily Amount: 180 mg. 
-     lidocaine (LIDODERM) 5 %; Apply patch to the affected area for 12 hours a day and remove for 12 hours a day. 21. Personal history of radiation therapy 
-     morphine CR (MS CONTIN) 100 mg CR tablet; Take 1 Tab by mouth every twelve (12) hours. Max Daily Amount: 200 mg. 
-     oxyCODONE IR (ROXICODONE) 30 mg immediate release tablet; Take 1 Tab by mouth every four (4) hours as needed for Pain (cancer related pain). Max Daily Amount: 180 mg. 
-     lidocaine (LIDODERM) 5 %; Apply patch to the affected area for 12 hours a day and remove for 12 hours a day. 22. Pain of left hand 
-     morphine CR (MS CONTIN) 100 mg CR tablet; Take 1 Tab by mouth every twelve (12) hours. Max Daily Amount: 200 mg. 
-     lidocaine (LIDODERM) 5 %; Apply patch to the affected area for 12 hours a day and remove for 12 hours a day. 23. Left wrist pain 
-     morphine CR (MS CONTIN) 100 mg CR tablet; Take 1 Tab by mouth every twelve (12) hours. Max Daily Amount: 200 mg. 
-     oxyCODONE IR (ROXICODONE) 30 mg immediate release tablet; Take 1 Tab by mouth every four (4) hours as needed for Pain (cancer related pain). Max Daily Amount: 180 mg. 
-     lidocaine (LIDODERM) 5 %; Apply patch to the affected area for 12 hours a day and remove for 12 hours a day. 24. Uterine leiomyoma, unspecified location 
-     morphine CR (MS CONTIN) 100 mg CR tablet;  Take 1 Tab by mouth every twelve (12) hours. Max Daily Amount: 200 mg. 
-     oxyCODONE IR (ROXICODONE) 30 mg immediate release tablet; Take 1 Tab by mouth every four (4) hours as needed for Pain (cancer related pain). Max Daily Amount: 180 mg. 
-     lidocaine (LIDODERM) 5 %; Apply patch to the affected area for 12 hours a day and remove for 12 hours a day. 25. Leg pain, bilateral 
-     morphine CR (MS CONTIN) 100 mg CR tablet; Take 1 Tab by mouth every twelve (12) hours. Max Daily Amount: 200 mg. 
-     oxyCODONE IR (ROXICODONE) 30 mg immediate release tablet; Take 1 Tab by mouth every four (4) hours as needed for Pain (cancer related pain). Max Daily Amount: 180 mg. 
-     lidocaine (LIDODERM) 5 %; Apply patch to the affected area for 12 hours a day and remove for 12 hours a day. 26. 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 
-     oxyCODONE IR (ROXICODONE) 30 mg immediate release tablet; Take 1 Tab by mouth every four (4) hours as needed for Pain (cancer related pain). Max Daily Amount: 180 mg. 
-     lidocaine (LIDODERM) 5 %; Apply patch to the affected area for 12 hours a day and remove for 12 hours a day. 27. Edema leg 
-     oxyCODONE IR (ROXICODONE) 30 mg immediate release tablet; Take 1 Tab by mouth every four (4) hours as needed for Pain (cancer related pain). Max Daily Amount: 180 mg. 
-     lidocaine (LIDODERM) 5 %; Apply patch to the affected area for 12 hours a day and remove for 12 hours a day. 28. Chronic pain of both knees 
-     oxyCODONE IR (ROXICODONE) 30 mg immediate release tablet; Take 1 Tab by mouth every four (4) hours as needed for Pain (cancer related pain). Max Daily Amount: 180 mg. 
-     lidocaine (LIDODERM) 5 %; Apply patch to the affected area for 12 hours a day and remove for 12 hours a day.  
 
 
Patient medications were refilled after signing the contract consent and no harm documentations and again was told to lessen the amount of opioid-based medication continue with weight reduction do some massage therapy chiropractor exercise therapy such as resistance banding avoid heavy lifting heavy pushing at this time include ibuprofen and Tylenol over-the-counter topical cream for  day views of concerns patient was told to take some Tums or over-the-counter PPI if abdominal upset ice therapy he therapy side effect of current opioid-based medication significantly explained in detail patient acknowledged understanding and agreed with recommendation 
in addition, pt was told to avoid machinary operation and driving while on any opioid based medications that will cause dizziness, drowsiness, and sleepiness. Dependency and tolerancy were also addressed,  meds side effects and compliancy advised,  Call or rtc if worsens, radiology results and schedule of future radiology studies reviewed with patient. Pt agreed with today's recommendations.

## 2018-10-31 NOTE — PATIENT INSTRUCTIONS
Uterine Cancer: Care Instructions Your Care Instructions Uterine cancer is the rapid growth of abnormal cells that line the uterus. It also is called endometrial cancer. These cells may spread to nearby organs, lymph glands, or distant organs. Uterine cancer can be cured most often when found early. Treatment may include surgery to remove the uterus, ovaries, fallopian tubes, and sometimes the pelvic lymph nodes. Radiation and hormones to stop cancer growth also are sometimes used. Chemotherapy may be used if the cancer has spread. Having cancer can be scary. You may feel many emotions and may need some help coping. Seek out family, friends, and counselors for support. You can do things at home to make yourself feel better while you go through treatment. You also can call the [employer] (2-404.438.1981) or visit its website at [employer] [employer] for more information. Follow-up care is a key part of your treatment and safety. Be sure to make and go to all appointments, and call your doctor if you are having problems. It's also a good idea to know your test results and keep a list of the medicines you take. How can you care for yourself at home? · Take your medicines exactly as prescribed. Call your doctor if you think you are having a problem with your medicine. You may get medicine for nausea and vomiting if you have these side effects. · Eat healthy food. If you do not feel like eating, try to eat food that has protein and extra calories to keep up your strength and prevent weight loss. Drink liquid meal replacements for extra calories and protein. Try to eat your main meal early. · Get some physical activity every day, but do not get too tired. Keep doing the hobbies you enjoy as your energy allows. · Take steps to control your stress and workload. Learn relaxation techniques. ? Share your feelings. Stress and tension affect our emotions.  By expressing your feelings to others, you may be able to understand and cope with them. ? Consider joining a support group. Talking about a problem with your spouse, a good friend, or other people with similar problems is a good way to reduce tension and stress. ? Express yourself through art. Try writing, dance, art, or crafts to relieve tension. Some dance, writing, or art groups may be available just for people who have cancer. ? Be kind to your body and mind. Getting enough sleep, eating a healthy diet, and taking time to do things you enjoy can contribute to an overall feeling of balance in your life and help reduce stress. ? Get help if you need it. Discuss your concerns with your doctor or counselor. · If you are vomiting or have diarrhea: ? Drink plenty of fluids (enough so that your urine is light yellow or clear like water) to prevent dehydration. Choose water and other caffeine-free clear liquids. If you have kidney, heart, or liver disease and have to limit fluids, talk with your doctor before you increase the amount of fluids you drink. ? When you are able to eat, try clear soups, mild foods, and liquids until all symptoms are gone for 12 to 48 hours. Other good choices include dry toast, crackers, cooked cereal, and gelatin dessert, such as Jell-O. 
· Take care of your urinary tract to prevent problems such as infection, which can be caused by uterine cancer and its treatment. Limit drinks with caffeine, drink plenty of fluids, and urinate every 3 to 4 hours. · If you have not already done so, prepare a list of advance directives. Advance directives are instructions to your doctor and family members about what kind of care you want if you become unable to speak or express yourself. When should you call for help? Call 911 anytime you think you may need emergency care. For example, call if: 
  · You passed out (lost consciousness).  
 Call your doctor now or seek immediate medical care if:   · You have a fever.  
  · You have abnormal bleeding.  
  · You have new or worse pain.  
  · You think you have an infection.  
  · You have new symptoms, such as a cough, belly pain, vomiting, diarrhea, or a rash.  
 Watch closely for changes in your health, and be sure to contact your doctor if: 
  · You are much more tired than usual.  
  · You have swollen glands in your armpits, groin, or neck.  
  · You do not get better as expected. Where can you learn more? Go to http://kiara-olesya.info/. Enter E343 in the search box to learn more about \"Uterine Cancer: Care Instructions. \" Current as of: March 28, 2018 Content Version: 11.8 © 8254-6149 [employer], [employer]. Care instructions adapted under license by [employer] (which disclaims liability or warranty for this information). If you have questions about a medical condition or this instruction, always ask your healthcare professional. [place] [address] any warranty or liability for your use of this information.  
 
[facility]

## 2018-11-13 ENCOUNTER — LAB ONLY (OUTPATIENT)
Dept: FAMILY MEDICINE CLINIC | Age: 78
End: 2018-11-13

## 2018-11-13 ENCOUNTER — HOSPITAL ENCOUNTER (OUTPATIENT)
Dept: LAB | Age: 78
Discharge: HOME | End: 2018-11-13
Payer: MEDICARE

## 2018-11-13 DIAGNOSIS — C55 METASTASIS FROM CANCER OF UTERUS (HCC): Primary | ICD-10-CM

## 2018-11-13 DIAGNOSIS — C79.9 METASTASIS FROM CANCER OF UTERUS (HCC): Primary | ICD-10-CM

## 2018-11-13 DIAGNOSIS — D63.8 ANEMIA OF CHRONIC DISEASE: ICD-10-CM

## 2018-11-13 PROCEDURE — 85025 COMPLETE CBC W/AUTO DIFF WBC: CPT

## 2018-11-13 PROCEDURE — 80053 COMPREHEN METABOLIC PANEL: CPT

## 2018-11-13 PROCEDURE — 36415 COLL VENOUS BLD VENIPUNCTURE: CPT

## 2018-11-14 LAB
ALBUMIN SERPL-MCNC: 2.2 G/DL (ref 3.5–4.8)
ALBUMIN/GLOB SERPL: 0.4 {RATIO} (ref 1.2–2.2)
ALP SERPL-CCNC: 55 IU/L (ref 39–117)
ALT SERPL-CCNC: 3 IU/L (ref 0–32)
AST SERPL-CCNC: 6 IU/L (ref 0–40)
BASOPHILS # BLD AUTO: 0 X10E3/UL (ref 0–0.2)
BASOPHILS NFR BLD AUTO: 0 %
BILIRUB SERPL-MCNC: 0.4 MG/DL (ref 0–1.2)
BUN SERPL-MCNC: 32 MG/DL (ref 8–27)
BUN/CREAT SERPL: 36 (ref 12–28)
CALCIUM SERPL-MCNC: 10 MG/DL (ref 8.7–10.3)
CHLORIDE SERPL-SCNC: 105 MMOL/L (ref 96–106)
CO2 SERPL-SCNC: 18 MMOL/L (ref 20–29)
CREAT SERPL-MCNC: 0.88 MG/DL (ref 0.57–1)
EOSINOPHIL # BLD AUTO: 0 X10E3/UL (ref 0–0.4)
EOSINOPHIL NFR BLD AUTO: 0 %
ERYTHROCYTE [DISTWIDTH] IN BLOOD BY AUTOMATED COUNT: 17.7 % (ref 12.3–15.4)
GLOBULIN SER CALC-MCNC: 5.1 G/DL (ref 1.5–4.5)
GLUCOSE SERPL-MCNC: 124 MG/DL (ref 65–99)
HCT VFR BLD AUTO: 29 % (ref 34–46.6)
HGB BLD-MCNC: 9.6 G/DL (ref 11.1–15.9)
IMM GRANULOCYTES # BLD: 0 X10E3/UL (ref 0–0.1)
IMM GRANULOCYTES NFR BLD: 0 %
LYMPHOCYTES # BLD AUTO: 0.8 X10E3/UL (ref 0.7–3.1)
LYMPHOCYTES NFR BLD AUTO: 11 %
MCH RBC QN AUTO: 30.6 PG (ref 26.6–33)
MCHC RBC AUTO-ENTMCNC: 33.1 G/DL (ref 31.5–35.7)
MCV RBC AUTO: 92 FL (ref 79–97)
MONOCYTES # BLD AUTO: 0.3 X10E3/UL (ref 0.1–0.9)
MONOCYTES NFR BLD AUTO: 5 %
NEUTROPHILS # BLD AUTO: 5.8 X10E3/UL (ref 1.4–7)
NEUTROPHILS NFR BLD AUTO: 84 %
PLATELET # BLD AUTO: 227 X10E3/UL (ref 150–379)
POTASSIUM SERPL-SCNC: 5.1 MMOL/L (ref 3.5–5.2)
PROT SERPL-MCNC: 7.3 G/DL (ref 6–8.5)
RBC # BLD AUTO: 3.14 X10E6/UL (ref 3.77–5.28)
SODIUM SERPL-SCNC: 140 MMOL/L (ref 134–144)
WBC # BLD AUTO: 6.9 X10E3/UL (ref 3.4–10.8)

## 2018-12-06 VITALS — HEART RATE: 93 BPM | DIASTOLIC BLOOD PRESSURE: 75 MMHG | TEMPERATURE: 98.4 F | SYSTOLIC BLOOD PRESSURE: 124 MMHG

## 2021-08-03 PROBLEM — D64.9 ANEMIA: Status: RESOLVED | Noted: 2017-10-31 | Resolved: 2021-08-03

## 2021-09-03 NOTE — ROUTINE PROCESS
General Surgery End of Shift Nursing Note    Bedside shift change report given to Nils Church RN (oncoming nurse) by Anatoly Rodney (offgoing nurse). Report included the following information SBAR, Kardex, ED Summary, Intake/Output and Recent Results. Shift worked:   7p-7a   Summary of shift:  Pain well controlled with ordered pain meds   Issues for physician to address: none     Number times ambulated in hallway past shift: 0    Number of times OOB to chair past shift: 0    Pain Management:  Current medication: dilaudid/morphine/rosa  Patient states pain is manageable on current pain medication: NO    GI:    Current diet:  DIET CARDIAC Regular    Tolerating current diet: YES  Passing flatus: YES  Last Bowel Movement: several days ago   Appearance:     Respiratory:    Incentive Spirometer at bedside: NO  Patient instructed on use: NO    Patient Safety:    Falls Score: 4  Bed Alarm On? Yes      Gabby Benjamin RN Stable.

## 2024-07-11 NOTE — PROGRESS NOTES
Community Care Team Documentation for Patient in [facility]  Subsequent Follow up     Patient remains at [address] ([facility]). See previous [facility] Team notes. PCP : Abelardo Suarez MD  Nurse Navigator in PCP office: Linda Dawkins  PCP follow up appointment:  1/8/17 at 3:00 PM    Per Brannon Araujo and team at [facility], PT/OT continuing with progress:  Min assist for lower body ADLs, setup for upper body ADLs, min assist for standing with ADLs from wheelchair level, bed mobility and transfers at contact guard. Medically stable. Plan for DC 1/4 to home with family and [facility]. PCP follow up 1/8 at 3:00 PM.    Medications were not reconciled and general patient assessment was not completed during this skilled nursing facility outreach.      Kalen Hebert, MSN, RN, ACNS-BC, [facility]  Nurse Navigator, [place] [phone]
Admission

## 2024-08-27 NOTE — ED NOTES
Assumed care of pt via EMS stretcher. Pt c/o R thigh pain onset today. Patient reports PMH of DVT and states she is afraid she has a DVT again. Pt denies chest pain and shortness of breath. Pt placed on cardiac monitor x3. VSS. Pt in position of comfort with call bell within reach and no signs of acute distress noted. Otc Regimen: Eucerin face wash and moisturizer Detail Level: Simple Continue Regimen: Aveidaoxia  gel Plan: She had a hysterectomy. Initiate Treatment: spironolactone 25 mg tablet \\nQuantity: 60.0 Tablet  Days Supply: 30\\nSig: Take 2 po once a day